# Patient Record
Sex: MALE | Race: WHITE | Employment: OTHER | URBAN - METROPOLITAN AREA
[De-identification: names, ages, dates, MRNs, and addresses within clinical notes are randomized per-mention and may not be internally consistent; named-entity substitution may affect disease eponyms.]

---

## 2017-05-03 PROBLEM — I82.409 DVT (DEEP VENOUS THROMBOSIS) (HCC): Status: ACTIVE | Noted: 2017-05-03

## 2017-05-03 PROBLEM — I63.9 CVA (CEREBRAL VASCULAR ACCIDENT) (HCC): Status: ACTIVE | Noted: 2017-05-03

## 2017-05-03 PROBLEM — I26.99 PULMONARY EMBOLISM (HCC): Status: ACTIVE | Noted: 2017-05-03

## 2017-05-03 PROBLEM — R13.10 DYSPHAGIA: Status: ACTIVE | Noted: 2017-05-03

## 2017-05-23 ENCOUNTER — HOSPITAL ENCOUNTER (OUTPATIENT)
Dept: OCCUPATIONAL THERAPY | Age: 62
Discharge: HOME | End: 2017-05-31
Admitting: PHYSICAL MEDICINE & REHABILITATION

## 2017-05-23 ASSESSMENT — 9 HOLE PEG TEST
TEST_RESULT: IMPAIRED
TESTTIME_SECONDS: 52
TESTTIME_SECONDS: 66
TEST_RESULT: IMPAIRED

## 2017-05-25 ENCOUNTER — HOSPITAL ENCOUNTER (OUTPATIENT)
Dept: SPEECH THERAPY | Age: 62
Discharge: HOME | End: 2017-05-31
Admitting: PHYSICAL MEDICINE & REHABILITATION

## 2017-05-25 ENCOUNTER — HOSPITAL ENCOUNTER (OUTPATIENT)
Dept: PHYSICAL THERAPY | Age: 62
Discharge: HOME | End: 2017-05-31
Admitting: PHYSICAL MEDICINE & REHABILITATION

## 2017-06-06 ENCOUNTER — TELEPHONE (OUTPATIENT)
Dept: FAMILY MEDICINE CLINIC | Age: 62
End: 2017-06-06

## 2017-06-06 ENCOUNTER — OFFICE VISIT (OUTPATIENT)
Dept: FAMILY MEDICINE CLINIC | Age: 62
End: 2017-06-06

## 2017-06-06 VITALS
DIASTOLIC BLOOD PRESSURE: 94 MMHG | HEART RATE: 78 BPM | BODY MASS INDEX: 39.37 KG/M2 | OXYGEN SATURATION: 96 % | SYSTOLIC BLOOD PRESSURE: 130 MMHG | WEIGHT: 315 LBS

## 2017-06-06 DIAGNOSIS — I63.9 CEREBROVASCULAR ACCIDENT (CVA), UNSPECIFIED MECHANISM (HCC): Primary | ICD-10-CM

## 2017-06-06 DIAGNOSIS — I26.99 OTHER ACUTE PULMONARY EMBOLISM WITHOUT ACUTE COR PULMONALE (HCC): ICD-10-CM

## 2017-06-06 DIAGNOSIS — I73.9 PERIPHERAL VASCULAR DISEASE (HCC): ICD-10-CM

## 2017-06-06 DIAGNOSIS — Z79.01 LONG TERM CURRENT USE OF ANTICOAGULANT: ICD-10-CM

## 2017-06-06 DIAGNOSIS — E11.9 TYPE 2 DIABETES MELLITUS WITHOUT COMPLICATION, WITHOUT LONG-TERM CURRENT USE OF INSULIN (HCC): ICD-10-CM

## 2017-06-06 DIAGNOSIS — R13.0 APHAGIA: ICD-10-CM

## 2017-06-06 DIAGNOSIS — I82.409 ACUTE DEEP VEIN THROMBOSIS (DVT) OF LOWER EXTREMITY, UNSPECIFIED LATERALITY, UNSPECIFIED VEIN (HCC): ICD-10-CM

## 2017-06-06 LAB
INTERNATIONAL NORMALIZATION RATIO, POC: 1.2
PROTHROMBIN TIME, POC: NORMAL

## 2017-06-06 PROCEDURE — 99204 OFFICE O/P NEW MOD 45 MIN: CPT | Performed by: PHYSICIAN ASSISTANT

## 2017-06-06 PROCEDURE — 85610 PROTHROMBIN TIME: CPT | Performed by: PHYSICIAN ASSISTANT

## 2017-06-06 ASSESSMENT — ENCOUNTER SYMPTOMS
CONSTIPATION: 0
SORE THROAT: 0
BACK PAIN: 0
TROUBLE SWALLOWING: 0
CHEST TIGHTNESS: 0
COUGH: 0
DIARRHEA: 0
VOICE CHANGE: 0
EYE PAIN: 0
SHORTNESS OF BREATH: 1
ABDOMINAL PAIN: 0

## 2017-06-07 ENCOUNTER — HOSPITAL ENCOUNTER (OUTPATIENT)
Dept: PHYSICAL THERAPY | Age: 62
Discharge: HOME | End: 2017-06-07
Admitting: PHYSICAL MEDICINE & REHABILITATION

## 2017-06-07 ENCOUNTER — TELEPHONE (OUTPATIENT)
Dept: FAMILY MEDICINE CLINIC | Age: 62
End: 2017-06-07

## 2017-06-13 ENCOUNTER — TELEPHONE (OUTPATIENT)
Dept: FAMILY MEDICINE CLINIC | Age: 62
End: 2017-06-13

## 2017-06-13 RX ORDER — WARFARIN SODIUM 4 MG/1
TABLET ORAL
Qty: 60 TABLET | Refills: 5 | Status: SHIPPED | OUTPATIENT
Start: 2017-06-13 | End: 2017-09-27 | Stop reason: SDUPTHER

## 2017-06-14 ENCOUNTER — TELEPHONE (OUTPATIENT)
Dept: FAMILY MEDICINE CLINIC | Age: 62
End: 2017-06-14

## 2017-06-14 ENCOUNTER — HOSPITAL ENCOUNTER (OUTPATIENT)
Dept: PHYSICAL THERAPY | Age: 62
Discharge: HOME | End: 2017-06-14
Admitting: PHYSICAL MEDICINE & REHABILITATION

## 2017-06-15 ENCOUNTER — ANTI-COAG VISIT (OUTPATIENT)
Dept: FAMILY MEDICINE CLINIC | Age: 62
End: 2017-06-15

## 2017-06-15 RX ORDER — BLOOD-GLUCOSE METER
1 KIT MISCELLANEOUS DAILY PRN
Qty: 1 KIT | Refills: 0 | Status: SHIPPED | OUTPATIENT
Start: 2017-06-15 | End: 2017-12-21

## 2017-06-15 RX ORDER — LANCETS 28 GAUGE
1 EACH MISCELLANEOUS DAILY
Qty: 100 EACH | Refills: 3 | Status: SHIPPED | OUTPATIENT
Start: 2017-06-15 | End: 2017-12-21 | Stop reason: CLARIF

## 2017-06-21 ENCOUNTER — OFFICE VISIT (OUTPATIENT)
Dept: FAMILY MEDICINE CLINIC | Age: 62
End: 2017-06-21

## 2017-06-21 VITALS
BODY MASS INDEX: 38.75 KG/M2 | DIASTOLIC BLOOD PRESSURE: 80 MMHG | WEIGHT: 310 LBS | OXYGEN SATURATION: 94 % | SYSTOLIC BLOOD PRESSURE: 110 MMHG | HEART RATE: 77 BPM

## 2017-06-21 DIAGNOSIS — I63.9 CEREBROVASCULAR ACCIDENT (CVA), UNSPECIFIED MECHANISM (HCC): ICD-10-CM

## 2017-06-21 DIAGNOSIS — Z79.01 LONG TERM (CURRENT) USE OF ANTICOAGULANTS: ICD-10-CM

## 2017-06-21 DIAGNOSIS — I26.99 OTHER ACUTE PULMONARY EMBOLISM WITHOUT ACUTE COR PULMONALE (HCC): ICD-10-CM

## 2017-06-21 DIAGNOSIS — E78.2 MIXED HYPERLIPIDEMIA: ICD-10-CM

## 2017-06-21 DIAGNOSIS — I10 ESSENTIAL HYPERTENSION: ICD-10-CM

## 2017-06-21 LAB
INTERNATIONAL NORMALIZATION RATIO, POC: 1.3
PROTHROMBIN TIME, POC: NORMAL

## 2017-06-21 PROCEDURE — 85610 PROTHROMBIN TIME: CPT | Performed by: PHYSICIAN ASSISTANT

## 2017-06-21 PROCEDURE — 99214 OFFICE O/P EST MOD 30 MIN: CPT | Performed by: PHYSICIAN ASSISTANT

## 2017-06-21 ASSESSMENT — ENCOUNTER SYMPTOMS
ABDOMINAL PAIN: 0
COUGH: 0
BACK PAIN: 0
SHORTNESS OF BREATH: 0

## 2017-07-01 ENCOUNTER — HOSPITAL ENCOUNTER (OUTPATIENT)
Dept: OTHER | Age: 62
Discharge: HOME | End: 2017-07-31
Attending: PHYSICAL MEDICINE & REHABILITATION | Admitting: PHYSICAL MEDICINE & REHABILITATION

## 2017-07-01 ENCOUNTER — HOSPITAL ENCOUNTER (OUTPATIENT)
Dept: OTHER | Age: 62
Discharge: HOME | End: 2017-07-21
Attending: PHYSICAL MEDICINE & REHABILITATION | Admitting: PHYSICAL MEDICINE & REHABILITATION

## 2017-07-05 ENCOUNTER — TELEPHONE (OUTPATIENT)
Dept: FAMILY MEDICINE CLINIC | Age: 62
End: 2017-07-05

## 2017-07-06 RX ORDER — ATORVASTATIN CALCIUM 40 MG/1
40 TABLET, FILM COATED ORAL NIGHTLY
Qty: 30 TABLET | Refills: 3 | Status: SHIPPED | OUTPATIENT
Start: 2017-07-06 | End: 2017-12-19 | Stop reason: SDUPTHER

## 2017-07-07 ENCOUNTER — TELEPHONE (OUTPATIENT)
Dept: FAMILY MEDICINE CLINIC | Age: 62
End: 2017-07-07

## 2017-07-11 ENCOUNTER — ANTI-COAG VISIT (OUTPATIENT)
Dept: FAMILY MEDICINE CLINIC | Age: 62
End: 2017-07-11

## 2017-07-11 RX ORDER — WARFARIN SODIUM 5 MG/1
TABLET ORAL
Qty: 100 TABLET | Refills: 3 | Status: SHIPPED | OUTPATIENT
Start: 2017-07-11 | End: 2017-08-01 | Stop reason: SDUPTHER

## 2017-07-17 ENCOUNTER — HOSPITAL ENCOUNTER (OUTPATIENT)
Dept: DIABETES SERVICES | Age: 62
Discharge: HOME | End: 2017-07-31
Admitting: PHYSICIAN ASSISTANT

## 2017-07-17 DIAGNOSIS — E11.9 TYPE 2 DIABETES MELLITUS WITHOUT COMPLICATIONS (HCC): ICD-10-CM

## 2017-07-17 ASSESSMENT — PATIENT HEALTH QUESTIONNAIRE - PHQ9
1. LITTLE INTEREST OR PLEASURE IN DOING THINGS: 0
SUM OF ALL RESPONSES TO PHQ9 QUESTIONS 1 & 2: 0
2. FEELING DOWN, DEPRESSED OR HOPELESS: 0
SUM OF ALL RESPONSES TO PHQ QUESTIONS 1-9: 0

## 2017-07-20 ENCOUNTER — ANTI-COAG VISIT (OUTPATIENT)
Dept: FAMILY MEDICINE CLINIC | Age: 62
End: 2017-07-20

## 2017-07-21 ENCOUNTER — OFFICE VISIT (OUTPATIENT)
Dept: FAMILY MEDICINE CLINIC | Age: 62
End: 2017-07-21

## 2017-07-21 VITALS
SYSTOLIC BLOOD PRESSURE: 120 MMHG | BODY MASS INDEX: 37.77 KG/M2 | WEIGHT: 302.2 LBS | DIASTOLIC BLOOD PRESSURE: 78 MMHG | HEART RATE: 60 BPM | OXYGEN SATURATION: 96 %

## 2017-07-21 DIAGNOSIS — Z79.01 LONG TERM (CURRENT) USE OF ANTICOAGULANTS: ICD-10-CM

## 2017-07-21 DIAGNOSIS — I26.99 OTHER ACUTE PULMONARY EMBOLISM WITHOUT ACUTE COR PULMONALE (HCC): ICD-10-CM

## 2017-07-21 DIAGNOSIS — I63.9 CEREBROVASCULAR ACCIDENT (CVA), UNSPECIFIED MECHANISM (HCC): ICD-10-CM

## 2017-07-21 PROBLEM — R13.10 DYSPHAGIA: Status: RESOLVED | Noted: 2017-05-03 | Resolved: 2017-07-21

## 2017-07-21 PROCEDURE — 99214 OFFICE O/P EST MOD 30 MIN: CPT | Performed by: PHYSICIAN ASSISTANT

## 2017-07-21 ASSESSMENT — ENCOUNTER SYMPTOMS
BACK PAIN: 0
COUGH: 0
SHORTNESS OF BREATH: 0
ABDOMINAL PAIN: 0

## 2017-07-25 ENCOUNTER — OFFICE VISIT (OUTPATIENT)
Dept: NEUROLOGY | Age: 62
End: 2017-07-25

## 2017-07-25 VITALS
BODY MASS INDEX: 38.5 KG/M2 | WEIGHT: 300 LBS | HEIGHT: 74 IN | SYSTOLIC BLOOD PRESSURE: 144 MMHG | HEART RATE: 70 BPM | DIASTOLIC BLOOD PRESSURE: 86 MMHG

## 2017-07-25 DIAGNOSIS — H53.461 RIGHT HOMONYMOUS HEMIANOPSIA: Primary | ICD-10-CM

## 2017-07-25 DIAGNOSIS — I69.351 HEMIPARESIS AFFECTING RIGHT SIDE AS LATE EFFECT OF CEREBROVASCULAR ACCIDENT (HCC): ICD-10-CM

## 2017-07-25 DIAGNOSIS — I65.22 INTERNAL CAROTID ARTERY OCCLUSION, LEFT: ICD-10-CM

## 2017-07-25 PROCEDURE — 99245 OFF/OP CONSLTJ NEW/EST HI 55: CPT | Performed by: PSYCHIATRY & NEUROLOGY

## 2017-08-01 ENCOUNTER — TELEPHONE (OUTPATIENT)
Dept: FAMILY MEDICINE CLINIC | Age: 62
End: 2017-08-01

## 2017-08-01 RX ORDER — WARFARIN SODIUM 5 MG/1
TABLET ORAL
Qty: 100 TABLET | Refills: 3 | Status: SHIPPED | OUTPATIENT
Start: 2017-08-01 | End: 2017-11-21 | Stop reason: SDUPTHER

## 2017-08-07 ENCOUNTER — HOSPITAL ENCOUNTER (OUTPATIENT)
Dept: OTHER | Age: 62
Discharge: HOME | End: 2017-08-07
Attending: PHYSICIAN ASSISTANT | Admitting: PHYSICIAN ASSISTANT

## 2017-08-08 ENCOUNTER — ANTI-COAG VISIT (OUTPATIENT)
Dept: FAMILY MEDICINE CLINIC | Age: 62
End: 2017-08-08

## 2017-08-08 LAB
ESTIMATED AVERAGE GLUCOSE: 125.5 MG/DL
HBA1C MFR BLD: 6 %

## 2017-08-11 ENCOUNTER — OFFICE VISIT (OUTPATIENT)
Dept: VASCULAR SURGERY | Age: 62
End: 2017-08-11

## 2017-08-11 VITALS
SYSTOLIC BLOOD PRESSURE: 136 MMHG | DIASTOLIC BLOOD PRESSURE: 64 MMHG | HEIGHT: 74 IN | BODY MASS INDEX: 38.12 KG/M2 | WEIGHT: 297 LBS

## 2017-08-11 DIAGNOSIS — I69.351 HEMIPARESIS AFFECTING RIGHT SIDE AS LATE EFFECT OF CEREBROVASCULAR ACCIDENT (HCC): Primary | ICD-10-CM

## 2017-08-11 PROCEDURE — 99203 OFFICE O/P NEW LOW 30 MIN: CPT | Performed by: SURGERY

## 2017-08-30 ENCOUNTER — ANTI-COAG VISIT (OUTPATIENT)
Dept: FAMILY MEDICINE CLINIC | Age: 62
End: 2017-08-30

## 2017-09-01 ENCOUNTER — HOSPITAL ENCOUNTER (OUTPATIENT)
Dept: OTHER | Age: 62
Discharge: HOME | End: 2017-09-30
Attending: PHYSICAL MEDICINE & REHABILITATION | Admitting: PHYSICAL MEDICINE & REHABILITATION

## 2017-09-25 ENCOUNTER — TELEPHONE (OUTPATIENT)
Dept: FAMILY MEDICINE CLINIC | Age: 62
End: 2017-09-25

## 2017-09-27 ENCOUNTER — TELEPHONE (OUTPATIENT)
Dept: FAMILY MEDICINE CLINIC | Age: 62
End: 2017-09-27

## 2017-09-27 RX ORDER — LISINOPRIL 40 MG/1
40 TABLET ORAL DAILY
Qty: 30 TABLET | Refills: 3 | Status: SHIPPED | OUTPATIENT
Start: 2017-09-27 | End: 2018-01-22 | Stop reason: SDUPTHER

## 2017-09-27 RX ORDER — AMLODIPINE BESYLATE 5 MG/1
5 TABLET ORAL DAILY
Qty: 30 TABLET | Refills: 3 | Status: SHIPPED | OUTPATIENT
Start: 2017-09-27 | End: 2018-01-22 | Stop reason: SDUPTHER

## 2017-09-27 NOTE — TELEPHONE ENCOUNTER
amLODIPine (NORVASC) 5 MG tablet 30 tablet 3 5/17/2017       Sig - Route: Take 1 tablet by mouth daily - Oral           lisinopril (PRINIVIL;ZESTRIL) 40 MG tablet 30 tablet 3 5/17/2017       Sig - Route:  Take 1 tablet by mouth daily - Oral         [facility] in chart

## 2017-10-20 ENCOUNTER — OFFICE VISIT (OUTPATIENT)
Dept: FAMILY MEDICINE CLINIC | Age: 62
End: 2017-10-20

## 2017-10-20 VITALS
WEIGHT: 281 LBS | SYSTOLIC BLOOD PRESSURE: 130 MMHG | OXYGEN SATURATION: 96 % | HEIGHT: 74 IN | BODY MASS INDEX: 36.06 KG/M2 | DIASTOLIC BLOOD PRESSURE: 70 MMHG | HEART RATE: 68 BPM

## 2017-10-20 DIAGNOSIS — I10 ESSENTIAL HYPERTENSION: ICD-10-CM

## 2017-10-20 DIAGNOSIS — I63.9 CEREBROVASCULAR ACCIDENT (CVA), UNSPECIFIED MECHANISM (HCC): ICD-10-CM

## 2017-10-20 DIAGNOSIS — E78.2 MIXED HYPERLIPIDEMIA: Primary | ICD-10-CM

## 2017-10-20 DIAGNOSIS — E11.42 DIABETIC POLYNEUROPATHY ASSOCIATED WITH TYPE 2 DIABETES MELLITUS (HCC): ICD-10-CM

## 2017-10-20 PROCEDURE — G8427 DOCREV CUR MEDS BY ELIG CLIN: HCPCS | Performed by: PHYSICIAN ASSISTANT

## 2017-10-20 PROCEDURE — 3046F HEMOGLOBIN A1C LEVEL >9.0%: CPT | Performed by: PHYSICIAN ASSISTANT

## 2017-10-20 PROCEDURE — G8598 ASA/ANTIPLAT THER USED: HCPCS | Performed by: PHYSICIAN ASSISTANT

## 2017-10-20 PROCEDURE — 3017F COLORECTAL CA SCREEN DOC REV: CPT | Performed by: PHYSICIAN ASSISTANT

## 2017-10-20 PROCEDURE — 1036F TOBACCO NON-USER: CPT | Performed by: PHYSICIAN ASSISTANT

## 2017-10-20 PROCEDURE — 99214 OFFICE O/P EST MOD 30 MIN: CPT | Performed by: PHYSICIAN ASSISTANT

## 2017-10-20 PROCEDURE — G8417 CALC BMI ABV UP PARAM F/U: HCPCS | Performed by: PHYSICIAN ASSISTANT

## 2017-10-20 PROCEDURE — G8484 FLU IMMUNIZE NO ADMIN: HCPCS | Performed by: PHYSICIAN ASSISTANT

## 2017-10-20 RX ORDER — GABAPENTIN 300 MG/1
300 CAPSULE ORAL 3 TIMES DAILY
Qty: 90 CAPSULE | Refills: 3 | Status: SHIPPED | OUTPATIENT
Start: 2017-10-20 | End: 2018-01-09 | Stop reason: CLARIF

## 2017-10-20 ASSESSMENT — ENCOUNTER SYMPTOMS
COUGH: 0
TROUBLE SWALLOWING: 0
SHORTNESS OF BREATH: 0
CONSTIPATION: 0
ABDOMINAL PAIN: 0
BACK PAIN: 0
VOICE CHANGE: 0
EYE PAIN: 0
DIARRHEA: 0
CHEST TIGHTNESS: 0
SORE THROAT: 0

## 2017-10-20 NOTE — PROGRESS NOTES
Subjective:      Patient ID: Kelton Shabazz is a 63 y.o. male. HPI  Patient is here today for his 3 month follow up for his diabetes and HTN. He had a stroke back in the spring. He has been progressing well as far as physical therapy and OT. He was discharged due to meeting his goals. He refused to continue speech therapy. He has trouble understanding the therapist. His wife works with him at home. He is doing pretty well otherwise. His wife keeps a really close eye on his diet. He rarely uses his cane anymore unless he will be on uneven ground. Blood sugars are running good. FBS . BP Readings from Last 2 Encounters:   10/20/17 130/70   08/11/17 136/64     Hemoglobin A1C (%)   Date Value   08/07/2017 6.0              Tobacco use:  Patient  reports that he quit smoking about 5 months ago. His smoking use included Cigarettes. He smoked 1.00 pack per day. He quit smokeless tobacco use about 6 months ago. If Smoker - Cessation materials given? NA, not since April of 2017    Is Daily aspirin on medication list?:  No, coumadin    Diabetic retinal exam done? No   If yes, document in Health Maintenance. Monofilament placed on counter? yes    Shoes and socks removed? Yes    BP taken with correct size cuff? Yes   Repeated if > 140/90 NA     Is patient taking any medications for diabetes    Yes   If yes, see medication list    Is the patient reporting any side effects of diabetic medications? No    Microalbumin performed if applicable?   Yes      Is patient taking any over the counter medications    No   If yes, see medication list        Sodium (mmol/L)   Date Value   05/18/2017 145    BUN (mg/dL)   Date Value   05/18/2017 10    Glucose (mg/dL)   Date Value   05/18/2017 121 (H)      Potassium (mmol/L)   Date Value   05/18/2017 3.9    CREATININE (mg/dL)   Date Value   05/18/2017 0.7 (L)           BP Readings from Last 2 Encounters:   10/20/17 130/70   08/11/17 136/64       Is patient murmur heard. Pulses:       Dorsalis pedis pulses are 2+ on the right side, and 2+ on the left side. Pulmonary/Chest: Effort normal and breath sounds normal. No respiratory distress. He has no decreased breath sounds. Abdominal: Soft. Normal appearance and bowel sounds are normal. He exhibits no distension and no mass. There is no hepatosplenomegaly. There is no tenderness. There is no rigidity, no rebound, no guarding and no CVA tenderness. No hernia. Musculoskeletal: Normal range of motion. He exhibits no edema. Lymphadenopathy:     He has no cervical adenopathy. Neurological: He is alert and oriented to person, place, and time. He has normal reflexes. 12 point monofillament: can feel most but not some toes and some metatarsal heads but they are random   Skin:   Lower legs with venous insufficiency       Assessment:      Shaquille [place] was seen today for diabetes. Diagnoses and all orders for this visit:    Mixed hyperlipidemia    Uncontrolled type 2 diabetes mellitus without complication, without long-term current use of insulin ([address]  -      DIABETES FOOT EXAM    Essential hypertension    Cerebrovascular accident (CVA), unspecified mechanism ([address]    Diabetic polyneuropathy associated with type 2 diabetes mellitus ([address]  -     gabapentin (NEURONTIN) 300 MG capsule; Take 1 capsule by mouth 3 times daily    Other orders  -     Cancel: INFLUENZA, QUADV, 18-64 YRS, ID, PF, MICRO INJ, 0.1ML (FLUZONE QUADV, PF)             Plan:      Get flu shot at a pharmacy, get labs in 3 weeks, start neurontin, he did not want to but will try it, get eye exam, return in 3 months, all stable currently.

## 2017-10-27 ENCOUNTER — OFFICE VISIT (OUTPATIENT)
Dept: VASCULAR SURGERY | Age: 62
End: 2017-10-27

## 2017-10-27 VITALS
DIASTOLIC BLOOD PRESSURE: 70 MMHG | HEIGHT: 74 IN | WEIGHT: 281 LBS | SYSTOLIC BLOOD PRESSURE: 130 MMHG | BODY MASS INDEX: 36.06 KG/M2

## 2017-10-27 DIAGNOSIS — I82.409 ACUTE DEEP VEIN THROMBOSIS (DVT) OF LOWER EXTREMITY, UNSPECIFIED LATERALITY, UNSPECIFIED VEIN (HCC): Primary | ICD-10-CM

## 2017-10-27 PROCEDURE — G8427 DOCREV CUR MEDS BY ELIG CLIN: HCPCS | Performed by: SURGERY

## 2017-10-27 PROCEDURE — G8598 ASA/ANTIPLAT THER USED: HCPCS | Performed by: SURGERY

## 2017-10-27 PROCEDURE — 1036F TOBACCO NON-USER: CPT | Performed by: SURGERY

## 2017-10-27 PROCEDURE — G8417 CALC BMI ABV UP PARAM F/U: HCPCS | Performed by: SURGERY

## 2017-10-27 PROCEDURE — G8484 FLU IMMUNIZE NO ADMIN: HCPCS | Performed by: SURGERY

## 2017-10-27 PROCEDURE — 3017F COLORECTAL CA SCREEN DOC REV: CPT | Performed by: SURGERY

## 2017-10-27 PROCEDURE — 99212 OFFICE O/P EST SF 10 MIN: CPT | Performed by: SURGERY

## 2017-11-01 ENCOUNTER — HOSPITAL ENCOUNTER (OUTPATIENT)
Dept: OTHER | Age: 62
Discharge: HOME | End: 2017-11-30
Attending: PHYSICIAN ASSISTANT | Admitting: PHYSICIAN ASSISTANT

## 2017-11-01 ENCOUNTER — HOSPITAL ENCOUNTER (OUTPATIENT)
Dept: OTHER | Age: 62
Discharge: HOME | End: 2017-11-30
Attending: PHYSICAL MEDICINE & REHABILITATION | Admitting: PHYSICAL MEDICINE & REHABILITATION

## 2017-11-17 ENCOUNTER — HOSPITAL ENCOUNTER (OUTPATIENT)
Dept: OTHER | Age: 62
Discharge: HOME | End: 2017-11-17
Attending: PHYSICIAN ASSISTANT | Admitting: PHYSICIAN ASSISTANT

## 2017-11-17 LAB
A/G RATIO: 1.6 (ref 1.1–2.2)
ALBUMIN SERPL-MCNC: 4.3 G/DL (ref 3.4–5)
ALP BLD-CCNC: 71 U/L (ref 40–129)
ALT SERPL-CCNC: 17 U/L (ref 10–40)
ANION GAP SERPL CALCULATED.3IONS-SCNC: 16 MMOL/L (ref 3–16)
AST SERPL-CCNC: 16 U/L (ref 15–37)
BILIRUB SERPL-MCNC: 0.8 MG/DL (ref 0–1)
BUN BLDV-MCNC: 16 MG/DL (ref 7–20)
CALCIUM SERPL-MCNC: 9.4 MG/DL (ref 8.3–10.6)
CHLORIDE BLD-SCNC: 103 MMOL/L (ref 99–110)
CHOLESTEROL, TOTAL: 111 MG/DL (ref 0–199)
CO2: 28 MMOL/L (ref 21–32)
CREAT SERPL-MCNC: 0.7 MG/DL (ref 0.8–1.3)
GFR AFRICAN AMERICAN: >60
GFR NON-AFRICAN AMERICAN: >60
GLOBULIN: 2.7 G/DL
GLUCOSE BLD-MCNC: 115 MG/DL (ref 70–99)
HDLC SERPL-MCNC: 44 MG/DL (ref 40–60)
HEPATITIS C ANTIBODY INTERPRETATION: NORMAL
INR BLD: 2.31 (ref 0.85–1.15)
LDL CHOLESTEROL CALCULATED: 41 MG/DL
POTASSIUM SERPL-SCNC: 3.7 MMOL/L (ref 3.5–5.1)
PROSTATE SPECIFIC ANTIGEN: 1.12 NG/ML (ref 0–4)
PROTHROMBIN TIME: 26.1 SEC (ref 9.6–13)
SODIUM BLD-SCNC: 147 MMOL/L (ref 136–145)
TOTAL PROTEIN: 7 G/DL (ref 6.4–8.2)
TRIGL SERPL-MCNC: 128 MG/DL (ref 0–150)
VLDLC SERPL CALC-MCNC: 26 MG/DL

## 2017-11-18 ENCOUNTER — HOSPITAL ENCOUNTER (OUTPATIENT)
Dept: OTHER | Age: 62
Discharge: HOME | End: 2017-11-18
Attending: PHYSICIAN ASSISTANT | Admitting: PHYSICIAN ASSISTANT

## 2017-11-18 LAB
CREATININE URINE: 265.1 MG/DL (ref 39–259)
ESTIMATED AVERAGE GLUCOSE: 119.8 MG/DL
HBA1C MFR BLD: 5.8 %
MICROALBUMIN UR-MCNC: 4.2 MG/DL
MICROALBUMIN/CREAT UR-RTO: 15.8 MG/G (ref 0–30)

## 2017-11-20 LAB — HIV-1 AND HIV-2 ANTIBODIES: NORMAL

## 2017-11-21 ENCOUNTER — TELEPHONE (OUTPATIENT)
Dept: FAMILY MEDICINE CLINIC | Age: 62
End: 2017-11-21

## 2017-11-21 RX ORDER — WARFARIN SODIUM 5 MG/1
TABLET ORAL
Qty: 100 TABLET | Refills: 3 | Status: SHIPPED | OUTPATIENT
Start: 2017-11-21 | End: 2018-06-28 | Stop reason: SDUPTHER

## 2017-11-21 NOTE — TELEPHONE ENCOUNTER
Patient's wife requesting refills      metFORMIN (GLUCOPHAGE) 500 MG tablet 60 tablet 3 7/6/2017     Sig - Route:  Take 1 tablet by mouth 2 times daily (with meals) - Oral          warfarin (COUMADIN) 5 MG tablet      [address] in chart    Patient has stopped taking gabapentin (NEURONTIN) 300 MG capsule  Because it hurt his feet

## 2017-12-01 ENCOUNTER — HOSPITAL ENCOUNTER (OUTPATIENT)
Dept: OTHER | Age: 62
Discharge: HOME | End: 2017-12-31
Attending: PHYSICAL MEDICINE & REHABILITATION | Admitting: PHYSICAL MEDICINE & REHABILITATION

## 2017-12-19 ENCOUNTER — TELEPHONE (OUTPATIENT)
Dept: FAMILY MEDICINE CLINIC | Age: 62
End: 2017-12-19

## 2017-12-19 DIAGNOSIS — E78.2 MIXED HYPERLIPIDEMIA: Primary | ICD-10-CM

## 2017-12-19 RX ORDER — ATORVASTATIN CALCIUM 40 MG/1
40 TABLET, FILM COATED ORAL NIGHTLY
Qty: 30 TABLET | Refills: 3 | Status: SHIPPED | OUTPATIENT
Start: 2017-12-19 | End: 2018-05-01 | Stop reason: SDUPTHER

## 2017-12-21 ENCOUNTER — TELEPHONE (OUTPATIENT)
Dept: FAMILY MEDICINE CLINIC | Age: 62
End: 2017-12-21

## 2017-12-21 LAB
INR BLD: 2.99 (ref 0.85–1.15)
PROTHROMBIN TIME: 33.8 SEC (ref 9.6–13)

## 2017-12-21 RX ORDER — GLUCOSAMINE HCL/CHONDROITIN SU 500-400 MG
1 CAPSULE ORAL DAILY
Qty: 100 STRIP | Refills: 3 | Status: SHIPPED | OUTPATIENT
Start: 2017-12-21 | End: 2019-01-15 | Stop reason: SDUPTHER

## 2017-12-21 RX ORDER — LANCETS 30 GAUGE
EACH MISCELLANEOUS
Qty: 100 EACH | Refills: 3 | Status: SHIPPED | OUTPATIENT
Start: 2017-12-21 | End: 2019-01-15 | Stop reason: SDUPTHER

## 2017-12-21 NOTE — TELEPHONE ENCOUNTER
Pt's insurance changed formulary and he now needs a new glucometer    Please send a new script for a [employer] meter & supplies to     [facility]

## 2018-01-01 ENCOUNTER — HOSPITAL ENCOUNTER (OUTPATIENT)
Dept: OTHER | Age: 63
Discharge: HOME | End: 2018-01-31
Attending: PHYSICIAN ASSISTANT | Admitting: PHYSICIAN ASSISTANT

## 2018-01-09 ENCOUNTER — OFFICE VISIT (OUTPATIENT)
Dept: NEUROLOGY | Age: 63
End: 2018-01-09

## 2018-01-09 VITALS
HEIGHT: 74 IN | HEART RATE: 58 BPM | BODY MASS INDEX: 35.94 KG/M2 | WEIGHT: 280 LBS | SYSTOLIC BLOOD PRESSURE: 119 MMHG | DIASTOLIC BLOOD PRESSURE: 75 MMHG

## 2018-01-09 DIAGNOSIS — I69.351 HEMIPARESIS AFFECTING RIGHT SIDE AS LATE EFFECT OF CEREBROVASCULAR ACCIDENT (HCC): Primary | ICD-10-CM

## 2018-01-09 DIAGNOSIS — H53.461 RIGHT HOMONYMOUS HEMIANOPSIA: ICD-10-CM

## 2018-01-09 PROCEDURE — 3017F COLORECTAL CA SCREEN DOC REV: CPT | Performed by: PSYCHIATRY & NEUROLOGY

## 2018-01-09 PROCEDURE — G8484 FLU IMMUNIZE NO ADMIN: HCPCS | Performed by: PSYCHIATRY & NEUROLOGY

## 2018-01-09 PROCEDURE — 99213 OFFICE O/P EST LOW 20 MIN: CPT | Performed by: PSYCHIATRY & NEUROLOGY

## 2018-01-09 PROCEDURE — G8598 ASA/ANTIPLAT THER USED: HCPCS | Performed by: PSYCHIATRY & NEUROLOGY

## 2018-01-09 PROCEDURE — G8417 CALC BMI ABV UP PARAM F/U: HCPCS | Performed by: PSYCHIATRY & NEUROLOGY

## 2018-01-09 PROCEDURE — 1036F TOBACCO NON-USER: CPT | Performed by: PSYCHIATRY & NEUROLOGY

## 2018-01-09 PROCEDURE — G8427 DOCREV CUR MEDS BY ELIG CLIN: HCPCS | Performed by: PSYCHIATRY & NEUROLOGY

## 2018-01-09 NOTE — PATIENT INSTRUCTIONS
Please call with any questions or concerns:   [facility]  @ [phone]. LAB RESULTS:  Please obtain any labs or diagnostic tests as discussed today. You may call the office to check the results. Please allow  3 to 7 days for us to get these results. MEDICATION LIST:  Please bring an accurate list of your medications to every visit. APPOINTMENT CONFIRMATION:  We will call you the day before your scheduled appointment to confirm. If we are unable to reach you, you MUST call back by the end of the day to confirm the appointment or we may be forced to cancel.

## 2018-01-18 LAB
INR BLD: 3.77 (ref 0.85–1.15)
PROTHROMBIN TIME: 42.6 SEC (ref 9.6–13)

## 2018-01-19 ENCOUNTER — OFFICE VISIT (OUTPATIENT)
Dept: FAMILY MEDICINE CLINIC | Age: 63
End: 2018-01-19

## 2018-01-19 VITALS
BODY MASS INDEX: 36.06 KG/M2 | WEIGHT: 281 LBS | SYSTOLIC BLOOD PRESSURE: 124 MMHG | HEART RATE: 64 BPM | HEIGHT: 74 IN | OXYGEN SATURATION: 96 % | DIASTOLIC BLOOD PRESSURE: 74 MMHG

## 2018-01-19 DIAGNOSIS — E78.2 MIXED HYPERLIPIDEMIA: ICD-10-CM

## 2018-01-19 DIAGNOSIS — I10 ESSENTIAL HYPERTENSION: Primary | ICD-10-CM

## 2018-01-19 DIAGNOSIS — I69.351 HEMIPARESIS AFFECTING RIGHT SIDE AS LATE EFFECT OF CEREBROVASCULAR ACCIDENT (HCC): ICD-10-CM

## 2018-01-19 DIAGNOSIS — E11.42 CONTROLLED TYPE 2 DIABETES MELLITUS WITH DIABETIC POLYNEUROPATHY, WITHOUT LONG-TERM CURRENT USE OF INSULIN (HCC): ICD-10-CM

## 2018-01-19 PROCEDURE — G8484 FLU IMMUNIZE NO ADMIN: HCPCS | Performed by: PHYSICIAN ASSISTANT

## 2018-01-19 PROCEDURE — 99214 OFFICE O/P EST MOD 30 MIN: CPT | Performed by: PHYSICIAN ASSISTANT

## 2018-01-19 PROCEDURE — G8598 ASA/ANTIPLAT THER USED: HCPCS | Performed by: PHYSICIAN ASSISTANT

## 2018-01-19 PROCEDURE — 3017F COLORECTAL CA SCREEN DOC REV: CPT | Performed by: PHYSICIAN ASSISTANT

## 2018-01-19 PROCEDURE — 1036F TOBACCO NON-USER: CPT | Performed by: PHYSICIAN ASSISTANT

## 2018-01-19 PROCEDURE — 3046F HEMOGLOBIN A1C LEVEL >9.0%: CPT | Performed by: PHYSICIAN ASSISTANT

## 2018-01-19 PROCEDURE — G8427 DOCREV CUR MEDS BY ELIG CLIN: HCPCS | Performed by: PHYSICIAN ASSISTANT

## 2018-01-19 PROCEDURE — G8417 CALC BMI ABV UP PARAM F/U: HCPCS | Performed by: PHYSICIAN ASSISTANT

## 2018-01-19 ASSESSMENT — ENCOUNTER SYMPTOMS
CONSTIPATION: 0
BACK PAIN: 0
SHORTNESS OF BREATH: 0
CHEST TIGHTNESS: 0
COUGH: 0
ABDOMINAL PAIN: 0
EYE PAIN: 0
TROUBLE SWALLOWING: 0
SORE THROAT: 0
DIARRHEA: 0
VOICE CHANGE: 0

## 2018-01-19 NOTE — PROGRESS NOTES
and visual disturbance. Respiratory: Negative for cough, chest tightness and shortness of breath. Cardiovascular: Negative for chest pain and palpitations. Gastrointestinal: Negative for abdominal pain, constipation and diarrhea. Genitourinary: Negative for difficulty urinating. Musculoskeletal: Negative for arthralgias, back pain, myalgias and neck pain. Skin: Negative for rash. Neurological: Positive for numbness (and pain in his feet). Negative for dizziness, speech difficulty, weakness and headaches. Hematological: Negative for adenopathy. Psychiatric/Behavioral: Negative for confusion and sleep disturbance. The patient is not nervous/anxious. Objective:   Physical Exam   Constitutional: He is oriented to person, place, and time. Vital signs are normal. He appears well-developed and well-nourished. He is cooperative. HENT:   Head: Normocephalic. Right Ear: Tympanic membrane and ear canal normal.   Left Ear: Tympanic membrane and ear canal normal.   Nose: Nose normal.   Mouth/Throat: Uvula is midline, oropharynx is clear and moist and mucous membranes are normal.   Eyes: Conjunctivae are normal. Pupils are equal, round, and reactive to light. Neck: Neck supple. No thyromegaly present. Cardiovascular: Normal rate, regular rhythm and normal heart sounds. No murmur heard. Pulses:       Dorsalis pedis pulses are 2+ on the right side, and 2+ on the left side. Pulmonary/Chest: Effort normal and breath sounds normal. No respiratory distress. He has no decreased breath sounds. Abdominal: Soft. Normal appearance and bowel sounds are normal. He exhibits no distension and no mass. There is no hepatosplenomegaly. There is no tenderness. There is no rigidity, no rebound, no guarding and no CVA tenderness. No hernia. Musculoskeletal: Normal range of motion. He exhibits no edema.    Good strength in upper and lower extremities, gait steady   Lymphadenopathy:     He has no cervical adenopathy. Neurological: He is alert and oriented to person, place, and time. He has normal reflexes. Skin: Skin is warm, dry and intact. Psychiatric: He has a normal mood and affect. Speech still not normal all the time, sometimes he comprehends and answers appropriately, sometimes not       Assessment:      Carlos Lyon was seen today for diabetes. Diagnoses and all orders for this visit:    Essential hypertension    Hemiparesis affecting right side as late effect of cerebrovascular accident [facility])    Mixed hyperlipidemia    Controlled type 2 diabetes mellitus with diabetic polyneuropathy, without long-term current use of insulin ([facility]             Plan:      All stable, get labs in a month, repeat protime in a week. Return in 6 months as long as labs are stable.

## 2018-01-22 ENCOUNTER — TELEPHONE (OUTPATIENT)
Dept: FAMILY MEDICINE CLINIC | Age: 63
End: 2018-01-22

## 2018-01-22 DIAGNOSIS — I10 ESSENTIAL HYPERTENSION: Primary | ICD-10-CM

## 2018-01-22 DIAGNOSIS — E78.2 MIXED HYPERLIPIDEMIA: ICD-10-CM

## 2018-01-22 RX ORDER — AMLODIPINE BESYLATE 5 MG/1
5 TABLET ORAL DAILY
Qty: 30 TABLET | Refills: 3 | Status: SHIPPED | OUTPATIENT
Start: 2018-01-22 | End: 2018-05-21 | Stop reason: SDUPTHER

## 2018-01-22 RX ORDER — LISINOPRIL 40 MG/1
40 TABLET ORAL DAILY
Qty: 30 TABLET | Refills: 3 | Status: SHIPPED | OUTPATIENT
Start: 2018-01-22 | End: 2018-05-21 | Stop reason: SDUPTHER

## 2018-01-22 NOTE — TELEPHONE ENCOUNTER
amLODIPine (NORVASC) 5 MG tablet 30 tablet 3 9/27/2017     Sig - Route: Take 1 tablet by mouth daily - Oral      lisinopril (PRINIVIL;ZESTRIL) 40 MG tablet 30 tablet 3 9/27/2017     Sig - Route:  Take 1 tablet by mouth daily - Oral      Pharmacy:  [facility], [address] [phone]   (in chart)

## 2018-01-25 ENCOUNTER — ANTI-COAG VISIT (OUTPATIENT)
Dept: FAMILY MEDICINE CLINIC | Age: 63
End: 2018-01-25

## 2018-01-25 LAB
INR BLD: 1.47 (ref 0.85–1.15)
PROTHROMBIN TIME: 16.6 SEC (ref 9.6–13)

## 2018-02-01 ENCOUNTER — HOSPITAL ENCOUNTER (OUTPATIENT)
Dept: OTHER | Age: 63
Discharge: HOME | End: 2018-02-28
Attending: PHYSICIAN ASSISTANT | Admitting: PHYSICIAN ASSISTANT

## 2018-02-01 LAB
INR BLD: 2.79 (ref 0.85–1.15)
PROTHROMBIN TIME: 31.5 SEC (ref 9.6–13)

## 2018-02-08 LAB
INR BLD: 2.63 (ref 0.85–1.15)
PROTHROMBIN TIME: 29.7 SEC (ref 9.6–13)

## 2018-02-16 ENCOUNTER — HOSPITAL ENCOUNTER (OUTPATIENT)
Dept: OTHER | Age: 63
Discharge: HOME | End: 2018-02-16
Attending: PHYSICIAN ASSISTANT | Admitting: PHYSICIAN ASSISTANT

## 2018-02-16 LAB
A/G RATIO: 1.6 (ref 1.1–2.2)
ALBUMIN SERPL-MCNC: 4.1 G/DL (ref 3.4–5)
ALP BLD-CCNC: 59 U/L (ref 40–129)
ALT SERPL-CCNC: 17 U/L (ref 10–40)
ANION GAP SERPL CALCULATED.3IONS-SCNC: 11 MMOL/L (ref 3–16)
AST SERPL-CCNC: 16 U/L (ref 15–37)
BILIRUB SERPL-MCNC: 0.9 MG/DL (ref 0–1)
BUN BLDV-MCNC: 18 MG/DL (ref 7–20)
CALCIUM SERPL-MCNC: 9 MG/DL (ref 8.3–10.6)
CHLORIDE BLD-SCNC: 101 MMOL/L (ref 99–110)
CHOLESTEROL, TOTAL: 106 MG/DL (ref 0–199)
CO2: 30 MMOL/L (ref 21–32)
CREAT SERPL-MCNC: 0.8 MG/DL (ref 0.8–1.3)
CREATININE URINE: 248.2 MG/DL (ref 39–259)
ESTIMATED AVERAGE GLUCOSE: 116.9 MG/DL
GFR AFRICAN AMERICAN: >60
GFR NON-AFRICAN AMERICAN: >60
GLOBULIN: 2.6 G/DL
GLUCOSE BLD-MCNC: 99 MG/DL (ref 70–99)
HBA1C MFR BLD: 5.7 %
HDLC SERPL-MCNC: 45 MG/DL (ref 40–60)
LDL CHOLESTEROL CALCULATED: 47 MG/DL
MICROALBUMIN UR-MCNC: 1.9 MG/DL
MICROALBUMIN/CREAT UR-RTO: 7.7 MG/G (ref 0–30)
POTASSIUM SERPL-SCNC: 3.8 MMOL/L (ref 3.5–5.1)
SODIUM BLD-SCNC: 142 MMOL/L (ref 136–145)
TOTAL PROTEIN: 6.7 G/DL (ref 6.4–8.2)
TRIGL SERPL-MCNC: 68 MG/DL (ref 0–150)
VLDLC SERPL CALC-MCNC: 14 MG/DL

## 2018-02-22 ENCOUNTER — ANTI-COAG VISIT (OUTPATIENT)
Dept: FAMILY MEDICINE CLINIC | Age: 63
End: 2018-02-22

## 2018-02-22 LAB
INR BLD: 2.51 (ref 0.85–1.15)
PROTHROMBIN TIME: 28.4 SEC (ref 9.6–13)

## 2018-03-01 ENCOUNTER — HOSPITAL ENCOUNTER (OUTPATIENT)
Dept: OTHER | Age: 63
Discharge: HOME | End: 2018-03-31
Attending: PHYSICIAN ASSISTANT | Admitting: PHYSICIAN ASSISTANT

## 2018-03-15 LAB
INR BLD: 2.57 (ref 0.85–1.15)
PROTHROMBIN TIME: 29 SEC (ref 9.6–13)

## 2018-03-21 ENCOUNTER — TELEPHONE (OUTPATIENT)
Dept: FAMILY MEDICINE CLINIC | Age: 63
End: 2018-03-21

## 2018-03-21 NOTE — TELEPHONE ENCOUNTER
metFORMIN (GLUCOPHAGE) 500 MG tablet 60 tablet 3 11/21/2017     Sig - Route: TAKE 1 TABLET BY MOUTH 2 TIMES DAILY (WITH MEALS) - [address] [facility] Elly Ellis [address] [place] [address] [phone]

## 2018-04-01 ENCOUNTER — HOSPITAL ENCOUNTER (OUTPATIENT)
Dept: OTHER | Age: 63
Discharge: HOME | End: 2018-04-30
Attending: PHYSICIAN ASSISTANT | Admitting: PHYSICIAN ASSISTANT

## 2018-04-10 LAB
INR BLD: 2.61 (ref 0.85–1.15)
PROTHROMBIN TIME: 29.5 SEC (ref 9.6–13)

## 2018-05-01 ENCOUNTER — HOSPITAL ENCOUNTER (OUTPATIENT)
Dept: OTHER | Age: 63
Discharge: HOME | End: 2018-05-31
Attending: PHYSICIAN ASSISTANT | Admitting: PHYSICIAN ASSISTANT

## 2018-05-01 DIAGNOSIS — E78.2 MIXED HYPERLIPIDEMIA: ICD-10-CM

## 2018-05-01 RX ORDER — ATORVASTATIN CALCIUM 40 MG/1
40 TABLET, FILM COATED ORAL NIGHTLY
Qty: 30 TABLET | Refills: 2 | Status: SHIPPED | OUTPATIENT
Start: 2018-05-01 | End: 2018-07-23 | Stop reason: SDUPTHER

## 2018-05-08 ENCOUNTER — ANTI-COAG VISIT (OUTPATIENT)
Dept: FAMILY MEDICINE CLINIC | Age: 63
End: 2018-05-08

## 2018-05-08 LAB
INR BLD: 2.84 (ref 0.85–1.15)
PROTHROMBIN TIME: 32.1 SEC (ref 9.6–13)

## 2018-05-21 ENCOUNTER — TELEPHONE (OUTPATIENT)
Dept: FAMILY MEDICINE CLINIC | Age: 63
End: 2018-05-21

## 2018-05-21 DIAGNOSIS — E78.2 MIXED HYPERLIPIDEMIA: ICD-10-CM

## 2018-05-21 DIAGNOSIS — I10 ESSENTIAL HYPERTENSION: ICD-10-CM

## 2018-05-21 RX ORDER — LISINOPRIL 40 MG/1
40 TABLET ORAL DAILY
Qty: 30 TABLET | Refills: 3 | Status: SHIPPED | OUTPATIENT
Start: 2018-05-21 | End: 2018-09-18 | Stop reason: SDUPTHER

## 2018-05-21 RX ORDER — AMLODIPINE BESYLATE 5 MG/1
5 TABLET ORAL DAILY
Qty: 30 TABLET | Refills: 3 | Status: SHIPPED | OUTPATIENT
Start: 2018-05-21 | End: 2018-09-18 | Stop reason: SDUPTHER

## 2018-06-01 ENCOUNTER — HOSPITAL ENCOUNTER (OUTPATIENT)
Dept: OTHER | Age: 63
Discharge: HOME | End: 2018-06-30
Attending: PHYSICIAN ASSISTANT | Admitting: PHYSICIAN ASSISTANT

## 2018-06-04 LAB
INR BLD: 2.02 (ref 0.86–1.14)
PROTHROMBIN TIME: 23 SEC (ref 9.8–13)

## 2018-06-12 LAB
INR BLD: 1.9 (ref 0.86–1.14)
PROTHROMBIN TIME: 21.7 SEC (ref 9.8–13)

## 2018-06-28 LAB
INR BLD: 2.12 (ref 0.86–1.14)
PROTHROMBIN TIME: 24.2 SEC (ref 9.8–13)

## 2018-06-28 RX ORDER — WARFARIN SODIUM 5 MG/1
TABLET ORAL
Qty: 100 TABLET | Refills: 3 | Status: SHIPPED | OUTPATIENT
Start: 2018-06-28 | End: 2019-02-15 | Stop reason: SDUPTHER

## 2018-07-01 ENCOUNTER — HOSPITAL ENCOUNTER (OUTPATIENT)
Dept: OTHER | Age: 63
Discharge: HOME | End: 2018-07-31
Attending: PHYSICIAN ASSISTANT | Admitting: PHYSICIAN ASSISTANT

## 2018-07-12 LAB
INR BLD: 2.31 (ref 0.86–1.14)
PROTHROMBIN TIME: 26.3 SEC (ref 9.8–13)

## 2018-07-13 ENCOUNTER — OFFICE VISIT (OUTPATIENT)
Dept: FAMILY MEDICINE CLINIC | Age: 63
End: 2018-07-13

## 2018-07-13 VITALS
SYSTOLIC BLOOD PRESSURE: 108 MMHG | OXYGEN SATURATION: 97 % | WEIGHT: 272 LBS | HEART RATE: 70 BPM | HEIGHT: 72 IN | BODY MASS INDEX: 36.84 KG/M2 | DIASTOLIC BLOOD PRESSURE: 64 MMHG

## 2018-07-13 DIAGNOSIS — R13.0 APHAGIA: ICD-10-CM

## 2018-07-13 DIAGNOSIS — E78.2 MIXED HYPERLIPIDEMIA: ICD-10-CM

## 2018-07-13 DIAGNOSIS — E11.42 CONTROLLED TYPE 2 DIABETES MELLITUS WITH DIABETIC POLYNEUROPATHY, WITHOUT LONG-TERM CURRENT USE OF INSULIN (HCC): Primary | ICD-10-CM

## 2018-07-13 DIAGNOSIS — I69.351 HEMIPARESIS AFFECTING RIGHT SIDE AS LATE EFFECT OF CEREBROVASCULAR ACCIDENT (HCC): ICD-10-CM

## 2018-07-13 DIAGNOSIS — I10 ESSENTIAL HYPERTENSION: ICD-10-CM

## 2018-07-13 PROCEDURE — 1036F TOBACCO NON-USER: CPT | Performed by: PHYSICIAN ASSISTANT

## 2018-07-13 PROCEDURE — G8417 CALC BMI ABV UP PARAM F/U: HCPCS | Performed by: PHYSICIAN ASSISTANT

## 2018-07-13 PROCEDURE — 2022F DILAT RTA XM EVC RTNOPTHY: CPT | Performed by: PHYSICIAN ASSISTANT

## 2018-07-13 PROCEDURE — G8598 ASA/ANTIPLAT THER USED: HCPCS | Performed by: PHYSICIAN ASSISTANT

## 2018-07-13 PROCEDURE — 99214 OFFICE O/P EST MOD 30 MIN: CPT | Performed by: PHYSICIAN ASSISTANT

## 2018-07-13 PROCEDURE — G8427 DOCREV CUR MEDS BY ELIG CLIN: HCPCS | Performed by: PHYSICIAN ASSISTANT

## 2018-07-13 PROCEDURE — 3017F COLORECTAL CA SCREEN DOC REV: CPT | Performed by: PHYSICIAN ASSISTANT

## 2018-07-13 PROCEDURE — 3044F HG A1C LEVEL LT 7.0%: CPT | Performed by: PHYSICIAN ASSISTANT

## 2018-07-13 ASSESSMENT — ENCOUNTER SYMPTOMS
ABDOMINAL PAIN: 0
EYE PAIN: 0
CONSTIPATION: 0
SORE THROAT: 0
TROUBLE SWALLOWING: 0
CHEST TIGHTNESS: 0
SHORTNESS OF BREATH: 0
DIARRHEA: 0
COUGH: 0
VOICE CHANGE: 0
BACK PAIN: 0

## 2018-07-13 NOTE — PROGRESS NOTES
Subjective:      Patient ID: Ruslan Mcnamara is a 63 y.o. male. HPI  Patient is here today for his 6 month follow up on his chronic conditions. He has no complaints today. He is still having trouble with his speech since his stroke. He was not making progress at therapy and just stopped going. Seems like he understands what is said to him but can't process it and say what he wants or what makes sense. BS are running great, . He is sleeping well. He has a good appetite and watching what he eats. He has no bowel/bladder issues. He is due for an eye exam.     He only took neurontin for a few days, feet hurt worse so he stopped it. Sodium (mmol/L)   Date Value   02/16/2018 142    BUN (mg/dL)   Date Value   02/16/2018 18    Glucose (mg/dL)   Date Value   02/16/2018 99      Potassium (mmol/L)   Date Value   02/16/2018 3.8    CREATININE (mg/dL)   Date Value   02/16/2018 0.8           BP Readings from Last 2 Encounters:   07/13/18 108/64   01/19/18 124/74       Is patient currently taking any antihypertensive medications? Yes   If yes, see med list as above    Is the patient reporting any side effects of antihypertensive medications? No    Is the patient taking any over the counter medications? Yes   If yes, see med list as above    Is the patient taking a daily aspirin? No, coumadin      BP Readings from Last 2 Encounters:   07/13/18 108/64   01/19/18 124/74     Hemoglobin A1C (%)   Date Value   02/16/2018 5.7     Microalbumin, Random Urine (mg/dL)   Date Value   02/16/2018 1.90     LDL Calculated (mg/dL)   Date Value   02/16/2018 47              Tobacco use:  Patient  reports that he quit smoking about 13 months ago. His smoking use included Cigarettes. He smoked 1.00 pack per day. He quit smokeless tobacco use about 14 months ago. If Smoker - Cessation materials given? NA    Is Daily aspirin on medication list?:  No, coumadin    Diabetic retinal exam done?  Yes   If yes, document

## 2018-07-23 ENCOUNTER — TELEPHONE (OUTPATIENT)
Dept: FAMILY MEDICINE CLINIC | Age: 63
End: 2018-07-23

## 2018-07-23 DIAGNOSIS — E78.2 MIXED HYPERLIPIDEMIA: ICD-10-CM

## 2018-07-23 RX ORDER — ATORVASTATIN CALCIUM 40 MG/1
40 TABLET, FILM COATED ORAL NIGHTLY
Qty: 30 TABLET | Refills: 2 | Status: SHIPPED | OUTPATIENT
Start: 2018-07-23 | End: 2018-11-15 | Stop reason: SDUPTHER

## 2018-07-23 NOTE — TELEPHONE ENCOUNTER
metFORMIN (GLUCOPHAGE) 500 MG tablet 60 tablet 3 3/21/2018     Sig - Route: Take 1 tablet by mouth 2 times daily (with meals) - Oral      atorvastatin (LIPITOR) 40 MG tablet 30 tablet 2 5/1/2018     Sig - Route: TAKE 1 TABLET BY MOUTH NIGHTLY - Oral          [facility] in [address] is out of Office today. Pt has one tab of Metformin for today.

## 2018-07-31 LAB
A/G RATIO: 1.6 (ref 1.1–2.2)
ALBUMIN SERPL-MCNC: 4.4 G/DL (ref 3.4–5)
ALP BLD-CCNC: 63 U/L (ref 40–129)
ALT SERPL-CCNC: 22 U/L (ref 10–40)
ANION GAP SERPL CALCULATED.3IONS-SCNC: 17 MMOL/L (ref 3–16)
AST SERPL-CCNC: 16 U/L (ref 15–37)
BILIRUB SERPL-MCNC: 1 MG/DL (ref 0–1)
BUN BLDV-MCNC: 17 MG/DL (ref 7–20)
CALCIUM SERPL-MCNC: 9.8 MG/DL (ref 8.3–10.6)
CHLORIDE BLD-SCNC: 106 MMOL/L (ref 99–110)
CHOLESTEROL, TOTAL: 137 MG/DL (ref 0–199)
CO2: 26 MMOL/L (ref 21–32)
CREAT SERPL-MCNC: 1 MG/DL (ref 0.8–1.3)
CREATININE URINE: 281 MG/DL (ref 39–259)
GFR AFRICAN AMERICAN: >60
GFR NON-AFRICAN AMERICAN: >60
GLOBULIN: 2.7 G/DL
GLUCOSE BLD-MCNC: 107 MG/DL (ref 70–99)
HDLC SERPL-MCNC: 54 MG/DL (ref 40–60)
INR BLD: 3.11 (ref 0.86–1.14)
LDL CHOLESTEROL CALCULATED: 67 MG/DL
MICROALBUMIN UR-MCNC: 12.3 MG/DL
MICROALBUMIN/CREAT UR-RTO: 43.8 MG/G (ref 0–30)
POTASSIUM SERPL-SCNC: 4.1 MMOL/L (ref 3.5–5.1)
PROTHROMBIN TIME: 35.5 SEC (ref 9.8–13)
SODIUM BLD-SCNC: 149 MMOL/L (ref 136–145)
TOTAL PROTEIN: 7.1 G/DL (ref 6.4–8.2)
TRIGL SERPL-MCNC: 81 MG/DL (ref 0–150)
VLDLC SERPL CALC-MCNC: 16 MG/DL

## 2018-08-01 ENCOUNTER — HOSPITAL ENCOUNTER (OUTPATIENT)
Dept: OTHER | Age: 63
Discharge: HOME | End: 2018-08-31
Attending: PHYSICIAN ASSISTANT | Admitting: PHYSICIAN ASSISTANT

## 2018-08-01 LAB
ESTIMATED AVERAGE GLUCOSE: 111.2 MG/DL
HBA1C MFR BLD: 5.5 %

## 2018-08-14 ENCOUNTER — ANTI-COAG VISIT (OUTPATIENT)
Dept: FAMILY MEDICINE CLINIC | Age: 63
End: 2018-08-14

## 2018-08-14 LAB
INR BLD: 2.69 (ref 0.86–1.14)
PROTHROMBIN TIME: 30.7 SEC (ref 9.8–13)

## 2018-09-01 ENCOUNTER — HOSPITAL ENCOUNTER (OUTPATIENT)
Dept: OTHER | Age: 63
Discharge: HOME | End: 2018-09-01
Attending: PHYSICIAN ASSISTANT | Admitting: PHYSICIAN ASSISTANT

## 2018-09-11 ENCOUNTER — ANTI-COAG VISIT (OUTPATIENT)
Dept: FAMILY MEDICINE CLINIC | Age: 63
End: 2018-09-11

## 2018-09-11 LAB
INR BLD: 2.46 (ref 0.86–1.14)
PROTHROMBIN TIME: 28.1 SEC (ref 9.8–13)

## 2018-09-18 ENCOUNTER — TELEPHONE (OUTPATIENT)
Dept: FAMILY MEDICINE CLINIC | Age: 63
End: 2018-09-18

## 2018-09-18 DIAGNOSIS — E78.2 MIXED HYPERLIPIDEMIA: ICD-10-CM

## 2018-09-18 DIAGNOSIS — I10 ESSENTIAL HYPERTENSION: ICD-10-CM

## 2018-09-18 RX ORDER — LISINOPRIL 40 MG/1
40 TABLET ORAL DAILY
Qty: 30 TABLET | Refills: 3 | Status: SHIPPED | OUTPATIENT
Start: 2018-09-18 | End: 2019-01-15 | Stop reason: SDUPTHER

## 2018-09-18 RX ORDER — AMLODIPINE BESYLATE 5 MG/1
5 TABLET ORAL DAILY
Qty: 30 TABLET | Refills: 3 | Status: SHIPPED | OUTPATIENT
Start: 2018-09-18 | End: 2019-01-15 | Stop reason: SDUPTHER

## 2018-10-05 ENCOUNTER — HOSPITAL ENCOUNTER (OUTPATIENT)
Age: 63
Discharge: HOME | End: 2018-10-05
Payer: COMMERCIAL

## 2018-10-05 ENCOUNTER — ANTI-COAG VISIT (OUTPATIENT)
Dept: FAMILY MEDICINE CLINIC | Age: 63
End: 2018-10-05

## 2018-10-05 DIAGNOSIS — I82.409 ACUTE DEEP VEIN THROMBOSIS (DVT) OF LOWER EXTREMITY, UNSPECIFIED LATERALITY, UNSPECIFIED VEIN (HCC): Primary | ICD-10-CM

## 2018-10-05 LAB
INR BLD: 4.24 (ref 0.86–1.14)
PROTHROMBIN TIME: 48.3 SEC (ref 9.8–13)

## 2018-10-05 PROCEDURE — 85610 PROTHROMBIN TIME: CPT

## 2018-10-05 PROCEDURE — 36415 COLL VENOUS BLD VENIPUNCTURE: CPT

## 2018-10-08 ENCOUNTER — HOSPITAL ENCOUNTER (OUTPATIENT)
Age: 63
Discharge: HOME | End: 2018-10-08
Payer: COMMERCIAL

## 2018-10-08 DIAGNOSIS — I82.409 ACUTE DEEP VEIN THROMBOSIS (DVT) OF LOWER EXTREMITY, UNSPECIFIED LATERALITY, UNSPECIFIED VEIN (HCC): Primary | ICD-10-CM

## 2018-10-08 LAB
INR BLD: 1.6 (ref 0.86–1.14)
PROTHROMBIN TIME: 18.2 SEC (ref 9.8–13)

## 2018-10-08 PROCEDURE — 36415 COLL VENOUS BLD VENIPUNCTURE: CPT

## 2018-10-08 PROCEDURE — 85610 PROTHROMBIN TIME: CPT

## 2018-10-12 ENCOUNTER — ANTI-COAG VISIT (OUTPATIENT)
Dept: FAMILY MEDICINE CLINIC | Age: 63
End: 2018-10-12

## 2018-10-12 ENCOUNTER — HOSPITAL ENCOUNTER (OUTPATIENT)
Age: 63
Discharge: HOME | End: 2018-10-12
Payer: COMMERCIAL

## 2018-10-12 LAB
INR BLD: 2.19 (ref 0.86–1.14)
PROTHROMBIN TIME: 25 SEC (ref 9.8–13)

## 2018-10-12 PROCEDURE — 85610 PROTHROMBIN TIME: CPT

## 2018-10-12 PROCEDURE — 36415 COLL VENOUS BLD VENIPUNCTURE: CPT

## 2018-10-19 ENCOUNTER — HOSPITAL ENCOUNTER (OUTPATIENT)
Age: 63
Discharge: HOME | End: 2018-10-19
Payer: COMMERCIAL

## 2018-10-19 DIAGNOSIS — I82.409 ACUTE DEEP VEIN THROMBOSIS (DVT) OF LOWER EXTREMITY, UNSPECIFIED LATERALITY, UNSPECIFIED VEIN (HCC): Primary | ICD-10-CM

## 2018-10-19 LAB
INR BLD: 2.42 (ref 0.86–1.14)
PROTHROMBIN TIME: 27.6 SEC (ref 9.8–13)

## 2018-10-19 PROCEDURE — 36415 COLL VENOUS BLD VENIPUNCTURE: CPT

## 2018-10-19 PROCEDURE — 85610 PROTHROMBIN TIME: CPT

## 2018-11-02 ENCOUNTER — HOSPITAL ENCOUNTER (OUTPATIENT)
Age: 63
Discharge: HOME | End: 2018-11-02
Payer: COMMERCIAL

## 2018-11-02 ENCOUNTER — ANTI-COAG VISIT (OUTPATIENT)
Dept: FAMILY MEDICINE CLINIC | Age: 63
End: 2018-11-02

## 2018-11-02 LAB
INR BLD: 3.01 (ref 0.86–1.14)
PROTHROMBIN TIME: 34.3 SEC (ref 9.8–13)

## 2018-11-02 PROCEDURE — 85610 PROTHROMBIN TIME: CPT

## 2018-11-15 ENCOUNTER — TELEPHONE (OUTPATIENT)
Dept: FAMILY MEDICINE CLINIC | Age: 63
End: 2018-11-15

## 2018-11-15 DIAGNOSIS — E78.2 MIXED HYPERLIPIDEMIA: ICD-10-CM

## 2018-11-15 RX ORDER — ATORVASTATIN CALCIUM 40 MG/1
40 TABLET, FILM COATED ORAL NIGHTLY
Qty: 30 TABLET | Refills: 2 | Status: SHIPPED | OUTPATIENT
Start: 2018-11-15 | End: 2019-02-15 | Stop reason: SDUPTHER

## 2018-11-16 ENCOUNTER — ANTI-COAG VISIT (OUTPATIENT)
Dept: FAMILY MEDICINE CLINIC | Age: 63
End: 2018-11-16

## 2018-11-16 ENCOUNTER — HOSPITAL ENCOUNTER (OUTPATIENT)
Age: 63
Discharge: HOME | End: 2018-11-16
Payer: COMMERCIAL

## 2018-11-16 DIAGNOSIS — I82.409 ACUTE DEEP VEIN THROMBOSIS (DVT) OF LOWER EXTREMITY, UNSPECIFIED LATERALITY, UNSPECIFIED VEIN (HCC): Primary | ICD-10-CM

## 2018-11-16 LAB
INR BLD: 3.26 (ref 0.86–1.14)
PROTHROMBIN TIME: 37.2 SEC (ref 9.8–13)

## 2018-11-16 PROCEDURE — 85610 PROTHROMBIN TIME: CPT

## 2018-11-16 PROCEDURE — 36415 COLL VENOUS BLD VENIPUNCTURE: CPT

## 2018-11-30 ENCOUNTER — HOSPITAL ENCOUNTER (OUTPATIENT)
Age: 63
Discharge: HOME | End: 2018-11-30
Payer: COMMERCIAL

## 2018-11-30 ENCOUNTER — ANTI-COAG VISIT (OUTPATIENT)
Dept: FAMILY MEDICINE CLINIC | Age: 63
End: 2018-11-30

## 2018-11-30 LAB
INR BLD: 2.75 (ref 0.86–1.14)
PROTHROMBIN TIME: 31.3 SEC (ref 9.8–13)

## 2018-11-30 PROCEDURE — 85610 PROTHROMBIN TIME: CPT

## 2018-11-30 PROCEDURE — 36415 COLL VENOUS BLD VENIPUNCTURE: CPT

## 2018-12-03 DIAGNOSIS — I63.9 CEREBROVASCULAR ACCIDENT (CVA), UNSPECIFIED MECHANISM (HCC): Primary | ICD-10-CM

## 2018-12-07 ENCOUNTER — HOSPITAL ENCOUNTER (OUTPATIENT)
Age: 63
Discharge: HOME | End: 2018-12-07
Payer: COMMERCIAL

## 2018-12-07 ENCOUNTER — ANTI-COAG VISIT (OUTPATIENT)
Dept: FAMILY MEDICINE CLINIC | Age: 63
End: 2018-12-07

## 2018-12-07 DIAGNOSIS — I63.9 CEREBROVASCULAR ACCIDENT (CVA), UNSPECIFIED MECHANISM (HCC): Primary | ICD-10-CM

## 2018-12-07 LAB
INR BLD: 3.04 (ref 0.86–1.14)
PROTHROMBIN TIME: 34.7 SEC (ref 9.8–13)

## 2018-12-07 PROCEDURE — 36415 COLL VENOUS BLD VENIPUNCTURE: CPT

## 2018-12-07 PROCEDURE — 85610 PROTHROMBIN TIME: CPT

## 2018-12-14 ENCOUNTER — HOSPITAL ENCOUNTER (OUTPATIENT)
Age: 63
Discharge: HOME | End: 2018-12-14
Payer: COMMERCIAL

## 2018-12-14 ENCOUNTER — ANTI-COAG VISIT (OUTPATIENT)
Dept: FAMILY MEDICINE CLINIC | Age: 63
End: 2018-12-14

## 2018-12-14 LAB
INR BLD: 2.85 (ref 0.86–1.14)
PROTHROMBIN TIME: 32.5 SEC (ref 9.8–13)

## 2018-12-14 PROCEDURE — 36415 COLL VENOUS BLD VENIPUNCTURE: CPT

## 2018-12-14 PROCEDURE — 85610 PROTHROMBIN TIME: CPT

## 2018-12-21 ENCOUNTER — TELEPHONE (OUTPATIENT)
Dept: FAMILY MEDICINE CLINIC | Age: 63
End: 2018-12-21

## 2018-12-21 NOTE — TELEPHONE ENCOUNTER
FYI    Patient wife wants Estefani Soto to know that patient    Was to have his lab done today but she can not    Take him because she has  The stomach flu    Says she cant even get out the house. The wife    Says she will probably take him to get his    Labs done Monday if she is up to it.     Please advise

## 2018-12-26 ENCOUNTER — HOSPITAL ENCOUNTER (OUTPATIENT)
Age: 63
Discharge: HOME | End: 2018-12-26
Payer: COMMERCIAL

## 2018-12-26 LAB
INR BLD: 3.95 (ref 0.86–1.14)
PROTHROMBIN TIME: 45 SEC (ref 9.8–13)

## 2018-12-26 PROCEDURE — 85610 PROTHROMBIN TIME: CPT

## 2018-12-26 PROCEDURE — 36415 COLL VENOUS BLD VENIPUNCTURE: CPT

## 2018-12-27 ENCOUNTER — ANTI-COAG VISIT (OUTPATIENT)
Dept: FAMILY MEDICINE CLINIC | Age: 63
End: 2018-12-27

## 2019-01-10 ENCOUNTER — HOSPITAL ENCOUNTER (OUTPATIENT)
Age: 64
Discharge: HOME | End: 2019-01-10
Payer: COMMERCIAL

## 2019-01-10 LAB
INR BLD: 2.68 (ref 0.86–1.14)
PROTHROMBIN TIME: 30.5 SEC (ref 9.8–13)

## 2019-01-10 PROCEDURE — 85610 PROTHROMBIN TIME: CPT

## 2019-01-10 PROCEDURE — 36415 COLL VENOUS BLD VENIPUNCTURE: CPT

## 2019-01-11 ENCOUNTER — OFFICE VISIT (OUTPATIENT)
Dept: FAMILY MEDICINE CLINIC | Age: 64
End: 2019-01-11
Payer: COMMERCIAL

## 2019-01-11 VITALS
HEART RATE: 85 BPM | BODY MASS INDEX: 36.62 KG/M2 | SYSTOLIC BLOOD PRESSURE: 106 MMHG | WEIGHT: 270 LBS | DIASTOLIC BLOOD PRESSURE: 58 MMHG | OXYGEN SATURATION: 98 %

## 2019-01-11 DIAGNOSIS — Z86.73 HISTORY OF CVA (CEREBROVASCULAR ACCIDENT): ICD-10-CM

## 2019-01-11 DIAGNOSIS — Z79.01 LONG TERM CURRENT USE OF ANTICOAGULANT THERAPY: ICD-10-CM

## 2019-01-11 DIAGNOSIS — Z12.5 SCREENING PSA (PROSTATE SPECIFIC ANTIGEN): ICD-10-CM

## 2019-01-11 DIAGNOSIS — Z12.11 SCREEN FOR COLON CANCER: ICD-10-CM

## 2019-01-11 DIAGNOSIS — I10 ESSENTIAL HYPERTENSION: ICD-10-CM

## 2019-01-11 DIAGNOSIS — E11.42 CONTROLLED TYPE 2 DIABETES MELLITUS WITH DIABETIC POLYNEUROPATHY, WITHOUT LONG-TERM CURRENT USE OF INSULIN (HCC): Primary | ICD-10-CM

## 2019-01-11 DIAGNOSIS — E78.2 MIXED HYPERLIPIDEMIA: ICD-10-CM

## 2019-01-11 PROCEDURE — 99214 OFFICE O/P EST MOD 30 MIN: CPT | Performed by: PHYSICIAN ASSISTANT

## 2019-01-11 PROCEDURE — G8484 FLU IMMUNIZE NO ADMIN: HCPCS | Performed by: PHYSICIAN ASSISTANT

## 2019-01-11 PROCEDURE — 3017F COLORECTAL CA SCREEN DOC REV: CPT | Performed by: PHYSICIAN ASSISTANT

## 2019-01-11 PROCEDURE — G8427 DOCREV CUR MEDS BY ELIG CLIN: HCPCS | Performed by: PHYSICIAN ASSISTANT

## 2019-01-11 PROCEDURE — 2022F DILAT RTA XM EVC RTNOPTHY: CPT | Performed by: PHYSICIAN ASSISTANT

## 2019-01-11 PROCEDURE — G8598 ASA/ANTIPLAT THER USED: HCPCS | Performed by: PHYSICIAN ASSISTANT

## 2019-01-11 PROCEDURE — 1036F TOBACCO NON-USER: CPT | Performed by: PHYSICIAN ASSISTANT

## 2019-01-11 PROCEDURE — G8417 CALC BMI ABV UP PARAM F/U: HCPCS | Performed by: PHYSICIAN ASSISTANT

## 2019-01-11 PROCEDURE — 3046F HEMOGLOBIN A1C LEVEL >9.0%: CPT | Performed by: PHYSICIAN ASSISTANT

## 2019-01-11 RX ORDER — AMOXICILLIN 500 MG
CAPSULE ORAL
COMMUNITY
End: 2020-01-10 | Stop reason: ALTCHOICE

## 2019-01-11 ASSESSMENT — PATIENT HEALTH QUESTIONNAIRE - PHQ9
SUM OF ALL RESPONSES TO PHQ QUESTIONS 1-9: 0
2. FEELING DOWN, DEPRESSED OR HOPELESS: 0
SUM OF ALL RESPONSES TO PHQ QUESTIONS 1-9: 0
1. LITTLE INTEREST OR PLEASURE IN DOING THINGS: 0
SUM OF ALL RESPONSES TO PHQ9 QUESTIONS 1 & 2: 0

## 2019-01-11 ASSESSMENT — ENCOUNTER SYMPTOMS
CHEST TIGHTNESS: 0
CONSTIPATION: 0
BACK PAIN: 0
SHORTNESS OF BREATH: 0
ABDOMINAL PAIN: 0
TROUBLE SWALLOWING: 0
SORE THROAT: 0
COUGH: 0
VOICE CHANGE: 0
EYE PAIN: 0
DIARRHEA: 0

## 2019-01-14 ENCOUNTER — HOSPITAL ENCOUNTER (OUTPATIENT)
Age: 64
Discharge: HOME | End: 2019-01-14
Payer: COMMERCIAL

## 2019-01-14 ENCOUNTER — TELEPHONE (OUTPATIENT)
Dept: FAMILY MEDICINE CLINIC | Age: 64
End: 2019-01-14

## 2019-01-14 LAB
A/G RATIO: 1.6 (ref 1.1–2.2)
ALBUMIN SERPL-MCNC: 4.2 G/DL (ref 3.4–5)
ALP BLD-CCNC: 58 U/L (ref 40–129)
ALT SERPL-CCNC: 28 U/L (ref 10–40)
ANION GAP SERPL CALCULATED.3IONS-SCNC: 11 MMOL/L (ref 3–16)
AST SERPL-CCNC: 18 U/L (ref 15–37)
BILIRUB SERPL-MCNC: 1.3 MG/DL (ref 0–1)
BUN BLDV-MCNC: 12 MG/DL (ref 7–20)
CALCIUM SERPL-MCNC: 9.4 MG/DL (ref 8.3–10.6)
CHLORIDE BLD-SCNC: 105 MMOL/L (ref 99–110)
CHOLESTEROL, TOTAL: 104 MG/DL (ref 0–199)
CO2: 28 MMOL/L (ref 21–32)
CREAT SERPL-MCNC: 0.7 MG/DL (ref 0.8–1.3)
CREATININE URINE: 86.2 MG/DL (ref 39–259)
GFR AFRICAN AMERICAN: >60
GFR NON-AFRICAN AMERICAN: >60
GLOBULIN: 2.7 G/DL
GLUCOSE BLD-MCNC: 96 MG/DL (ref 70–99)
HDLC SERPL-MCNC: 47 MG/DL (ref 40–60)
LDL CHOLESTEROL CALCULATED: 46 MG/DL
MICROALBUMIN UR-MCNC: 2.4 MG/DL
MICROALBUMIN/CREAT UR-RTO: 27.8 MG/G (ref 0–30)
POTASSIUM SERPL-SCNC: 4.1 MMOL/L (ref 3.5–5.1)
PROSTATE SPECIFIC ANTIGEN: 1.34 NG/ML (ref 0–4)
SODIUM BLD-SCNC: 144 MMOL/L (ref 136–145)
TOTAL PROTEIN: 6.9 G/DL (ref 6.4–8.2)
TRIGL SERPL-MCNC: 53 MG/DL (ref 0–150)
VLDLC SERPL CALC-MCNC: 11 MG/DL

## 2019-01-14 PROCEDURE — 84153 ASSAY OF PSA TOTAL: CPT

## 2019-01-14 PROCEDURE — 82043 UR ALBUMIN QUANTITATIVE: CPT

## 2019-01-14 PROCEDURE — 80053 COMPREHEN METABOLIC PANEL: CPT

## 2019-01-14 PROCEDURE — 36415 COLL VENOUS BLD VENIPUNCTURE: CPT

## 2019-01-14 PROCEDURE — 82570 ASSAY OF URINE CREATININE: CPT

## 2019-01-14 PROCEDURE — 83036 HEMOGLOBIN GLYCOSYLATED A1C: CPT

## 2019-01-14 PROCEDURE — 80061 LIPID PANEL: CPT

## 2019-01-15 DIAGNOSIS — E78.2 MIXED HYPERLIPIDEMIA: ICD-10-CM

## 2019-01-15 DIAGNOSIS — I10 ESSENTIAL HYPERTENSION: ICD-10-CM

## 2019-01-15 LAB
ESTIMATED AVERAGE GLUCOSE: 116.9 MG/DL
HBA1C MFR BLD: 5.7 %

## 2019-01-15 RX ORDER — GLUCOSAMINE HCL/CHONDROITIN SU 500-400 MG
1 CAPSULE ORAL DAILY
Qty: 100 STRIP | Refills: 3 | Status: SHIPPED | OUTPATIENT
Start: 2019-01-15 | End: 2020-03-03 | Stop reason: SDUPTHER

## 2019-01-15 RX ORDER — AMLODIPINE BESYLATE 5 MG/1
5 TABLET ORAL DAILY
Qty: 30 TABLET | Refills: 3 | Status: SHIPPED | OUTPATIENT
Start: 2019-01-15 | End: 2019-05-13 | Stop reason: SDUPTHER

## 2019-01-15 RX ORDER — LANCETS 30 GAUGE
EACH MISCELLANEOUS
Qty: 100 EACH | Refills: 3 | Status: SHIPPED | OUTPATIENT
Start: 2019-01-15 | End: 2020-03-03 | Stop reason: SDUPTHER

## 2019-01-15 RX ORDER — LISINOPRIL 40 MG/1
40 TABLET ORAL DAILY
Qty: 30 TABLET | Refills: 3 | Status: SHIPPED | OUTPATIENT
Start: 2019-01-15 | End: 2019-05-13 | Stop reason: SDUPTHER

## 2019-01-22 ENCOUNTER — HOSPITAL ENCOUNTER (OUTPATIENT)
Age: 64
Discharge: HOME | End: 2019-01-22
Payer: COMMERCIAL

## 2019-01-22 ENCOUNTER — OFFICE VISIT (OUTPATIENT)
Dept: NEUROLOGY | Age: 64
End: 2019-01-22
Payer: COMMERCIAL

## 2019-01-22 VITALS
WEIGHT: 268 LBS | HEART RATE: 50 BPM | HEIGHT: 74 IN | BODY MASS INDEX: 34.39 KG/M2 | DIASTOLIC BLOOD PRESSURE: 78 MMHG | SYSTOLIC BLOOD PRESSURE: 129 MMHG

## 2019-01-22 DIAGNOSIS — I69.351 HEMIPARESIS AFFECTING RIGHT SIDE AS LATE EFFECT OF CEREBROVASCULAR ACCIDENT (HCC): ICD-10-CM

## 2019-01-22 LAB
INR BLD: 2.25 (ref 0.86–1.14)
PROTHROMBIN TIME: 25.6 SEC (ref 9.8–13)

## 2019-01-22 PROCEDURE — G8598 ASA/ANTIPLAT THER USED: HCPCS | Performed by: PSYCHIATRY & NEUROLOGY

## 2019-01-22 PROCEDURE — 99213 OFFICE O/P EST LOW 20 MIN: CPT | Performed by: PSYCHIATRY & NEUROLOGY

## 2019-01-22 PROCEDURE — 1036F TOBACCO NON-USER: CPT | Performed by: PSYCHIATRY & NEUROLOGY

## 2019-01-22 PROCEDURE — G8484 FLU IMMUNIZE NO ADMIN: HCPCS | Performed by: PSYCHIATRY & NEUROLOGY

## 2019-01-22 PROCEDURE — 36415 COLL VENOUS BLD VENIPUNCTURE: CPT

## 2019-01-22 PROCEDURE — 81327 SEPT9 GEN PRMTR MTHYLTN ALYS: CPT

## 2019-01-22 PROCEDURE — G8427 DOCREV CUR MEDS BY ELIG CLIN: HCPCS | Performed by: PSYCHIATRY & NEUROLOGY

## 2019-01-22 PROCEDURE — G8417 CALC BMI ABV UP PARAM F/U: HCPCS | Performed by: PSYCHIATRY & NEUROLOGY

## 2019-01-22 PROCEDURE — 85610 PROTHROMBIN TIME: CPT

## 2019-01-22 PROCEDURE — 3017F COLORECTAL CA SCREEN DOC REV: CPT | Performed by: PSYCHIATRY & NEUROLOGY

## 2019-02-01 LAB — MISCELLANEOUS LAB TEST ORDER: NORMAL

## 2019-02-15 ENCOUNTER — ANTI-COAG VISIT (OUTPATIENT)
Dept: FAMILY MEDICINE CLINIC | Age: 64
End: 2019-02-15

## 2019-02-15 ENCOUNTER — HOSPITAL ENCOUNTER (OUTPATIENT)
Age: 64
Discharge: HOME | End: 2019-02-15
Payer: COMMERCIAL

## 2019-02-15 DIAGNOSIS — I82.409 ACUTE DEEP VEIN THROMBOSIS (DVT) OF LOWER EXTREMITY, UNSPECIFIED LATERALITY, UNSPECIFIED VEIN (HCC): Primary | ICD-10-CM

## 2019-02-15 LAB
INR BLD: 2.5 (ref 0.86–1.14)
PROTHROMBIN TIME: 28.5 SEC (ref 9.8–13)

## 2019-02-15 PROCEDURE — 85610 PROTHROMBIN TIME: CPT

## 2019-02-15 PROCEDURE — 36415 COLL VENOUS BLD VENIPUNCTURE: CPT

## 2019-02-15 RX ORDER — WARFARIN SODIUM 5 MG/1
TABLET ORAL
Qty: 100 TABLET | Refills: 3 | Status: SHIPPED | OUTPATIENT
Start: 2019-02-15 | End: 2019-10-17 | Stop reason: SDUPTHER

## 2019-03-07 ENCOUNTER — ANTI-COAG VISIT (OUTPATIENT)
Dept: FAMILY MEDICINE CLINIC | Age: 64
End: 2019-03-07

## 2019-03-07 ENCOUNTER — HOSPITAL ENCOUNTER (OUTPATIENT)
Age: 64
Discharge: HOME | End: 2019-03-07
Payer: COMMERCIAL

## 2019-03-07 DIAGNOSIS — I82.409 ACUTE DEEP VEIN THROMBOSIS (DVT) OF LOWER EXTREMITY, UNSPECIFIED LATERALITY, UNSPECIFIED VEIN (HCC): Primary | ICD-10-CM

## 2019-03-07 LAB
INR BLD: 2.55 (ref 0.86–1.14)
PROTHROMBIN TIME: 29.1 SEC (ref 9.8–13)

## 2019-03-07 PROCEDURE — 36415 COLL VENOUS BLD VENIPUNCTURE: CPT

## 2019-03-07 PROCEDURE — 85610 PROTHROMBIN TIME: CPT

## 2019-03-11 ENCOUNTER — TELEPHONE (OUTPATIENT)
Dept: RHEUMATOLOGY | Age: 64
End: 2019-03-11

## 2019-03-20 ENCOUNTER — TELEPHONE (OUTPATIENT)
Dept: FAMILY MEDICINE CLINIC | Age: 64
End: 2019-03-20

## 2019-04-08 ENCOUNTER — ANTI-COAG VISIT (OUTPATIENT)
Dept: FAMILY MEDICINE CLINIC | Age: 64
End: 2019-04-08

## 2019-04-08 ENCOUNTER — HOSPITAL ENCOUNTER (OUTPATIENT)
Age: 64
Discharge: HOME | End: 2019-04-08
Payer: COMMERCIAL

## 2019-04-08 DIAGNOSIS — Z86.73 HISTORY OF CVA (CEREBROVASCULAR ACCIDENT): Primary | ICD-10-CM

## 2019-04-08 LAB
INR BLD: 2.79 (ref 0.86–1.14)
PROTHROMBIN TIME: 31.8 SEC (ref 9.8–13)

## 2019-04-08 PROCEDURE — 36415 COLL VENOUS BLD VENIPUNCTURE: CPT

## 2019-04-08 PROCEDURE — 85610 PROTHROMBIN TIME: CPT

## 2019-04-11 DIAGNOSIS — Z01.84 IMMUNITY STATUS TESTING: Primary | ICD-10-CM

## 2019-04-12 ENCOUNTER — HOSPITAL ENCOUNTER (OUTPATIENT)
Age: 64
Discharge: HOME | End: 2019-04-12
Payer: COMMERCIAL

## 2019-04-12 DIAGNOSIS — Z01.84 IMMUNITY STATUS TESTING: ICD-10-CM

## 2019-04-12 PROCEDURE — 86735 MUMPS ANTIBODY: CPT

## 2019-04-12 PROCEDURE — 86765 RUBEOLA ANTIBODY: CPT

## 2019-04-13 LAB
MUV IGG SER QL: 188 AU/ML
RUBEOLA (MEASLES) AB IGG: >300 AU/ML

## 2019-05-13 DIAGNOSIS — E78.2 MIXED HYPERLIPIDEMIA: ICD-10-CM

## 2019-05-13 DIAGNOSIS — I10 ESSENTIAL HYPERTENSION: ICD-10-CM

## 2019-05-13 RX ORDER — LISINOPRIL 40 MG/1
40 TABLET ORAL DAILY
Qty: 30 TABLET | Refills: 3 | Status: SHIPPED | OUTPATIENT
Start: 2019-05-13 | End: 2019-09-16 | Stop reason: SDUPTHER

## 2019-05-13 RX ORDER — AMLODIPINE BESYLATE 5 MG/1
5 TABLET ORAL DAILY
Qty: 30 TABLET | Refills: 3 | Status: SHIPPED | OUTPATIENT
Start: 2019-05-13 | End: 2019-09-16 | Stop reason: SDUPTHER

## 2019-05-14 ENCOUNTER — HOSPITAL ENCOUNTER (OUTPATIENT)
Age: 64
Discharge: HOME | End: 2019-05-14
Payer: COMMERCIAL

## 2019-05-14 ENCOUNTER — ANTI-COAG VISIT (OUTPATIENT)
Dept: FAMILY MEDICINE CLINIC | Age: 64
End: 2019-05-14

## 2019-05-14 LAB
INR BLD: 3.25 (ref 0.86–1.14)
PROTHROMBIN TIME: 37 SEC (ref 9.8–13)

## 2019-05-14 PROCEDURE — 36415 COLL VENOUS BLD VENIPUNCTURE: CPT

## 2019-05-14 PROCEDURE — 85610 PROTHROMBIN TIME: CPT

## 2019-05-28 ENCOUNTER — ANTI-COAG VISIT (OUTPATIENT)
Dept: FAMILY MEDICINE CLINIC | Age: 64
End: 2019-05-28

## 2019-05-28 ENCOUNTER — HOSPITAL ENCOUNTER (OUTPATIENT)
Age: 64
Discharge: HOME | End: 2019-05-28
Payer: COMMERCIAL

## 2019-05-28 LAB
INR BLD: 2.89 (ref 0.86–1.14)
PROTHROMBIN TIME: 32.9 SEC (ref 9.8–13)

## 2019-05-28 PROCEDURE — 36415 COLL VENOUS BLD VENIPUNCTURE: CPT

## 2019-05-28 PROCEDURE — 85610 PROTHROMBIN TIME: CPT

## 2019-06-11 ENCOUNTER — HOSPITAL ENCOUNTER (OUTPATIENT)
Age: 64
Discharge: HOME | End: 2019-06-11
Payer: COMMERCIAL

## 2019-06-11 LAB
INR BLD: 2.76 (ref 0.86–1.14)
PROTHROMBIN TIME: 31.5 SEC (ref 9.8–13)

## 2019-06-11 PROCEDURE — 36415 COLL VENOUS BLD VENIPUNCTURE: CPT

## 2019-06-11 PROCEDURE — 85610 PROTHROMBIN TIME: CPT

## 2019-06-12 ENCOUNTER — TELEPHONE (OUTPATIENT)
Dept: FAMILY MEDICINE CLINIC | Age: 64
End: 2019-06-12

## 2019-06-12 ENCOUNTER — ANTI-COAG VISIT (OUTPATIENT)
Dept: FAMILY MEDICINE CLINIC | Age: 64
End: 2019-06-12

## 2019-06-25 ENCOUNTER — ANTI-COAG VISIT (OUTPATIENT)
Dept: FAMILY MEDICINE CLINIC | Age: 64
End: 2019-06-25

## 2019-06-25 ENCOUNTER — HOSPITAL ENCOUNTER (OUTPATIENT)
Age: 64
Discharge: HOME | End: 2019-06-25
Payer: COMMERCIAL

## 2019-06-25 DIAGNOSIS — Z86.73 HISTORY OF CVA (CEREBROVASCULAR ACCIDENT): Primary | ICD-10-CM

## 2019-06-25 LAB
INR BLD: 2.94 (ref 0.86–1.14)
PROTHROMBIN TIME: 33.5 SEC (ref 9.8–13)

## 2019-06-25 PROCEDURE — 36415 COLL VENOUS BLD VENIPUNCTURE: CPT

## 2019-06-25 PROCEDURE — 85610 PROTHROMBIN TIME: CPT

## 2019-07-09 ENCOUNTER — HOSPITAL ENCOUNTER (OUTPATIENT)
Age: 64
Discharge: HOME | End: 2019-07-09
Payer: COMMERCIAL

## 2019-07-09 LAB
INR BLD: 3 (ref 0.86–1.14)
PROTHROMBIN TIME: 34.2 SEC (ref 9.8–13)

## 2019-07-09 PROCEDURE — 85610 PROTHROMBIN TIME: CPT

## 2019-07-09 PROCEDURE — 36415 COLL VENOUS BLD VENIPUNCTURE: CPT

## 2019-07-10 ENCOUNTER — ANTI-COAG VISIT (OUTPATIENT)
Dept: FAMILY MEDICINE CLINIC | Age: 64
End: 2019-07-10

## 2019-07-10 DIAGNOSIS — Z86.73 HISTORY OF CVA (CEREBROVASCULAR ACCIDENT): Primary | ICD-10-CM

## 2019-07-11 ENCOUNTER — OFFICE VISIT (OUTPATIENT)
Dept: FAMILY MEDICINE CLINIC | Age: 64
End: 2019-07-11
Payer: COMMERCIAL

## 2019-07-11 VITALS
HEART RATE: 94 BPM | OXYGEN SATURATION: 97 % | DIASTOLIC BLOOD PRESSURE: 70 MMHG | WEIGHT: 264 LBS | BODY MASS INDEX: 33.9 KG/M2 | SYSTOLIC BLOOD PRESSURE: 100 MMHG

## 2019-07-11 DIAGNOSIS — I69.351 HEMIPARESIS AFFECTING RIGHT SIDE AS LATE EFFECT OF CEREBROVASCULAR ACCIDENT (HCC): ICD-10-CM

## 2019-07-11 DIAGNOSIS — Z79.01 LONG TERM CURRENT USE OF ANTICOAGULANT THERAPY: ICD-10-CM

## 2019-07-11 DIAGNOSIS — I10 ESSENTIAL HYPERTENSION: ICD-10-CM

## 2019-07-11 DIAGNOSIS — E11.42 CONTROLLED TYPE 2 DIABETES MELLITUS WITH DIABETIC POLYNEUROPATHY, WITHOUT LONG-TERM CURRENT USE OF INSULIN (HCC): ICD-10-CM

## 2019-07-11 DIAGNOSIS — E78.2 MIXED HYPERLIPIDEMIA: ICD-10-CM

## 2019-07-11 DIAGNOSIS — I73.9 PERIPHERAL VASCULAR DISEASE (HCC): Primary | ICD-10-CM

## 2019-07-11 PROCEDURE — 99214 OFFICE O/P EST MOD 30 MIN: CPT | Performed by: PHYSICIAN ASSISTANT

## 2019-07-11 PROCEDURE — 3044F HG A1C LEVEL LT 7.0%: CPT | Performed by: PHYSICIAN ASSISTANT

## 2019-07-11 PROCEDURE — G8417 CALC BMI ABV UP PARAM F/U: HCPCS | Performed by: PHYSICIAN ASSISTANT

## 2019-07-11 PROCEDURE — 1036F TOBACCO NON-USER: CPT | Performed by: PHYSICIAN ASSISTANT

## 2019-07-11 PROCEDURE — G8427 DOCREV CUR MEDS BY ELIG CLIN: HCPCS | Performed by: PHYSICIAN ASSISTANT

## 2019-07-11 PROCEDURE — G8598 ASA/ANTIPLAT THER USED: HCPCS | Performed by: PHYSICIAN ASSISTANT

## 2019-07-11 PROCEDURE — 3017F COLORECTAL CA SCREEN DOC REV: CPT | Performed by: PHYSICIAN ASSISTANT

## 2019-07-11 PROCEDURE — 2022F DILAT RTA XM EVC RTNOPTHY: CPT | Performed by: PHYSICIAN ASSISTANT

## 2019-07-11 ASSESSMENT — ENCOUNTER SYMPTOMS
COUGH: 0
SHORTNESS OF BREATH: 0
BACK PAIN: 0
ABDOMINAL PAIN: 0

## 2019-07-12 ENCOUNTER — HOSPITAL ENCOUNTER (OUTPATIENT)
Age: 64
Discharge: HOME | End: 2019-07-12
Payer: COMMERCIAL

## 2019-07-12 DIAGNOSIS — E78.2 MIXED HYPERLIPIDEMIA: ICD-10-CM

## 2019-07-12 LAB
A/G RATIO: 1.6 (ref 1.1–2.2)
ALBUMIN SERPL-MCNC: 4.2 G/DL (ref 3.4–5)
ALP BLD-CCNC: 54 U/L (ref 40–129)
ALT SERPL-CCNC: 22 U/L (ref 10–40)
ANION GAP SERPL CALCULATED.3IONS-SCNC: 15 MMOL/L (ref 3–16)
AST SERPL-CCNC: 18 U/L (ref 15–37)
BILIRUB SERPL-MCNC: 1.6 MG/DL (ref 0–1)
BUN BLDV-MCNC: 18 MG/DL (ref 7–20)
CALCIUM SERPL-MCNC: 9.5 MG/DL (ref 8.3–10.6)
CHLORIDE BLD-SCNC: 103 MMOL/L (ref 99–110)
CHOLESTEROL, TOTAL: 99 MG/DL (ref 0–199)
CO2: 26 MMOL/L (ref 21–32)
CREAT SERPL-MCNC: 1.1 MG/DL (ref 0.8–1.3)
ESTIMATED AVERAGE GLUCOSE: 119.8 MG/DL
GFR AFRICAN AMERICAN: >60
GFR NON-AFRICAN AMERICAN: >60
GLOBULIN: 2.6 G/DL
GLUCOSE BLD-MCNC: 118 MG/DL (ref 70–99)
HBA1C MFR BLD: 5.8 %
HDLC SERPL-MCNC: 51 MG/DL (ref 40–60)
LDL CHOLESTEROL CALCULATED: 33 MG/DL
POTASSIUM SERPL-SCNC: 3.8 MMOL/L (ref 3.5–5.1)
SODIUM BLD-SCNC: 144 MMOL/L (ref 136–145)
TOTAL PROTEIN: 6.8 G/DL (ref 6.4–8.2)
TRIGL SERPL-MCNC: 74 MG/DL (ref 0–150)
VLDLC SERPL CALC-MCNC: 15 MG/DL

## 2019-07-12 PROCEDURE — 80053 COMPREHEN METABOLIC PANEL: CPT

## 2019-07-12 PROCEDURE — 83036 HEMOGLOBIN GLYCOSYLATED A1C: CPT

## 2019-07-12 PROCEDURE — 36415 COLL VENOUS BLD VENIPUNCTURE: CPT

## 2019-07-12 PROCEDURE — 80061 LIPID PANEL: CPT

## 2019-08-01 ENCOUNTER — ANTI-COAG VISIT (OUTPATIENT)
Dept: FAMILY MEDICINE CLINIC | Age: 64
End: 2019-08-01

## 2019-08-01 ENCOUNTER — HOSPITAL ENCOUNTER (OUTPATIENT)
Age: 64
Discharge: HOME | End: 2019-08-01
Payer: COMMERCIAL

## 2019-08-01 LAB
INR BLD: 3.22 (ref 0.86–1.14)
PROTHROMBIN TIME: 36.7 SEC (ref 9.8–13)

## 2019-08-01 PROCEDURE — 36415 COLL VENOUS BLD VENIPUNCTURE: CPT

## 2019-08-01 PROCEDURE — 85610 PROTHROMBIN TIME: CPT

## 2019-08-15 ENCOUNTER — HOSPITAL ENCOUNTER (OUTPATIENT)
Age: 64
Discharge: HOME | End: 2019-08-15
Payer: COMMERCIAL

## 2019-08-15 LAB
INR BLD: 2.51 (ref 0.86–1.14)
PROTHROMBIN TIME: 28.6 SEC (ref 9.8–13)

## 2019-08-15 PROCEDURE — 85610 PROTHROMBIN TIME: CPT

## 2019-08-15 PROCEDURE — 36415 COLL VENOUS BLD VENIPUNCTURE: CPT

## 2019-08-16 ENCOUNTER — ANTI-COAG VISIT (OUTPATIENT)
Dept: FAMILY MEDICINE CLINIC | Age: 64
End: 2019-08-16

## 2019-08-29 ENCOUNTER — ANTI-COAG VISIT (OUTPATIENT)
Dept: FAMILY MEDICINE CLINIC | Age: 64
End: 2019-08-29

## 2019-08-29 ENCOUNTER — HOSPITAL ENCOUNTER (OUTPATIENT)
Age: 64
Discharge: HOME | End: 2019-08-29
Payer: COMMERCIAL

## 2019-08-29 LAB
INR BLD: 2.16 (ref 0.86–1.14)
PROTHROMBIN TIME: 24.6 SEC (ref 9.8–13)

## 2019-08-29 PROCEDURE — 85610 PROTHROMBIN TIME: CPT

## 2019-08-29 PROCEDURE — 36415 COLL VENOUS BLD VENIPUNCTURE: CPT

## 2019-09-05 DIAGNOSIS — E78.2 MIXED HYPERLIPIDEMIA: ICD-10-CM

## 2019-09-05 RX ORDER — ATORVASTATIN CALCIUM 40 MG/1
40 TABLET, FILM COATED ORAL NIGHTLY
Qty: 30 TABLET | Refills: 5 | Status: SHIPPED | OUTPATIENT
Start: 2019-09-05 | End: 2020-03-13 | Stop reason: SDUPTHER

## 2019-09-12 ENCOUNTER — ANTI-COAG VISIT (OUTPATIENT)
Dept: FAMILY MEDICINE CLINIC | Age: 64
End: 2019-09-12

## 2019-09-12 ENCOUNTER — HOSPITAL ENCOUNTER (OUTPATIENT)
Age: 64
Discharge: HOME | End: 2019-09-12
Payer: COMMERCIAL

## 2019-09-12 DIAGNOSIS — Z86.73 HISTORY OF CVA (CEREBROVASCULAR ACCIDENT): ICD-10-CM

## 2019-09-12 LAB
INR BLD: 2.53 (ref 0.86–1.14)
PROTHROMBIN TIME: 28.8 SEC (ref 9.8–13)

## 2019-09-12 PROCEDURE — 36415 COLL VENOUS BLD VENIPUNCTURE: CPT

## 2019-09-12 PROCEDURE — 85610 PROTHROMBIN TIME: CPT

## 2019-09-16 DIAGNOSIS — I10 ESSENTIAL HYPERTENSION: ICD-10-CM

## 2019-09-16 DIAGNOSIS — E78.2 MIXED HYPERLIPIDEMIA: ICD-10-CM

## 2019-09-16 RX ORDER — LISINOPRIL 40 MG/1
40 TABLET ORAL DAILY
Qty: 30 TABLET | Refills: 5 | Status: SHIPPED | OUTPATIENT
Start: 2019-09-16 | End: 2020-03-13 | Stop reason: SDUPTHER

## 2019-09-16 RX ORDER — AMLODIPINE BESYLATE 5 MG/1
5 TABLET ORAL DAILY
Qty: 30 TABLET | Refills: 5 | Status: SHIPPED | OUTPATIENT
Start: 2019-09-16 | End: 2020-03-13 | Stop reason: SDUPTHER

## 2019-09-16 NOTE — TELEPHONE ENCOUNTER
Pt. Requesting:        lisinopril (PRINIVIL;ZESTRIL) 40 MG tablet 30 tablet 3 5/13/2019     Sig - Route: Take 1 tablet by mouth daily - Oral      amLODIPine (NORVASC) 5 MG tablet 30 tablet 3 5/13/2019     Sig - Route:  Take 1 tablet by mouth daily - Oral      Please send to :    Pharmacy:  [facility], [address]  [phone]

## 2019-10-10 ENCOUNTER — HOSPITAL ENCOUNTER (OUTPATIENT)
Age: 64
Discharge: HOME | End: 2019-10-10
Payer: MEDICARE

## 2019-10-10 ENCOUNTER — TELEPHONE (OUTPATIENT)
Dept: FAMILY MEDICINE CLINIC | Age: 64
End: 2019-10-10

## 2019-10-10 DIAGNOSIS — I82.409 ACUTE DEEP VEIN THROMBOSIS (DVT) OF LOWER EXTREMITY, UNSPECIFIED LATERALITY, UNSPECIFIED VEIN (HCC): Primary | ICD-10-CM

## 2019-10-10 LAB
INR BLD: 2.14 (ref 0.86–1.14)
PROTHROMBIN TIME: 24.4 SEC (ref 9.8–13)

## 2019-10-10 PROCEDURE — 36415 COLL VENOUS BLD VENIPUNCTURE: CPT

## 2019-10-10 PROCEDURE — 85610 PROTHROMBIN TIME: CPT

## 2019-10-18 RX ORDER — WARFARIN SODIUM 5 MG/1
TABLET ORAL
Qty: 100 TABLET | Refills: 1 | Status: SHIPPED | OUTPATIENT
Start: 2019-10-18 | End: 2020-03-03 | Stop reason: SDUPTHER

## 2019-11-07 ENCOUNTER — ANTI-COAG VISIT (OUTPATIENT)
Dept: FAMILY MEDICINE CLINIC | Age: 64
End: 2019-11-07

## 2019-11-22 LAB — INR BLD: 2.2

## 2019-11-25 ENCOUNTER — ANTI-COAG VISIT (OUTPATIENT)
Dept: FAMILY MEDICINE CLINIC | Age: 64
End: 2019-11-25

## 2019-12-20 ENCOUNTER — ANTI-COAG VISIT (OUTPATIENT)
Dept: FAMILY MEDICINE CLINIC | Age: 64
End: 2019-12-20

## 2019-12-30 ENCOUNTER — ANTI-COAG VISIT (OUTPATIENT)
Dept: FAMILY MEDICINE CLINIC | Age: 64
End: 2019-12-30

## 2020-01-10 ENCOUNTER — OFFICE VISIT (OUTPATIENT)
Dept: FAMILY MEDICINE CLINIC | Age: 65
End: 2020-01-10
Payer: MEDICARE

## 2020-01-10 VITALS
HEART RATE: 96 BPM | WEIGHT: 264.6 LBS | SYSTOLIC BLOOD PRESSURE: 110 MMHG | DIASTOLIC BLOOD PRESSURE: 82 MMHG | OXYGEN SATURATION: 98 % | BODY MASS INDEX: 33.97 KG/M2

## 2020-01-10 PROBLEM — Z86.73 HISTORY OF CVA (CEREBROVASCULAR ACCIDENT): Status: ACTIVE | Noted: 2020-01-10

## 2020-01-10 PROCEDURE — G8427 DOCREV CUR MEDS BY ELIG CLIN: HCPCS | Performed by: PHYSICIAN ASSISTANT

## 2020-01-10 PROCEDURE — G8417 CALC BMI ABV UP PARAM F/U: HCPCS | Performed by: PHYSICIAN ASSISTANT

## 2020-01-10 PROCEDURE — 90686 IIV4 VACC NO PRSV 0.5 ML IM: CPT | Performed by: PHYSICIAN ASSISTANT

## 2020-01-10 PROCEDURE — 3046F HEMOGLOBIN A1C LEVEL >9.0%: CPT | Performed by: PHYSICIAN ASSISTANT

## 2020-01-10 PROCEDURE — 1036F TOBACCO NON-USER: CPT | Performed by: PHYSICIAN ASSISTANT

## 2020-01-10 PROCEDURE — 99214 OFFICE O/P EST MOD 30 MIN: CPT | Performed by: PHYSICIAN ASSISTANT

## 2020-01-10 PROCEDURE — G0008 ADMIN INFLUENZA VIRUS VAC: HCPCS | Performed by: PHYSICIAN ASSISTANT

## 2020-01-10 PROCEDURE — 3017F COLORECTAL CA SCREEN DOC REV: CPT | Performed by: PHYSICIAN ASSISTANT

## 2020-01-10 PROCEDURE — G8482 FLU IMMUNIZE ORDER/ADMIN: HCPCS | Performed by: PHYSICIAN ASSISTANT

## 2020-01-10 PROCEDURE — 2022F DILAT RTA XM EVC RTNOPTHY: CPT | Performed by: PHYSICIAN ASSISTANT

## 2020-01-10 ASSESSMENT — ENCOUNTER SYMPTOMS
SHORTNESS OF BREATH: 0
DIARRHEA: 0
VOICE CHANGE: 0
CHEST TIGHTNESS: 0
SORE THROAT: 0
CONSTIPATION: 0
ABDOMINAL PAIN: 0
TROUBLE SWALLOWING: 0
COUGH: 0
BACK PAIN: 0
EYE PAIN: 0

## 2020-01-10 ASSESSMENT — PATIENT HEALTH QUESTIONNAIRE - PHQ9
SUM OF ALL RESPONSES TO PHQ QUESTIONS 1-9: 0
SUM OF ALL RESPONSES TO PHQ QUESTIONS 1-9: 0
1. LITTLE INTEREST OR PLEASURE IN DOING THINGS: 0
SUM OF ALL RESPONSES TO PHQ9 QUESTIONS 1 & 2: 0
2. FEELING DOWN, DEPRESSED OR HOPELESS: 0

## 2020-01-10 NOTE — PROGRESS NOTES
Subjective:      Patient ID: Mikael Calix is a 60 y.o. male. HPI  Patient is here today for his 6 month follow up for his chronic conditions. They do not have any concerns today. He needs a repeat protime from abnormal one about a week ago. FBS run 90's. Postprandial run 120-140. He is sleeping well. He has a good appetite and follows a diabetic diet very closely. He has no bowel/bladder issues. His speech is not much better but he is ok with it. He gets around fine, strength is good. He understands everything, it is the processing and talking after he hears something that is the problem. He takes all of his medicines as directed, no SE's. He is overdue for eye exam.      Sodium (mmol/L)   Date Value   07/12/2019 144    BUN (mg/dL)   Date Value   07/12/2019 18    Glucose (mg/dL)   Date Value   07/12/2019 118 (H)      Potassium (mmol/L)   Date Value   07/12/2019 3.8    CREATININE (mg/dL)   Date Value   07/12/2019 1.1           BP Readings from Last 2 Encounters:   01/10/20 110/82   07/11/19 100/70       Is patient currently taking any antihypertensive medications? Yes   If yes, see med list as above    Is the patient reporting any side effects of antihypertensive medications? No    Is the patient taking any over the counter medications? Yes   If yes, see med list as above    Is the patient taking a daily aspirin? No coumadin      BP Readings from Last 2 Encounters:   01/10/20 110/82   07/11/19 100/70     Hemoglobin A1C (%)   Date Value   07/12/2019 5.8     Microalbumin, Random Urine (mg/dL)   Date Value   01/14/2019 2.40 (H)     LDL Calculated (mg/dL)   Date Value   07/12/2019 33              Tobacco use:  Patient  reports that he quit smoking about 2 years ago. His smoking use included cigarettes. He has a 80.00 pack-year smoking history. He quit smokeless tobacco use about 2 years ago. If Smoker - Cessation materials given?  NA    Is Daily aspirin on medication list?:  No coumadin    Diabetic retinal exam done? No   If yes, document in Health Maintenance. Monofilament placed on counter? Yes    Shoes and socks removed? Yes    BP taken with correct size cuff? Yes   Repeated if > 140/90 NA     Is patient taking any medications for diabetes    Yes   If yes, see medication list    Is the patient reporting any side effects of diabetic medications? No    Microalbumin performed if applicable? Yes      Is patient taking any over the counter medications    Yes   If yes, see medication list        Review of Systems   Constitutional: Negative for appetite change, fatigue and unexpected weight change. HENT: Negative for congestion, dental problem, ear pain, hearing loss, sore throat, trouble swallowing and voice change. Eyes: Negative for pain and visual disturbance. Respiratory: Negative for cough, chest tightness and shortness of breath. Cardiovascular: Negative for chest pain and palpitations. Gastrointestinal: Negative for abdominal pain, constipation and diarrhea. Genitourinary: Negative for difficulty urinating. Musculoskeletal: Negative for arthralgias, back pain, myalgias and neck pain. Skin: Negative for rash. Neurological: Positive for speech difficulty and weakness (right leg). Negative for dizziness, numbness and headaches. Hematological: Negative for adenopathy. Psychiatric/Behavioral: Negative for confusion and sleep disturbance. The patient is not nervous/anxious. Objective:   Physical Exam  Vitals signs reviewed. Constitutional:       General: He is not in acute distress. Appearance: Normal appearance. He is well-developed and well-groomed. HENT:      Head: Normocephalic. Right Ear: Tympanic membrane and ear canal normal.      Left Ear: Tympanic membrane and ear canal normal.      Nose: Nose normal.      Mouth/Throat:      Pharynx: Oropharynx is clear. Uvula midline.    Eyes:      Conjunctiva/sclera: Conjunctivae normal. Pupils: Pupils are equal, round, and reactive to light. Neck:      Vascular: No carotid bruit. Cardiovascular:      Rate and Rhythm: Normal rate and regular rhythm. Pulses:           Dorsalis pedis pulses are 1+ on the right side and 1+ on the left side. Heart sounds: Normal heart sounds. No murmur. No friction rub. No gallop. Comments: Mild trace edema BLE's  Pulmonary:      Effort: Pulmonary effort is normal.      Breath sounds: Normal breath sounds. Abdominal:      General: Bowel sounds are normal. There is no distension. Palpations: Abdomen is soft. Abdomen is not rigid. There is no hepatomegaly, splenomegaly or mass. Tenderness: There is no tenderness. There is no right CVA tenderness, left CVA tenderness, guarding or rebound. Hernia: A hernia is present. Hernia is present in the umbilical area (reducible, nontender). Musculoskeletal: Normal range of motion. Right foot: Normal.      Left foot: Normal.   Lymphadenopathy:      Cervical: No cervical adenopathy. Skin:     General: Skin is warm. Findings: No rash. Comments: BLE's with varicose veins, venous insufficiency, pedal pulses difficult to palpate   Neurological:      Mental Status: He is alert. Mental status is at baseline. Sensory: Sensation is intact. Motor: Motor function is intact. Deep Tendon Reflexes: Reflexes are normal and symmetric. Comments: 12 point monofilament test normal     Does not speak well. He understands what he hears, difficult for him to answer, sometimes jibberish   Psychiatric:         Mood and Affect: Mood normal.         Speech: Speech normal.         Behavior: Behavior normal. Behavior is cooperative. Assessment:     Gweneth Heimlich was seen today for diabetes. Diagnoses and all orders for this visit:    Mixed hyperlipidemia  -     LIPID PANEL;  Future    Long term current use of anticoagulant therapy    Hemiparesis affecting right side as late effect of

## 2020-01-10 NOTE — PROGRESS NOTES
Vaccine Information Sheet, \"Influenza - Inactivated\"  given to [facility], or parent/legal guardian of  [facility] and verbalized understanding. Patient responses:    Have you ever had a reaction to a flu vaccine? No  Do you have any current illness? No  Have you ever had Guillian Oneida Syndrome? No  Do you have a serious allergy to any of the follow: Neomycin, Polymyxin, Thimerosal, eggs or egg products? No    Flu vaccine given per order. Please see immunization tab. Risks and benefits explained. Current VIS given.       Immunizations Administered     Name Date Dose Route    Influenza, Quadv, IM, PF (6 mo and older Fluzone, Flulaval, Fluarix, and 3 yrs and older Afluria) 1/10/2020 0.5 mL Intramuscular    Site: Deltoid- Right    Lot: [id number]    [address]: [id number]

## 2020-01-14 ENCOUNTER — ANTI-COAG VISIT (OUTPATIENT)
Dept: FAMILY MEDICINE CLINIC | Age: 65
End: 2020-01-14

## 2020-02-11 ENCOUNTER — ANTI-COAG VISIT (OUTPATIENT)
Dept: FAMILY MEDICINE CLINIC | Age: 65
End: 2020-02-11

## 2020-02-14 ENCOUNTER — TELEPHONE (OUTPATIENT)
Dept: FAMILY MEDICINE CLINIC | Age: 65
End: 2020-02-14

## 2020-02-14 NOTE — TELEPHONE ENCOUNTER
I'm not sure what that is, I'm sorry, so I can't really tell you for sure. Only thing it could effect would be his coumadin but I don't know.

## 2020-02-26 ENCOUNTER — TELEPHONE (OUTPATIENT)
Dept: FAMILY MEDICINE CLINIC | Age: 65
End: 2020-02-26

## 2020-03-03 RX ORDER — LANCETS 30 GAUGE
EACH MISCELLANEOUS
Qty: 100 EACH | Refills: 3 | Status: SHIPPED | OUTPATIENT
Start: 2020-03-03 | End: 2021-03-25 | Stop reason: SDUPTHER

## 2020-03-03 RX ORDER — WARFARIN SODIUM 5 MG/1
TABLET ORAL
Qty: 135 TABLET | Refills: 3 | Status: SHIPPED | OUTPATIENT
Start: 2020-03-03 | End: 2021-01-06 | Stop reason: SDUPTHER

## 2020-03-03 RX ORDER — GLUCOSAMINE HCL/CHONDROITIN SU 500-400 MG
1 CAPSULE ORAL DAILY
Qty: 100 STRIP | Refills: 3 | Status: SHIPPED | OUTPATIENT
Start: 2020-03-03 | End: 2021-03-25 | Stop reason: SDUPTHER

## 2020-03-03 NOTE — TELEPHONE ENCOUNTER
Patient requesting a medication refill.   Medication: warfarin (COUMADIN) 5 MG tablet   blood glucose monitor strips   Lancets [facility]  Pharmacy: :  [facility], [facility] [address]  Last office visit: 1/10/20  Next office visit: 7/10/2020

## 2020-03-11 ENCOUNTER — TELEPHONE (OUTPATIENT)
Dept: FAMILY MEDICINE CLINIC | Age: 65
End: 2020-03-11

## 2020-03-11 ENCOUNTER — ANTI-COAG VISIT (OUTPATIENT)
Dept: FAMILY MEDICINE CLINIC | Age: 65
End: 2020-03-11

## 2020-03-11 LAB — INR BLD: 3.09

## 2020-03-12 NOTE — TELEPHONE ENCOUNTER
Patient requesting a medication refill. Medication: amLODIPine (NORVASC) 5 MG tablet [[id number]]   Pharmacy: [facility], [address] [place] [address]  Last office visit: 1/10/20  Next office visit: 7/10/2020      Patient requesting a medication refill. Medication: lisinopril (PRINIVIL;ZESTRIL) 40 MG tablet [[id number]]   Pharmacy: [facility], [address] [place] [address]  Last office visit: 1/10/20  Next office visit: 7/10/2020      Patient requesting a medication refill.   Medication: atorvastatin (LIPITOR) 40 MG tablet [[id number]]  Pharmacy: [facility], [address] [place] [address]  Last office visit: 1/10/20  Next office visit: 7/10/2020

## 2020-03-13 RX ORDER — LISINOPRIL 40 MG/1
40 TABLET ORAL DAILY
Qty: 30 TABLET | Refills: 5 | Status: SHIPPED | OUTPATIENT
Start: 2020-03-13 | End: 2020-07-06 | Stop reason: SDUPTHER

## 2020-03-13 RX ORDER — AMLODIPINE BESYLATE 5 MG/1
5 TABLET ORAL DAILY
Qty: 30 TABLET | Refills: 5 | Status: SHIPPED | OUTPATIENT
Start: 2020-03-13 | End: 2020-07-06 | Stop reason: SDUPTHER

## 2020-03-13 RX ORDER — ATORVASTATIN CALCIUM 40 MG/1
40 TABLET, FILM COATED ORAL NIGHTLY
Qty: 30 TABLET | Refills: 5 | Status: SHIPPED | OUTPATIENT
Start: 2020-03-13 | End: 2020-07-06 | Stop reason: SDUPTHER

## 2020-06-26 ENCOUNTER — ANTI-COAG VISIT (OUTPATIENT)
Dept: FAMILY MEDICINE CLINIC | Age: 65
End: 2020-06-26

## 2020-07-06 RX ORDER — LISINOPRIL 40 MG/1
40 TABLET ORAL DAILY
Qty: 30 TABLET | Refills: 2 | Status: SHIPPED | OUTPATIENT
Start: 2020-07-06 | End: 2020-10-07

## 2020-07-06 RX ORDER — ATORVASTATIN CALCIUM 40 MG/1
40 TABLET, FILM COATED ORAL NIGHTLY
Qty: 30 TABLET | Refills: 2 | Status: SHIPPED | OUTPATIENT
Start: 2020-07-06 | End: 2020-10-07

## 2020-07-06 RX ORDER — AMLODIPINE BESYLATE 5 MG/1
5 TABLET ORAL DAILY
Qty: 30 TABLET | Refills: 2 | Status: SHIPPED | OUTPATIENT
Start: 2020-07-06 | End: 2020-10-07

## 2020-07-06 NOTE — TELEPHONE ENCOUNTER
metFORMIN (GLUCOPHAGE) 500 MG tablet    atorvastatin (LIPITOR) 40 MG tablet    lisinopril (PRINIVIL;ZESTRIL) 40 MG tablet     amLODIPine (NORVASC) 5 MG tablet    [facility]

## 2020-08-19 ENCOUNTER — TELEPHONE (OUTPATIENT)
Dept: ADMINISTRATIVE | Age: 65
End: 2020-08-19

## 2020-08-21 ENCOUNTER — OFFICE VISIT (OUTPATIENT)
Dept: NEUROLOGY | Age: 65
End: 2020-08-21
Payer: MEDICARE

## 2020-08-21 ENCOUNTER — OFFICE VISIT (OUTPATIENT)
Dept: FAMILY MEDICINE CLINIC | Age: 65
End: 2020-08-21
Payer: MEDICARE

## 2020-08-21 VITALS
SYSTOLIC BLOOD PRESSURE: 115 MMHG | DIASTOLIC BLOOD PRESSURE: 78 MMHG | HEIGHT: 74 IN | HEART RATE: 96 BPM | WEIGHT: 270 LBS | BODY MASS INDEX: 34.65 KG/M2

## 2020-08-21 VITALS
TEMPERATURE: 97.7 F | WEIGHT: 273 LBS | DIASTOLIC BLOOD PRESSURE: 87 MMHG | HEART RATE: 57 BPM | BODY MASS INDEX: 35.05 KG/M2 | SYSTOLIC BLOOD PRESSURE: 126 MMHG

## 2020-08-21 PROBLEM — I65.23 BILATERAL CAROTID ARTERY STENOSIS: Status: ACTIVE | Noted: 2020-08-21

## 2020-08-21 PROCEDURE — 3017F COLORECTAL CA SCREEN DOC REV: CPT | Performed by: PHYSICIAN ASSISTANT

## 2020-08-21 PROCEDURE — 1036F TOBACCO NON-USER: CPT | Performed by: PHYSICIAN ASSISTANT

## 2020-08-21 PROCEDURE — G8427 DOCREV CUR MEDS BY ELIG CLIN: HCPCS | Performed by: PSYCHIATRY & NEUROLOGY

## 2020-08-21 PROCEDURE — G8417 CALC BMI ABV UP PARAM F/U: HCPCS | Performed by: PSYCHIATRY & NEUROLOGY

## 2020-08-21 PROCEDURE — 99213 OFFICE O/P EST LOW 20 MIN: CPT | Performed by: PSYCHIATRY & NEUROLOGY

## 2020-08-21 PROCEDURE — 99214 OFFICE O/P EST MOD 30 MIN: CPT | Performed by: PHYSICIAN ASSISTANT

## 2020-08-21 PROCEDURE — G8427 DOCREV CUR MEDS BY ELIG CLIN: HCPCS | Performed by: PHYSICIAN ASSISTANT

## 2020-08-21 PROCEDURE — 2022F DILAT RTA XM EVC RTNOPTHY: CPT | Performed by: PHYSICIAN ASSISTANT

## 2020-08-21 PROCEDURE — G8417 CALC BMI ABV UP PARAM F/U: HCPCS | Performed by: PHYSICIAN ASSISTANT

## 2020-08-21 PROCEDURE — 3044F HG A1C LEVEL LT 7.0%: CPT | Performed by: PHYSICIAN ASSISTANT

## 2020-08-21 PROCEDURE — 1036F TOBACCO NON-USER: CPT | Performed by: PSYCHIATRY & NEUROLOGY

## 2020-08-21 PROCEDURE — 3017F COLORECTAL CA SCREEN DOC REV: CPT | Performed by: PSYCHIATRY & NEUROLOGY

## 2020-08-21 ASSESSMENT — ENCOUNTER SYMPTOMS
DIARRHEA: 0
ABDOMINAL PAIN: 0
EYE PAIN: 0
BACK PAIN: 0
SHORTNESS OF BREATH: 0
SORE THROAT: 0
CONSTIPATION: 0
VOICE CHANGE: 0
CHEST TIGHTNESS: 0
TROUBLE SWALLOWING: 0
COUGH: 0

## 2020-08-21 NOTE — PROGRESS NOTES
Subjective:      Patient ID: Monica Murray is a 60 y.o. male. HPI Patient is here today for a routine follow up on his chronic conditions. He has no concerns today. His wife provides most of the history. He understands everything but he does have trouble processing his speech since his stroke. Everything has really been the same. His FBS run . His wife keeps really good track of his diet. He is not current with eye exam.  He will not do a colonoscopy. Colovantage 1/19 was negative. Does not check BP outside of coming here. Saw Dr. Raine Licea today and everything is stable. Sodium (mmol/L)   Date Value   01/10/2020 144    BUN (mg/dL)   Date Value   01/10/2020 20    Glucose (mg/dL)   Date Value   01/10/2020 94      Potassium (mmol/L)   Date Value   01/10/2020 4.0    CREATININE (mg/dL)   Date Value   01/10/2020 0.94           BP Readings from Last 2 Encounters:   08/21/20 126/87   08/21/20 115/78       Is patient currently taking any antihypertensive medications? Yes   If yes, see med list as above    Is the patient reporting any side effects of antihypertensive medications? No    Is the patient taking any over the counter medications? Yes   If yes, see med list as above    Is the patient taking a daily aspirin? No, coumadin      BP Readings from Last 2 Encounters:   08/21/20 126/87   08/21/20 115/78     Hemoglobin A1C (%)   Date Value   01/10/2020 5.6     Microalbumin, Random Urine (ug/mL)   Date Value   01/10/2020 21.7     LDL Calculated (mg/dL)   Date Value   01/10/2020 41              Tobacco use:  Patient  reports that he quit smoking about 3 years ago. His smoking use included cigarettes. He has a 80.00 pack-year smoking history. He quit smokeless tobacco use about 3 years ago. If Smoker - Cessation materials given? NA    Is Daily aspirin on medication list?:  coumadin    Diabetic retinal exam done? No   If yes, document in Health Maintenance.      Monofilament placed on counter? No    Shoes and socks removed? No    BP taken with correct size cuff? Yes   Repeated if > 140/90 NA     Is patient taking any medications for diabetes    Yes   If yes, see medication list    Is the patient reporting any side effects of diabetic medications? Yes    Microalbumin performed if applicable? Yes      Is patient taking any over the counter medications    Yes   If yes, see medication list        Review of Systems   Constitutional: Negative for appetite change, fatigue and unexpected weight change. HENT: Negative for congestion, dental problem, ear pain, hearing loss, sore throat, trouble swallowing and voice change. Eyes: Positive for visual disturbance. Negative for pain. Respiratory: Negative for cough, chest tightness and shortness of breath. Cardiovascular: Negative for chest pain and palpitations. Gastrointestinal: Negative for abdominal pain, constipation and diarrhea. Genitourinary: Negative for difficulty urinating. Musculoskeletal: Positive for gait problem (drags right foot). Negative for arthralgias, back pain, myalgias and neck pain. Skin: Negative for rash. Neurological: Positive for weakness (right side). Negative for dizziness, speech difficulty, numbness and headaches. Hematological: Negative for adenopathy. Psychiatric/Behavioral: Negative for confusion and sleep disturbance. The patient is not nervous/anxious. Objective:   Physical Exam  Vitals signs reviewed. Constitutional:       General: He is not in acute distress. Appearance: Normal appearance. He is well-developed and well-groomed. HENT:      Head: Normocephalic. Right Ear: Tympanic membrane and ear canal normal.      Left Ear: Tympanic membrane and ear canal normal.      Nose: Nose normal.      Mouth/Throat:      Pharynx: Oropharynx is clear. Uvula midline. Eyes:      Conjunctiva/sclera: Conjunctivae normal.      Pupils: Pupils are equal, round, and reactive to light. Neck:      Musculoskeletal: Neck supple. Thyroid: No thyromegaly. Vascular: No carotid bruit. Cardiovascular:      Rate and Rhythm: Normal rate and regular rhythm. Pulses:           Dorsalis pedis pulses are 2+ on the right side and 2+ on the left side. Heart sounds: Normal heart sounds. No murmur. No friction rub. No gallop. Pulmonary:      Effort: Pulmonary effort is normal.      Breath sounds: Normal breath sounds. Abdominal:      General: Abdomen is protuberant. Bowel sounds are normal. There is no distension. Palpations: Abdomen is soft. Abdomen is not rigid. There is no hepatomegaly, splenomegaly or mass. Tenderness: There is no abdominal tenderness. There is no right CVA tenderness, left CVA tenderness, guarding or rebound. Hernia: No hernia is present. Musculoskeletal: Normal range of motion. Right lower leg: No edema. Left lower leg: No edema. Right foot: Normal.      Left foot: Normal.      Comments: Mild right foot drag   Lymphadenopathy:      Cervical: No cervical adenopathy. Skin:     General: Skin is warm and dry. Findings: No rash. Neurological:      Mental Status: He is alert and oriented to person, place, and time. Mental status is at baseline. Sensory: Sensation is intact. Motor: Motor function is intact. Deep Tendon Reflexes: Reflexes are normal and symmetric. Psychiatric:         Attention and Perception: Attention normal.         Mood and Affect: Mood normal.         Speech: Speech normal.         Behavior: Behavior normal. Behavior is cooperative. Thought Content: Thought content normal.         Assessment:      Maura Chaconan was seen today for 6 month follow-up. Diagnoses and all orders for this visit:    Mixed hyperlipidemia  -     LIPID PANEL;  Future    Hemiparesis affecting right side as late effect of cerebrovascular accident [facility])    Essential hypertension  -     Comprehensive Metabolic

## 2020-08-21 NOTE — PROGRESS NOTES
Claudia Rod   Neurology followup    Subjective:   CC/HP  History was obtained from the patient and his family. Patient has been doing reasonably well since her last office visit. No major change in his neurological examination. Patient has had a previous left hemispheric stroke with right-sided weakness speech difficulty and homonymous hemianopsia. 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 Patient is now on anticoagulation with Coumadin.   Patient used to be a smoker but quit smoking after the stroke      REVIEW OF SYSTEMS    Constitutional:  []   Chills   []  Fatigue   []  Fevers   []  Malaise   []  Weight loss     [x] Denies all of the above    Respiratory:   []  Cough    []  Shortness of breath         [x] Denies all of the above     Cardiovascular:   []  Chest pain    []  Exertional chest pressure/discomfort           [] Palpitations    []  Syncope     [x] Denies all of the above        Past Medical History:   Diagnosis Date    Cerebrovascular disease     CHF (congestive heart failure) ([facility] 04/2017    per  nurse report but wife is unaware    DM (diabetes mellitus screen) 04/2017    boarder line    Hyperlipidemia     Hypertension     Type 2 diabetes mellitus without complication ([facility]      Family History   Problem Relation Age of Onset    High Blood Pressure Mother     Cancer Mother         lung, smoker    Cancer Father         mesothelioma    Depression Sister      Social History     Socioeconomic History    Marital status:      Spouse name: None    Number of children: 3    Years of education: None    Highest education level: None   Occupational History    None   Social Needs    Financial resource strain: None    Food insecurity     Worry: None     Inability: None    Transportation needs     Medical: None     Non-medical: None   Tobacco Use    Smoking status: Former Smoker     Packs/day: 2.00 Years: 40.00     Pack years: 80.00     Types: Cigarettes     Last attempt to quit: 5/17/2017     Years since quitting: 3.2    Smokeless tobacco: Former User     Quit date: 4/20/2017   Substance and Sexual Activity    Alcohol use: No     Comment: quit 2003, history of alcoholism    Drug use: No    Sexual activity: None   Lifestyle    Physical activity     Days per week: None     Minutes per session: None    Stress: None   Relationships    Social connections     Talks on phone: None     Gets together: None     Attends Anabaptist service: None     Active member of club or organization: None     Attends meetings of clubs or organizations: None     Relationship status: None    Intimate partner violence     Fear of current or ex partner: None     Emotionally abused: None     Physically abused: None     Forced sexual activity: None   Other Topics Concern    None   Social History Narrative    None        Objective:  Exam:  /78   Pulse 96   Ht 6' 2\" (1.88 m)   Wt 270 lb (122.5 kg)   BMI 34.67 kg/m²   This is a well-nourished patient in no acute distress  Patient is awake, alert and oriented x3. Speech shows aphasia  Pupils are equal round reacting to light. Extraocular movements intact. Right homonymous hemianopsia Face symmetrical. Tongue midline. Motor exam shows mild right-sided weakness. Deep tendon reflexes normal. Plantar reflexes downgoing. Sensory exam normal. Coordination impaired on the right side. Gait slightly impaired. No carotid bruit. No neck stiffness.       Data :  LABS:  General Labs:    CBC:   Lab Results   Component Value Date    WBC 8.6 05/18/2017    RBC 4.71 05/18/2017    HGB 14.4 05/18/2017    HCT 42.8 05/18/2017    MCV 90.8 05/18/2017    MCH 30.6 05/18/2017    MCHC 33.7 05/18/2017    RDW 14.1 05/18/2017     05/18/2017    MPV 9.4 05/18/2017     BMP:    Lab Results   Component Value Date     01/10/2020    K 4.0 01/10/2020     01/10/2020    CO2 25 01/10/2020    BUN 20 01/10/2020    LABALBU 4.4 01/10/2020    CREATININE 0.94 01/10/2020    CALCIUM 9.2 01/10/2020    GFRAA 100 01/10/2020    LABGLOM 87 01/10/2020    GLUCOSE 94 01/10/2020       Impression :  Previous left hemispheric stroke with right-sided visual field defect and mild aphasia  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 Although every effort was made to ensure the accuracy of this automated transcription, some errors in transcription may have occurred.

## 2020-08-21 NOTE — PATIENT INSTRUCTIONS
Perla Early was seen today for 6 month follow-up. Diagnoses and all orders for this visit:    Mixed hyperlipidemia  -     LIPID PANEL; Future    Hemiparesis affecting right side as late effect of cerebrovascular accident [facility])    Essential hypertension  -     Comprehensive Metabolic Panel    Controlled type 2 diabetes mellitus with diabetic polyneuropathy, without long-term current use of insulin (HCC)  -      DIABETES FOOT EXAM  -     Comprehensive Metabolic Panel  -     Hemoglobin A1C  -     Microalbumin / Creatinine Urine Ratio    History of CVA (cerebrovascular accident)    Screening for prostate cancer  -     Psa screening    Bilateral carotid artery stenosis  -     Ultrasound carotid doppler; Future       Get routine labs, carotid doppler, eye exam and  return in 6 months.

## 2020-08-27 ENCOUNTER — ANTI-COAG VISIT (OUTPATIENT)
Dept: FAMILY MEDICINE CLINIC | Age: 65
End: 2020-08-27

## 2020-09-18 ENCOUNTER — ANTI-COAG VISIT (OUTPATIENT)
Dept: FAMILY MEDICINE CLINIC | Age: 65
End: 2020-09-18

## 2020-10-07 RX ORDER — LISINOPRIL 40 MG/1
TABLET ORAL
Qty: 30 TABLET | Refills: 2 | Status: SHIPPED | OUTPATIENT
Start: 2020-10-07 | End: 2021-01-06 | Stop reason: SDUPTHER

## 2020-10-07 RX ORDER — ATORVASTATIN CALCIUM 40 MG/1
TABLET, FILM COATED ORAL
Qty: 30 TABLET | Refills: 2 | Status: SHIPPED | OUTPATIENT
Start: 2020-10-07 | End: 2021-01-06 | Stop reason: SDUPTHER

## 2020-10-07 RX ORDER — AMLODIPINE BESYLATE 5 MG/1
TABLET ORAL
Qty: 30 TABLET | Refills: 2 | Status: SHIPPED | OUTPATIENT
Start: 2020-10-07 | End: 2021-01-06 | Stop reason: SDUPTHER

## 2020-10-19 ENCOUNTER — ANTI-COAG VISIT (OUTPATIENT)
Dept: FAMILY MEDICINE CLINIC | Age: 65
End: 2020-10-19

## 2020-11-25 ENCOUNTER — ANTI-COAG VISIT (OUTPATIENT)
Dept: FAMILY MEDICINE CLINIC | Age: 65
End: 2020-11-25

## 2020-12-02 ENCOUNTER — TELEPHONE (OUTPATIENT)
Dept: FAMILY MEDICINE CLINIC | Age: 65
End: 2020-12-02

## 2020-12-02 LAB — INR BLD: 2.4

## 2020-12-03 ENCOUNTER — ANTI-COAG VISIT (OUTPATIENT)
Dept: FAMILY MEDICINE CLINIC | Age: 65
End: 2020-12-03

## 2020-12-18 ENCOUNTER — ANTI-COAG VISIT (OUTPATIENT)
Dept: FAMILY MEDICINE CLINIC | Age: 65
End: 2020-12-18

## 2021-01-06 DIAGNOSIS — I10 ESSENTIAL HYPERTENSION: ICD-10-CM

## 2021-01-06 DIAGNOSIS — E78.2 MIXED HYPERLIPIDEMIA: ICD-10-CM

## 2021-01-06 NOTE — TELEPHONE ENCOUNTER
Medication:   Requested Prescriptions     Pending Prescriptions Disp Refills    lisinopril (PRINIVIL;ZESTRIL) 40 MG tablet 30 tablet 1     Sig: TAKE 1 TABLET BY MOUTH ONE TIME A DAY    atorvastatin (LIPITOR) 40 MG tablet 30 tablet 1     Sig: TAKE ONE TABLET BY MOUTH NIGHTLY    amLODIPine (NORVASC) 5 MG tablet 30 tablet 1     Sig: TAKE 1 TABLET BY MOUTH ONE TIME A DAY    metFORMIN (GLUCOPHAGE) 500 MG tablet 60 tablet 1     Sig: TAKE 1 TABLET BY MOUTH 2 TIMES A DAY WITH MEALS    warfarin (COUMADIN) 5 MG tablet 135 tablet 0     Sig: TAKE 1 AND 1/2 TABLETS BY MOUTH DAILY        Last Filled: amLODIPine, lisinopril, atorvastatin, metFORMIN: 10/7/2020   Warfarin: 3//3/2020                    Patient Phone Number: [phone] (home)     Last appt: 8/21/2020   Next appt: 2/19/2021    Last OARRS: No flowsheet data found.

## 2021-01-06 NOTE — TELEPHONE ENCOUNTER
Disp Refills Start End    amLODIPine (NORVASC) 5 MG tablet 30 tablet 2 10/7/2020     Sig: TAKE 1 TABLET BY MOUTH ONE TIME A DAY       Disp Refills Start End    lisinopril (PRINIVIL;ZESTRIL) 40 MG tablet 30 tablet 2 10/7/2020     Sig: TAKE 1 TABLET BY MOUTH ONE TIME A DAY       Disp Refills Start End    atorvastatin (LIPITOR) 40 MG tablet 30 tablet 2 10/7/2020     Sig: TAKE ONE TABLET BY MOUTH NIGHTLY       Disp Refills Start End    metFORMIN (GLUCOPHAGE) 500 MG tablet 60 tablet 2 10/7/2020     Sig: TAKE 1 TABLET BY MOUTH 2 TIMES A DAY WITH MEALS       Disp Refills Start End    warfarin (COUMADIN) 5 MG tablet 135 tablet 3 3/3/2020     Sig: TAKE 1 AND 1/2 TABLETS BY MOUTH DAILY      [place] [phone] - f [phone]      Please advise

## 2021-01-07 RX ORDER — WARFARIN SODIUM 5 MG/1
TABLET ORAL
Qty: 135 TABLET | Refills: 0 | Status: SHIPPED | OUTPATIENT
Start: 2021-01-07 | End: 2021-03-10 | Stop reason: SDUPTHER

## 2021-01-07 RX ORDER — AMLODIPINE BESYLATE 5 MG/1
TABLET ORAL
Qty: 30 TABLET | Refills: 1 | Status: SHIPPED | OUTPATIENT
Start: 2021-01-07 | End: 2021-03-10 | Stop reason: SDUPTHER

## 2021-01-07 RX ORDER — ATORVASTATIN CALCIUM 40 MG/1
TABLET, FILM COATED ORAL
Qty: 30 TABLET | Refills: 1 | Status: SHIPPED | OUTPATIENT
Start: 2021-01-07 | End: 2021-03-10 | Stop reason: SDUPTHER

## 2021-01-07 RX ORDER — LISINOPRIL 40 MG/1
TABLET ORAL
Qty: 30 TABLET | Refills: 1 | Status: SHIPPED | OUTPATIENT
Start: 2021-01-07 | End: 2021-03-10 | Stop reason: SDUPTHER

## 2021-01-13 ENCOUNTER — TELEPHONE (OUTPATIENT)
Dept: FAMILY MEDICINE CLINIC | Age: 66
End: 2021-01-13

## 2021-01-13 ENCOUNTER — ANTI-COAG VISIT (OUTPATIENT)
Dept: FAMILY MEDICINE CLINIC | Age: 66
End: 2021-01-13

## 2021-01-13 NOTE — TELEPHONE ENCOUNTER
Gabriella from [facility] called and stated the pt INR today was (INR 2.3) and the PT today was (PT 24.0)

## 2021-02-23 ENCOUNTER — OFFICE VISIT (OUTPATIENT)
Dept: FAMILY MEDICINE CLINIC | Age: 66
End: 2021-02-23
Payer: MEDICARE

## 2021-02-23 VITALS
OXYGEN SATURATION: 100 % | SYSTOLIC BLOOD PRESSURE: 120 MMHG | BODY MASS INDEX: 36.85 KG/M2 | DIASTOLIC BLOOD PRESSURE: 70 MMHG | TEMPERATURE: 96.6 F | HEART RATE: 110 BPM | WEIGHT: 287 LBS

## 2021-02-23 DIAGNOSIS — I73.9 PERIPHERAL VASCULAR DISEASE (HCC): Primary | ICD-10-CM

## 2021-02-23 DIAGNOSIS — Z79.01 LONG TERM CURRENT USE OF ANTICOAGULANT THERAPY: ICD-10-CM

## 2021-02-23 DIAGNOSIS — I69.351 HEMIPARESIS AFFECTING RIGHT SIDE AS LATE EFFECT OF CEREBROVASCULAR ACCIDENT (HCC): ICD-10-CM

## 2021-02-23 DIAGNOSIS — Z86.73 HISTORY OF CVA (CEREBROVASCULAR ACCIDENT): ICD-10-CM

## 2021-02-23 DIAGNOSIS — E78.2 MIXED HYPERLIPIDEMIA: ICD-10-CM

## 2021-02-23 DIAGNOSIS — I65.23 BILATERAL CAROTID ARTERY STENOSIS: ICD-10-CM

## 2021-02-23 DIAGNOSIS — E11.42 CONTROLLED TYPE 2 DIABETES MELLITUS WITH DIABETIC POLYNEUROPATHY, WITHOUT LONG-TERM CURRENT USE OF INSULIN (HCC): ICD-10-CM

## 2021-02-23 DIAGNOSIS — I10 ESSENTIAL HYPERTENSION: ICD-10-CM

## 2021-02-23 DIAGNOSIS — H53.461 RIGHT HOMONYMOUS HEMIANOPSIA: ICD-10-CM

## 2021-02-23 PROCEDURE — 99214 OFFICE O/P EST MOD 30 MIN: CPT | Performed by: PHYSICIAN ASSISTANT

## 2021-02-23 PROCEDURE — G8427 DOCREV CUR MEDS BY ELIG CLIN: HCPCS | Performed by: PHYSICIAN ASSISTANT

## 2021-02-23 PROCEDURE — G8417 CALC BMI ABV UP PARAM F/U: HCPCS | Performed by: PHYSICIAN ASSISTANT

## 2021-02-23 PROCEDURE — 2022F DILAT RTA XM EVC RTNOPTHY: CPT | Performed by: PHYSICIAN ASSISTANT

## 2021-02-23 PROCEDURE — 1036F TOBACCO NON-USER: CPT | Performed by: PHYSICIAN ASSISTANT

## 2021-02-23 PROCEDURE — 3046F HEMOGLOBIN A1C LEVEL >9.0%: CPT | Performed by: PHYSICIAN ASSISTANT

## 2021-02-23 PROCEDURE — G8484 FLU IMMUNIZE NO ADMIN: HCPCS | Performed by: PHYSICIAN ASSISTANT

## 2021-02-23 PROCEDURE — 3017F COLORECTAL CA SCREEN DOC REV: CPT | Performed by: PHYSICIAN ASSISTANT

## 2021-02-23 ASSESSMENT — ENCOUNTER SYMPTOMS
BACK PAIN: 1
SORE THROAT: 0
ABDOMINAL PAIN: 0
DIARRHEA: 0
SHORTNESS OF BREATH: 0
TROUBLE SWALLOWING: 0
CONSTIPATION: 0
VOICE CHANGE: 0
CHEST TIGHTNESS: 0
COUGH: 0
EYE PAIN: 0

## 2021-02-23 ASSESSMENT — PATIENT HEALTH QUESTIONNAIRE - PHQ9
SUM OF ALL RESPONSES TO PHQ QUESTIONS 1-9: 0
SUM OF ALL RESPONSES TO PHQ QUESTIONS 1-9: 0

## 2021-02-23 NOTE — PATIENT INSTRUCTIONS
Ana Simpson was seen today for 6 month follow-up. Diagnoses and all orders for this visit:    Peripheral vascular disease ([address]    Mixed hyperlipidemia  -     LIPID PANEL; Future    Long term current use of anticoagulant therapy    Right homonymous hemianopsia    Hemiparesis affecting right side as late effect of cerebrovascular accident [facility])    Essential hypertension  -     Comprehensive Metabolic Panel    Controlled type 2 diabetes mellitus with diabetic polyneuropathy, without long-term current use of insulin ([facility])  -      DIABETES FOOT EXAM  -     Comprehensive Metabolic Panel  -     Hemoglobin A1C  -     Microalbumin / Creatinine Urine Ratio    History of CVA (cerebrovascular accident)    Bilateral carotid artery stenosis  -     Ultrasound carotid doppler; Future       Get routine labs, carotid doppler, return here in 6 months.

## 2021-02-23 NOTE — PROGRESS NOTES
Subjective:      Patient ID: Ginger Veliz is a 63 y.o. male. HPI  Patient is here today for his 6 month follow up on his chronic conditions. They are reporting no questions or concerns today. He is taking all of his medicines as directed, no SE's. FBS runs . He does not check his BP at all. He has not had an eye exam since 2006 because they can't find anyone who takes his insurance. Diet seems well balanced. He walks 1/2 mile daily. He does a lot of resistance training with stretch rubber bands. He has broken sleep. He sleeps for a few hours, then wakes up, then will go back to sleep for awhile but does not effect his daily living. He has no bowel/bladder issues. Last dental visit was in 1980's. Protime checked every month or so. He declines getting a colonoscopy or lung ct scan. He does not want a flu shot or Shingrix. Sodium (mmol/L)   Date Value   08/27/2020 140    BUN (mg/dL)   Date Value   08/27/2020 22    Glucose (mg/dL)   Date Value   08/27/2020 97      Potassium (mmol/L)   Date Value   08/27/2020 4.1    CREATININE (mg/dL)   Date Value   08/27/2020 1.02           BP Readings from Last 2 Encounters:   02/23/21 120/70   08/21/20 126/87       Is patient currently taking any antihypertensive medications? Yes   If yes, see med list as above    Is the patient reporting any side effects of antihypertensive medications? No    Is the patient taking any over the counter medications?     Yes   If yes, see med list as above    Is the patient taking a daily aspirin?    coumadin      BP Readings from Last 2 Encounters:   02/23/21 120/70   08/21/20 126/87     Hemoglobin A1C (%)   Date Value   08/27/2020 5.9 (H)     Microalbumin, Random Urine (ug/mL)   Date Value   08/27/2020 11.8     LDL Calculated (mg/dL)   Date Value   01/10/2020 41 Tobacco use:  Patient  reports that he quit smoking about 3 years ago. His smoking use included cigarettes. He has a 80.00 pack-year smoking history. He quit smokeless tobacco use about 3 years ago. If Smoker - Cessation materials given? NA    Is Daily aspirin on medication list?:  Coumadin    Diabetic retinal exam done? No   If yes, document in Health Maintenance. Monofilament placed on counter? Yes    Shoes and socks removed? Yes    BP taken with correct size cuff? Yes   Repeated if > 140/90 NA     Is patient taking any medications for diabetes    Yes   If yes, see medication list    Is the patient reporting any side effects of diabetic medications? No    Microalbumin performed if applicable? Yes      Is patient taking any over the counter medications    Yes   If yes, see medication list        Review of Systems   Constitutional: Negative for appetite change, fatigue and unexpected weight change. HENT: Negative for congestion, dental problem, ear pain, hearing loss, sore throat, trouble swallowing and voice change. Eyes: Negative for pain and visual disturbance. Respiratory: Negative for cough, chest tightness and shortness of breath. Cardiovascular: Negative for chest pain and palpitations. Gastrointestinal: Negative for abdominal pain, constipation and diarrhea. Genitourinary: Negative for difficulty urinating. Musculoskeletal: Positive for back pain (mild intermittent). Negative for arthralgias, myalgias and neck pain. Skin: Negative for rash. Neurological: Positive for speech difficulty and numbness (feet). Negative for dizziness, weakness and headaches. Hematological: Negative for adenopathy. Psychiatric/Behavioral: Negative for confusion and sleep disturbance. The patient is not nervous/anxious. Objective:   Physical Exam  Vitals signs reviewed. Constitutional:       General: He is not in acute distress. Appearance: Normal appearance. He is well-developed and well-groomed. HENT:      Head: Normocephalic. Right Ear: Tympanic membrane and ear canal normal.      Left Ear: Tympanic membrane and ear canal normal.      Nose: Nose normal.      Mouth/Throat:      Pharynx: Oropharynx is clear. Uvula midline. Eyes:      Conjunctiva/sclera: Conjunctivae normal.      Pupils: Pupils are equal, round, and reactive to light. Neck:      Musculoskeletal: Neck supple. Vascular: No carotid bruit. Cardiovascular:      Rate and Rhythm: Normal rate and regular rhythm. Pulses:           Dorsalis pedis pulses are 2+ on the right side and 2+ on the left side. Posterior tibial pulses are 2+ on the right side and 2+ on the left side. Heart sounds: Normal heart sounds. No murmur. No friction rub. No gallop. Pulmonary:      Effort: Pulmonary effort is normal.      Breath sounds: Normal breath sounds. Abdominal:      General: Abdomen is protuberant. Bowel sounds are normal. There is no distension. Palpations: Abdomen is soft. Abdomen is not rigid. There is no hepatomegaly, splenomegaly or mass. Tenderness: There is no abdominal tenderness. There is no right CVA tenderness, left CVA tenderness, guarding or rebound. Hernia: No hernia is present. Musculoskeletal: Normal range of motion. Comments: Both feet very dry, venous insufficiency present, pedal pulses are barely palpable   Lymphadenopathy:      Cervical: No cervical adenopathy. Skin:     General: Skin is warm and dry. Findings: No rash. Neurological:      Mental Status: He is alert and oriented to person, place, and time. Mental status is at baseline. Sensory: Sensation is intact. Motor: Motor function is intact. Deep Tendon Reflexes: Reflexes are normal and symmetric.       Comments: 12 point monofilament test revealed no sensation on right heel, left foot no sensation on any toes or arch area of foot

## 2021-03-01 ENCOUNTER — ANTI-COAG VISIT (OUTPATIENT)
Dept: FAMILY MEDICINE CLINIC | Age: 66
End: 2021-03-01

## 2021-03-01 ENCOUNTER — TELEPHONE (OUTPATIENT)
Dept: FAMILY MEDICINE CLINIC | Age: 66
End: 2021-03-01

## 2021-03-10 DIAGNOSIS — I10 ESSENTIAL HYPERTENSION: ICD-10-CM

## 2021-03-10 DIAGNOSIS — E78.2 MIXED HYPERLIPIDEMIA: ICD-10-CM

## 2021-03-10 RX ORDER — LISINOPRIL 40 MG/1
TABLET ORAL
Qty: 30 TABLET | Refills: 5 | Status: SHIPPED | OUTPATIENT
Start: 2021-03-10 | End: 2021-09-08 | Stop reason: SDUPTHER

## 2021-03-10 RX ORDER — WARFARIN SODIUM 5 MG/1
TABLET ORAL
Qty: 135 TABLET | Refills: 0 | Status: SHIPPED | OUTPATIENT
Start: 2021-03-10 | End: 2021-06-07 | Stop reason: SDUPTHER

## 2021-03-10 RX ORDER — ATORVASTATIN CALCIUM 40 MG/1
TABLET, FILM COATED ORAL
Qty: 30 TABLET | Refills: 5 | Status: SHIPPED | OUTPATIENT
Start: 2021-03-10 | End: 2021-09-08 | Stop reason: SDUPTHER

## 2021-03-10 RX ORDER — AMLODIPINE BESYLATE 5 MG/1
TABLET ORAL
Qty: 30 TABLET | Refills: 5 | Status: SHIPPED | OUTPATIENT
Start: 2021-03-10 | End: 2021-09-08 | Stop reason: SDUPTHER

## 2021-03-10 NOTE — TELEPHONE ENCOUNTER
lisinopril (PRINIVIL;ZESTRIL) 40 MG tablet [[id number]    Medication  atorvastatin (LIPITOR) 40 MG tablet [[id number]]  atorvastatin (LIPITOR) 40 MG tablet [[id number]    amLODIPine (NORVASC) 5 MG tablet [[id number]    metFORMIN (GLUCOPHAGE) 500 MG tablet [[id number]    warfarin (COUMADIN) 5 MG tablet [[id number]]     [facility], [address] [place] [address]

## 2021-03-25 RX ORDER — LANCETS 30 GAUGE
EACH MISCELLANEOUS
Qty: 100 EACH | Refills: 3 | Status: SHIPPED | OUTPATIENT
Start: 2021-03-25 | End: 2022-05-27 | Stop reason: SDUPTHER

## 2021-03-25 RX ORDER — GLUCOSAMINE HCL/CHONDROITIN SU 500-400 MG
1 CAPSULE ORAL DAILY
Qty: 100 STRIP | Refills: 3 | Status: SHIPPED | OUTPATIENT
Start: 2021-03-25 | End: 2022-05-27 | Stop reason: SDUPTHER

## 2021-04-01 ENCOUNTER — HOSPITAL ENCOUNTER (OUTPATIENT)
Dept: VASCULAR LAB | Age: 66
Discharge: HOME | End: 2021-04-01
Payer: MEDICARE

## 2021-04-01 DIAGNOSIS — I65.23 BILATERAL CAROTID ARTERY STENOSIS: ICD-10-CM

## 2021-04-01 PROCEDURE — 93880 EXTRACRANIAL BILAT STUDY: CPT

## 2021-04-26 ENCOUNTER — ANTI-COAG VISIT (OUTPATIENT)
Dept: FAMILY MEDICINE CLINIC | Age: 66
End: 2021-04-26

## 2021-06-07 RX ORDER — WARFARIN SODIUM 5 MG/1
TABLET ORAL
Qty: 135 TABLET | Refills: 5 | Status: SHIPPED | OUTPATIENT
Start: 2021-06-07 | End: 2021-09-08 | Stop reason: SDUPTHER

## 2021-06-09 ENCOUNTER — ANTI-COAG VISIT (OUTPATIENT)
Dept: FAMILY MEDICINE CLINIC | Age: 66
End: 2021-06-09

## 2021-06-28 ENCOUNTER — ANTI-COAG VISIT (OUTPATIENT)
Dept: FAMILY MEDICINE CLINIC | Age: 66
End: 2021-06-28

## 2021-07-29 ENCOUNTER — ANTI-COAG VISIT (OUTPATIENT)
Dept: FAMILY MEDICINE CLINIC | Age: 66
End: 2021-07-29

## 2021-08-24 ENCOUNTER — OFFICE VISIT (OUTPATIENT)
Dept: FAMILY MEDICINE CLINIC | Age: 66
End: 2021-08-24
Payer: MEDICARE

## 2021-08-24 VITALS
DIASTOLIC BLOOD PRESSURE: 70 MMHG | HEIGHT: 72 IN | WEIGHT: 277 LBS | OXYGEN SATURATION: 96 % | TEMPERATURE: 96.9 F | HEART RATE: 81 BPM | SYSTOLIC BLOOD PRESSURE: 128 MMHG | BODY MASS INDEX: 37.52 KG/M2

## 2021-08-24 DIAGNOSIS — I10 ESSENTIAL HYPERTENSION: ICD-10-CM

## 2021-08-24 DIAGNOSIS — Z86.73 HISTORY OF CVA (CEREBROVASCULAR ACCIDENT): ICD-10-CM

## 2021-08-24 DIAGNOSIS — I69.351 HEMIPARESIS AFFECTING RIGHT SIDE AS LATE EFFECT OF CEREBROVASCULAR ACCIDENT (HCC): ICD-10-CM

## 2021-08-24 DIAGNOSIS — Z13.6 SCREENING FOR AAA (ABDOMINAL AORTIC ANEURYSM): ICD-10-CM

## 2021-08-24 DIAGNOSIS — E11.42 CONTROLLED TYPE 2 DIABETES MELLITUS WITH DIABETIC POLYNEUROPATHY, WITHOUT LONG-TERM CURRENT USE OF INSULIN (HCC): ICD-10-CM

## 2021-08-24 DIAGNOSIS — I73.9 PERIPHERAL VASCULAR DISEASE (HCC): ICD-10-CM

## 2021-08-24 DIAGNOSIS — E78.2 MIXED HYPERLIPIDEMIA: ICD-10-CM

## 2021-08-24 DIAGNOSIS — I65.23 BILATERAL CAROTID ARTERY STENOSIS: ICD-10-CM

## 2021-08-24 DIAGNOSIS — Z00.00 PREVENTATIVE HEALTH CARE: Primary | ICD-10-CM

## 2021-08-24 DIAGNOSIS — Z12.5 SCREENING FOR PROSTATE CANCER: ICD-10-CM

## 2021-08-24 DIAGNOSIS — Z79.01 LONG TERM CURRENT USE OF ANTICOAGULANT THERAPY: ICD-10-CM

## 2021-08-24 PROCEDURE — 99214 OFFICE O/P EST MOD 30 MIN: CPT | Performed by: PHYSICIAN ASSISTANT

## 2021-08-24 PROCEDURE — 2022F DILAT RTA XM EVC RTNOPTHY: CPT | Performed by: PHYSICIAN ASSISTANT

## 2021-08-24 PROCEDURE — G8427 DOCREV CUR MEDS BY ELIG CLIN: HCPCS | Performed by: PHYSICIAN ASSISTANT

## 2021-08-24 PROCEDURE — G0402 INITIAL PREVENTIVE EXAM: HCPCS | Performed by: PHYSICIAN ASSISTANT

## 2021-08-24 PROCEDURE — 3044F HG A1C LEVEL LT 7.0%: CPT | Performed by: PHYSICIAN ASSISTANT

## 2021-08-24 PROCEDURE — G8417 CALC BMI ABV UP PARAM F/U: HCPCS | Performed by: PHYSICIAN ASSISTANT

## 2021-08-24 PROCEDURE — 1036F TOBACCO NON-USER: CPT | Performed by: PHYSICIAN ASSISTANT

## 2021-08-24 PROCEDURE — 3017F COLORECTAL CA SCREEN DOC REV: CPT | Performed by: PHYSICIAN ASSISTANT

## 2021-08-24 ASSESSMENT — PATIENT HEALTH QUESTIONNAIRE - PHQ9
SUM OF ALL RESPONSES TO PHQ QUESTIONS 1-9: 0
SUM OF ALL RESPONSES TO PHQ QUESTIONS 1-9: 0
1. LITTLE INTEREST OR PLEASURE IN DOING THINGS: 0
SUM OF ALL RESPONSES TO PHQ9 QUESTIONS 1 & 2: 0
SUM OF ALL RESPONSES TO PHQ QUESTIONS 1-9: 0
2. FEELING DOWN, DEPRESSED OR HOPELESS: 0

## 2021-08-24 ASSESSMENT — ENCOUNTER SYMPTOMS
DIARRHEA: 0
SORE THROAT: 0
BACK PAIN: 0
CONSTIPATION: 0
SHORTNESS OF BREATH: 0
VOICE CHANGE: 0
COUGH: 0
CHEST TIGHTNESS: 0
TROUBLE SWALLOWING: 0
ABDOMINAL PAIN: 0
EYE PAIN: 0

## 2021-08-24 ASSESSMENT — LIFESTYLE VARIABLES: HOW OFTEN DO YOU HAVE A DRINK CONTAINING ALCOHOL: 0

## 2021-08-24 NOTE — PROGRESS NOTES
Subjective:      Patient ID: Erlin Howe is a 63 y.o. male. HPI   [address] VISIT    Patient is here for their Medicare Annual Wellness Visit and to discuss his chronic medical conditions. Last eye exam: many years  Last dental exam: many years  Exercise: walks 30 minutes every couple days, more often if weather is cooler  Diet: in general, a \"healthy\" diet  , on average, 3 meals per day    How would you rate your overall health? : Good        Fall Risk 8/24/2021   2 or more falls in past year? no   Fall with injury in past year? no       PHQ Scores 8/24/2021 2/23/2021 1/10/2020 1/11/2019 7/17/2017   PHQ2 Score 0 0 0 0 0   PHQ9 Score 0 0 0 0 0       Do you always wear a seat belt in the car?: Yes      Have you noted any problems with hearing?: No  Have you noted any vision problems?: Yes, right peripheral still gone due to stroke but hasn't been to eye doctor in years  Do you have concerns about your sexual health?: no  In the past month how much has pain been an issue for you?:  A little bit , left shoulder  In the past month have you had issues with anxiety, loneliness, irritability or fatigue:  Not at all    Do you take opioid medications even sometimes? No   Living Will: No,   Encouraged to get one    Who lives at home with you: spouse  Do you have any services coming to your home (meals on wheels, home health, etc) ? : no      Do you need help with:  Using the phone:  N/A doesn't use the phone due to aphagia  Bathing: No  Dressing:  No  Toileting: No  Transportation:  N/A, doesn't drive  Shopping: No  Preparing meals: Yes: but cooks limited  Housework/Laundry: No  Medications: No  Money management: Yes: wife    Does your home have:  Unsecured throw rugs: No  Grab bars in bathroom: No  Walk in shower: No  Seat in shower: No  Lit pathways for night (nightlights): Yes    Memory:  Have you or anyone close to you expressed concerns about your memory: Yes: that was questionable before stroke but has gotten a little worse since then. But it depends on what is asked. He does remember a lot of things though. Cannot draw clock. Knows:  Month: No  Day: No  Year: No  Day of Week: Yes  Able to Recall (orange, boat, pencil) : No    Patient history:   Patient's medications, allergies, past medical, surgical, social and family histories were reviewed and updated in the EHR under History. Reviewed    Care Team:  Patient's list of care team members was updated in EHR under the Snap Shot. Neurology: Dr. Martina Hastings    Immunizations: Reviewed with patient. Did not get Covid vaccines. Does not want Shingrix or Pneumonia vaccines. Health Maintenance Due   Topic Date Due    Pneumococcal 0-64 years Vaccine (1 of 2 - PPSV23) Never done    Diabetic retinal exam  Never done    COVID-19 Vaccine (1) Never done    DTaP/Tdap/Td vaccine (1 - Tdap) Never done    Colon cancer screen colonoscopy  Never done    Shingles Vaccine (1 of 2) Never done    Low dose CT lung screening  Never done    Annual Wellness Visit (AWV)  Never done       CHRONIC CONDITIONS    Current coumadin dose is 5mg is MWSaturday, rest is 7.5mg, getting checked again next week. His left shoulder has been bothering him for months. Can barely raise his arm. Has been doing stretches and exercises and it is helping some. His BP is looking good. They don't check it at home. He takes his medicine as directed, no SE's. He is taking his lipitor as directed, no SE's. His FBS run . He eats an appropriate diet and it has been stable for awhile now. He is sleeping well. He has no bowel/bladder issues. Physical Exam:    Body mass index is 38.1 kg/m².   Vitals:    08/24/21 1033   BP: 128/70   Site: Right Upper Arm   Position: Sitting   Cuff Size: Large Adult   Pulse: 81   Temp: 96.9 °F (36.1 °C)   SpO2: 96%   Weight: 277 lb (125.6 kg)   Height: 5' 11.5\" (1.816 m)     Wt Readings from Last 3 Encounters:   08/24/21 277 lb (125.6 kg)   02/23/21 287 lb (130.2 kg)   08/21/20 273 lb (123.8 kg)       GENERAL:Alert and oriented x 4 NAD, no acute distress, affect appropriate and obese, well hydrated, well developed. LUNG:clear to auscultation bilaterally with normal respiratory effort  CV: Normal heart sounds, regular rate and rhythm without murmurs  EXTREMETY: no loss of hair, no edema, normal pedal pulses bilaterally    Left shoulder with limited ROM due to pain    Was the timed get up and go unsteady or longer than 20 seconds: No    Vision Screen for Initial Exam: no, has not had an eye exam in years    EKG for Initial Exam at 74 (): not applicable    AAA U/S screen for men 65-75 who smoked (): ordered it today    Assessment/Plan:    Karin Azar was seen today for medicare awv and 6 month follow-up. Diagnoses and all orders for this visit:    Preventative health care    Bilateral carotid artery stenosis    Controlled type 2 diabetes mellitus with diabetic polyneuropathy, without long-term current use of insulin (HCC)  -     Comprehensive Metabolic Panel  -     Hemoglobin A1C  -     Microalbumin / Creatinine Urine Ratio    History of CVA (cerebrovascular accident)    Hemiparesis affecting right side as late effect of cerebrovascular accident [facility])    Essential hypertension  -     Comprehensive Metabolic Panel    Long term current use of anticoagulant therapy    Mixed hyperlipidemia  -     LIPID PANEL; Future    Peripheral vascular disease (HCC)    Screening for prostate cancer  -     PSA screening    Screening for AAA (abdominal aortic aneurysm)  -     US ABDOMINAL AORTA LIMITED; Future               Review of Systems   Constitutional: Negative for appetite change, fatigue and unexpected weight change. HENT: Negative for congestion, dental problem, ear pain, hearing loss, sore throat, trouble swallowing and voice change. Eyes: Negative for pain and visual disturbance.    Respiratory: Negative for cough, chest tightness and shortness of breath. Cardiovascular: Negative for chest pain and palpitations. Gastrointestinal: Negative for abdominal pain, constipation and diarrhea. Genitourinary: Negative for difficulty urinating. Musculoskeletal: Positive for arthralgias (left shoulder pain). Negative for back pain, myalgias and neck pain. Skin: Negative for rash. Neurological: Negative for dizziness, speech difficulty, weakness, numbness and headaches. Hematological: Negative for adenopathy. Psychiatric/Behavioral: Negative for confusion and sleep disturbance. The patient is not nervous/anxious. Objective:   Physical Exam    Assessment:          Karin Azar was seen today for medicare aw and 6 month follow-up. Diagnoses and all orders for this visit:    Preventative health care    Bilateral carotid artery stenosis    Controlled type 2 diabetes mellitus with diabetic polyneuropathy, without long-term current use of insulin (HCC)  -     Comprehensive Metabolic Panel  -     Hemoglobin A1C  -     Microalbumin / Creatinine Urine Ratio    History of CVA (cerebrovascular accident)    Hemiparesis affecting right side as late effect of cerebrovascular accident [facility])    Essential hypertension  -     Comprehensive Metabolic Panel    Long term current use of anticoagulant therapy    Mixed hyperlipidemia  -     LIPID PANEL; Future    Peripheral vascular disease (HCC)    Screening for prostate cancer  -     PSA screening    Screening for AAA (abdominal aortic aneurysm)  -     US ABDOMINAL AORTA LIMITED; Future         Plan:          Get routine labs, US of abdomen, stable conditions, return here in 6 months. Declined colonoscopy, ct scan of lungs, and vaccines. Tylenol for shoulder but see ortho if not improving.      Arie Miranda

## 2021-08-24 NOTE — PATIENT INSTRUCTIONS
Orthopedists:      [address] [facility]  [address] [facility]. MD Leonarda Sommer MD Oneita Ivans, MD Gifford Salk, MD Elvera Nottingham, MD    [address]  [address] MD Cynthia Cruz MD    [phone]    Twila Elin [address]  Various locations  MD Natali Pandey MD Theodosia Panning, MD Marko Curie, MD Magalene Filbert, MD    [phone]      [facility]  MD Courtney Rose MD Alice Pancoast, MD     [phone]      Janalee Lesches Dr. Raymundo Huron Dr. Marco Dyer Dr. Seymour Hayward Dr. Carman Roman Dr. Arnulfo Pottier Dr. Lawayne Rede Dr. Worth Prier Dr. Ortencia Leiter    [phone]) 217 [facility]  Various locations  MD Mehnaz Garay MD Kathyrn Ben, MD Jamel Rash, MD Gwen Stabler, MD    [phone]          Mckayla Arroyo was seen today for medicare awv and 6 month follow-up. Diagnoses and all orders for this visit:    Preventative health care    Bilateral carotid artery stenosis    Controlled type 2 diabetes mellitus with diabetic polyneuropathy, without long-term current use of insulin (HCC)  -     Comprehensive Metabolic Panel  -     Hemoglobin A1C  -     Microalbumin / Creatinine Urine Ratio    History of CVA (cerebrovascular accident)    Hemiparesis affecting right side as late effect of cerebrovascular accident [facility])    Essential hypertension  -     Comprehensive Metabolic Panel    Long term current use of anticoagulant therapy    Mixed hyperlipidemia  -     LIPID PANEL; Future    Peripheral vascular disease (HCC)    Screening for prostate cancer  -     PSA screening    Screening for AAA (abdominal aortic aneurysm)  -     US ABDOMINAL AORTA LIMITED; Future       Get routine labs, ultrasound, return here in 6 months.

## 2021-09-08 DIAGNOSIS — I10 ESSENTIAL HYPERTENSION: ICD-10-CM

## 2021-09-08 DIAGNOSIS — E78.2 MIXED HYPERLIPIDEMIA: ICD-10-CM

## 2021-09-08 RX ORDER — WARFARIN SODIUM 5 MG/1
TABLET ORAL
Qty: 135 TABLET | Refills: 5 | Status: SHIPPED | OUTPATIENT
Start: 2021-09-08

## 2021-09-08 RX ORDER — AMLODIPINE BESYLATE 5 MG/1
TABLET ORAL
Qty: 30 TABLET | Refills: 5 | Status: SHIPPED | OUTPATIENT
Start: 2021-09-08 | End: 2022-03-03 | Stop reason: SDUPTHER

## 2021-09-08 RX ORDER — ATORVASTATIN CALCIUM 40 MG/1
TABLET, FILM COATED ORAL
Qty: 30 TABLET | Refills: 5 | Status: SHIPPED | OUTPATIENT
Start: 2021-09-08 | End: 2022-03-03 | Stop reason: SDUPTHER

## 2021-09-08 RX ORDER — LISINOPRIL 40 MG/1
TABLET ORAL
Qty: 30 TABLET | Refills: 5 | Status: SHIPPED | OUTPATIENT
Start: 2021-09-08 | End: 2022-03-03 | Stop reason: SDUPTHER

## 2021-09-08 NOTE — TELEPHONE ENCOUNTER
----- Message from Gagan Rodriguez sent at 9/7/2021 11:22 AM EDT -----  Subject: Refill Request    QUESTIONS  Name of Medication? warfarin (COUMADIN) 5 MG tablet  Patient-reported dosage and instructions? once day  How many days do you have left? 5  Preferred Pharmacy? [facility] phone number (if available)? [phone]  ---------------------------------------------------------------------------  --------------,  Name of Medication? metFORMIN (GLUCOPHAGE) 500 MG tablet  Patient-reported dosage and instructions? Twice a day   How many days do you have left? 0  Preferred Pharmacy? [facility] phone number (if available)? [phone]  ---------------------------------------------------------------------------  --------------,  Name of Medication? atorvastatin (LIPITOR) 40 MG tablet  Patient-reported dosage and instructions? once a day  How many days do you have left? 0  Preferred Pharmacy? [facility] phone number (if available)? [phone]  ---------------------------------------------------------------------------  --------------,  Name of Medication? lisinopril (PRINIVIL;ZESTRIL) 40 MG tablet  Patient-reported dosage and instructions? once a day   How many days do you have left? 0  Preferred Pharmacy? [facility] phone number (if available)? [phone]  ---------------------------------------------------------------------------  --------------,  Name of Medication? amLODIPine (NORVASC) 5 MG tablet  Patient-reported dosage and instructions? once a day  How many days do you have left? 0  Preferred Pharmacy? [facility] phone number (if available)? [phone]  ---------------------------------------------------------------------------  --------------  Deannie Sandhoff INFO  What is the best way for the office to contact you?  OK to leave message on   voicemail  Preferred Call Back Phone Number?  [phone]

## 2021-09-08 NOTE — TELEPHONE ENCOUNTER
Medication:   Requested Prescriptions     Pending Prescriptions Disp Refills    warfarin (COUMADIN) 5 MG tablet 135 tablet 5     Sig: TAKE 1 AND 1/2 TABLETS BY MOUTH DAILY    metFORMIN (GLUCOPHAGE) 500 MG tablet 60 tablet 5     Sig: TAKE 1 TABLET BY MOUTH 2 TIMES A DAY WITH MEALS    atorvastatin (LIPITOR) 40 MG tablet 30 tablet 5     Sig: TAKE ONE TABLET BY MOUTH NIGHTLY    lisinopril (PRINIVIL;ZESTRIL) 40 MG tablet 30 tablet 5     Sig: TAKE 1 TABLET BY MOUTH ONE TIME A DAY    amLODIPine (NORVASC) 5 MG tablet 30 tablet 5     Sig: TAKE 1 TABLET BY MOUTH ONE TIME A DAY      Last Filled:      Patient Phone Number: [phone] (home)     Last appt: 8/24/2021   Next appt: 2/25/2022    Last OARRS: No flowsheet data found.   PDMP Monitoring:    Last PDMP Jenna Tee as Reviewed [facility]):  Review User Review Instant Review Result          Preferred Pharmacy:   [address]  Phone: [phone] Fax: [phone]

## 2021-09-09 ENCOUNTER — ANTI-COAG VISIT (OUTPATIENT)
Dept: FAMILY MEDICINE CLINIC | Age: 66
End: 2021-09-09

## 2021-09-23 ENCOUNTER — TELEPHONE (OUTPATIENT)
Dept: FAMILY MEDICINE CLINIC | Age: 66
End: 2021-09-23

## 2021-09-23 NOTE — TELEPHONE ENCOUNTER
Looks like taking 5mg daily now    Have him increase to 7.5mg M/Thur and 5mg rest    Recheck 2 weeks

## 2021-09-24 ENCOUNTER — ANTI-COAG VISIT (OUTPATIENT)
Dept: FAMILY MEDICINE CLINIC | Age: 66
End: 2021-09-24

## 2021-10-08 ENCOUNTER — ANTI-COAG VISIT (OUTPATIENT)
Dept: FAMILY MEDICINE CLINIC | Age: 66
End: 2021-10-08

## 2021-10-08 LAB — INR BLD: 1.97

## 2021-10-08 NOTE — PROGRESS NOTES
Number with fast busy signal/ stay on the same dose and recheck in 1 wk    Already charted on flowsheet

## 2021-10-15 ENCOUNTER — ANTI-COAG VISIT (OUTPATIENT)
Dept: FAMILY MEDICINE CLINIC | Age: 66
End: 2021-10-15

## 2021-10-15 LAB — INR BLD: 2.36

## 2021-10-25 ENCOUNTER — OFFICE VISIT (OUTPATIENT)
Dept: NEUROLOGY | Age: 66
End: 2021-10-25
Payer: MEDICARE

## 2021-10-25 VITALS
HEART RATE: 72 BPM | DIASTOLIC BLOOD PRESSURE: 72 MMHG | WEIGHT: 277 LBS | SYSTOLIC BLOOD PRESSURE: 116 MMHG | HEIGHT: 72 IN | BODY MASS INDEX: 37.52 KG/M2

## 2021-10-25 DIAGNOSIS — H53.461 RIGHT HOMONYMOUS HEMIANOPSIA: Primary | ICD-10-CM

## 2021-10-25 DIAGNOSIS — Z79.01 LONG TERM CURRENT USE OF ANTICOAGULANT THERAPY: ICD-10-CM

## 2021-10-25 DIAGNOSIS — Z86.73 HISTORY OF CVA (CEREBROVASCULAR ACCIDENT): ICD-10-CM

## 2021-10-25 PROCEDURE — 1036F TOBACCO NON-USER: CPT | Performed by: PSYCHIATRY & NEUROLOGY

## 2021-10-25 PROCEDURE — 99213 OFFICE O/P EST LOW 20 MIN: CPT | Performed by: PSYCHIATRY & NEUROLOGY

## 2021-10-25 PROCEDURE — G8417 CALC BMI ABV UP PARAM F/U: HCPCS | Performed by: PSYCHIATRY & NEUROLOGY

## 2021-10-25 PROCEDURE — G8427 DOCREV CUR MEDS BY ELIG CLIN: HCPCS | Performed by: PSYCHIATRY & NEUROLOGY

## 2021-10-25 PROCEDURE — 3017F COLORECTAL CA SCREEN DOC REV: CPT | Performed by: PSYCHIATRY & NEUROLOGY

## 2021-10-25 PROCEDURE — G8484 FLU IMMUNIZE NO ADMIN: HCPCS | Performed by: PSYCHIATRY & NEUROLOGY

## 2021-10-25 NOTE — PROGRESS NOTES
Dann Singleton   Neurology followup    Subjective:   CC/HP  History was obtained from the patient and his family. Patient has been doing reasonably well since his last office visit. No major change in his neurological examination. Patient has had a previous left hemispheric stroke with right-sided weakness speech difficulty and homonymous hemianopsia. 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 Patient is now on anticoagulation with Coumadin.   Patient used to be a smoker but quit smoking after the stroke      REVIEW OF SYSTEMS    Constitutional:  []   Chills   []  Fatigue   []  Fevers   []  Malaise   []  Weight loss     [x] Denies all of the above    Respiratory:   []  Cough    []  Shortness of breath         [x] Denies all of the above     Cardiovascular:   []  Chest pain    []  Exertional chest pressure/discomfort           [] Palpitations    []  Syncope     [x] Denies all of the above        Past Medical History:   Diagnosis Date    Cerebrovascular disease     CHF (congestive heart failure) ([facility] 2017    per  nurse report but wife is unaware    DM (diabetes mellitus screen) 2017    boarder line    Hyperlipidemia     Hypertension     Type 2 diabetes mellitus without complication ([facility]      Family History   Problem Relation Age of Onset    High Blood Pressure Mother     Cancer Mother         lung, smoker    Cancer Father         mesothelioma    Depression Sister      Social History     Socioeconomic History    Marital status:      Spouse name: Not on file    Number of children: 3    Years of education: Not on file    Highest education level: Not on file   Occupational History    Not on file   Tobacco Use    Smoking status: Former Smoker     Packs/day: 2.00     Years: 40.00     Pack years: 80.00     Types: Cigarettes     Quit date: 2017     Years since quittin.4    Smokeless tobacco: Former User     Quit date: 4/20/2017   Vaping Use    Vaping Use: Never used   Substance and Sexual Activity    Alcohol use: No     Comment: quit 2003, history of alcoholism    Drug use: No    Sexual activity: Not on file   Other Topics Concern    Not on file   Social History Narrative    Not on file     Social Determinants of Health     Financial Resource Strain:     Difficulty of Paying Living Expenses:    Food Insecurity:     Worried About Running Out of Food in the Last Year:     [address] in the Last Year:    Transportation Needs:     Lack of Transportation (Medical):  Lack of Transportation (Non-Medical):    Physical Activity:     Days of Exercise per Week:     Minutes of Exercise per Session:    Stress:     Feeling of Stress :    Social Connections:     Frequency of Communication with Friends and Family:     Frequency of Social Gatherings with Friends and Family:     Attends [employer] Services:     Active Member of Clubs or Organizations:     Attends Club or Organization Meetings:     Marital Status:    Intimate Partner Violence:     Fear of Current or Ex-Partner:     Emotionally Abused:     Physically Abused:     Sexually Abused:         Objective:  Exam:  /72   Pulse 72   Ht 5' 11.5\" (1.816 m)   Wt 277 lb (125.6 kg)   BMI 38.10 kg/m²   This is a well-nourished patient in no acute distress  Patient is awake, alert and oriented x3. Speech shows aphasia  Pupils are equal round reacting to light. Extraocular movements intact. Right homonymous hemianopsia Face symmetrical. Tongue midline. Motor exam shows mild right-sided weakness. Deep tendon reflexes normal. Plantar reflexes downgoing. Sensory exam normal. Coordination impaired on the right side. Gait slightly impaired. No carotid bruit. No neck stiffness.       Data :  LABS:  General Labs:    CBC:   Lab Results   Component Value Date    WBC 8.6 05/18/2017    RBC 4.71 05/18/2017    HGB 14.4 05/18/2017    HCT 42.8 05/18/2017 MCV 90.8 05/18/2017    MCH 30.6 05/18/2017    MCHC 33.7 05/18/2017    RDW 14.1 05/18/2017     05/18/2017    MPV 9.4 05/18/2017     BMP:    Lab Results   Component Value Date     09/09/2021    K 4.0 09/09/2021     09/09/2021    CO2 22 09/09/2021    BUN 29 09/09/2021    LABALBU 4.0 09/09/2021    CREATININE 1.15 09/09/2021    CALCIUM 9.3 09/09/2021    GFRAA 78 09/09/2021    LABGLOM 67 09/09/2021    GLUCOSE 95 09/09/2021       Impression :  Previous left hemispheric stroke with right-sided visual field defect and mild aphasia  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 Although every effort was made to ensure the accuracy of this automated transcription, some errors in transcription may have occurred.

## 2021-10-29 ENCOUNTER — ANTI-COAG VISIT (OUTPATIENT)
Dept: FAMILY MEDICINE CLINIC | Age: 66
End: 2021-10-29

## 2021-11-22 ENCOUNTER — TELEPHONE (OUTPATIENT)
Dept: FAMILY MEDICINE CLINIC | Age: 66
End: 2021-11-22

## 2021-11-23 ENCOUNTER — ANTI-COAG VISIT (OUTPATIENT)
Dept: FAMILY MEDICINE CLINIC | Age: 66
End: 2021-11-23

## 2022-02-08 ENCOUNTER — ANTI-COAG VISIT (OUTPATIENT)
Dept: FAMILY MEDICINE CLINIC | Age: 67
End: 2022-02-08

## 2022-03-02 ENCOUNTER — OFFICE VISIT (OUTPATIENT)
Dept: FAMILY MEDICINE CLINIC | Age: 67
End: 2022-03-02
Payer: MEDICARE

## 2022-03-02 VITALS
SYSTOLIC BLOOD PRESSURE: 118 MMHG | DIASTOLIC BLOOD PRESSURE: 70 MMHG | BODY MASS INDEX: 36.3 KG/M2 | HEART RATE: 80 BPM | TEMPERATURE: 97.3 F | HEIGHT: 72 IN | WEIGHT: 268 LBS | OXYGEN SATURATION: 96 %

## 2022-03-02 DIAGNOSIS — E11.42 CONTROLLED TYPE 2 DIABETES MELLITUS WITH DIABETIC POLYNEUROPATHY, WITHOUT LONG-TERM CURRENT USE OF INSULIN (HCC): Primary | ICD-10-CM

## 2022-03-02 DIAGNOSIS — E78.2 MIXED HYPERLIPIDEMIA: ICD-10-CM

## 2022-03-02 DIAGNOSIS — I10 ESSENTIAL HYPERTENSION: ICD-10-CM

## 2022-03-02 DIAGNOSIS — Z86.73 HISTORY OF CVA (CEREBROVASCULAR ACCIDENT): ICD-10-CM

## 2022-03-02 DIAGNOSIS — Z79.01 LONG TERM CURRENT USE OF ANTICOAGULANT THERAPY: ICD-10-CM

## 2022-03-02 PROCEDURE — 99214 OFFICE O/P EST MOD 30 MIN: CPT | Performed by: PHYSICIAN ASSISTANT

## 2022-03-02 PROCEDURE — 1036F TOBACCO NON-USER: CPT | Performed by: PHYSICIAN ASSISTANT

## 2022-03-02 PROCEDURE — 3046F HEMOGLOBIN A1C LEVEL >9.0%: CPT | Performed by: PHYSICIAN ASSISTANT

## 2022-03-02 PROCEDURE — G8417 CALC BMI ABV UP PARAM F/U: HCPCS | Performed by: PHYSICIAN ASSISTANT

## 2022-03-02 PROCEDURE — G8484 FLU IMMUNIZE NO ADMIN: HCPCS | Performed by: PHYSICIAN ASSISTANT

## 2022-03-02 PROCEDURE — 3017F COLORECTAL CA SCREEN DOC REV: CPT | Performed by: PHYSICIAN ASSISTANT

## 2022-03-02 PROCEDURE — 2022F DILAT RTA XM EVC RTNOPTHY: CPT | Performed by: PHYSICIAN ASSISTANT

## 2022-03-02 PROCEDURE — G8427 DOCREV CUR MEDS BY ELIG CLIN: HCPCS | Performed by: PHYSICIAN ASSISTANT

## 2022-03-02 SDOH — ECONOMIC STABILITY: FOOD INSECURITY: WITHIN THE PAST 12 MONTHS, YOU WORRIED THAT YOUR FOOD WOULD RUN OUT BEFORE YOU GOT MONEY TO BUY MORE.: NEVER TRUE

## 2022-03-02 SDOH — ECONOMIC STABILITY: FOOD INSECURITY: WITHIN THE PAST 12 MONTHS, THE FOOD YOU BOUGHT JUST DIDN'T LAST AND YOU DIDN'T HAVE MONEY TO GET MORE.: NEVER TRUE

## 2022-03-02 ASSESSMENT — ENCOUNTER SYMPTOMS
TROUBLE SWALLOWING: 0
CONSTIPATION: 0
BACK PAIN: 0
ABDOMINAL PAIN: 0
CHEST TIGHTNESS: 0
SHORTNESS OF BREATH: 0
EYE PAIN: 0
SORE THROAT: 0
DIARRHEA: 0
COUGH: 0
VOICE CHANGE: 0

## 2022-03-02 ASSESSMENT — SOCIAL DETERMINANTS OF HEALTH (SDOH): HOW HARD IS IT FOR YOU TO PAY FOR THE VERY BASICS LIKE FOOD, HOUSING, MEDICAL CARE, AND HEATING?: NOT HARD AT ALL

## 2022-03-02 NOTE — PATIENT INSTRUCTIONS
Lalo Rahman was seen today for follow-up, diabetes and hypertension. Diagnoses and all orders for this visit:    Controlled type 2 diabetes mellitus with diabetic polyneuropathy, without long-term current use of insulin (HCC)  -     Hemoglobin A1C; Future  -     Comprehensive Metabolic Panel    Essential hypertension  -     Comprehensive Metabolic Panel    Mixed hyperlipidemia  -     LIPID PANEL; Future    Long term current use of anticoagulant therapy    History of CVA (cerebrovascular accident)       Get labs next week, return here in 6 months.

## 2022-03-02 NOTE — PROGRESS NOTES
Subjective:      Patient ID: Zachariah Mijares is a 61 y.o. male. HPI   Patient presenting today for diabetes follow up. Patient currently taking Metformin 500 mg bid. Checks bs in the morning- reports it stays in the range (). Reports it was low one day but he ate and it came up. Good at controlling sugar intake. Patient with controlled hypertension- still taking atorvastatin, lisinopril, and amlodipine. Never forgets does. Does not take blood pressures at home- never had. Patient declines colonoscopy, vaccines, CT scan of lungs or US of Abdominal Aorta for screening for AAA. Reports she cannot afford the copay right now. Sodium (mmol/L)   Date Value   09/09/2021 140    BUN (mg/dL)   Date Value   09/09/2021 29 (H)    Glucose (mg/dL)   Date Value   09/09/2021 95      Potassium (mmol/L)   Date Value   09/09/2021 4.0    CREATININE (mg/dL)   Date Value   09/09/2021 1.15           BP Readings from Last 2 Encounters:   03/02/22 118/70   10/25/21 116/72       Is patient currently taking any antihypertensive medications? Yes   If yes, see med list as above    Is the patient reporting any side effects of antihypertensive medications? No    Is the patient taking any over the counter medications? Yes   If yes, see med list as above    Is the patient taking a daily aspirin? No, coumadin      BP Readings from Last 2 Encounters:   03/02/22 118/70   10/25/21 116/72     Hemoglobin A1C (%)   Date Value   09/09/2021 6.5 (H)     Microalbumin, Random Urine (ug/mL)   Date Value   09/09/2021 21.4     LDL Calculated (mg/dL)   Date Value   09/09/2021 43              Tobacco use:  Patient  reports that he quit smoking about 4 years ago. His smoking use included cigarettes. He has a 80.00 pack-year smoking history. He quit smokeless tobacco use about 4 years ago. If Smoker - Cessation materials given? NA    Is Daily aspirin on medication list?:  No, coumadin    Diabetic retinal exam done?  No   If yes, document in Health Maintenance. Monofilament placed on counter? No    Shoes and socks removed? No    BP taken with correct size cuff? Yes   Repeated if > 140/90 NA     Is patient taking any medications for diabetes    Yes   If yes, see medication list    Is the patient reporting any side effects of diabetic medications? No    Microalbumin performed if applicable? No      Is patient taking any over the counter medications    Yes   If yes, see medication list        Review of Systems   Constitutional: Negative for appetite change, fatigue and unexpected weight change. HENT: Negative for congestion, dental problem, ear pain, hearing loss, sore throat, trouble swallowing and voice change. Eyes: Negative for pain and visual disturbance. Respiratory: Negative for cough, chest tightness and shortness of breath. Cardiovascular: Negative for chest pain and palpitations. Gastrointestinal: Negative for abdominal pain, constipation and diarrhea. Genitourinary: Negative for difficulty urinating. Musculoskeletal: Negative for arthralgias, back pain, myalgias and neck pain. Skin: Negative for rash. Neurological: Negative for dizziness, speech difficulty, weakness, numbness and headaches. Hematological: Negative for adenopathy. Psychiatric/Behavioral: Negative for confusion and sleep disturbance. The patient is not nervous/anxious. Objective:   Physical Exam  Vitals reviewed. Constitutional:       Appearance: Normal appearance. He is well-developed and well-groomed. HENT:      Head: Normocephalic. Right Ear: Tympanic membrane and ear canal normal.      Left Ear: Tympanic membrane and ear canal normal.      Nose: Nose normal.      Mouth/Throat:      Pharynx: Oropharynx is clear. Uvula midline. Eyes:      Conjunctiva/sclera: Conjunctivae normal.      Pupils: Pupils are equal, round, and reactive to light. Neck:      Thyroid: No thyroid mass or thyromegaly.       Vascular: No carotid bruit. Trachea: Trachea normal.   Cardiovascular:      Rate and Rhythm: Normal rate and regular rhythm. Chest Wall: PMI is not displaced. Pulses: Normal pulses. Heart sounds: Normal heart sounds, S1 normal and S2 normal.   Pulmonary:      Effort: Pulmonary effort is normal.      Breath sounds: Normal breath sounds. Abdominal:      General: Abdomen is flat. Bowel sounds are normal.      Palpations: Abdomen is soft. Tenderness: There is no abdominal tenderness. There is no guarding or rebound. Hernia: No hernia is present. Musculoskeletal:      Cervical back: Normal range of motion and neck supple. Lymphadenopathy:      Cervical: No cervical adenopathy. Skin:     General: Skin is warm and dry. Findings: No rash. Neurological:      Mental Status: He is alert and oriented to person, place, and time. Cranial Nerves: No cranial nerve deficit. Sensory: No sensory deficit. Gait: Gait normal.   Psychiatric:         Attention and Perception: Attention normal.         Mood and Affect: Mood normal.         Speech: Speech normal.         Behavior: Behavior normal. Behavior is cooperative. Assessment:      Hector Liu was seen today for follow-up, diabetes and hypertension. Diagnoses and all orders for this visit:    Controlled type 2 diabetes mellitus with diabetic polyneuropathy, without long-term current use of insulin (HCC)  -     Hemoglobin A1C; Future  -     Comprehensive Metabolic Panel    Essential hypertension  -     Comprehensive Metabolic Panel    Mixed hyperlipidemia  -     LIPID PANEL; Future    Long term current use of anticoagulant therapy    History of CVA (cerebrovascular accident)             Plan:      Get labs in a week, return here in 6 months.          Arie Cotton

## 2022-03-03 DIAGNOSIS — I10 ESSENTIAL HYPERTENSION: ICD-10-CM

## 2022-03-03 DIAGNOSIS — E78.2 MIXED HYPERLIPIDEMIA: ICD-10-CM

## 2022-03-03 RX ORDER — ATORVASTATIN CALCIUM 40 MG/1
TABLET, FILM COATED ORAL
Qty: 30 TABLET | Refills: 5 | Status: SHIPPED | OUTPATIENT
Start: 2022-03-03 | End: 2022-03-22 | Stop reason: SDUPTHER

## 2022-03-03 RX ORDER — AMLODIPINE BESYLATE 5 MG/1
TABLET ORAL
Qty: 30 TABLET | Refills: 5 | Status: SHIPPED | OUTPATIENT
Start: 2022-03-03 | End: 2022-08-30 | Stop reason: SDUPTHER

## 2022-03-03 RX ORDER — LISINOPRIL 40 MG/1
TABLET ORAL
Qty: 30 TABLET | Refills: 5 | Status: SHIPPED | OUTPATIENT
Start: 2022-03-03 | End: 2022-08-30 | Stop reason: SDUPTHER

## 2022-03-03 NOTE — TELEPHONE ENCOUNTER
metFORMIN (GLUCOPHAGE) 500 MG tablet 60 tablet 5 9/8/2021     Sig: TAKE 1 TABLET BY MOUTH 2 TIMES A DAY WITH MEALS      lisinopril (PRINIVIL;ZESTRIL) 40 MG tablet 30 tablet 5 9/8/2021     Sig: TAKE 1 TABLET BY MOUTH ONE TIME A DAY      amLODIPine (NORVASC) 5 MG tablet 30 tablet 5 9/8/2021     Sig: TAKE 1 TABLET BY MOUTH ONE TIME A DAY      [facility] chart     Provider out of the office

## 2022-03-03 NOTE — TELEPHONE ENCOUNTER
Medication:   Requested Prescriptions     Pending Prescriptions Disp Refills    metFORMIN (GLUCOPHAGE) 500 MG tablet 60 tablet 5     Sig: TAKE 1 TABLET BY MOUTH 2 TIMES A DAY WITH MEALS    lisinopril (PRINIVIL;ZESTRIL) 40 MG tablet 30 tablet 5     Sig: TAKE 1 TABLET BY MOUTH ONE TIME A DAY    amLODIPine (NORVASC) 5 MG tablet 30 tablet 5     Sig: TAKE 1 TABLET BY MOUTH ONE TIME A DAY    atorvastatin (LIPITOR) 40 MG tablet 30 tablet 5     Sig: TAKE ONE TABLET BY MOUTH NIGHTLY      Provider out of office    Patient Phone Number: [phone] (home)     Last appt: 3/2/2022   Next appt: Visit date not found    Last OARRS: No flowsheet data found.   PDMP Monitoring:    Last PDMP Boyd pappas Reviewed [facility]):  Review User Review Instant Review Result          Preferred Pharmacy:   [address]  Phone: [phone] Fax: [phone]

## 2022-03-10 ENCOUNTER — ANTI-COAG VISIT (OUTPATIENT)
Dept: FAMILY MEDICINE CLINIC | Age: 67
End: 2022-03-10

## 2022-03-21 DIAGNOSIS — E78.2 MIXED HYPERLIPIDEMIA: ICD-10-CM

## 2022-03-21 DIAGNOSIS — Z79.01 LONG TERM CURRENT USE OF ANTICOAGULANT THERAPY: Primary | ICD-10-CM

## 2022-03-21 RX ORDER — ATORVASTATIN CALCIUM 40 MG/1
TABLET, FILM COATED ORAL
Qty: 30 TABLET | Refills: 5 | OUTPATIENT
Start: 2022-03-21

## 2022-03-21 NOTE — TELEPHONE ENCOUNTER
PT is requesting a refill on atorvastatin (LIPITOR) 40 MG tablet to please be sent to [facility], [address] [phone]    PT is also requesting to know if the order for the [address] Standing order is still good for this week?

## 2022-03-22 RX ORDER — ATORVASTATIN CALCIUM 40 MG/1
TABLET, FILM COATED ORAL
Qty: 30 TABLET | Refills: 5 | Status: SHIPPED | OUTPATIENT
Start: 2022-03-22 | End: 2022-10-03

## 2022-03-24 LAB
INR BLD: 2.37 (ref 0.88–1.12)
PROTHROMBIN TIME: 27.8 SEC (ref 9.9–12.7)

## 2022-03-25 ENCOUNTER — ANTI-COAG VISIT (OUTPATIENT)
Dept: FAMILY MEDICINE CLINIC | Age: 67
End: 2022-03-25

## 2022-04-26 LAB
INR BLD: 1.9 (ref 0.9–1.2)
PROTHROMBIN TIME: 19.2 SEC (ref 9.1–12)

## 2022-04-27 ENCOUNTER — ANTI-COAG VISIT (OUTPATIENT)
Dept: FAMILY MEDICINE CLINIC | Age: 67
End: 2022-04-27

## 2022-05-12 LAB
INR BLD: 2.5 (ref 0.9–1.2)
INTERNATIONAL NORMALIZATION RATIO, POC: 2.5
PROTHROMBIN TIME: 25.4 SEC (ref 9.1–12)

## 2022-05-13 ENCOUNTER — TELEPHONE (OUTPATIENT)
Dept: FAMILY MEDICINE CLINIC | Age: 67
End: 2022-05-13

## 2022-05-13 ENCOUNTER — ANTI-COAG VISIT (OUTPATIENT)
Dept: FAMILY MEDICINE CLINIC | Age: 67
End: 2022-05-13
Payer: MEDICARE

## 2022-05-13 LAB — INR BLD: 2.5

## 2022-05-13 PROCEDURE — 85610 PROTHROMBIN TIME: CPT | Performed by: PHYSICIAN ASSISTANT

## 2022-05-13 NOTE — TELEPHONE ENCOUNTER
PT INR came in, left message to inform patient that it was ok and to stay on the same dose. Recheck in 1 month.

## 2022-05-16 ENCOUNTER — ANTI-COAG VISIT (OUTPATIENT)
Dept: FAMILY MEDICINE CLINIC | Age: 67
End: 2022-05-16

## 2022-05-27 RX ORDER — GLUCOSAMINE HCL/CHONDROITIN SU 500-400 MG
1 CAPSULE ORAL DAILY
Qty: 100 STRIP | Refills: 3 | Status: SHIPPED | OUTPATIENT
Start: 2022-05-27

## 2022-05-27 RX ORDER — LANCETS 30 GAUGE
EACH MISCELLANEOUS
Qty: 100 EACH | Refills: 3 | Status: SHIPPED | OUTPATIENT
Start: 2022-05-27

## 2022-05-27 NOTE — TELEPHONE ENCOUNTER
blood glucose monitor strips 100 strip 3 3/25/2021     Sig - Route: 1 strip by Other route daily True Metric Test strips - Other    Sent to pharmacy as: Blood Glucose Test In Vitro Strip      Lancets MISC [[id number]]     Order Details  Dose, Route, Frequency: As Directed   Dispense Quantity: 100 each Refills: 3            Lancets MISC 100 each 3 3/25/2021     Sig: True Metric Lancets    Sent to pharmacy as: Lancets    E-Prescribing Status: Receipt confirmed by pharmacy (3/25/2021  6:22 PM EDT)          Pharmacy    [address] [place] [address] [address] [place] [address]   Phone:  [phone]  Fax:  [phone]

## 2022-05-27 NOTE — TELEPHONE ENCOUNTER
Medication:   Requested Prescriptions     Pending Prescriptions Disp Refills    metFORMIN (GLUCOPHAGE) 500 MG tablet 60 tablet 5     Sig: TAKE 1 TABLET BY MOUTH 2 TIMES A DAY WITH MEALS      Last Filled:      Patient Phone Number: [phone] (home)     Last appt: 3/2/2022   Next appt: 9/2/2022    Last OARRS: No flowsheet data found.   PDMP Monitoring:    Last PDMP Ale Darden as Reviewed [facility]):  Review User Review Instant Review Result          [facility] [address]  Phone: [phone] Fax: [phone]

## 2022-06-20 LAB — INR BLD: 2.73

## 2022-06-21 ENCOUNTER — ANTI-COAG VISIT (OUTPATIENT)
Dept: FAMILY MEDICINE CLINIC | Age: 67
End: 2022-06-21

## 2022-07-26 LAB
INR BLD: 2.7 (ref 0.9–1.2)
PROTHROMBIN TIME: 28 SEC (ref 9.1–12)

## 2022-07-27 ENCOUNTER — ANTI-COAG VISIT (OUTPATIENT)
Dept: FAMILY MEDICINE CLINIC | Age: 67
End: 2022-07-27

## 2022-08-24 LAB
INR BLD: 2.6 (ref 0.9–1.2)
PROTHROMBIN TIME: 25.5 SEC (ref 9.1–12)

## 2022-08-30 DIAGNOSIS — I10 ESSENTIAL HYPERTENSION: ICD-10-CM

## 2022-08-30 DIAGNOSIS — E78.2 MIXED HYPERLIPIDEMIA: ICD-10-CM

## 2022-08-30 RX ORDER — AMLODIPINE BESYLATE 5 MG/1
TABLET ORAL
Qty: 30 TABLET | Refills: 5 | OUTPATIENT
Start: 2022-08-30

## 2022-08-30 RX ORDER — AMLODIPINE BESYLATE 5 MG/1
TABLET ORAL
Qty: 90 TABLET | Refills: 1 | Status: SHIPPED | OUTPATIENT
Start: 2022-08-30

## 2022-08-30 RX ORDER — LISINOPRIL 40 MG/1
TABLET ORAL
Qty: 30 TABLET | Refills: 5 | OUTPATIENT
Start: 2022-08-30

## 2022-08-30 RX ORDER — LISINOPRIL 40 MG/1
TABLET ORAL
Qty: 90 TABLET | Refills: 1 | Status: SHIPPED | OUTPATIENT
Start: 2022-08-30

## 2022-09-02 ENCOUNTER — OFFICE VISIT (OUTPATIENT)
Dept: FAMILY MEDICINE CLINIC | Age: 67
End: 2022-09-02
Payer: MEDICARE

## 2022-09-02 VITALS
OXYGEN SATURATION: 97 % | SYSTOLIC BLOOD PRESSURE: 110 MMHG | BODY MASS INDEX: 36.22 KG/M2 | DIASTOLIC BLOOD PRESSURE: 78 MMHG | HEART RATE: 62 BPM | WEIGHT: 263.4 LBS

## 2022-09-02 DIAGNOSIS — Z86.711 HX OF PULMONARY EMBOLUS: ICD-10-CM

## 2022-09-02 DIAGNOSIS — Z86.73 HISTORY OF CVA (CEREBROVASCULAR ACCIDENT): ICD-10-CM

## 2022-09-02 DIAGNOSIS — E11.42 CONTROLLED TYPE 2 DIABETES MELLITUS WITH DIABETIC POLYNEUROPATHY, WITHOUT LONG-TERM CURRENT USE OF INSULIN (HCC): ICD-10-CM

## 2022-09-02 DIAGNOSIS — Z79.01 LONG TERM CURRENT USE OF ANTICOAGULANT THERAPY: ICD-10-CM

## 2022-09-02 DIAGNOSIS — I10 ESSENTIAL HYPERTENSION: Primary | ICD-10-CM

## 2022-09-02 DIAGNOSIS — E78.2 MIXED HYPERLIPIDEMIA: ICD-10-CM

## 2022-09-02 DIAGNOSIS — Z86.718 HISTORY OF DVT (DEEP VEIN THROMBOSIS): ICD-10-CM

## 2022-09-02 PROCEDURE — G8417 CALC BMI ABV UP PARAM F/U: HCPCS | Performed by: NURSE PRACTITIONER

## 2022-09-02 PROCEDURE — 3044F HG A1C LEVEL LT 7.0%: CPT | Performed by: NURSE PRACTITIONER

## 2022-09-02 PROCEDURE — 2022F DILAT RTA XM EVC RTNOPTHY: CPT | Performed by: NURSE PRACTITIONER

## 2022-09-02 PROCEDURE — G8427 DOCREV CUR MEDS BY ELIG CLIN: HCPCS | Performed by: NURSE PRACTITIONER

## 2022-09-02 PROCEDURE — 3017F COLORECTAL CA SCREEN DOC REV: CPT | Performed by: NURSE PRACTITIONER

## 2022-09-02 PROCEDURE — 1036F TOBACCO NON-USER: CPT | Performed by: NURSE PRACTITIONER

## 2022-09-02 PROCEDURE — 99214 OFFICE O/P EST MOD 30 MIN: CPT | Performed by: NURSE PRACTITIONER

## 2022-09-02 RX ORDER — BLOOD PRESSURE TEST KIT
1 KIT MISCELLANEOUS DAILY
Qty: 1 KIT | Refills: 0 | Status: SHIPPED | OUTPATIENT
Start: 2022-09-02

## 2022-09-02 ASSESSMENT — PATIENT HEALTH QUESTIONNAIRE - PHQ9
SUM OF ALL RESPONSES TO PHQ QUESTIONS 1-9: 0
SUM OF ALL RESPONSES TO PHQ9 QUESTIONS 1 & 2: 0
SUM OF ALL RESPONSES TO PHQ QUESTIONS 1-9: 0
SUM OF ALL RESPONSES TO PHQ QUESTIONS 1-9: 0
1. LITTLE INTEREST OR PLEASURE IN DOING THINGS: 0
SUM OF ALL RESPONSES TO PHQ QUESTIONS 1-9: 0
2. FEELING DOWN, DEPRESSED OR HOPELESS: 0

## 2022-09-02 NOTE — PROGRESS NOTES
Nina Javier  : 1955  Encounter date: 2022    This is a 61 y.o. male who presents with  Chief Complaint   Patient presents with    New Patient     Former Chelsea Conklinte patient. No concerns. History of present illness:    HPI   Presents to clinic today to establish care with me - former Joyce Romero patient. He has a few chronic health conditions. HTN  Has been working on losing weight - has been successful in losing 20lbs in the past 1-2 years. T2DM  Does check blood sugars in the morning - typically runs between 90s-110s. Otherwise has been limiting sweets. Does do exercise when its not so hot outside. HLD  Stable. Compliant with medications. Denies any side effects. Hx CVA/PE/DVT - Coumadin therapy  Does well with coumadin therapy. Does sometimes need to adjust when he increases his exercise. No Known Allergies  Current Outpatient Medications   Medication Sig Dispense Refill    Blood Pressure KIT 1 kit by Does not apply route daily 1 kit 0    metFORMIN (GLUCOPHAGE) 500 MG tablet TAKE 1 TABLET BY MOUTH 2 TIMES A DAY WITH MEALS 180 tablet 1    lisinopril (PRINIVIL;ZESTRIL) 40 MG tablet TAKE 1 TABLET BY MOUTH ONE TIME A DAY 90 tablet 1    amLODIPine (NORVASC) 5 MG tablet TAKE 1 TABLET BY MOUTH ONE TIME A DAY 90 tablet 1    blood glucose monitor strips 1 strip by Other route daily True Metric Test strips 100 strip 3    Lancets MISC True Metric Lancets 100 each 3    atorvastatin (LIPITOR) 40 MG tablet TAKE ONE TABLET BY MOUTH NIGHTLY 30 tablet 5    warfarin (COUMADIN) 5 MG tablet TAKE 1 AND 1/2 TABLETS BY MOUTH DAILY 135 tablet 5    Blood Glucose Monitoring Suppl AGUSTO 1 kit by Does not apply route daily True Metric testing device 1 Device 0    Methylsulfonylmethane (MSM PO) Take by mouth      Prenatal MV-Min-Fe Fum-FA-DHA (PRENATAL 1 PO) Take by mouth       No current facility-administered medications for this visit.       Review of Systems   Constitutional:  Negative for activity change, appetite change, chills, fatigue and fever. Respiratory:  Negative for cough and shortness of breath. Cardiovascular:  Negative for chest pain and palpitations. Gastrointestinal:  Negative for diarrhea, nausea and vomiting. Past medical, surgical, family and social history were reviewed and updated with the patient. Objective:    /78 (Site: Left Upper Arm, Position: Sitting, Cuff Size: Large Adult)   Pulse 62   Wt 263 lb 6.4 oz (119.5 kg)   SpO2 97%   BMI 36.22 kg/m²   Weight: 263 lb 6.4 oz (119.5 kg)     BP Readings from Last 3 Encounters:   09/02/22 110/78   03/02/22 118/70   10/25/21 116/72     Wt Readings from Last 3 Encounters:   09/02/22 263 lb 6.4 oz (119.5 kg)   03/02/22 268 lb (121.6 kg)   10/25/21 277 lb (125.6 kg)       Physical Exam  Constitutional:       General: He is not in acute distress. Appearance: He is well-developed. HENT:      Head: Normocephalic and atraumatic. Cardiovascular:      Rate and Rhythm: Normal rate and regular rhythm. Heart sounds: Normal heart sounds, S1 normal and S2 normal.   Pulmonary:      Effort: Pulmonary effort is normal. No respiratory distress. Breath sounds: Normal breath sounds. Skin:     General: Skin is warm and dry. Neurological:      Mental Status: He is alert and oriented to person, place, and time. Psychiatric:         Thought Content: Thought content normal.         Judgment: Judgment normal.     Assessment/Plan    1. Essential hypertension  Stable. Continue on current medication regimen. May consider monitoring blood pressure readings at home. Watch for low readings as he continues to lose weight. - Blood Pressure KIT; 1 kit by Does not apply route daily  Dispense: 1 kit; Refill: 0    2. Mixed hyperlipidemia  Stable. Continue on current medication regimen. 3. Controlled type 2 diabetes mellitus with diabetic polyneuropathy, without long-term current use of insulin (HCC)  Stable.   Continue on current medication regimen. 4. Long term current use of anticoagulant therapy  Stable. Continue on current medication regimen. 5. History of CVA (cerebrovascular accident)  Continue follow up with Neurology. 6. History of DVT (deep vein thrombosis)    7. Hx of pulmonary embolus     Nika Shin was counseled regarding symptoms of current diagnosis, course and complications of disease if inadequately treated. Discussed side effects of medications, diagnosis, treatment options, and prognosis along with risks, benefits, complications, and alternatives of treatment including labs, imaging and other studies/treatment targets and goals. He verbalized understanding of instructions and counseling. Return in about 6 months (around 3/2/2023) for Medicare annual wellness, chronic health conditions. Medical decision making of moderate complexity.

## 2022-09-07 ENCOUNTER — TELEPHONE (OUTPATIENT)
Dept: FAMILY MEDICINE CLINIC | Age: 67
End: 2022-09-07

## 2022-09-07 NOTE — TELEPHONE ENCOUNTER
Can check sometimes in the morning, sometimes in the afternoon, sometimes in the evening to get a good variety.

## 2022-09-18 ASSESSMENT — ENCOUNTER SYMPTOMS
COUGH: 0
VOMITING: 0
SHORTNESS OF BREATH: 0
DIARRHEA: 0
NAUSEA: 0

## 2022-09-26 ENCOUNTER — TELEPHONE (OUTPATIENT)
Dept: FAMILY MEDICINE CLINIC | Age: 67
End: 2022-09-26

## 2022-09-26 NOTE — TELEPHONE ENCOUNTER
Blood pressure readings okay with one low reading. If he gets more readings consistently in the 100s/60s will decrease his lisinopril.

## 2022-09-27 ENCOUNTER — ANTI-COAG VISIT (OUTPATIENT)
Dept: FAMILY MEDICINE CLINIC | Age: 67
End: 2022-09-27

## 2022-09-27 LAB — INR BLD: 3.43

## 2022-09-29 LAB
INR BLD: 3.43 (ref 0.87–1.14)
PROTHROMBIN TIME: 34.8 SEC (ref 11.7–14.5)

## 2022-10-03 DIAGNOSIS — E78.2 MIXED HYPERLIPIDEMIA: ICD-10-CM

## 2022-10-03 RX ORDER — ATORVASTATIN CALCIUM 40 MG/1
TABLET, FILM COATED ORAL
Qty: 90 TABLET | Refills: 1 | Status: SHIPPED | OUTPATIENT
Start: 2022-10-03 | End: 2022-10-04 | Stop reason: SDUPTHER

## 2022-10-03 NOTE — TELEPHONE ENCOUNTER
Medication:   Requested Prescriptions     Pending Prescriptions Disp Refills    atorvastatin (LIPITOR) 40 MG tablet [Pharmacy Med Name: ATORVASTATIN CALCIUM 40MG TABS] 30 tablet 5     Sig: TAKE ONE TABLET BY MOUTH EVERY NIGHT          Patient Phone Number: [phone] (home)     Last appt: 9/2/2022   Next appt: 3/2/2023    Last OARRS: No flowsheet data found.   PDMP Monitoring:    Last PDMP Singh Lopez as Reviewed [facility]):  Review User Review Instant Review Result          Preferred Pharmacy:   [address]  Phone: [phone] Fax: [phone]

## 2022-10-04 RX ORDER — ATORVASTATIN CALCIUM 40 MG/1
TABLET, FILM COATED ORAL
Qty: 90 TABLET | Refills: 1 | Status: SHIPPED | OUTPATIENT
Start: 2022-10-04

## 2022-10-04 NOTE — TELEPHONE ENCOUNTER
Pharmacy called back stating they never received atorvastatin refill. Pharmacy states wife has been calling for the script. Please resend atorvastatin 40mg to [facility] on [address].

## 2022-10-28 LAB
INR BLD: 4 (ref 0.9–1.2)
PROTHROMBIN TIME: 38.2 SEC (ref 9.1–12)

## 2022-11-16 ENCOUNTER — ANTI-COAG VISIT (OUTPATIENT)
Dept: FAMILY MEDICINE CLINIC | Age: 67
End: 2022-11-16

## 2022-11-16 LAB
INR BLD: 4.1 (ref 0.9–1.2)
PROTHROMBIN TIME: 39 SEC (ref 9.1–12)

## 2022-12-06 ENCOUNTER — TELEPHONE (OUTPATIENT)
Dept: FAMILY MEDICINE CLINIC | Age: 67
End: 2022-12-06

## 2022-12-06 ENCOUNTER — ANTI-COAG VISIT (OUTPATIENT)
Dept: FAMILY MEDICINE CLINIC | Age: 67
End: 2022-12-06

## 2022-12-06 LAB — INR BLD: 3.05

## 2022-12-13 ENCOUNTER — TELEPHONE (OUTPATIENT)
Dept: FAMILY MEDICINE CLINIC | Age: 67
End: 2022-12-13

## 2022-12-13 NOTE — TELEPHONE ENCOUNTER
Spouse called stating that her  is declining and she is worried. She stated that after he was seen by Jim Kimball in September he has starting wetting himself, legs sometimes swell up and now SOB after going from the bed to the living room. She stated that she believe it could be his warfarin because every time they lower it, he seems to get worse. Patient would like to speak with someone today.

## 2022-12-13 NOTE — TELEPHONE ENCOUNTER
Called spouse, Mario Deng. States he has been having SOB during exertion only (moving from bed to bathroom) for 7 days. Nocturia x2mos. BLE edema x6wks, but is worse on Rt side. Scheduled appt for Friday 12/15.

## 2022-12-16 ENCOUNTER — HOSPITAL ENCOUNTER (INPATIENT)
Age: 67
LOS: 10 days | Discharge: HOME | DRG: 291 | End: 2022-12-26
Attending: EMERGENCY MEDICINE | Admitting: FAMILY MEDICINE
Payer: MEDICARE

## 2022-12-16 ENCOUNTER — APPOINTMENT (OUTPATIENT)
Dept: GENERAL RADIOLOGY | Age: 67
DRG: 291 | End: 2022-12-16
Payer: MEDICARE

## 2022-12-16 ENCOUNTER — OFFICE VISIT (OUTPATIENT)
Dept: FAMILY MEDICINE CLINIC | Age: 67
End: 2022-12-16
Payer: MEDICARE

## 2022-12-16 VITALS
WEIGHT: 288 LBS | BODY MASS INDEX: 39.61 KG/M2 | SYSTOLIC BLOOD PRESSURE: 90 MMHG | OXYGEN SATURATION: 94 % | TEMPERATURE: 97.7 F | HEART RATE: 52 BPM | DIASTOLIC BLOOD PRESSURE: 62 MMHG

## 2022-12-16 DIAGNOSIS — R06.02 SOB (SHORTNESS OF BREATH): Primary | ICD-10-CM

## 2022-12-16 DIAGNOSIS — I48.91 ATRIAL FIBRILLATION WITH RVR (HCC): ICD-10-CM

## 2022-12-16 DIAGNOSIS — M79.89 LEG SWELLING: ICD-10-CM

## 2022-12-16 DIAGNOSIS — N17.9 ACUTE RENAL FAILURE, UNSPECIFIED ACUTE RENAL FAILURE TYPE (HCC): ICD-10-CM

## 2022-12-16 DIAGNOSIS — R09.02 HYPOXIA: ICD-10-CM

## 2022-12-16 DIAGNOSIS — I48.91 ATRIAL FIBRILLATION, UNSPECIFIED TYPE (HCC): ICD-10-CM

## 2022-12-16 DIAGNOSIS — I50.9 ACUTE CONGESTIVE HEART FAILURE, UNSPECIFIED HEART FAILURE TYPE (HCC): Primary | ICD-10-CM

## 2022-12-16 LAB
A/G RATIO: 1.3 (ref 1.1–2.2)
ALBUMIN SERPL-MCNC: 3.7 G/DL (ref 3.4–5)
ALP BLD-CCNC: 80 U/L (ref 40–129)
ALT SERPL-CCNC: 26 U/L (ref 10–40)
ANION GAP SERPL CALCULATED.3IONS-SCNC: 13 MMOL/L (ref 3–16)
AST SERPL-CCNC: 31 U/L (ref 15–37)
BASOPHILS ABSOLUTE: 0.1 K/UL (ref 0–0.2)
BASOPHILS RELATIVE PERCENT: 1.2 %
BILIRUB SERPL-MCNC: 1.2 MG/DL (ref 0–1)
BUN BLDV-MCNC: 39 MG/DL (ref 7–20)
CALCIUM SERPL-MCNC: 9.7 MG/DL (ref 8.3–10.6)
CHLORIDE BLD-SCNC: 111 MMOL/L (ref 99–110)
CO2: 20 MMOL/L (ref 21–32)
CREAT SERPL-MCNC: 2.4 MG/DL (ref 0.8–1.3)
EOSINOPHILS ABSOLUTE: 0.1 K/UL (ref 0–0.6)
EOSINOPHILS RELATIVE PERCENT: 0.9 %
GFR SERPL CREATININE-BSD FRML MDRD: 29 ML/MIN/{1.73_M2}
GLUCOSE BLD-MCNC: 112 MG/DL (ref 70–99)
HCT VFR BLD CALC: 34 % (ref 40.5–52.5)
HEMOGLOBIN: 11.4 G/DL (ref 13.5–17.5)
INR BLD: 4.36 (ref 0.87–1.14)
LYMPHOCYTES ABSOLUTE: 1.1 K/UL (ref 1–5.1)
LYMPHOCYTES RELATIVE PERCENT: 15.7 %
MCH RBC QN AUTO: 32.9 PG (ref 26–34)
MCHC RBC AUTO-ENTMCNC: 33.5 G/DL (ref 31–36)
MCV RBC AUTO: 98.2 FL (ref 80–100)
MONOCYTES ABSOLUTE: 0.5 K/UL (ref 0–1.3)
MONOCYTES RELATIVE PERCENT: 8 %
NEUTROPHILS ABSOLUTE: 5 K/UL (ref 1.7–7.7)
NEUTROPHILS RELATIVE PERCENT: 74.2 %
PDW BLD-RTO: 15.1 % (ref 12.4–15.4)
PLATELET # BLD: 178 K/UL (ref 135–450)
PMV BLD AUTO: 9.8 FL (ref 5–10.5)
POTASSIUM REFLEX MAGNESIUM: 4.6 MMOL/L (ref 3.5–5.1)
PRO-BNP: ABNORMAL PG/ML (ref 0–124)
PROTHROMBIN TIME: 42.1 SEC (ref 11.7–14.5)
RBC # BLD: 3.46 M/UL (ref 4.2–5.9)
SODIUM BLD-SCNC: 144 MMOL/L (ref 136–145)
TOTAL PROTEIN: 6.6 G/DL (ref 6.4–8.2)
TROPONIN: 0.04 NG/ML
TROPONIN: 0.05 NG/ML
WBC # BLD: 6.8 K/UL (ref 4–11)

## 2022-12-16 PROCEDURE — 1200000000 HC SEMI PRIVATE

## 2022-12-16 PROCEDURE — 1123F ACP DISCUSS/DSCN MKR DOCD: CPT | Performed by: NURSE PRACTITIONER

## 2022-12-16 PROCEDURE — 3078F DIAST BP <80 MM HG: CPT | Performed by: NURSE PRACTITIONER

## 2022-12-16 PROCEDURE — 3017F COLORECTAL CA SCREEN DOC REV: CPT | Performed by: NURSE PRACTITIONER

## 2022-12-16 PROCEDURE — G8484 FLU IMMUNIZE NO ADMIN: HCPCS | Performed by: NURSE PRACTITIONER

## 2022-12-16 PROCEDURE — 83880 ASSAY OF NATRIURETIC PEPTIDE: CPT

## 2022-12-16 PROCEDURE — 99285 EMERGENCY DEPT VISIT HI MDM: CPT

## 2022-12-16 PROCEDURE — 96374 THER/PROPH/DIAG INJ IV PUSH: CPT

## 2022-12-16 PROCEDURE — G8417 CALC BMI ABV UP PARAM F/U: HCPCS | Performed by: NURSE PRACTITIONER

## 2022-12-16 PROCEDURE — 1036F TOBACCO NON-USER: CPT | Performed by: NURSE PRACTITIONER

## 2022-12-16 PROCEDURE — 6360000002 HC RX W HCPCS: Performed by: EMERGENCY MEDICINE

## 2022-12-16 PROCEDURE — 2580000003 HC RX 258: Performed by: FAMILY MEDICINE

## 2022-12-16 PROCEDURE — 6370000000 HC RX 637 (ALT 250 FOR IP): Performed by: FAMILY MEDICINE

## 2022-12-16 PROCEDURE — 80053 COMPREHEN METABOLIC PANEL: CPT

## 2022-12-16 PROCEDURE — 3074F SYST BP LT 130 MM HG: CPT | Performed by: NURSE PRACTITIONER

## 2022-12-16 PROCEDURE — 85025 COMPLETE CBC W/AUTO DIFF WBC: CPT

## 2022-12-16 PROCEDURE — 99214 OFFICE O/P EST MOD 30 MIN: CPT | Performed by: NURSE PRACTITIONER

## 2022-12-16 PROCEDURE — 93005 ELECTROCARDIOGRAM TRACING: CPT | Performed by: EMERGENCY MEDICINE

## 2022-12-16 PROCEDURE — 36415 COLL VENOUS BLD VENIPUNCTURE: CPT

## 2022-12-16 PROCEDURE — 85610 PROTHROMBIN TIME: CPT

## 2022-12-16 PROCEDURE — 93000 ELECTROCARDIOGRAM COMPLETE: CPT | Performed by: NURSE PRACTITIONER

## 2022-12-16 PROCEDURE — 2500000003 HC RX 250 WO HCPCS: Performed by: EMERGENCY MEDICINE

## 2022-12-16 PROCEDURE — 84484 ASSAY OF TROPONIN QUANT: CPT

## 2022-12-16 PROCEDURE — 71046 X-RAY EXAM CHEST 2 VIEWS: CPT

## 2022-12-16 PROCEDURE — G8427 DOCREV CUR MEDS BY ELIG CLIN: HCPCS | Performed by: NURSE PRACTITIONER

## 2022-12-16 RX ORDER — ONDANSETRON 2 MG/ML
4 INJECTION INTRAMUSCULAR; INTRAVENOUS EVERY 6 HOURS PRN
Status: DISCONTINUED | OUTPATIENT
Start: 2022-12-16 | End: 2022-12-26 | Stop reason: HOSPADM

## 2022-12-16 RX ORDER — SODIUM CHLORIDE 0.9 % (FLUSH) 0.9 %
5-40 SYRINGE (ML) INJECTION EVERY 12 HOURS SCHEDULED
Status: DISCONTINUED | OUTPATIENT
Start: 2022-12-16 | End: 2022-12-26 | Stop reason: HOSPADM

## 2022-12-16 RX ORDER — SODIUM CHLORIDE 0.9 % (FLUSH) 0.9 %
5-40 SYRINGE (ML) INJECTION PRN
Status: DISCONTINUED | OUTPATIENT
Start: 2022-12-16 | End: 2022-12-26 | Stop reason: HOSPADM

## 2022-12-16 RX ORDER — WARFARIN SODIUM 5 MG/1
5 TABLET ORAL
Status: CANCELLED | OUTPATIENT
Start: 2022-12-17

## 2022-12-16 RX ORDER — FUROSEMIDE 10 MG/ML
40 INJECTION INTRAMUSCULAR; INTRAVENOUS DAILY
Status: DISCONTINUED | OUTPATIENT
Start: 2022-12-17 | End: 2022-12-19

## 2022-12-16 RX ORDER — ACETAMINOPHEN 325 MG/1
650 TABLET ORAL EVERY 6 HOURS PRN
Status: DISCONTINUED | OUTPATIENT
Start: 2022-12-16 | End: 2022-12-26 | Stop reason: HOSPADM

## 2022-12-16 RX ORDER — DILTIAZEM HYDROCHLORIDE 5 MG/ML
10 INJECTION INTRAVENOUS ONCE
Status: COMPLETED | OUTPATIENT
Start: 2022-12-16 | End: 2022-12-16

## 2022-12-16 RX ORDER — SODIUM CHLORIDE 9 MG/ML
INJECTION, SOLUTION INTRAVENOUS PRN
Status: DISCONTINUED | OUTPATIENT
Start: 2022-12-16 | End: 2022-12-26 | Stop reason: HOSPADM

## 2022-12-16 RX ORDER — WARFARIN SODIUM 5 MG/1
5 TABLET ORAL
Status: CANCELLED | OUTPATIENT
Start: 2022-12-16

## 2022-12-16 RX ORDER — POLYETHYLENE GLYCOL 3350 17 G/17G
17 POWDER, FOR SOLUTION ORAL DAILY PRN
Status: DISCONTINUED | OUTPATIENT
Start: 2022-12-16 | End: 2022-12-26 | Stop reason: HOSPADM

## 2022-12-16 RX ORDER — ATORVASTATIN CALCIUM 40 MG/1
40 TABLET, FILM COATED ORAL DAILY
Status: DISCONTINUED | OUTPATIENT
Start: 2022-12-16 | End: 2022-12-26 | Stop reason: HOSPADM

## 2022-12-16 RX ORDER — FUROSEMIDE 10 MG/ML
80 INJECTION INTRAMUSCULAR; INTRAVENOUS ONCE
Status: COMPLETED | OUTPATIENT
Start: 2022-12-16 | End: 2022-12-16

## 2022-12-16 RX ORDER — ONDANSETRON 4 MG/1
4 TABLET, ORALLY DISINTEGRATING ORAL EVERY 8 HOURS PRN
Status: DISCONTINUED | OUTPATIENT
Start: 2022-12-16 | End: 2022-12-26 | Stop reason: HOSPADM

## 2022-12-16 RX ORDER — DILTIAZEM HCL/D5W 125 MG/125
2.5-15 PLASTIC BAG, INJECTION (ML) INTRAVENOUS CONTINUOUS
Status: DISCONTINUED | OUTPATIENT
Start: 2022-12-16 | End: 2022-12-21 | Stop reason: ALTCHOICE

## 2022-12-16 RX ORDER — ACETAMINOPHEN 650 MG/1
650 SUPPOSITORY RECTAL EVERY 6 HOURS PRN
Status: DISCONTINUED | OUTPATIENT
Start: 2022-12-16 | End: 2022-12-26 | Stop reason: HOSPADM

## 2022-12-16 RX ADMIN — ATORVASTATIN CALCIUM 40 MG: 40 TABLET, FILM COATED ORAL at 21:22

## 2022-12-16 RX ADMIN — DILTIAZEM HYDROCHLORIDE 10 MG: 5 INJECTION INTRAVENOUS at 17:07

## 2022-12-16 RX ADMIN — Medication 5 MG/HR: at 18:21

## 2022-12-16 RX ADMIN — FUROSEMIDE 80 MG: 10 INJECTION, SOLUTION INTRAMUSCULAR; INTRAVENOUS at 17:44

## 2022-12-16 RX ADMIN — Medication 10 ML: at 21:22

## 2022-12-16 ASSESSMENT — ENCOUNTER SYMPTOMS
NAUSEA: 0
COUGH: 0
DIARRHEA: 0
SHORTNESS OF BREATH: 0
VOMITING: 0

## 2022-12-16 ASSESSMENT — PAIN SCALES - GENERAL: PAINLEVEL_OUTOF10: 0

## 2022-12-16 NOTE — PROGRESS NOTES
Brandon Memory  : 1955  Encounter date: 2022    This is a 74 y.o. male who presents with  Chief Complaint   Patient presents with    Shortness of Breath     After walking O2 dropped to 90 after walking down the roy to exam room. Increase in swelling to BLE. History of present illness:    HPI   Presents to clinic today with concerns for worsening SOB and leg swelling, abdominal swelling over the past week. Reports some heart palpitations. Increasing fatigue. Per wife he has been sleeping frequently. They did first notice the leg swelling in November and has progressed. Patient states he associates all of his symptoms with his warfarin dosing adjustments. Since we have been decreasing his medicine he has started to notice the changes. No Known Allergies  Current Outpatient Medications   Medication Sig Dispense Refill    atorvastatin (LIPITOR) 40 MG tablet TAKE ONE TABLET BY MOUTH EVERY NIGHT 90 tablet 1    Blood Pressure KIT 1 kit by Does not apply route daily 1 kit 0    metFORMIN (GLUCOPHAGE) 500 MG tablet TAKE 1 TABLET BY MOUTH 2 TIMES A DAY WITH MEALS 180 tablet 1    lisinopril (PRINIVIL;ZESTRIL) 40 MG tablet TAKE 1 TABLET BY MOUTH ONE TIME A DAY 90 tablet 1    amLODIPine (NORVASC) 5 MG tablet TAKE 1 TABLET BY MOUTH ONE TIME A DAY 90 tablet 1    blood glucose monitor strips 1 strip by Other route daily True Metric Test strips 100 strip 3    Lancets MISC True Metric Lancets 100 each 3    warfarin (COUMADIN) 5 MG tablet TAKE 1 AND 1/2 TABLETS BY MOUTH DAILY (Patient taking differently: TAKE 1/2 TABLETS BY MOUTH DAILY) 135 tablet 5    Blood Glucose Monitoring Suppl AGUSTO 1 kit by Does not apply route daily True Metric testing device 1 Device 0    Methylsulfonylmethane (MSM PO) Take by mouth      Prenatal MV-Min-Fe Fum-FA-DHA (PRENATAL 1 PO) Take by mouth       No current facility-administered medications for this visit.       Review of Systems   Constitutional:  Negative for activity change, appetite change, chills, fatigue and fever. Respiratory:  Negative for cough and shortness of breath. Cardiovascular:  Negative for chest pain and palpitations. Gastrointestinal:  Negative for diarrhea, nausea and vomiting. Past medical, surgical, family and social history were reviewed and updated with the patient. Objective:    BP 90/62 (Site: Left Upper Arm, Position: Sitting, Cuff Size: Large Adult)   Pulse 52   Temp 97.7 °F (36.5 °C)   Wt 288 lb (130.6 kg)   SpO2 94%   BMI 39.61 kg/m²   Weight: 288 lb (130.6 kg)     BP Readings from Last 3 Encounters:   12/16/22 120/72   12/16/22 90/62   09/02/22 110/78     Wt Readings from Last 3 Encounters:   12/16/22 288 lb (130.6 kg)   09/02/22 263 lb 6.4 oz (119.5 kg)   03/02/22 268 lb (121.6 kg)     Physical Exam  Constitutional:       General: He is not in acute distress. Appearance: Normal appearance. Neurological:      Mental Status: He is alert and oriented to person, place, and time. Psychiatric:         Mood and Affect: Mood normal.         Speech: Speech normal.         Behavior: Behavior normal.     Assessment/Plan    1. SOB (shortness of breath)  EKG shows Afib - new onset. Hx of stroke. O2 sat at 90 with ambulation - recovered to 94% on room air with a few minutes of rest. Advised on EMS transport - patient refused. Wife will transport to [facility]. Report called to [facility]. - EKG 12 Lead    2. Atrial fibrillation, unspecified type (HCC)    3. Leg swelling    4. Hypoxia     Robbie Rea was counseled regarding symptoms of current diagnosis, course and complications of disease if inadequately treated. Discussed side effects of medications, diagnosis, treatment options, and prognosis along with risks, benefits, complications, and alternatives of treatment including labs, imaging and other studies/treatment targets and goals. He verbalized understanding of instructions and counseling.     Medical decision making of moderate complexity.

## 2022-12-16 NOTE — ED PROVIDER NOTES
[address] [facility] PROVIDER NOTE    Patient Identification  Pt Name: Imtiaz Escobar  MRN: [id number]  Buckgfaudrey 1955  Date of evaluation: 12/16/2022  Provider: Tonya Love MD  PCP: KRISTYN [place] CNP    Chief Complaint  Shortness of Breath (Pt states that he is having shortness of breath, sent by cardiology. Pt states Sob with walking. )      HPI  (History provided by {Persons; family members:[id number]})  This is a 74 y.o. male who was brought in by {Z:[id number]} for ***. Shortness of breath, worse with exertion. Patient was sent from cardiology clinic today with worsening shortness of breath with exertion and concerns for CHF. Patient had previously been told he may have CHF many years ago, but does not have a recent echocardiogram.  He said increasing short of breath with exertion, but no orthopnea or paroxysmal nocturnal dyspnea. He has had no associated chest pain, fever, or cough. He has some lower extremity edema bilaterally. This has worsened over time per his wife. She also reports he has had weight gain    ROS  {NUMBERS 1-12:10} systems reviewed, pertinent positives/negatives per HPI otherwise noted to be negative. I have reviewed the following nursing documentation:  Allergies: Patient has no known allergies.     Past medical history:   Past Medical History:   Diagnosis Date    Cerebrovascular disease     CHF (congestive heart failure) ([facility] [address] 04/2017    per  nurse report but wife is unaware    DM (diabetes mellitus screen) 04/2017    boarder line    Hyperlipidemia     Hypertension     Type 2 diabetes mellitus without complication ([facility] [address]      Past surgical history:   Past Surgical History:   Procedure Laterality Date    VASECTOMY         Home medications:   Previous Medications    AMLODIPINE (NORVASC) 5 MG TABLET    TAKE 1 TABLET BY MOUTH ONE TIME A DAY    ATORVASTATIN (LIPITOR) 40 MG TABLET    TAKE ONE TABLET BY MOUTH EVERY NIGHT    BLOOD GLUCOSE MONITOR STRIPS    1 strip by Other route daily True Metric Test strips    BLOOD GLUCOSE MONITORING SUPPL AGUSTO    1 kit by Does not apply route daily True Metric testing device    BLOOD PRESSURE KIT    1 kit by Does not apply route daily    LANCETS MISC    True Metric Lancets    LISINOPRIL (PRINIVIL;ZESTRIL) 40 MG TABLET    TAKE 1 TABLET BY MOUTH ONE TIME A DAY    METFORMIN (GLUCOPHAGE) 500 MG TABLET    TAKE 1 TABLET BY MOUTH 2 TIMES A DAY WITH MEALS    METHYLSULFONYLMETHANE (MSM PO)    Take by mouth    PRENATAL MV-MIN-FE FUM-FA-DHA (PRENATAL 1 PO)    Take by mouth    WARFARIN (COUMADIN) 5 MG TABLET    TAKE 1 AND 1/2 TABLETS BY MOUTH DAILY       Social history:  reports that he quit smoking about 5 years ago. His smoking use included cigarettes. He has a 80.00 pack-year smoking history. He quit smokeless tobacco use about 5 years ago. He reports that he does not drink alcohol and does not use drugs. Family history:    Family History   Problem Relation Age of Onset    High Blood Pressure Mother     Cancer Mother         lung, smoker    Cancer Father         mesothelioma    Depression Sister        ***  Exam  ED Triage Vitals [12/16/22 1526]   BP Temp Temp Source Heart Rate Resp SpO2 Height Weight   120/72 97.8 °F (36.6 °C) Oral 91 20 92 % -- --     Nursing note and vitals reviewed. Constitutional: Well developed, well nourished. Non-toxic in appearance. HENT:      Head: Normocephalic and atraumatic. Ears: External ears normal.      Nose: Nose normal.     Mouth: Membrane mucosa moist and pink. Eyes: Anicteric sclera. No discharge. Neck: Supple. Trachea midline. Cardiovascular: RRR; no murmurs, rubs, or gallops. Pulmonary/Chest: Effort normal. No respiratory distress. CTAB. No stridor. No wheezes. No rales. Abdominal: Soft. No distension. ***  : ***  Rectal: ***   Musculoskeletal: Moves all extremities. No gross deformity. Neurological: Alert and oriented. Face symmetric. Speech is clear.   Skin: Warm and dry. No rash. Psychiatric: Normal mood and affect. Behavior is normal.    Procedures      EKG  The Ekg interpreted by me in the absence of a cardiologist shows. atrial fibrillation with a rate of 138; PVCs present  Axis is   Normal  QTc is  normal  Intervals and Durations are unremarkable. No specific ST-T wave changes appreciated. No evidence of acute ischemia. Atrial fibrillation is new in comparison to previous EKG from April 2020 recently. Otherwise, there are no significant      Radiology  XR CHEST (2 VW)   Final Result   1. Small to moderate right pleural effusion and right basilar airspace   consolidation that could reflect atelectasis or pneumonia. 2. Cardiomegaly without evidence of CHF.              Labs  Results for orders placed or performed during the hospital encounter of 12/16/22   CBC with Auto Differential   Result Value Ref Range    WBC 6.8 4.0 - 11.0 K/uL    RBC 3.46 (L) 4.20 - 5.90 M/uL    Hemoglobin 11.4 (L) 13.5 - 17.5 g/dL    Hematocrit 34.0 (L) 40.5 - 52.5 %    MCV 98.2 80.0 - 100.0 fL    MCH 32.9 26.0 - 34.0 pg    MCHC 33.5 31.0 - 36.0 g/dL    RDW 15.1 12.4 - 15.4 %    Platelets 885 987 - 585 K/uL    MPV 9.8 5.0 - 10.5 fL    Neutrophils % 74.2 %    Lymphocytes % 15.7 %    Monocytes % 8.0 %    Eosinophils % 0.9 %    Basophils % 1.2 %    Neutrophils Absolute 5.0 1.7 - 7.7 K/uL    Lymphocytes Absolute 1.1 1.0 - 5.1 K/uL    Monocytes Absolute 0.5 0.0 - 1.3 K/uL    Eosinophils Absolute 0.1 0.0 - 0.6 K/uL    Basophils Absolute 0.1 0.0 - 0.2 K/uL   Comprehensive Metabolic Panel w/ Reflex to MG   Result Value Ref Range    Sodium 144 136 - 145 mmol/L    Potassium reflex Magnesium 4.6 3.5 - 5.1 mmol/L    Chloride 111 (H) 99 - 110 mmol/L    CO2 20 (L) 21 - 32 mmol/L    Anion Gap 13 3 - 16    Glucose 112 (H) 70 - 99 mg/dL    BUN 39 (H) 7 - 20 mg/dL    Creatinine 2.4 (H) 0.8 - 1.3 mg/dL    Est, Glom Filt Rate 29 (A) >60    Calcium 9.7 8.3 - 10.6 mg/dL    Total Protein 6.6 6.4 - 8.2 g/dL Albumin 3.7 3.4 - 5.0 g/dL    Albumin/Globulin Ratio 1.3 1.1 - 2.2    Total Bilirubin 1.2 (H) 0.0 - 1.0 mg/dL    Alkaline Phosphatase 80 40 - 129 U/L    ALT 26 10 - 40 U/L    AST 31 15 - 37 U/L   Brain Natriuretic Peptide   Result Value Ref Range    Pro-BNP 20,155 (H) 0 - 124 pg/mL   Troponin   Result Value Ref Range    Troponin 0.05 (H) <0.01 ng/mL       SEP-1  Is this patient to be included in the SEP-1 Core Measure due to severe sepsis or septic shock? No   Exclusion criteria - the patient is NOT to be included for SEP-1 Core Measure due to: Infection is not suspected     MDM and ED Course  Patient was sent from cardiology clinic today with worsening shortness of breath with exertion and concerns for CHF. Patient had previously been told he may have CHF many years ago, but does not have a recent echocardiogram.  He has increasing short of breath with exertion, but no orthopnea or paroxysmal nocturnal dyspnea. His BNP is significantly elevated to 20,155. His troponin is mildly elevated at 0.05, but he appears to have a chronic troponin leak when compared to his previous labs. His EKG shows no evidence of acute ischemia or infarction. In addition to the above, he also has acute renal failure with a creatinine of 2.4 which may be contributing to his fluid overload. He was also experiencing atrial fibrillation with RVR, which may have increased his troponin or BNP. I initiated treatment of CHF with Lasix 80mg IV. I have also given Cardizem 10mg IV, but his heart rate is still elevated, so I am going to initiate a Cardizem drip. I consulted *** through [facility] for admission. *** reviewed the patient's history, physical exam, labs, imaging studies, and emergency department course and has decided to admit Prateek Rojo for further evaluation and treatment.   As I have deemed necessary from their history, physical, and studies, I have considered and evaluated Prateek Rojo for the following diagnoses:  ***    The total Critical Care time is 30 minutes which excludes separately billable procedures. Final Impression  1. Acute congestive heart failure, unspecified heart failure type ([facility] [address]    2. Atrial fibrillation with RVR ([facility] [address]    3. Acute renal failure, unspecified acute renal failure type (HCC)        Blood pressure 119/79, pulse (!) 125, temperature 97.8 °F (36.6 °C), temperature source Oral, resp. rate 24, SpO2 99 %. Disposition:  DISPOSITION Decision To Admit 12/16/2022 05:00:43 PM        This chart was generated using the [address] [place] [address] dictation system. I created this record but it may contain dictation errors given the limitations of this technology.

## 2022-12-17 PROBLEM — I48.0 PAROXYSMAL ATRIAL FIBRILLATION (HCC): Status: ACTIVE | Noted: 2022-12-17

## 2022-12-17 PROBLEM — I50.9 ACUTE CONGESTIVE HEART FAILURE (HCC): Status: ACTIVE | Noted: 2022-12-17

## 2022-12-17 PROBLEM — N17.9 ACUTE RENAL FAILURE (HCC): Status: ACTIVE | Noted: 2022-12-17

## 2022-12-17 LAB
ANION GAP SERPL CALCULATED.3IONS-SCNC: 15 MMOL/L (ref 3–16)
BUN BLDV-MCNC: 38 MG/DL (ref 7–20)
CALCIUM SERPL-MCNC: 9 MG/DL (ref 8.3–10.6)
CHLORIDE BLD-SCNC: 112 MMOL/L (ref 99–110)
CO2: 20 MMOL/L (ref 21–32)
CREAT SERPL-MCNC: 2.2 MG/DL (ref 0.8–1.3)
EKG ATRIAL RATE: 159 BPM
EKG DIAGNOSIS: NORMAL
EKG Q-T INTERVAL: 268 MS
EKG QRS DURATION: 82 MS
EKG QTC CALCULATION (BAZETT): 402 MS
EKG R AXIS: 100 DEGREES
EKG T AXIS: -69 DEGREES
EKG VENTRICULAR RATE: 135 BPM
GFR SERPL CREATININE-BSD FRML MDRD: 32 ML/MIN/{1.73_M2}
GLUCOSE BLD-MCNC: 113 MG/DL (ref 70–99)
HCT VFR BLD CALC: 33.4 % (ref 40.5–52.5)
HEMOGLOBIN: 11 G/DL (ref 13.5–17.5)
INR BLD: 4.25 (ref 0.87–1.14)
MCH RBC QN AUTO: 32.1 PG (ref 26–34)
MCHC RBC AUTO-ENTMCNC: 32.9 G/DL (ref 31–36)
MCV RBC AUTO: 97.8 FL (ref 80–100)
PDW BLD-RTO: 15.1 % (ref 12.4–15.4)
PLATELET # BLD: 161 K/UL (ref 135–450)
PMV BLD AUTO: 9.4 FL (ref 5–10.5)
POTASSIUM SERPL-SCNC: 3.9 MMOL/L (ref 3.5–5.1)
PROTHROMBIN TIME: 41.3 SEC (ref 11.7–14.5)
RBC # BLD: 3.42 M/UL (ref 4.2–5.9)
SODIUM BLD-SCNC: 147 MMOL/L (ref 136–145)
WBC # BLD: 6.9 K/UL (ref 4–11)

## 2022-12-17 PROCEDURE — 36415 COLL VENOUS BLD VENIPUNCTURE: CPT

## 2022-12-17 PROCEDURE — 6370000000 HC RX 637 (ALT 250 FOR IP): Performed by: FAMILY MEDICINE

## 2022-12-17 PROCEDURE — 2500000003 HC RX 250 WO HCPCS: Performed by: FAMILY MEDICINE

## 2022-12-17 PROCEDURE — 99223 1ST HOSP IP/OBS HIGH 75: CPT | Performed by: INTERNAL MEDICINE

## 2022-12-17 PROCEDURE — 6370000000 HC RX 637 (ALT 250 FOR IP): Performed by: INTERNAL MEDICINE

## 2022-12-17 PROCEDURE — 2580000003 HC RX 258: Performed by: FAMILY MEDICINE

## 2022-12-17 PROCEDURE — 2700000000 HC OXYGEN THERAPY PER DAY

## 2022-12-17 PROCEDURE — 6360000002 HC RX W HCPCS: Performed by: FAMILY MEDICINE

## 2022-12-17 PROCEDURE — 85610 PROTHROMBIN TIME: CPT

## 2022-12-17 PROCEDURE — 85027 COMPLETE CBC AUTOMATED: CPT

## 2022-12-17 PROCEDURE — 94760 N-INVAS EAR/PLS OXIMETRY 1: CPT

## 2022-12-17 PROCEDURE — 93010 ELECTROCARDIOGRAM REPORT: CPT | Performed by: INTERNAL MEDICINE

## 2022-12-17 PROCEDURE — 80048 BASIC METABOLIC PNL TOTAL CA: CPT

## 2022-12-17 PROCEDURE — 1200000000 HC SEMI PRIVATE

## 2022-12-17 RX ADMIN — Medication 10 MG/HR: at 15:03

## 2022-12-17 RX ADMIN — Medication 10 ML: at 20:13

## 2022-12-17 RX ADMIN — METOPROLOL TARTRATE 25 MG: 25 TABLET, FILM COATED ORAL at 19:38

## 2022-12-17 RX ADMIN — Medication 12.5 MG/HR: at 02:31

## 2022-12-17 RX ADMIN — FUROSEMIDE 40 MG: 10 INJECTION, SOLUTION INTRAMUSCULAR; INTRAVENOUS at 10:32

## 2022-12-17 RX ADMIN — Medication 10 ML: at 10:33

## 2022-12-17 RX ADMIN — METOPROLOL TARTRATE 25 MG: 25 TABLET, FILM COATED ORAL at 13:50

## 2022-12-17 RX ADMIN — METOPROLOL TARTRATE 25 MG: 25 TABLET, FILM COATED ORAL at 23:51

## 2022-12-17 RX ADMIN — ATORVASTATIN CALCIUM 40 MG: 40 TABLET, FILM COATED ORAL at 20:13

## 2022-12-17 ASSESSMENT — PAIN SCALES - GENERAL
PAINLEVEL_OUTOF10: 0

## 2022-12-17 NOTE — CONSULTS
LDLCALC, LABVLDL in the last 72 hours. ABGs: No results for input(s): PHART, PO2ART, GEE4SLL in the last 72 hours. INR:   Recent Labs     12/16/22  1602 12/17/22  0554   INR 4.36* 4.25*     UA:No results for input(s): Christella Plants, GLUCOSEU, BILIRUBINUR, Genette Forge, BLOODU, PHUR, PROTEINU, UROBILINOGEN, NITRU, LEUKOCYTESUR, Champ Perez in the last 72 hours. Urine Microscopic: No results for input(s): LABCAST, BACTERIA, COMU, HYALCAST, WBCUA, RBCUA, EPIU in the last 72 hours. Urine Culture: No results for input(s): LABURIN in the last 72 hours. Urine Chemistry: No results for input(s): Kahn David, PROTEINUR, NAUR in the last 72 hours. IMAGING:  XR CHEST (2 VW)   Final Result   1. Small to moderate right pleural effusion and right basilar airspace   consolidation that could reflect atelectasis or pneumonia. 2. Cardiomegaly without evidence of CHF. Prior to Admission medications    Medication Sig Start Date End Date Taking?  Authorizing Provider   atorvastatin (LIPITOR) 40 MG tablet TAKE ONE TABLET BY MOUTH EVERY NIGHT 10/4/22   Elonda Head KRISTYN Goodman NP   Blood Pressure KIT 1 kit by Does not apply route daily 9/2/22   KRISTYN Cai CNP   metFORMIN (GLUCOPHAGE) 500 MG tablet TAKE 1 TABLET BY MOUTH 2 TIMES A DAY WITH MEALS 8/30/22   Rohith Lilly MD   lisinopril (PRINIVIL;ZESTRIL) 40 MG tablet TAKE 1 TABLET BY MOUTH ONE TIME A DAY 8/30/22   Rohith Lilly MD   amLODIPine (NORVASC) 5 MG tablet TAKE 1 TABLET BY MOUTH ONE TIME A DAY 8/30/22   Rohith Lilly MD   blood glucose monitor strips 1 strip by Other route daily True Metric Test strips 5/27/22   KRISTYN Cai CNP   Lancets MISC True Metric Lancets 5/27/22   KRISTYN Cai CNP   warfarin (COUMADIN) 5 MG tablet TAKE 1 AND 1/2 TABLETS BY MOUTH DAILY  Patient taking differently: TAKE 1/2 TABLETS BY MOUTH DAILY 9/8/21   NALINI Sheridan   Blood Glucose Monitoring Suppl AGUSTO 1 kit by Does not apply route daily True Metric testing device 12/21/17   Corinne Herman MD   Methylsulfonylmethane (MSM PO) Take by mouth    Historical Provider, MD   Prenatal MV-Min-Fe Fum-FA-DHA (PRENATAL 1 PO) Take by mouth    Historical Provider, MD                       Assessment/Plan :      1. SANDY 2/2 hemodynamic chnages form atrial fib with RVR   On cardizem drip   Hold metformin   Hold lisinopril       Has cardiorenal process going on   Continue lasix iv     Recommend to dose adjust all medications  based on renal functions  Maintain SBP> 90 mmHg   Daily weights   AVOID NSAIDs  Avoid Nephrotoxins  Monitor Intake/Output  Call if significant decrease in urine output        2. A. Fib with RVR   On cardizem. Consult EP  Get ECHO     3. Anemia. Hb11.0   Monitor     4. Acid- base disorder. Metabolic acidosis   Monitor     5.  Electrolytes  Mild high sodium levels   Monitor             D/w primary team      Thank you for allowing us to participate in care of Frantz Dietrich         Electronically signed by: Hillary Mcintyre MD, 12/17/2022, 8:47 AM      [facility] [address]  Office : [phone]  Fax :[phone]

## 2022-12-17 NOTE — PROGRESS NOTES
[facility] PROGRESS NOTE    12/17/2022 9:18 AM        Name: Clair Salcedo . Admitted: 12/16/2022  Primary Care Provider: KRISTYN Obando CNP (Tel: [phone])                        Subjective:  . No acute events overnight. Resting well. Pain control. Diet ok. Labs reviewed  Denies any chest pain sob. Reviewed interval ancillary notes    Current Medications  dilTIAZem (CARDIZEM) 125 mg in dextrose 5% 125 mL infusion (premix), Continuous  furosemide (LASIX) injection 40 mg, Daily  atorvastatin (LIPITOR) tablet 40 mg, Daily  sodium chloride flush 0.9 % injection 5-40 mL, 2 times per day  sodium chloride flush 0.9 % injection 5-40 mL, PRN  0.9 % sodium chloride infusion, PRN  ondansetron (ZOFRAN-ODT) disintegrating tablet 4 mg, Q8H PRN   Or  ondansetron (ZOFRAN) injection 4 mg, Q6H PRN  polyethylene glycol (GLYCOLAX) packet 17 g, Daily PRN  acetaminophen (TYLENOL) tablet 650 mg, Q6H PRN   Or  acetaminophen (TYLENOL) suppository 650 mg, Q6H PRN  warfarin placeholder: dosing by pharmacy, RX Placeholder        Objective:  /78   Pulse 84   Temp 97.3 °F (36.3 °C) (Oral)   Resp 18   Ht 6' (1.829 m)   Wt 275 lb 3.2 oz (124.8 kg)   SpO2 95%   BMI 37.32 kg/m²     Intake/Output Summary (Last 24 hours) at 12/17/2022 0918  Last data filed at 12/17/2022 0827  Gross per 24 hour   Intake 960 ml   Output 4125 ml   Net -3165 ml      Wt Readings from Last 3 Encounters:   12/17/22 275 lb 3.2 oz (124.8 kg)   12/16/22 288 lb (130.6 kg)   09/02/22 263 lb 6.4 oz (119.5 kg)       General appearance:  Appears comfortable  Eyes: Sclera clear. Pupils equal.  ENT: Moist oral mucosa. Trachea midline, no adenopathy. Cardiovascular: Regular rhythm, normal S1, S2. No murmur. No edema in lower extremities  Respiratory: Not using accessory muscles. Good inspiratory effort. Clear to auscultation bilaterally, no wheeze or crackles. GI: Abdomen soft, no tenderness, not distended, normal bowel sounds  Musculoskeletal: No cyanosis in digits, neck supple  Neurology: CN 2-12 grossly intact. No speech or motor deficits  Psych: Normal affect. Alert and oriented in time, place and person  Skin: Warm, dry, normal turgor    Labs and Tests:  CBC:   Recent Labs     12/16/22  1602 12/17/22  0554   WBC 6.8 6.9   HGB 11.4* 11.0*    161     BMP:    Recent Labs     12/16/22  1602 12/17/22  0554    147*   K 4.6 3.9   * 112*   CO2 20* 20*   BUN 39* 38*   CREATININE 2.4* 2.2*   GLUCOSE 112* 113*     Hepatic:   Recent Labs     12/16/22  1602   AST 31   ALT 26   BILITOT 1.2*   ALKPHOS 80       Discussed care with patient             Problem List  Principal Problem:    Atrial fibrillation with rapid ventricular response (HCC)  Resolved Problems:    * No resolved hospital problems. *       Assessment & Plan:   A. fib with RVR  -Likely multifactorial with underlying CHF and also with renal failure  -Remains on Cardizem drip which we will wean. -Coagulation on Coumadin. -INR is therapeutic    Acute on chronic CHF  -Patient receiving IV Lasix monitor closely    Acute renal failure  -Creatinine is at 2.2 with hypernatremia  -Nephrology consult  Hold lisinopril      Diet: ADULT DIET; Regular;  Low Sodium (2 gm)  Code:Full Code  DVT PPX lovenox       Dario Alfred MD   12/17/2022 9:18 AM

## 2022-12-17 NOTE — CONSULTS
[facility]  Cardiac Consult     Referring Provider:  KRISTYN Apple CNP         History of Present Illness:  73 y/o male admitted with shortness of breath, edema and new afib. He has a h/o prior CVA and has an aphasia, so history is very difficult. It also appears that he has a h/o DVT and PE and has been on chronic anticoagulation with warfarin for at least 5 years. He says he has had LE edema for some time but seem to be worsening. Shortness of breath worsening over days to weeks as well. He cannot give me much more history due to aphasia. He was seen by PCP yesterday and found to be in rapid afib and sent to the ER. Past Medical History:   has a past medical history of Cerebrovascular disease, CHF (congestive heart failure) ([address] DM (diabetes mellitus screen), Hyperlipidemia, Hypertension, and Type 2 diabetes mellitus without complication ([facility] [address] Surgical History:   has a past surgical history that includes Vasectomy. Social History:   reports that he quit smoking about 5 years ago. His smoking use included cigarettes. He has a 80.00 pack-year smoking history. He quit smokeless tobacco use about 5 years ago. He reports that he does not drink alcohol and does not use drugs. Family History:  family history includes Cancer in his father and mother; Depression in his sister; High Blood Pressure in his mother. Medications:   furosemide  40 mg IntraVENous Daily    atorvastatin  40 mg Oral Daily    sodium chloride flush  5-40 mL IntraVENous 2 times per day    warfarin placeholder: dosing by pharmacy   Other RX Placeholder         Allergies:  Patient has no known allergies. [x] Medications and dosages reviewed.     ROS:  [x]Full ROS obtained and negative except as mentioned in HPI      Physical Examination:    Vitals:    12/17/22 0823   BP: 116/78   Pulse: 84   Resp: 18   Temp: 97.3 °F (36.3 °C)   SpO2: 95%        GENERAL: middle age male with expressive aphasia in NAD  NEUROLOGICAL: Alert and oriented  PSYCH: Calm affect  SKIN: Warm and dry, No visible rash,   EYES: Pupils equal and round, Sclera non-icteric,   HENT:  External ears and nose unremarkable, mucus membranes moist  MUSCULOSKELETAL: Normal cephalic, neck supple  CAROTID: Normal upstroke, no bruits  CARDIAC: JVP normal, Normal PMI, regular rate and irregular rhythm, normal S1S2, no murmur, rub, or gallop  RESPIRATORY: Normal respiratory effort, clear to auscultation bilaterally  EXTREMITIES: 2+ edema  GASTROINTESTINAL: normal bowel sounds, soft, non-tender, No hepatomegaly     All testing and labs listed below were personally reviewed. CXR  There is a small to moderate right pleural effusion and there is right basal   consolidation. There is cardiomegaly. There is no vascular congestion or   septal thickening. Impression:  1. Small to moderate right pleural effusion and right basilar airspace   consolidation that could reflect atelectasis or pneumonia. 2. Cardiomegaly without evidence of CHF. LABS     Latest Reference Range & Units 12/16/22 16:02 12/16/22 20:14   Troponin <0.01 ng/mL 0.05 (H) 0.04 (H)   (H): Data is abnormally high    WBC6.8K/uL RBC3.46 Low M/uL Kzcxubqaff32.4 Low g/dL Gwyajcjdja44.0 Low % MCV98. 2fL MCH32.9pg MCHC33.5g/dL RDW15.1% Bmjtqufgj317B/uL     Pro-BNP20,155 High pg/mL     Calcium9.7mg/dL Total Protein6.6g/dL Albumin3.7g/dL Albumin/Globulin Ratio1. 3 Total Bilirubin1.2 High mg/dL Alkaline Rqgciavqwqc79R/L ALT26U/L AST31U/L     WBC6.9K/uL RBC3.42 Low M/uL Nidswlqmhe64.0 Low g/dL Ucbfnhxndu99.4 Low % MCV97. 8fL MCH32.1pg MCHC32.9g/dL RDW15.1% Lbcdaditk279G/uL     INR4.25 High      Doeqqr326 High mmol/L Potassium3.9mmol/L Lagmkmhn102 High mmol/L  Low mmol/L Anion Gap15 Tfnqnol597 High mg/dL BUN38 High mg/dL Creatinine2.2 High mg/dL     TELE:-personally reviewed  Afib    EKG  Rapid afib, NSST changes    ASSESSMENT:    Elevated troponin  Mild.  C/w CHF and renal insufficiency, not ACS  No ischemic evaluation needed. CHF:  Acute on chronic   EF unknown   Agree with diuresis  Will need ECHO    Afib:  New diagnosis  Duration unclear  On coumadin at home  Continue coumadin  Start lopressor for rate control and wean cardizem. Renal insufficiency:  Acute  Likely cardiorenal  Nephrology following  Follow with diuresis    Prior CVA:  Stable  Expressive aphasia    H/o DVT/CVA  On chronic anticoagulation with coumadin      Plan:  Agree with plan  Continue coumadin  Rate control for afib for now.   Diuresis  ECHO    Thank you for allowing me to participate in the care of this individual.      Dioni Mann M.D., [facility]

## 2022-12-17 NOTE — CONSULTS
Clinical Pharmacy Note: Pharmacy to Dose Warfarin    Pharmacy consulted by Dr. Alysha De La O to dose warfarin. Verena Pickett is a 74 y.o. male  is receiving warfarin for indication: afib. INR Goal Range: 2.0-3.0  Prior to admission warfarin dosing regimen: 5 mg every Mon, Wed, Fri; 2.5 mg all other days    INR today:   Lab Results   Component Value Date/Time    INR 4.36 12/16/2022 04:02 PM       Assessment/Plan:  INR is supratherapeutic on prior to admission dosing regimen. Based on today's assessment, hold warfarin today  Daily INR is ordered. Pharmacy will continue to monitor and make adjustments to regimen as necessary. Thank you for the consult,     Doug EllisD.   PGY-1 Resident  [id number]  12/16/22 8:56 PM

## 2022-12-17 NOTE — H&P
HOSPITALISTS HISTORY AND PHYSICAL    12/16/2022 7:27 PM    Patient Information:  Neel Morrison is a 74 y.o. male [id number]  PCP:  KRISTYN Humphrey CNP (Tel: [phone] )    Chief complaint:    Chief Complaint   Patient presents with    Shortness of Breath     Pt states that he is having shortness of breath, sent by cardiology. Pt states Sob with walking. History of Present Illness:  Veronika Morgan is a 74 y.o. male with h/o CVA, CHF, Afib , chronic anticoagulation with Coumadin presented with dyspnea, leg edema, palpitations, and weight gain. Per Wife at bedside pt has been very fatigued and wanting to sleep most of the time. Reports weight gain of over 20 pounds. Reports compliance with medications. Pt is independent with ADLs at baselineHe was seen at the cardiology office today for the same and was  sent to the ED for further management. He is found to be in AFib with Rapid rate. HR ranging . Chest xray showed   Small to moderate right pleural effusion and right basilar airspace   consolidation that could reflect atelectasis or pneumonia. 2. Cardiomegaly without evidence of CHF     Labs showed elevated pro bnp 20K   Troponin is elevated 0.05  Cr is high 2.4    REVIEW OF SYSTEMS:   Constitutional: Negative for fever,chills or night sweats  ENT: Negative for rhinorrhea, epistaxis, hoarseness, sore throat. Respiratory: Negative for shortness of breath,wheezing  Cardiovascular: Negative for chest pain, palpitations   Gastrointestinal: Negative for nausea, vomiting, diarrhea  Genitourinary: Negative for polyuria, dysuria   Hematologic/Lymphatic: Negative for bleeding tendency, easy bruising  Musculoskeletal: Negative for myalgias and arthralgias  Neurologic: Negative for confusion,dysarthria.   Skin: Negative for itching,rash  Psychiatric: Negative for depression,anxiety, agitation. Endocrine: Negative for polydipsia,polyuria,heat /cold intolerance. Past Medical History:   has a past medical history of Cerebrovascular disease, CHF (congestive heart failure) ([facility] [address] DM (diabetes mellitus screen), Hyperlipidemia, Hypertension, and Type 2 diabetes mellitus without complication ([facility] [address] Past Surgical History:   has a past surgical history that includes Vasectomy. Medications:  No current facility-administered medications on file prior to encounter.      Current Outpatient Medications on File Prior to Encounter   Medication Sig Dispense Refill    atorvastatin (LIPITOR) 40 MG tablet TAKE ONE TABLET BY MOUTH EVERY NIGHT 90 tablet 1    Blood Pressure KIT 1 kit by Does not apply route daily 1 kit 0    metFORMIN (GLUCOPHAGE) 500 MG tablet TAKE 1 TABLET BY MOUTH 2 TIMES A DAY WITH MEALS 180 tablet 1    lisinopril (PRINIVIL;ZESTRIL) 40 MG tablet TAKE 1 TABLET BY MOUTH ONE TIME A DAY 90 tablet 1    amLODIPine (NORVASC) 5 MG tablet TAKE 1 TABLET BY MOUTH ONE TIME A DAY 90 tablet 1    blood glucose monitor strips 1 strip by Other route daily True Metric Test strips 100 strip 3    Lancets MISC True Metric Lancets 100 each 3    warfarin (COUMADIN) 5 MG tablet TAKE 1 AND 1/2 TABLETS BY MOUTH DAILY (Patient taking differently: TAKE 1/2 TABLETS BY MOUTH DAILY) 135 tablet 5    Blood Glucose Monitoring Suppl AGUSTO 1 kit by Does not apply route daily True Metric testing device 1 Device 0    Methylsulfonylmethane (MSM PO) Take by mouth      Prenatal MV-Min-Fe Fum-FA-DHA (PRENATAL 1 PO) Take by mouth       Current Facility-Administered Medications   Medication Dose Route Frequency Provider Last Rate Last Admin    dilTIAZem (CARDIZEM) 125 mg in dextrose 5% 125 mL infusion (premix)  2.5-15 mg/hr IntraVENous Continuous Palak Becker MD 7.5 mL/hr at 12/16/22 1856 7.5 mg/hr at 12/16/22 1856     Current Outpatient Medications   Medication Sig Dispense Refill    atorvastatin (LIPITOR) 40 MG tablet TAKE ONE TABLET BY MOUTH EVERY NIGHT 90 tablet 1    Blood Pressure KIT 1 kit by Does not apply route daily 1 kit 0    metFORMIN (GLUCOPHAGE) 500 MG tablet TAKE 1 TABLET BY MOUTH 2 TIMES A DAY WITH MEALS 180 tablet 1    lisinopril (PRINIVIL;ZESTRIL) 40 MG tablet TAKE 1 TABLET BY MOUTH ONE TIME A DAY 90 tablet 1    amLODIPine (NORVASC) 5 MG tablet TAKE 1 TABLET BY MOUTH ONE TIME A DAY 90 tablet 1    blood glucose monitor strips 1 strip by Other route daily True Metric Test strips 100 strip 3    Lancets MISC True Metric Lancets 100 each 3    warfarin (COUMADIN) 5 MG tablet TAKE 1 AND 1/2 TABLETS BY MOUTH DAILY (Patient taking differently: TAKE 1/2 TABLETS BY MOUTH DAILY) 135 tablet 5    Blood Glucose Monitoring Suppl AGUSTO 1 kit by Does not apply route daily True Metric testing device 1 Device 0    Methylsulfonylmethane (MSM PO) Take by mouth      Prenatal MV-Min-Fe Fum-FA-DHA (PRENATAL 1 PO) Take by mouth        Allergies:  No Known Allergies     Social History:  Patient Lives    reports that he quit smoking about 5 years ago. His smoking use included cigarettes. He has a 80.00 pack-year smoking history. He quit smokeless tobacco use about 5 years ago. He reports that he does not drink alcohol and does not use drugs. Family History:  family history includes Cancer in his father and mother; Depression in his sister; High Blood Pressure in his mother. ,     Physical Exam:  /76   Pulse (!) 133   Temp 97.8 °F (36.6 °C) (Oral)   Resp 26   SpO2 96%     General appearance:  Appears comfortable. Well nourished  Eyes: Sclera clear, pupils equal  ENT: Moist mucus membranes, no thrush. Trachea midline. Cardiovascular: Regular rhythm, normal S1, S2.  No murmur, gallop, rub. +Ve edema in lower extremities  Respiratory: Clear to auscultation bilaterally, no wheeze, good inspiratory effort  Gastrointestinal: Abdomen soft, non-tender, not distended, normal bowel sounds  Musculoskeletal: No cyanosis in two midnights for investigation and treatment of the above medically necessary diagnoses. Please note that some part of this chart was generated using Dragon dictation software. Although every effort was made to ensure the accuracy of this automated transcription, some errors in transcription may have occurred inadvertently. If you may need any clarification, please do not hesitate to contact me through [facility].        Livia Treviño MD    12/16/2022 7:27 PM

## 2022-12-18 PROBLEM — R77.8 ELEVATED TROPONIN: Status: ACTIVE | Noted: 2022-12-18

## 2022-12-18 PROBLEM — Z86.718 HISTORY OF DVT IN ADULTHOOD: Status: ACTIVE | Noted: 2022-12-18

## 2022-12-18 PROBLEM — R79.89 ELEVATED TROPONIN: Status: ACTIVE | Noted: 2022-12-18

## 2022-12-18 LAB
ANION GAP SERPL CALCULATED.3IONS-SCNC: 10 MMOL/L (ref 3–16)
BUN BLDV-MCNC: 36 MG/DL (ref 7–20)
CALCIUM SERPL-MCNC: 9.6 MG/DL (ref 8.3–10.6)
CHLORIDE BLD-SCNC: 107 MMOL/L (ref 99–110)
CO2: 27 MMOL/L (ref 21–32)
CREAT SERPL-MCNC: 2.1 MG/DL (ref 0.8–1.3)
GFR SERPL CREATININE-BSD FRML MDRD: 34 ML/MIN/{1.73_M2}
GLUCOSE BLD-MCNC: 109 MG/DL (ref 70–99)
HCT VFR BLD CALC: 35.5 % (ref 40.5–52.5)
HEMOGLOBIN: 11.6 G/DL (ref 13.5–17.5)
INR BLD: 3.67 (ref 0.87–1.14)
MCH RBC QN AUTO: 31.8 PG (ref 26–34)
MCHC RBC AUTO-ENTMCNC: 32.6 G/DL (ref 31–36)
MCV RBC AUTO: 97.5 FL (ref 80–100)
PDW BLD-RTO: 14.9 % (ref 12.4–15.4)
PLATELET # BLD: 180 K/UL (ref 135–450)
PMV BLD AUTO: 9 FL (ref 5–10.5)
POTASSIUM SERPL-SCNC: 3.8 MMOL/L (ref 3.5–5.1)
PROTHROMBIN TIME: 36.7 SEC (ref 11.7–14.5)
RBC # BLD: 3.64 M/UL (ref 4.2–5.9)
SODIUM BLD-SCNC: 144 MMOL/L (ref 136–145)
TSH REFLEX: 1.2 UIU/ML (ref 0.27–4.2)
WBC # BLD: 6.9 K/UL (ref 4–11)

## 2022-12-18 PROCEDURE — 2580000003 HC RX 258: Performed by: FAMILY MEDICINE

## 2022-12-18 PROCEDURE — 1200000000 HC SEMI PRIVATE

## 2022-12-18 PROCEDURE — 36415 COLL VENOUS BLD VENIPUNCTURE: CPT

## 2022-12-18 PROCEDURE — 6370000000 HC RX 637 (ALT 250 FOR IP): Performed by: FAMILY MEDICINE

## 2022-12-18 PROCEDURE — 84443 ASSAY THYROID STIM HORMONE: CPT

## 2022-12-18 PROCEDURE — 99233 SBSQ HOSP IP/OBS HIGH 50: CPT | Performed by: NURSE PRACTITIONER

## 2022-12-18 PROCEDURE — 80048 BASIC METABOLIC PNL TOTAL CA: CPT

## 2022-12-18 PROCEDURE — 85027 COMPLETE CBC AUTOMATED: CPT

## 2022-12-18 PROCEDURE — 85610 PROTHROMBIN TIME: CPT

## 2022-12-18 PROCEDURE — 6370000000 HC RX 637 (ALT 250 FOR IP): Performed by: INTERNAL MEDICINE

## 2022-12-18 PROCEDURE — 6360000002 HC RX W HCPCS: Performed by: FAMILY MEDICINE

## 2022-12-18 RX ADMIN — METOPROLOL TARTRATE 25 MG: 25 TABLET, FILM COATED ORAL at 23:54

## 2022-12-18 RX ADMIN — ATORVASTATIN CALCIUM 40 MG: 40 TABLET, FILM COATED ORAL at 20:24

## 2022-12-18 RX ADMIN — Medication 10 ML: at 20:25

## 2022-12-18 RX ADMIN — METOPROLOL TARTRATE 25 MG: 25 TABLET, FILM COATED ORAL at 14:22

## 2022-12-18 RX ADMIN — FUROSEMIDE 40 MG: 10 INJECTION, SOLUTION INTRAMUSCULAR; INTRAVENOUS at 09:31

## 2022-12-18 RX ADMIN — METOPROLOL TARTRATE 25 MG: 25 TABLET, FILM COATED ORAL at 06:21

## 2022-12-18 RX ADMIN — Medication 10 ML: at 09:30

## 2022-12-18 ASSESSMENT — PAIN SCALES - GENERAL
PAINLEVEL_OUTOF10: 0

## 2022-12-18 NOTE — PROGRESS NOTES
Pt assisted to bathroom with stand by assist and then up to chair. Chair alarm in place. Call bell in reach.

## 2022-12-18 NOTE — PROGRESS NOTES
[address]   Cardiology Progress Note     Date: 12/18/2022  Admit Date: 12/16/2022     Reason for consultation: CHF, AF    Chief Complaint:   Chief Complaint   Patient presents with    Shortness of Breath     Pt states that he is having shortness of breath, sent by cardiology. Pt states Sob with walking. History of Present Illness: History obtained from patient and medical record. Lisa Morris is a 74 y.o. 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 Hx of IVC filter. Pt presented to hospital with worsening leg swelling and SOB. He was found to have new onset AF. Interval Hx: Today, he is being seen for follow up. He is being seen for follow up. He remains in atrial fibrillation with stable BP. We discussed doing cardioversion tomorrow. Patient seen and examined. Clinical notes reviewed. Telemetry reviewed. No new complaints today. No major events overnight. Denies having chest pain, palpitations, shortness of breath, orthopnea/PND, cough, or dizziness at the time of this visit. Allergies:  No Known Allergies    Home Meds:  Prior to Visit Medications    Medication Sig Taking?  Authorizing Provider   atorvastatin (LIPITOR) 40 MG tablet TAKE ONE TABLET BY MOUTH EVERY NIGHT  Ashely Awad, APRN - NP   Blood Pressure KIT 1 kit by Does not apply route daily  KRISTYN Liang CNP   metFORMIN (GLUCOPHAGE) 500 MG tablet TAKE 1 TABLET BY MOUTH 2 TIMES A DAY WITH MEALS  Martin Quinn MD   lisinopril (PRINIVIL;ZESTRIL) 40 MG tablet TAKE 1 TABLET BY MOUTH ONE TIME A DAY  Martin Quinn MD   amLODIPine (NORVASC) 5 MG tablet TAKE 1 TABLET BY MOUTH ONE TIME A DAY  Martin Quinn MD   blood glucose monitor strips 1 strip by Other route daily True Metric Test strips  KRISTYN Liang CNP   Lancets MISC True Metric Lancets  KRISTYN Liang - CNP   warfarin (COUMADIN) 5 MG tablet TAKE 1 AND 1/2 TABLETS BY MOUTH DAILY  Patient taking differently: TAKE 1/2 TABLETS BY MOUTH DAILY  NALINI Yadav   Blood Glucose Monitoring Suppl AGUSTO 1 kit by Does not apply route daily True Metric testing device  Clarita Garzon MD   Methylsulfonylmethane (MSM PO) Take by mouth  Historical Provider, MD   Prenatal MV-Min-Fe Fum-FA-DHA (PRENATAL 1 PO) Take by mouth  Historical Provider, MD      Scheduled Meds:   metoprolol tartrate  25 mg Oral 4 times per day    furosemide  40 mg IntraVENous Daily    atorvastatin  40 mg Oral Daily    sodium chloride flush  5-40 mL IntraVENous 2 times per day    warfarin placeholder: dosing by pharmacy   Other RX Placeholder     Continuous Infusions:   dilTIAZem Stopped (12/17/22 1729)    sodium chloride       PRN Meds:sodium chloride flush, sodium chloride, ondansetron **OR** ondansetron, polyethylene glycol, acetaminophen **OR** acetaminophen     Past Medical History:  Past Medical History:   Diagnosis Date    Cerebrovascular disease     CHF (congestive heart failure) ([facility] 04/2017    per  nurse report but wife is unaware    DM (diabetes mellitus screen) 04/2017    boarder line    Hyperlipidemia     Hypertension     Type 2 diabetes mellitus without complication ([facility] [address]       Past Surgical History:    has a past surgical history that includes Vasectomy. Social History:  Reviewed. reports that he quit smoking about 5 years ago. His smoking use included cigarettes. He has a 80.00 pack-year smoking history. He quit smokeless tobacco use about 5 years ago. He reports that he does not drink alcohol and does not use drugs. Family History:  Reviewed. family history includes Cancer in his father and mother; Depression in his sister; High Blood Pressure in his mother.      Review of Systems:  Limited by aphasia    Physical Examination:  Vitals:    12/18/22 0927   BP: 101/60   Pulse: 97   Resp: 18   Temp: 97.6 °F (36.4 °C)   SpO2: 95%      In: 1480 [P.O.:1480]  Out: 5825    Wt Readings from Last 3 Encounters:   12/17/22 267 lb 4.8 oz (121.2 kg)   12/16/22 288 lb (130.6 kg)   09/02/22 263 lb 6.4 oz (119.5 kg)       Intake/Output Summary (Last 24 hours) at 12/18/2022 1320  Last data filed at 12/18/2022 0479  Gross per 24 hour   Intake 1000 ml   Output 4950 ml   Net -3950 ml       Telemetry: Personally Reviewed  - Atrial fibrillation  Constitutional: Cooperative and in no apparent distress, and appears well nourished  Skin: Warm and pink; no pallor, cyanosis, bruising, or clubbing  HEENT: Symmetric and normocephalic. PERRL, EOM intact. Conjunctiva pink with clear sclera. Mucus membranes pink and moist. Teeth intact. Thyroid smooth without nodules or goiter. Cardiovascular: Regular rate and irregular rhythm. S1/S2 present without murmurs, rubs, or gallops. Peripheral pulses 2+, capillary refill < 3 seconds. 1+ BLE edema  Respiratory: Respirations symmetric and unlabored. Lungs clear to auscultation bilaterally, no wheezing, crackles, or rhonchi  Gastrointestinal: Abdomen soft and round. Bowel sounds normoactive in all quadrants without tenderness or masses. Musculoskeletal: Bilateral upper and lower extremity strength 5/5 with full ROM  Neurologic/Psych: Awake and orientated to person, place and time. Calm affect, appropriate mood. + Aphasia    Pertinent labs, diagnostic, device, and imaging results reviewed as a part of this visit    Labs:    BMP:   Recent Labs     12/16/22  1602 12/17/22  0554 12/18/22  0701    147* 144   K 4.6 3.9 3.8   * 112* 107   CO2 20* 20* 27   BUN 39* 38* 36*   CREATININE 2.4* 2.2* 2.1*     Estimated Creatinine Clearance: 47 mL/min (A) (based on SCr of 2.1 mg/dL (H)).    CBC:   Recent Labs     12/16/22  1602 12/17/22  0554 12/18/22  0701   WBC 6.8 6.9 6.9   HGB 11.4* 11.0* 11.6*   HCT 34.0* 33.4* 35.5*   MCV 98.2 97.8 97.5    161 180     Thyroid: No results found for: TSH, G1PEGGW, U7XAFDZ, THYROIDAB  Lipids:   Lab Results   Component Value Date/Time    CHOL 103 03/10/2022 08:55 AM    HDL 48 03/10/2022 08:55 AM    TRIG 61 03/10/2022 08:55 AM     LFTS:   Lab Results   Component Value Date/Time    ALT 26 2022 04:02 PM    AST 31 2022 04:02 PM    ALKPHOS 80 2022 04:02 PM    PROT 6.6 2022 04:02 PM    AGRATIO 1.3 2022 04:02 PM    BILITOT 1.2 2022 04:02 PM     Cardiac Enzymes:   Lab Results   Component Value Date/Time    TROPONINI 0.04 2022 08:14 PM    TROPONINI 0.05 2022 04:02 PM    TROPONINI 0.06 2017 07:22 AM     Coags:   Lab Results   Component Value Date/Time    PROTIME 36.7 2022 07:01 AM    INR 3.67 2022 07:01 AM       EC22 (Personally Interpreted)   - Atrial fibrillation with RVR    ECHO: 2017  - Left ventricle: Systolic function was probably normal. Images were inadequate for LV wall motion assessment.   - Right ventricle: The cavity size was dilated. Wall thickness was increased. Systolic function was moderately reduced by objective     interpretation. TAPSE: 1.3cm. Tricuspid annular systolic velocity: 6.8UH/W.   - Pulmonary arteries: Systolic pressure was moderately increased, = 50mm Hg assuming that the right atrial pressure was 15 mmHg. Stress Test: None    CXR: 22  1. Small to moderate right pleural effusion and right basilar airspace   consolidation that could reflect atelectasis or pneumonia. 2. Cardiomegaly without evidence of CHF.      Problem List:   Patient Active Problem List    Diagnosis Date Noted    Acute renal failure ([address] 2022    Acute congestive heart failure ([address] 2022    Paroxysmal atrial fibrillation ([address] 2022    Atrial fibrillation with RVR ([address] 2022    Bilateral carotid artery stenosis 2020    History of CVA (cerebrovascular accident) 01/10/2020    Controlled type 2 diabetes mellitus with diabetic polyneuropathy, without long-term current use of insulin ([address] 2018    Essential hypertension 10/20/2017 Right homonymous hemianopsia 07/25/2017    Hemiparesis affecting right side as late effect of cerebrovascular accident ([facility] 07/25/2017    Mixed hyperlipidemia 06/21/2017    Long term current use of anticoagulant therapy 06/21/2017    Peripheral vascular disease ([facility] 06/06/2017    CVA (cerebral vascular accident) ([facility] 05/03/2017    Pulmonary embolism ([facility] 05/03/2017    DVT (deep venous thrombosis) ([facility] 05/03/2017        Assessment and Plan:     1. Atrial Fibrillation  - New onset of unknown duration    - Currently in AF  - Continue metoprolol 25 mg QID  - [id number] score:high ; [id number] Vasc score and anticoagulation discussed. High risk for stroke and thromboembolism. Anticoagulation is recommended. Risk of bleeding was discussed.  ~ On Coumadin (Was on previously for hx of DVT/PE)    - Afib risk factors including age, HTN, obesity, inactivity and YAHAIRA were discussed with patient. Risk factor modification recommended   ~ TSH: will check       - Treatment options including cardioversion, rate control strategy, antiarrhythmics, anticoagulation and possible ablation were discussed with patient. Risks, benefits and alternative of each treatment options were explained. All questions answered    ~ Pt has been anti-coagulated on coumadin for DVT/PE. Will keep NPO p midnight for possible DCCV tomorrow, although, he could probably use a couple more days of diuresis prior to attempting cardioversion. Will need to call and discuss with pt's family as difficult to obtain consent given aphasia    2. Acute heart failure (NYHA Class III)  - Appears decompensated  - Continue with BB  - On IV lasix  ~ ACE-I/ARB contraindicated due to renal insufficiency and risk of hyperkalemia  - Monitor I&O's, Daily weights    - Echo ordered. To be completed tomorrow    3. Elevated Troponin   - Mild   - No need for ischemic eval at this time    4. SANDY on CKD   - Remains elevated   - Monitor closely with diuresis    5.  Hx of DVT   - On coumadin    6. Hx of CVA   - Aphasia at baseline  - On coumadin, statin    Multiple medical conditions with risk of decompensation. All pertinent information and plan of care discussed with the rounding physician. All questions and concerns were addressed to the patient. Alternatives to my treatment were discussed. I have discussed the above stated plan with patient and the nurse. The patient verbalized understanding and agreed with the plan. Thank you for allowing to us to participate in the care of Rufus Seen    The patient was seen for >35 minutes. I reviewed interval history, physical exam, review of data including labs, imaging, development and implementation of treatment plan and coordination of complex care.  More than 50% of the time was devoted to counseling the patient on their diagnoses/treatments, as well as coordination of care with the other care teams    KRISTYN Wyatt-CNP  [facility]   Office: [phone]

## 2022-12-18 NOTE — PROGRESS NOTES
Office : [phone]     Fax :[phone]       Nephrology  progress Note      Patient's Name: Merlyn Stewart  8:37 AM  12/18/2022    Reason for Consult: SANDY      Requesting Physician:  KIRSTYN Altman CNP      Chief Complaint:    Chief Complaint   Patient presents with    Shortness of Breath     Pt states that he is having shortness of breath, sent by cardiology. Pt states Sob with walking. History of Present iIlness:    Merlyn Stewart is a 74 y.o. male with prior history of DM 2, CHF, a. Fib , edema LE who presented at the PCP office yesterday with c/o SOB and leg swelling . Also, c/o palpitations . Was sent to ER . He was in A.fib with RVR. Also, initial lab work shows elevated creatinine of 2.4. baseline is 1.0. On 02 per NC     Interval hx     Off oxygen now   Edema decreasing   No chest pain       I/O last 3 completed shifts: In: 46 [P.O.:1680]  Out: 0 [Urine:9950]    Past Medical History:   Diagnosis Date    Cerebrovascular disease     CHF (congestive heart failure) ([facility] [address] 04/2017    per  nurse report but wife is unaware    DM (diabetes mellitus screen) 04/2017    boarder line    Hyperlipidemia     Hypertension     Type 2 diabetes mellitus without complication [facility])        Past Surgical History:   Procedure Laterality Date    VASECTOMY         Family History   Problem Relation Age of Onset    High Blood Pressure Mother     Cancer Mother         lung, smoker    Cancer Father         mesothelioma    Depression Sister         reports that he quit smoking about 5 years ago. His smoking use included cigarettes. He has a 80.00 pack-year smoking history. He quit smokeless tobacco use about 5 years ago.  He reports that he does not drink alcohol and does not use drugs. Allergies:  Patient has no known allergies.     Current Medications:    metoprolol tartrate (LOPRESSOR) tablet 25 mg, 4 times per day  dilTIAZem (CARDIZEM) 125 mg in dextrose 5% 125 mL infusion (premix), Continuous  furosemide (LASIX) injection 40 mg, Daily  atorvastatin (LIPITOR) tablet 40 mg, Daily  sodium chloride flush 0.9 % injection 5-40 mL, 2 times per day  sodium chloride flush 0.9 % injection 5-40 mL, PRN  0.9 % sodium chloride infusion, PRN  ondansetron (ZOFRAN-ODT) disintegrating tablet 4 mg, Q8H PRN   Or  ondansetron (ZOFRAN) injection 4 mg, Q6H PRN  polyethylene glycol (GLYCOLAX) packet 17 g, Daily PRN  acetaminophen (TYLENOL) tablet 650 mg, Q6H PRN   Or  acetaminophen (TYLENOL) suppository 650 mg, Q6H PRN  warfarin placeholder: dosing by pharmacy, RX Placeholder      Review of Systems:   14 point ROS obtained but were negative except mentioned in HPI      Physical exam:     Vitals:  /78   Pulse 96   Temp 97.6 °F (36.4 °C) (Oral)   Resp 16   Ht 6' (1.829 m)   Wt 267 lb 4.8 oz (121.2 kg)   SpO2 93%   BMI 36.25 kg/m²   Constitutional:  OAA X3 NAD  Skin: no rash, turgor wnl  Heent:  eomi, mmm  Neck: no bruits or jvd noted  Cardiovascular:  S1, S2 without m/r/g  Respiratory: CTA B without w/r/r  Abdomen:  +bs, soft, nt, nd  Ext: 1 +  lower extremity edema  Psychiatric: mood and affect appropriate  Musculoskeletal:  Rom, muscular strength intact    Labs:  CBC:   Recent Labs     12/16/22  1602 12/17/22  0554 12/18/22  0701   WBC 6.8 6.9 6.9   HGB 11.4* 11.0* 11.6*    161 180     BMP:    Recent Labs     12/16/22  1602 12/17/22  0554 12/18/22  0701    147* 144   K 4.6 3.9 3.8   * 112* 107   CO2 20* 20* 27   BUN 39* 38* 36*   CREATININE 2.4* 2.2* 2.1*   GLUCOSE 112* 113* 109*     Ca/Mg/Phos:   Recent Labs     12/16/22  1602 12/17/22  0554 12/18/22  0701   CALCIUM 9.7 9.0 9.6     Hepatic:   Recent Labs     12/16/22  1602   AST 31   ALT 26   BILITOT 1.2* ALKPHOS 80     Troponin:   Recent Labs     12/16/22  1602 12/16/22 2014   TROPONINI 0.05* 0.04*     BNP: No results for input(s): BNP in the last 72 hours. Lipids: No results for input(s): CHOL, TRIG, HDL, LDLCALC, LABVLDL in the last 72 hours. ABGs: No results for input(s): PHART, PO2ART, WPX7AWR in the last 72 hours. INR:   Recent Labs     12/16/22  1602 12/17/22  0554 12/18/22  0701   INR 4.36* 4.25* 3.67*     UA:No results for input(s): Vicenta Redbird Smith, GLUCOSEU, BILIRUBINUR, Isabel Maximino, SPECGRAV, BLOODU, PHUR, PROTEINU, UROBILINOGEN, NITRU, LEUKOCYTESUR, Rohan Hires in the last 72 hours. Urine Microscopic: No results for input(s): LABCAST, BACTERIA, COMU, HYALCAST, WBCUA, RBCUA, EPIU in the last 72 hours. Urine Culture: No results for input(s): LABURIN in the last 72 hours. Urine Chemistry: No results for input(s): Nicci Area, PROTEINUR, NAUR in the last 72 hours. IMAGING:  XR CHEST (2 VW)   Final Result   1. Small to moderate right pleural effusion and right basilar airspace   consolidation that could reflect atelectasis or pneumonia. 2. Cardiomegaly without evidence of CHF. Prior to Admission medications    Medication Sig Start Date End Date Taking?  Authorizing Provider   atorvastatin (LIPITOR) 40 MG tablet TAKE ONE TABLET BY MOUTH EVERY NIGHT 10/4/22   Annie Awad, APRN - NP   Blood Pressure KIT 1 kit by Does not apply route daily 9/2/22   [facility], APRN - CNP   metFORMIN (GLUCOPHAGE) 500 MG tablet TAKE 1 TABLET BY MOUTH 2 TIMES A DAY WITH MEALS 8/30/22   Martin Quinn MD   lisinopril (PRINIVIL;ZESTRIL) 40 MG tablet TAKE 1 TABLET BY MOUTH ONE TIME A DAY 8/30/22   Martin Quinn MD   amLODIPine (NORVASC) 5 MG tablet TAKE 1 TABLET BY MOUTH ONE TIME A DAY 8/30/22   Martin Quinn MD   blood glucose monitor strips 1 strip by Other route daily True Metric Test strips 5/27/22   Ana Police, APRN - CNP   Lancets MISC True Metric Lancets 5/27/22   KRISTYN Anderson - CNP   warfarin (COUMADIN) 5 MG tablet TAKE 1 AND 1/2 TABLETS BY MOUTH DAILY  Patient taking differently: TAKE 1/2 TABLETS BY MOUTH DAILY 9/8/21   NALINI Isabel   Blood Glucose Monitoring Suppl AGUSTO 1 kit by Does not apply route daily True Metric testing device 12/21/17   Dali Eckert MD   Methylsulfonylmethane (MSM PO) Take by mouth    Historical Provider, MD   Prenatal MV-Min-Fe Fum-FA-DHA (PRENATAL 1 PO) Take by mouth    Historical Provider, MD                       Assessment/Plan :      1. SANDY 2/2 hemodynamic chnages form atrial fib with RVR   On cardizem drip   Hold metformin   Hold lisinopril       Has cardiorenal process going on   Continue lasix iv - good response     Recommend to dose adjust all medications  based on renal functions  Maintain SBP> 90 mmHg   Daily weights   AVOID NSAIDs  Avoid Nephrotoxins  Monitor Intake/Output  Call if significant decrease in urine output        2. A. Fib with RVR   On cardizem. Consult EP  Get ECHO     3. Anemia. Hb11.6  Monitor     4. Acid- base disorder. Metabolic acidosis   Monitor     5.  Monitor electrolytes   Sodium level better             D/w primary team      Thank you for allowing us to participate in care of [facility]         Electronically signed by: Eleno Hodgkins, MD, 12/18/2022, 8:37 AM      [facility] [address]  Office : [phone]  Fax :[phone]

## 2022-12-18 NOTE — PROGRESS NOTES
[facility]    12/18/2022 7:43 AM        Name: Jevon Matthews . Admitted: 12/16/2022  Primary Care Provider: KRISTYN Zabala CNP (Tel: [phone])                        Subjective:  . No acute events overnight. Resting well. Pain control. Diet ok. Labs reviewed  Denies any chest pain sob. Reviewed interval ancillary notes    Current Medications  metoprolol tartrate (LOPRESSOR) tablet 25 mg, 4 times per day  dilTIAZem (CARDIZEM) 125 mg in dextrose 5% 125 mL infusion (premix), Continuous  furosemide (LASIX) injection 40 mg, Daily  atorvastatin (LIPITOR) tablet 40 mg, Daily  sodium chloride flush 0.9 % injection 5-40 mL, 2 times per day  sodium chloride flush 0.9 % injection 5-40 mL, PRN  0.9 % sodium chloride infusion, PRN  ondansetron (ZOFRAN-ODT) disintegrating tablet 4 mg, Q8H PRN   Or  ondansetron (ZOFRAN) injection 4 mg, Q6H PRN  polyethylene glycol (GLYCOLAX) packet 17 g, Daily PRN  acetaminophen (TYLENOL) tablet 650 mg, Q6H PRN   Or  acetaminophen (TYLENOL) suppository 650 mg, Q6H PRN  warfarin placeholder: dosing by pharmacy, RX Placeholder      Objective:  /78   Pulse 96   Temp 97.6 °F (36.4 °C) (Oral)   Resp 16   Ht 6' (1.829 m)   Wt 267 lb 4.8 oz (121.2 kg)   SpO2 93%   BMI 36.25 kg/m²     Intake/Output Summary (Last 24 hours) at 12/18/2022 0743  Last data filed at 12/18/2022 2271  Gross per 24 hour   Intake 1200 ml   Output 5825 ml   Net -4625 ml      Wt Readings from Last 3 Encounters:   12/17/22 267 lb 4.8 oz (121.2 kg)   12/16/22 288 lb (130.6 kg)   09/02/22 263 lb 6.4 oz (119.5 kg)       General appearance:  Appears comfortable  Eyes: Sclera clear. Pupils equal.  ENT: Moist oral mucosa. Trachea midline, no adenopathy. Cardiovascular: Regular rhythm, normal S1, S2. No murmur.  No edema in lower extremities  Respiratory: Not using accessory muscles. Good inspiratory effort. Clear to auscultation bilaterally, no wheeze or crackles. GI: Abdomen soft, no tenderness, not distended, normal bowel sounds  Musculoskeletal: No cyanosis in digits, neck supple  Neurology: CN 2-12 grossly intact. No speech or motor deficits  Psych: Normal affect. Alert and oriented in time, place and person  Skin: Warm, dry, normal turgor    Labs and Tests:  CBC:   Recent Labs     12/16/22  1602 12/17/22  0554 12/18/22  0701   WBC 6.8 6.9 6.9   HGB 11.4* 11.0* 11.6*    161 180     BMP:    Recent Labs     12/16/22  1602 12/17/22  0554    147*   K 4.6 3.9   * 112*   CO2 20* 20*   BUN 39* 38*   CREATININE 2.4* 2.2*   GLUCOSE 112* 113*     Hepatic:   Recent Labs     12/16/22  1602   AST 31   ALT 26   BILITOT 1.2*   ALKPHOS 80       Discussed care with patient             Problem List  Principal Problem:    Atrial fibrillation with RVR (HCC)  Active Problems:    Acute renal failure (HCC)    Acute congestive heart failure (HCC)    Paroxysmal atrial fibrillation (HCC)  Resolved Problems:    * No resolved hospital problems. *       Assessment & Plan:   A. fib with RVR  -Likely multifactorial with underlying CHF and also with renal failure  -Hr improviong  - continue current treatment  - apprecaite cardio revs     Acute on chronic CHF  -Patient receiving IV Lasix  - morning renal function panel peding  - weight is down       Acute renal failure  -Creatinine is at 2.2 with hypernatremia on presentation   -appreciate nephrology recs   Hold lisinopril      Diet: ADULT DIET; Regular;  Low Sodium (2 gm)  Code:Full Code  DVT PPX lovenox       Vanessa Nicholas MD   12/18/2022 7:43 AM

## 2022-12-19 LAB
ANION GAP SERPL CALCULATED.3IONS-SCNC: 8 MMOL/L (ref 3–16)
BUN BLDV-MCNC: 31 MG/DL (ref 7–20)
CALCIUM SERPL-MCNC: 9.6 MG/DL (ref 8.3–10.6)
CHLORIDE BLD-SCNC: 108 MMOL/L (ref 99–110)
CO2: 32 MMOL/L (ref 21–32)
CREAT SERPL-MCNC: 1.9 MG/DL (ref 0.8–1.3)
EKG ATRIAL RATE: 61 BPM
EKG DIAGNOSIS: NORMAL
EKG P AXIS: 69 DEGREES
EKG P-R INTERVAL: 170 MS
EKG Q-T INTERVAL: 442 MS
EKG QRS DURATION: 90 MS
EKG QTC CALCULATION (BAZETT): 444 MS
EKG R AXIS: 13 DEGREES
EKG T AXIS: 120 DEGREES
EKG VENTRICULAR RATE: 61 BPM
GFR SERPL CREATININE-BSD FRML MDRD: 39 ML/MIN/{1.73_M2}
GLUCOSE BLD-MCNC: 113 MG/DL (ref 70–99)
HCT VFR BLD CALC: 34.9 % (ref 40.5–52.5)
HEMOGLOBIN: 11.8 G/DL (ref 13.5–17.5)
INR BLD: 3.31 (ref 0.87–1.14)
MCH RBC QN AUTO: 32.8 PG (ref 26–34)
MCHC RBC AUTO-ENTMCNC: 33.7 G/DL (ref 31–36)
MCV RBC AUTO: 97.4 FL (ref 80–100)
PDW BLD-RTO: 15 % (ref 12.4–15.4)
PLATELET # BLD: 195 K/UL (ref 135–450)
PMV BLD AUTO: 9.3 FL (ref 5–10.5)
POTASSIUM SERPL-SCNC: 3.7 MMOL/L (ref 3.5–5.1)
PROTHROMBIN TIME: 33.9 SEC (ref 11.7–14.5)
RBC # BLD: 3.58 M/UL (ref 4.2–5.9)
SODIUM BLD-SCNC: 148 MMOL/L (ref 136–145)
WBC # BLD: 6.8 K/UL (ref 4–11)

## 2022-12-19 PROCEDURE — 6370000000 HC RX 637 (ALT 250 FOR IP): Performed by: FAMILY MEDICINE

## 2022-12-19 PROCEDURE — 2580000003 HC RX 258: Performed by: FAMILY MEDICINE

## 2022-12-19 PROCEDURE — 6370000000 HC RX 637 (ALT 250 FOR IP): Performed by: HOSPITALIST

## 2022-12-19 PROCEDURE — 93306 TTE W/DOPPLER COMPLETE: CPT

## 2022-12-19 PROCEDURE — 5A2204Z RESTORATION OF CARDIAC RHYTHM, SINGLE: ICD-10-PCS | Performed by: INTERNAL MEDICINE

## 2022-12-19 PROCEDURE — 92960 CARDIOVERSION ELECTRIC EXT: CPT | Performed by: INTERNAL MEDICINE

## 2022-12-19 PROCEDURE — 6370000000 HC RX 637 (ALT 250 FOR IP): Performed by: INTERNAL MEDICINE

## 2022-12-19 PROCEDURE — 92960 CARDIOVERSION ELECTRIC EXT: CPT

## 2022-12-19 PROCEDURE — 85610 PROTHROMBIN TIME: CPT

## 2022-12-19 PROCEDURE — 93005 ELECTROCARDIOGRAM TRACING: CPT | Performed by: HOSPITALIST

## 2022-12-19 PROCEDURE — 93005 ELECTROCARDIOGRAM TRACING: CPT | Performed by: INTERNAL MEDICINE

## 2022-12-19 PROCEDURE — 85027 COMPLETE CBC AUTOMATED: CPT

## 2022-12-19 PROCEDURE — 7100000010 HC PHASE II RECOVERY - FIRST 15 MIN

## 2022-12-19 PROCEDURE — 1200000000 HC SEMI PRIVATE

## 2022-12-19 PROCEDURE — 36415 COLL VENOUS BLD VENIPUNCTURE: CPT

## 2022-12-19 PROCEDURE — 80048 BASIC METABOLIC PNL TOTAL CA: CPT

## 2022-12-19 PROCEDURE — 93010 ELECTROCARDIOGRAM REPORT: CPT | Performed by: INTERNAL MEDICINE

## 2022-12-19 RX ORDER — FUROSEMIDE 40 MG/1
40 TABLET ORAL DAILY
Status: DISCONTINUED | OUTPATIENT
Start: 2022-12-19 | End: 2022-12-22 | Stop reason: ALTCHOICE

## 2022-12-19 RX ORDER — WARFARIN SODIUM 2.5 MG/1
2.5 TABLET ORAL
Status: COMPLETED | OUTPATIENT
Start: 2022-12-19 | End: 2022-12-19

## 2022-12-19 RX ADMIN — Medication 10 ML: at 20:26

## 2022-12-19 RX ADMIN — Medication 10 ML: at 09:00

## 2022-12-19 RX ADMIN — METOPROLOL TARTRATE 25 MG: 25 TABLET, FILM COATED ORAL at 13:35

## 2022-12-19 RX ADMIN — METOPROLOL TARTRATE 25 MG: 25 TABLET, FILM COATED ORAL at 06:24

## 2022-12-19 RX ADMIN — ATORVASTATIN CALCIUM 40 MG: 40 TABLET, FILM COATED ORAL at 20:26

## 2022-12-19 RX ADMIN — WARFARIN SODIUM 2.5 MG: 2.5 TABLET ORAL at 17:35

## 2022-12-19 RX ADMIN — METOPROLOL TARTRATE 25 MG: 25 TABLET, FILM COATED ORAL at 23:28

## 2022-12-19 ASSESSMENT — PAIN SCALES - GENERAL
PAINLEVEL_OUTOF10: 0

## 2022-12-19 NOTE — PROGRESS NOTES
Pharmacy to Dose Warfarin    Pharmacy consulted to dose warfarin for afib, hx DVT/PE. INR Goal: 2-3    INR today: 3.31    Assessment/Plan:  - Just above therapeutic goal. As it has been held the past 3 days, will resume warfarin today with 2.5mg anticipating INR will drop below goal.    Pharmacy will continue to follow.     Cintia Suárez, PharmD, BCCCP

## 2022-12-19 NOTE — PROGRESS NOTES
[address]   Cardiology Progress Note     Date: 12/19/2022  Admit Date: 12/16/2022     Reason for consultation: CHF, AF    Chief Complaint:   Chief Complaint   Patient presents with    Shortness of Breath     Pt states that he is having shortness of breath, sent by cardiology. Pt states Sob with walking. History of Present Illness: History obtained from patient and medical record. Armando Ventura is a 74 y.o. 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 Hx of IVC filter. Pt presented to hospital with worsening leg swelling and SOB. He was found to have new onset AF. Interval Hx: Today, he is being seen for follow up. Remains in AF. He can tel he is in AD with palpitations x several weeks. He has chronic expressive aphasia from prior CVA. Continues with some LE swelling, however he has been diuresed and now on PO lasix. No new complaints today. No major events overnight. Denies having chest pain, palpitations, shortness of breath,  or dizziness. Patient seen and examined. Clinical notes reviewed. Telemetry reviewed. Allergies:  No Known Allergies    Home Meds:  Prior to Visit Medications    Medication Sig Taking?  Authorizing Provider   atorvastatin (LIPITOR) 40 MG tablet TAKE ONE TABLET BY MOUTH EVERY NIGHT  KRISTYN Hernandez - NP   Blood Pressure KIT 1 kit by Does not apply route daily  KRISTYN Denson CNP   metFORMIN (GLUCOPHAGE) 500 MG tablet TAKE 1 TABLET BY MOUTH 2 TIMES A DAY WITH MEALS  Chayito Rose MD   lisinopril (PRINIVIL;ZESTRIL) 40 MG tablet TAKE 1 TABLET BY MOUTH ONE TIME A DAY  Chayito Rose MD   amLODIPine (NORVASC) 5 MG tablet TAKE 1 TABLET BY MOUTH ONE TIME A DAY  Chayito Rose MD   blood glucose monitor strips 1 strip by Other route daily True Metric Test strips  KRISTYN Denson - CNP   Lancets MISC True Metric Lancets  KRISTYN Denson - CNP   warfarin (COUMADIN) 5 MG tablet TAKE 1 AND 1/2 TABLETS BY MOUTH DAILY  Patient taking differently: TAKE 1/2 TABLETS BY MOUTH DAILY  NALINI Larry   Blood Glucose Monitoring Suppl AGUSTO 1 kit by Does not apply route daily True Metric testing device  Jethro Cummings MD   Methylsulfonylmethane (MSM PO) Take by mouth  Historical Provider, MD   Prenatal MV-Min-Fe Fum-FA-DHA (PRENATAL 1 PO) Take by mouth  Historical Provider, MD      Scheduled Meds:   furosemide  40 mg Oral Daily    warfarin  2.5 mg Oral Once    metoprolol tartrate  25 mg Oral 4 times per day    atorvastatin  40 mg Oral Daily    sodium chloride flush  5-40 mL IntraVENous 2 times per day    warfarin placeholder: dosing by pharmacy   Other RX Placeholder     Continuous Infusions:   dilTIAZem Stopped (12/17/22 1729)    sodium chloride       PRN Meds:sodium chloride flush, sodium chloride, ondansetron **OR** ondansetron, polyethylene glycol, acetaminophen **OR** acetaminophen     Past Medical History:  Past Medical History:   Diagnosis Date    Cerebrovascular disease     CHF (congestive heart failure) ([facility] [address] 04/2017    per  nurse report but wife is unaware    DM (diabetes mellitus screen) 04/2017    boarder line    Hyperlipidemia     Hypertension     Type 2 diabetes mellitus without complication ([facility] [address]       Past Surgical History:    has a past surgical history that includes Vasectomy. Social History:  Reviewed. reports that he quit smoking about 5 years ago. His smoking use included cigarettes. He has a 80.00 pack-year smoking history. He quit smokeless tobacco use about 5 years ago. He reports that he does not drink alcohol and does not use drugs. Family History:  Reviewed. family history includes Cancer in his father and mother; Depression in his sister; High Blood Pressure in his mother.      Review of Systems:  Limited by aphasia    Physical Examination:  Vitals:    12/19/22 0917   BP: (!) 98/55   Pulse: 70   Resp: 16   Temp: 97.5 °F (36.4 °C)   SpO2: 92%      In: 760 [P.O.:760]  Out: 5700    Wt Readings from Last 3 Encounters:   12/19/22 258 lb 1.6 oz (117.1 kg)   12/16/22 288 lb (130.6 kg)   09/02/22 263 lb 6.4 oz (119.5 kg)       Intake/Output Summary (Last 24 hours) at 12/19/2022 1041  Last data filed at 12/19/2022 8062  Gross per 24 hour   Intake 480 ml   Output 5700 ml   Net -5220 ml         Telemetry: Personally Reviewed  - Atrial fibrillation  Constitutional: Cooperative and in no apparent distress, and appears well nourished  Skin: Warm and pink; no pallor, cyanosis, bruising, or clubbing  HEENT: Symmetric and normocephalic. PERRL, EOM intact. Conjunctiva pink with clear sclera. Mucus membranes pink and moist. Teeth intact. Thyroid smooth without nodules or goiter. Cardiovascular: Irregular rate and rhythm. S1/S2 present without murmurs, rubs, or gallops. Peripheral pulses 2+, capillary refill < 3 seconds. 1+ BLE edema  Respiratory: Respirations symmetric and unlabored. Lungs clear to auscultation bilaterally, no wheezing, crackles, or rhonchi   Gastrointestinal: Abdomen soft and round. Bowel sounds normoactive in all quadrants without tenderness or masses. Musculoskeletal: Bilateral upper and lower extremity strength 5/5 with full ROM  Neurologic/Psych: Awake and orientated to person, place and time. Calm affect, appropriate mood. + Aphasia    Pertinent labs, diagnostic, device, and imaging results reviewed as a part of this visit    Labs:    BMP:   Recent Labs     12/17/22  0554 12/18/22  0701 12/19/22  0553   * 144 148*   K 3.9 3.8 3.7   * 107 108   CO2 20* 27 32   BUN 38* 36* 31*   CREATININE 2.2* 2.1* 1.9*       Estimated Creatinine Clearance: 51 mL/min (A) (based on SCr of 1.9 mg/dL (H)).    CBC:   Recent Labs     12/17/22  0554 12/18/22  0701 12/19/22  0553   WBC 6.9 6.9 6.8   HGB 11.0* 11.6* 11.8*   HCT 33.4* 35.5* 34.9*   MCV 97.8 97.5 97.4    180 195       Thyroid: No results found for: TSH, E7VOVBZ, V3IJRSD, THYROIDAB  Lipids:   Lab Results   Component Value Date/Time    CHOL 103 03/10/2022 08:55 AM    HDL 48 03/10/2022 08:55 AM    TRIG 61 03/10/2022 08:55 AM     LFTS:   Lab Results   Component Value Date/Time    ALT 26 2022 04:02 PM    AST 31 2022 04:02 PM    ALKPHOS 80 2022 04:02 PM    PROT 6.6 2022 04:02 PM    AGRATIO 1.3 2022 04:02 PM    BILITOT 1.2 2022 04:02 PM     Cardiac Enzymes:   Lab Results   Component Value Date/Time    TROPONINI 0.04 2022 08:14 PM    TROPONINI 0.05 2022 04:02 PM    TROPONINI 0.06 2017 07:22 AM     Coags:   Lab Results   Component Value Date/Time    PROTIME 33.9 2022 05:53 AM    INR 3.31 2022 05:53 AM       EC22 (Personally Interpreted)   - Atrial fibrillation with RVR    ECHO: 2017  - Left ventricle: Systolic function was probably normal. Images were inadequate for LV wall motion assessment.   - Right ventricle: The cavity size was dilated. Wall thickness was increased. Systolic function was moderately reduced by objective     interpretation. TAPSE: 1.3cm. Tricuspid annular systolic velocity: 9.0HN/V.   - Pulmonary arteries: Systolic pressure was moderately increased, = 50mm Hg assuming that the right atrial pressure was 15 mmHg. Stress Test: None    CXR: 22  1. Small to moderate right pleural effusion and right basilar airspace   consolidation that could reflect atelectasis or pneumonia. 2. Cardiomegaly without evidence of CHF.      Problem List:   Patient Active Problem List    Diagnosis Date Noted    Elevated troponin 2022    History of DVT in adulthood 2022    Acute renal failure ([address] 2022    Acute congestive heart failure ([address] 2022    Paroxysmal atrial fibrillation ([address] 2022    Atrial fibrillation ([address] 2022    Bilateral carotid artery stenosis 2020    History of stroke 01/10/2020    Controlled type 2 diabetes mellitus with diabetic polyneuropathy, without long-term current use of insulin ([facility] 01/19/2018    Essential hypertension 10/20/2017    Right homonymous hemianopsia 07/25/2017    Hemiparesis affecting right side as late effect of cerebrovascular accident ([facility] 07/25/2017    Mixed hyperlipidemia 06/21/2017    Long term current use of anticoagulant therapy 06/21/2017    Peripheral vascular disease ([facility] 06/06/2017    CVA (cerebral vascular accident) ([facility] 05/03/2017    Pulmonary embolism ([facility] 05/03/2017    DVT (deep venous thrombosis) ([facility] 05/03/2017        Assessment and Plan:   1. Atrial Fibrillation  - New onset of unknown duration, complains of palpitaitons x several weeks  - Currently in AF rate 100-110's  - Continue metoprolol 25 mg QID  - [id number] score:high ; [id number] Vasc score and anticoagulation discussed. High risk for stroke and thromboembolism. Anticoagulation is recommended. Risk of bleeding was discussed.  ~ On Coumadin (Was on previously for hx of DVT/PE)    - Afib risk factors including age, HTN, obesity, inactivity and YAHAIRA were discussed with patient. Risk factor modification recommended   ~ TSH: will check       - Treatment options including cardioversion, rate control strategy, antiarrhythmics, anticoagulation and possible ablation were discussed with patient. Risks, benefits and alternative of each treatment options were explained. All questions answered    ~ DCCV later today discussed with patient as below    2. Acute heart failure (NYHA Class III)  - Appears decompensated  - Continue with BB  - On IV lasix  ~ ACE-I/ARB contraindicated due to renal insufficiency and risk of hyperkalemia  - Monitor I&O's, Daily weights    - Echo ordered. To be completed tomorrow    3. Elevated Troponin   - Mild   - No need for ischemic eval at this time    4. SANDY on CKD   - Remains elevated   - Monitor closely with diuresis    5. Hx of DVT   - On coumadin    6.  Hx of CVA   - Aphasia at baseline  - On coumadin, statin    - Patient is feeling better, now on PO diuretics. Remains in AF with rates somewhat elevated. He can feel that he is in AF. He ahs been AC with warfarin. R/B/A discussed and he would like to proceed with Cardioversion. Consider monitor or follow clinically as OP for recurrence. - NPO for DCCV later today with Dr. Win Nuñez conditions with risk of decompensation. All pertinent information and plan of care discussed with the rounding physician. All questions and concerns were addressed to the patient. Alternatives to my treatment were discussed. I have discussed the above stated plan with patient and the nurse. The patient verbalized understanding and agreed with the plan. Thank you for allowing to us to participate in the care of Marvin Tobin    The patient was seen for >35 minutes. I reviewed interval history, physical exam, review of data including labs, imaging, development and implementation of treatment plan and coordination of complex care.  More than 50% of the time was devoted to counseling the patient on their diagnoses/treatments, as well as coordination of care with the other care teams    KRISTYN Burnette-VERNELL BENAVIDEZ[address] [address]   Office: [phone]

## 2022-12-19 NOTE — PROCEDURES
[facility]     Electrophysiology Procedure Note       Date of Procedure: 12/19/2022  Patient's Name: Xiomy Roberts  YOB: 1955   Medical Record Number: [id number]  Procedure Performed by: Jihan Frederick MD    Procedures performed:    External Electrical cardioversion   IV sedation. Start time: 11:15 AM  Stop time: 11:20 AM    Medication: Brevital Sodium   Sedation medication was given by physician     Indication of the procedure: Persistent atrial fibrillation     Details of procedure: The patient was brought to the cath lab area in a fasting and non-sedated state. The risks, benefits and alternatives of the procedure were discussed with the patient. The patient opted to proceed with the procedure. Written informed consent was signed and placed in the chart. A timeout protocol was completed to identify the patient and the procedure being performed. Then we used brevital for sedation. 60 mg Brevital was given by me as one dose, and electrical DC cardioversion was perfomred using 200J and then 360 J, synchronized shock. Patient was converted to sinus rhythm. The patient tolerated the procedure well and there were no complications.      Conclusion:   Successful external DC cardioversion of atrial fibrillation

## 2022-12-19 NOTE — PROGRESS NOTES
[facility] PROGRESS NOTE    12/19/2022 7:55 AM        Name: Anusha Santana . Admitted: 12/16/2022  Primary Care Provider: KRISTYN Rush CNP (Tel: [phone])                        Subjective:  . No acute events overnight. Resting well. Pain control. Diet ok. Labs reviewed  Denies any chest pain sob. Reviewed interval ancillary notes    Current Medications  metoprolol tartrate (LOPRESSOR) tablet 25 mg, 4 times per day  dilTIAZem (CARDIZEM) 125 mg in dextrose 5% 125 mL infusion (premix), Continuous  furosemide (LASIX) injection 40 mg, Daily  atorvastatin (LIPITOR) tablet 40 mg, Daily  sodium chloride flush 0.9 % injection 5-40 mL, 2 times per day  sodium chloride flush 0.9 % injection 5-40 mL, PRN  0.9 % sodium chloride infusion, PRN  ondansetron (ZOFRAN-ODT) disintegrating tablet 4 mg, Q8H PRN   Or  ondansetron (ZOFRAN) injection 4 mg, Q6H PRN  polyethylene glycol (GLYCOLAX) packet 17 g, Daily PRN  acetaminophen (TYLENOL) tablet 650 mg, Q6H PRN   Or  acetaminophen (TYLENOL) suppository 650 mg, Q6H PRN  warfarin placeholder: dosing by pharmacy, RX Placeholder      Objective:  /74   Pulse 71   Temp 97.4 °F (36.3 °C) (Oral)   Resp 17   Ht 6' (1.829 m)   Wt 258 lb 1.6 oz (117.1 kg)   SpO2 92%   BMI 35.00 kg/m²     Intake/Output Summary (Last 24 hours) at 12/19/2022 0755  Last data filed at 12/19/2022 0250  Gross per 24 hour   Intake 760 ml   Output 4750 ml   Net -3990 ml      Wt Readings from Last 3 Encounters:   12/19/22 258 lb 1.6 oz (117.1 kg)   12/16/22 288 lb (130.6 kg)   09/02/22 263 lb 6.4 oz (119.5 kg)       General appearance:  Appears comfortable  Eyes: Sclera clear. Pupils equal.  ENT: Moist oral mucosa. Trachea midline, no adenopathy. Cardiovascular: Regular rhythm, normal S1, S2. No murmur.  No edema in lower extremities  Respiratory: Not using accessory muscles. Good inspiratory effort. Clear to auscultation bilaterally, no wheeze or crackles. GI: Abdomen soft, no tenderness, not distended, normal bowel sounds  Musculoskeletal: No cyanosis in digits, neck supple  Neurology: CN 2-12 grossly intact. No speech or motor deficits  Psych: Normal affect. Alert and oriented in time, place and person  Skin: Warm, dry, normal turgor    Labs and Tests:  CBC:   Recent Labs     12/17/22  0554 12/18/22  0701 12/19/22  0553   WBC 6.9 6.9 6.8   HGB 11.0* 11.6* 11.8*    180 195     BMP:    Recent Labs     12/17/22  0554 12/18/22  0701 12/19/22  0553   * 144 148*   K 3.9 3.8 3.7   * 107 108   CO2 20* 27 32   BUN 38* 36* 31*   CREATININE 2.2* 2.1* 1.9*   GLUCOSE 113* 109* 113*     Hepatic:   Recent Labs     12/16/22  1602   AST 31   ALT 26   BILITOT 1.2*   ALKPHOS 80       Discussed care with patient             Problem List  Principal Problem:    Atrial fibrillation (HCC)  Active Problems:    Acute renal failure (HCC)    Acute congestive heart failure (HCC)    Paroxysmal atrial fibrillation (HCC)    Elevated troponin    History of DVT in adulthood    History of stroke  Resolved Problems:    * No resolved hospital problems.  *       Assessment & Plan:   A. fib with RVR  -Likely multifactorial with underlying CHF and also with renal failure  -Hr improviong   Continue diuresis  Possible cardioversion t luis alberto     Acute on chronic CHF  -Patient receiving IV Lasix  - morning renal function panel peding  - weight is down       Acute renal failure  -Creatinine is at 2.2 with hypernatremia on presentation   -appreciate nephrology recs         Diet: Diet NPO  Code:Full Code  DVT PPX lovenox   Dispo- 1-2 days pending improvement     Georgia Nassar MD   12/19/2022 7:55 AM

## 2022-12-19 NOTE — PROGRESS NOTES
Office : [phone]     Fax :[phone]       Nephrology  progress Note      Patient's Name: Irving Henriquez  8:17 AM  12/19/2022    Reason for Consult: SANDY      Requesting Physician:  KRISTYN Guevara CNP      Chief Complaint:    Chief Complaint   Patient presents with    Shortness of Breath     Pt states that he is having shortness of breath, sent by cardiology. Pt states Sob with walking. History of Present iIlness:    Irving Henriquez is a 74 y.o. male with prior history of DM 2, CHF, a. Fib , edema LE who presented at the PCP office yesterday with c/o SOB and leg swelling . Also, c/o palpitations . Was sent to ER . He was in A.fib with RVR. Also, initial lab work shows elevated creatinine of 2.4. baseline is 1.0. On 02 per NC     Interval hx :    Off oxygen now   Edema decreasing   No chest pain     GOOD UOP     Creatinine  trending down     I/O last 3 completed shifts:   In: 200 [P.O.:1240]  Out: 7950 [Urine:7950]    Past Medical History:   Diagnosis Date    Cerebrovascular disease     CHF (congestive heart failure) ([facility] [address] 04/2017    per  nurse report but wife is unaware    DM (diabetes mellitus screen) 04/2017    boarder line    Hyperlipidemia     Hypertension     Type 2 diabetes mellitus without complication ([facility] [address]        Past Surgical History:   Procedure Laterality Date    VASECTOMY         Family History   Problem Relation Age of Onset    High Blood Pressure Mother     Cancer Mother         lung, smoker    Cancer Father         mesothelioma    Depression Sister            Current Medications:    metoprolol tartrate (LOPRESSOR) tablet 25 mg, 4 times per day  dilTIAZem (CARDIZEM) 125 mg in dextrose 5% 125 mL infusion (premix), Continuous  furosemide (LASIX) injection 40 mg, Daily  atorvastatin (LIPITOR) tablet 40 mg, Daily  sodium chloride flush 0.9 % injection 5-40 mL, 2 times per day  sodium chloride flush 0.9 % injection 5-40 mL, PRN  0.9 % sodium chloride infusion, PRN  ondansetron (ZOFRAN-ODT) disintegrating tablet 4 mg, Q8H PRN   Or  ondansetron (ZOFRAN) injection 4 mg, Q6H PRN  polyethylene glycol (GLYCOLAX) packet 17 g, Daily PRN  acetaminophen (TYLENOL) tablet 650 mg, Q6H PRN   Or  acetaminophen (TYLENOL) suppository 650 mg, Q6H PRN  warfarin placeholder: dosing by pharmacy, RX Placeholder        Physical exam:     Vitals:  /74   Pulse 71   Temp 97.4 °F (36.3 °C) (Oral)   Resp 17   Ht 6' (1.829 m)   Wt 258 lb 1.6 oz (117.1 kg)   SpO2 92%   BMI 35.00 kg/m²   Constitutional:  OAA X3 NAD  Skin: no rash, turgor wnl  Heent:  eomi, mmm  Neck: no bruits or jvd noted  Cardiovascular:  S1, S2 without m/r/g  Respiratory: CTA B without w/r/r  Abdomen:  +bs, soft, nt, nd  Ext: 1 +  lower extremity edema      Labs:  CBC:   Recent Labs     12/17/22  0554 12/18/22  0701 12/19/22  0553   WBC 6.9 6.9 6.8   HGB 11.0* 11.6* 11.8*    180 195     BMP:    Recent Labs     12/17/22  0554 12/18/22  0701 12/19/22  0553   * 144 148*   K 3.9 3.8 3.7   * 107 108   CO2 20* 27 32   BUN 38* 36* 31*   CREATININE 2.2* 2.1* 1.9*   GLUCOSE 113* 109* 113*     Ca/Mg/Phos:   Recent Labs     12/17/22  0554 12/18/22  0701 12/19/22  0553   CALCIUM 9.0 9.6 9.6     Hepatic:   Recent Labs     12/16/22  1602   AST 31   ALT 26   BILITOT 1.2*   ALKPHOS 80     Troponin:   Recent Labs     12/16/22  1602 12/16/22 2014   TROPONINI 0.05* 0.04*     BNP: No results for input(s): BNP in the last 72 hours. Lipids: No results for input(s): CHOL, TRIG, HDL, LDLCALC, LABVLDL in the last 72 hours. ABGs: No results for input(s): PHART, PO2ART, DJH8QLT in the last 72 hours.   INR:   Recent Labs     12/17/22  0554 12/18/22  0701 12/19/22  0553   INR 4.25* 3.67* 3.31*     UA:No results for input(s): Selina Schiller, GLUCOSEU, BILIRUBINUR, Deshaun Eva, BLOODU, PHUR, PROTEINU, UROBILINOGEN, NITRU, LEUKOCYTESUR, Waqas Junior in the last 72 hours. Urine Microscopic: No results for input(s): LABCAST, BACTERIA, COMU, HYALCAST, WBCUA, RBCUA, EPIU in the last 72 hours. Urine Culture: No results for input(s): LABURIN in the last 72 hours. Urine Chemistry: No results for input(s): [place], PROTEINUR, NAUR in the last 72 hours. IMAGING:  XR CHEST (2 VW)   Final Result   1. Small to moderate right pleural effusion and right basilar airspace   consolidation that could reflect atelectasis or pneumonia. 2. Cardiomegaly without evidence of CHF. Prior to Admission medications    Medication Sig Start Date End Date Taking?  Authorizing Provider   atorvastatin (LIPITOR) 40 MG tablet TAKE ONE TABLET BY MOUTH EVERY NIGHT 10/4/22   KRISTYN Medrano - JORGE   Blood Pressure KIT 1 kit by Does not apply route daily 9/2/22   KRISTYN Carrillo CNP   metFORMIN (GLUCOPHAGE) 500 MG tablet TAKE 1 TABLET BY MOUTH 2 TIMES A DAY WITH MEALS 8/30/22   Marshal Level., MD   lisinopril (PRINIVIL;ZESTRIL) 40 MG tablet TAKE 1 TABLET BY MOUTH ONE TIME A DAY 8/30/22   Marshal Level., MD   amLODIPine (NORVASC) 5 MG tablet TAKE 1 TABLET BY MOUTH ONE TIME A DAY 8/30/22   Marshal Level., MD   blood glucose monitor strips 1 strip by Other route daily True Metric Test strips 5/27/22   KRISTYN Carrillo CNP   Lancets MISC True Metric Lancets 5/27/22   KRISTYN Carrillo CNP   warfarin (COUMADIN) 5 MG tablet TAKE 1 AND 1/2 TABLETS BY MOUTH DAILY  Patient taking differently: TAKE 1/2 TABLETS BY MOUTH DAILY 9/8/21   NALINI Viera   Blood Glucose Monitoring Suppl AGUSTO 1 kit by Does not apply route daily True Metric testing device 12/21/17   Pascale Lock MD   Methylsulfonylmethane (MSM PO) Take by mouth    Historical Provider, MD Prenatal MV-Min-Fe Fum-FA-DHA (PRENATAL 1 PO) Take by mouth    Historical Provider, MD                       Assessment/Plan :      1. SANDY 2/2 hemodynamic chnages form atrial fib with RVR     Hold metformin   Hold lisinopril   Has cardiorenal process going on   On lasix 40 mg po daily     Recommend to dose adjust all medications  based on renal functions  Maintain SBP> 90 mmHg   Daily weights   AVOID NSAIDs  Avoid Nephrotoxins  Monitor Intake/Output  Call if significant decrease in urine output        2. A. Fib with RVR   On cardizem. Consult EP  Get ECHO     3. Anemia. Hb11.6  Monitor     4. Acid- base disorder. Metabolic acidosis   Monitor     5.  Monitor electrolytes   Sodium level better             D/w primary team      Thank you for allowing us to participate in care of [facility]         Electronically signed by: Kathy Squires MD, 12/19/2022, 8:17 AM      [facility] of [address]  Office : [phone]  Fax :[phone]

## 2022-12-20 PROBLEM — I50.21 ACUTE SYSTOLIC HEART FAILURE (HCC): Status: ACTIVE | Noted: 2022-12-20

## 2022-12-20 PROBLEM — I42.9 CARDIOMYOPATHY (HCC): Status: ACTIVE | Noted: 2022-12-20

## 2022-12-20 LAB
ANION GAP SERPL CALCULATED.3IONS-SCNC: 11 MMOL/L (ref 3–16)
BASOPHILS ABSOLUTE: 0.1 K/UL (ref 0–0.2)
BASOPHILS RELATIVE PERCENT: 0.8 %
BUN BLDV-MCNC: 29 MG/DL (ref 7–20)
CALCIUM SERPL-MCNC: 9.4 MG/DL (ref 8.3–10.6)
CHLORIDE BLD-SCNC: 106 MMOL/L (ref 99–110)
CO2: 29 MMOL/L (ref 21–32)
CREAT SERPL-MCNC: 1.8 MG/DL (ref 0.8–1.3)
CREATININE URINE: 81.7 MG/DL (ref 39–259)
EKG ATRIAL RATE: 85 BPM
EKG DIAGNOSIS: NORMAL
EKG P AXIS: 58 DEGREES
EKG P-R INTERVAL: 154 MS
EKG Q-T INTERVAL: 384 MS
EKG QRS DURATION: 86 MS
EKG QTC CALCULATION (BAZETT): 456 MS
EKG R AXIS: 29 DEGREES
EKG T AXIS: 96 DEGREES
EKG VENTRICULAR RATE: 85 BPM
EOSINOPHILS ABSOLUTE: 0.2 K/UL (ref 0–0.6)
EOSINOPHILS RELATIVE PERCENT: 1.7 %
GFR SERPL CREATININE-BSD FRML MDRD: 41 ML/MIN/{1.73_M2}
GLUCOSE BLD-MCNC: 120 MG/DL (ref 70–99)
HCT VFR BLD CALC: 37.2 % (ref 40.5–52.5)
HEMOGLOBIN: 12.4 G/DL (ref 13.5–17.5)
INR BLD: 2.98 (ref 0.87–1.14)
INR BLD: 3.05 (ref 0.87–1.14)
LYMPHOCYTES ABSOLUTE: 0.8 K/UL (ref 1–5.1)
LYMPHOCYTES RELATIVE PERCENT: 8.2 %
MCH RBC QN AUTO: 32.7 PG (ref 26–34)
MCHC RBC AUTO-ENTMCNC: 33.2 G/DL (ref 31–36)
MCV RBC AUTO: 98.4 FL (ref 80–100)
MONOCYTES ABSOLUTE: 1.1 K/UL (ref 0–1.3)
MONOCYTES RELATIVE PERCENT: 11.4 %
NEUTROPHILS ABSOLUTE: 7.2 K/UL (ref 1.7–7.7)
NEUTROPHILS RELATIVE PERCENT: 77.9 %
PDW BLD-RTO: 14.9 % (ref 12.4–15.4)
PLATELET # BLD: 198 K/UL (ref 135–450)
PMV BLD AUTO: 9.6 FL (ref 5–10.5)
POTASSIUM SERPL-SCNC: 3.7 MMOL/L (ref 3.5–5.1)
PROTEIN PROTEIN: 23 MG/DL
PROTEIN/CREAT RATIO: 0.3 MG/DL
PROTHROMBIN TIME: 31.2 SEC (ref 11.7–14.5)
PROTHROMBIN TIME: 31.7 SEC (ref 11.7–14.5)
RBC # BLD: 3.78 M/UL (ref 4.2–5.9)
SODIUM BLD-SCNC: 146 MMOL/L (ref 136–145)
WBC # BLD: 9.3 K/UL (ref 4–11)

## 2022-12-20 PROCEDURE — 85025 COMPLETE CBC W/AUTO DIFF WBC: CPT

## 2022-12-20 PROCEDURE — 97165 OT EVAL LOW COMPLEX 30 MIN: CPT

## 2022-12-20 PROCEDURE — 6370000000 HC RX 637 (ALT 250 FOR IP): Performed by: INTERNAL MEDICINE

## 2022-12-20 PROCEDURE — 1200000000 HC SEMI PRIVATE

## 2022-12-20 PROCEDURE — 80048 BASIC METABOLIC PNL TOTAL CA: CPT

## 2022-12-20 PROCEDURE — 97530 THERAPEUTIC ACTIVITIES: CPT

## 2022-12-20 PROCEDURE — 97116 GAIT TRAINING THERAPY: CPT

## 2022-12-20 PROCEDURE — 85610 PROTHROMBIN TIME: CPT

## 2022-12-20 PROCEDURE — 97161 PT EVAL LOW COMPLEX 20 MIN: CPT

## 2022-12-20 PROCEDURE — 99233 SBSQ HOSP IP/OBS HIGH 50: CPT | Performed by: INTERNAL MEDICINE

## 2022-12-20 PROCEDURE — 84156 ASSAY OF PROTEIN URINE: CPT

## 2022-12-20 PROCEDURE — 2580000003 HC RX 258: Performed by: FAMILY MEDICINE

## 2022-12-20 PROCEDURE — 93010 ELECTROCARDIOGRAM REPORT: CPT | Performed by: INTERNAL MEDICINE

## 2022-12-20 PROCEDURE — 6370000000 HC RX 637 (ALT 250 FOR IP): Performed by: FAMILY MEDICINE

## 2022-12-20 PROCEDURE — 36415 COLL VENOUS BLD VENIPUNCTURE: CPT

## 2022-12-20 PROCEDURE — 6370000000 HC RX 637 (ALT 250 FOR IP): Performed by: HOSPITALIST

## 2022-12-20 PROCEDURE — 6370000000 HC RX 637 (ALT 250 FOR IP): Performed by: NURSE PRACTITIONER

## 2022-12-20 PROCEDURE — 99233 SBSQ HOSP IP/OBS HIGH 50: CPT | Performed by: NURSE PRACTITIONER

## 2022-12-20 PROCEDURE — 82570 ASSAY OF URINE CREATININE: CPT

## 2022-12-20 PROCEDURE — 97535 SELF CARE MNGMENT TRAINING: CPT

## 2022-12-20 RX ORDER — TAMSULOSIN HYDROCHLORIDE 0.4 MG/1
0.4 CAPSULE ORAL DAILY
Qty: 30 CAPSULE | Refills: 3 | Status: SHIPPED | OUTPATIENT
Start: 2022-12-20 | End: 2022-12-23 | Stop reason: SDUPTHER

## 2022-12-20 RX ORDER — TAMSULOSIN HYDROCHLORIDE 0.4 MG/1
0.4 CAPSULE ORAL DAILY
Status: DISCONTINUED | OUTPATIENT
Start: 2022-12-20 | End: 2022-12-26 | Stop reason: HOSPADM

## 2022-12-20 RX ORDER — WARFARIN SODIUM 5 MG/1
5 TABLET ORAL
Status: COMPLETED | OUTPATIENT
Start: 2022-12-20 | End: 2022-12-20

## 2022-12-20 RX ORDER — METOPROLOL TARTRATE 50 MG/1
50 TABLET, FILM COATED ORAL 2 TIMES DAILY
Qty: 60 TABLET | Refills: 1 | Status: SHIPPED | OUTPATIENT
Start: 2022-12-20 | End: 2022-12-21 | Stop reason: HOSPADM

## 2022-12-20 RX ORDER — FINASTERIDE 5 MG/1
5 TABLET, FILM COATED ORAL DAILY
Status: DISCONTINUED | OUTPATIENT
Start: 2022-12-20 | End: 2022-12-26 | Stop reason: HOSPADM

## 2022-12-20 RX ORDER — METOPROLOL SUCCINATE 50 MG/1
100 TABLET, EXTENDED RELEASE ORAL DAILY
Status: DISCONTINUED | OUTPATIENT
Start: 2022-12-20 | End: 2022-12-22

## 2022-12-20 RX ORDER — FUROSEMIDE 40 MG/1
40 TABLET ORAL DAILY
Qty: 60 TABLET | Refills: 3 | Status: SHIPPED | OUTPATIENT
Start: 2022-12-20 | End: 2022-12-23 | Stop reason: HOSPADM

## 2022-12-20 RX ADMIN — FINASTERIDE 5 MG: 5 TABLET, FILM COATED ORAL at 13:08

## 2022-12-20 RX ADMIN — SACUBITRIL AND VALSARTAN 1 TABLET: 24; 26 TABLET, FILM COATED ORAL at 21:21

## 2022-12-20 RX ADMIN — ATORVASTATIN CALCIUM 40 MG: 40 TABLET, FILM COATED ORAL at 21:21

## 2022-12-20 RX ADMIN — FUROSEMIDE 40 MG: 40 TABLET ORAL at 08:39

## 2022-12-20 RX ADMIN — SACUBITRIL AND VALSARTAN 1 TABLET: 24; 26 TABLET, FILM COATED ORAL at 13:07

## 2022-12-20 RX ADMIN — TAMSULOSIN HYDROCHLORIDE 0.4 MG: 0.4 CAPSULE ORAL at 09:47

## 2022-12-20 RX ADMIN — METOPROLOL TARTRATE 25 MG: 25 TABLET, FILM COATED ORAL at 13:08

## 2022-12-20 RX ADMIN — Medication 10 ML: at 21:21

## 2022-12-20 RX ADMIN — Medication 10 ML: at 08:39

## 2022-12-20 RX ADMIN — WARFARIN SODIUM 5 MG: 5 TABLET ORAL at 17:51

## 2022-12-20 RX ADMIN — METOPROLOL TARTRATE 25 MG: 25 TABLET, FILM COATED ORAL at 05:48

## 2022-12-20 ASSESSMENT — PAIN SCALES - GENERAL: PAINLEVEL_OUTOF10: 0

## 2022-12-20 NOTE — CONSULTS
Urology Consult Note  [facility]     Patient: Irving Henriquez MRN: [id number]  Room/Bed: [facility]8575/5351-42   YOB: 1955  Age/Sex: 74 y. o.male  Admission Date: 12/16/2022     Date of Service:  12/20/2022    Consulting Provider: KRISTYN Tripp CNP  Admitting/Requesting Physician: Hal Perez MD  Primary Care Physician: KRISTYN Guevara CNP    Reason for Consult: Urinary Retention    ASSESSMENT/PLAN     73 yo male with hx of DVT/PE on Coumadin, DM, HTN, HLD, CHF, CVA and obesity. Hx of IVC filter. Presents with worsening leg swelling and SOB. Found to have new onset A. Fib. Urology has been consulted for urinary retention. Post-cath residual is 870cc's today. PSA was 1.7 on 08/2021. No cross sectional imaging. Has not previously worked with a urologist.     Recommendations:  Str cath Tristen Rupa Flomax and finasteride  OOB as tolerated  Discussed indwelling cummins if unable to void by time of discharge  Will need to follow up as an outpatient for PSA testing and consideration of a cystoscopy with volumetrics  Will follow      All patient questions were answered. He understands the plan as listed above. HISTORY     Chief Complaint:   Chief Complaint   Patient presents with    Shortness of Breath     Pt states that he is having shortness of breath, sent by cardiology. Pt states Sob with walking. History of Present Illness: Irving Henriquez is a 74 y.o. male with urinary retention. Onset of symptoms was several days ago with improving course since that time. Symptoms are aggravated by lasix and fluid overload. Symptoms improved with str cath. Associated symptoms include pelvic distension. Patient also reports frequency of urination. He has tried the following treatments: Flomax, finasteride, cic.      Past Medical History:  He has a past medical history of Cerebrovascular disease, CHF (congestive heart failure) ([facility] [address] (04/2017), DM (diabetes mellitus screen) (04/2017), Hyperlipidemia, Hypertension, and Type 2 diabetes mellitus without complication ([facility] [address] Hospital Problem List:  Principal Problem:    Atrial fibrillation with RVR (HCC)  Active Problems:    Acute renal failure (HCC)    Acute congestive heart failure (HCC)    Paroxysmal atrial fibrillation (HCC)    Elevated troponin    History of DVT in adulthood    Cardiomyopathy ([facility] [address]    History of stroke  Resolved Problems:    * No resolved hospital problems. *      Past Surgical History:  He has a past surgical history that includes Vasectomy. Social History:  He reports that he quit smoking about 5 years ago. His smoking use included cigarettes. He has a 80.00 pack-year smoking history. He quit smokeless tobacco use about 5 years ago. He reports that he does not drink alcohol and does not use drugs. Family History:  family history includes Cancer in his father and mother; Depression in his sister; High Blood Pressure in his mother. Allergies:  No Known Allergies    Medications:  Scheduled Meds:   tamsulosin  0.4 mg Oral Daily    warfarin  5 mg Oral Once    sacubitril-valsartan  1 tablet Oral BID    furosemide  40 mg Oral Daily    metoprolol tartrate  25 mg Oral 4 times per day    atorvastatin  40 mg Oral Daily    sodium chloride flush  5-40 mL IntraVENous 2 times per day    warfarin placeholder: dosing by pharmacy   Other RX Placeholder     Continuous Infusions:   dilTIAZem Stopped (12/17/22 1729)    sodium chloride       PRN Meds:sodium chloride flush, sodium chloride, ondansetron **OR** ondansetron, polyethylene glycol, acetaminophen **OR** acetaminophen    Review of Systems:  Pertinent positives/negatives reviewed in HPI. All other systems reviewed and negative, unless noted below.     Constitutional: Negative  Genitourinary: see HPI  HEENT: Negative   Cardiovascular: Negative   Respiratory: Negative   Gastrointestinal: Negative   Musculoskeletal: Negative   Neurological: Negative Psychiatric: Negative   Integumentary: Negative     PHYSICAL EXAM     Vitals:    12/20/22 0830   BP: 104/70   Pulse: 71   Resp: 16   Temp: 97.8 °F (36.6 °C)   SpO2: 96%     CONSTITUTIONAL: The patient is well nourished/developed, with no distress noted. NEUROLOGICAL/PSYCHIATRIC: Oriented to place and time, normal affected noted. NECK: The neck is symmetrical and supple, with no masses noted. CARDIOVASCULAR: Regular rate and rhythm, no evidence of swelling noted. RESPIRATORY: Normal respiratory effort with no wheezing noted. ABDOMEN: Abdomen soft, non-tender, non-distended. No enlarged liver or spleen. No hernias noted. Stool occult blood not indicated. SKIN: Skin appears normal.  LYMPHATICS: No adenopathy noted. CVA: No CVA tenderness bilaterally. GENITOURINARY: The penis is without rash or lesions and meatus with expected size and location. The scrotum appears normal. Bilateral testicles appears to be of normal size and location. No masses or tenderness noted of testicles or epididymis.        Ins/Outs:    Intake/Output Summary (Last 24 hours) at 12/20/2022 1227  Last data filed at 12/20/2022 1223  Gross per 24 hour   Intake 1200 ml   Output 2775 ml   Net -1575 ml       LABS     CBC   Lab Results   Component Value Date/Time    WBC 9.3 12/20/2022 04:50 AM    RBC 3.78 12/20/2022 04:50 AM    HGB 12.4 12/20/2022 04:50 AM    HCT 37.2 12/20/2022 04:50 AM    MCV 98.4 12/20/2022 04:50 AM    MCH 32.7 12/20/2022 04:50 AM    MCHC 33.2 12/20/2022 04:50 AM    RDW 14.9 12/20/2022 04:50 AM     12/20/2022 04:50 AM    MPV 9.6 12/20/2022 04:50 AM     BMP   Lab Results   Component Value Date/Time     12/20/2022 04:50 AM    K 3.7 12/20/2022 04:50 AM    K 4.6 12/16/2022 04:02 PM     12/20/2022 04:50 AM    CO2 29 12/20/2022 04:50 AM    BUN 29 12/20/2022 04:50 AM    CREATININE 1.8 12/20/2022 04:50 AM    GLUCOSE 120 12/20/2022 04:50 AM    CALCIUM 9.4 12/20/2022 04:50 AM     Urinalysis: No results found for: COLORU, GLUCOSEU, BLOODU, NITRU, LEUKOCYTESUR  Urine culture: No results for input(s): LABURIN in the last 72 hours. PSA:   Lab Results   Component Value Date    PSA 1.7 09/09/2021    PSA 1.5 08/27/2020    PSA 1.34 01/14/2019         IMAGING     XR CHEST (2 VW)    Result Date: 12/16/2022  EXAMINATION: TWO XRAY VIEWS OF THE CHEST 12/16/2022 1:01 pm COMPARISON: None. HISTORY: ORDERING SYSTEM PROVIDED HISTORY: short of breath, suspected CHF TECHNOLOGIST PROVIDED HISTORY: Reason for exam:->short of breath, suspected CHF Reason for Exam: short of breath, suspected CHF FINDINGS: There is a small to moderate right pleural effusion and there is right basal consolidation. There is cardiomegaly. There is no vascular congestion or septal thickening. 1. Small to moderate right pleural effusion and right basilar airspace consolidation that could reflect atelectasis or pneumonia. 2. Cardiomegaly without evidence of CHF.             Electronically signed by: KRISTYN Bradley CNP  12/20/2022   [facility]  Office Contact: [phone]

## 2022-12-20 NOTE — PROGRESS NOTES
4963:   Patient having difficulty urinating, repeat bladder scan 787ml, straight cath output 760ml, post void residual 5ml    Patient unable to urinate after cummins out, bladder scan 363 ml, hosp notified. Order to straight cath when greater than 400.  Will continue to monitor

## 2022-12-20 NOTE — PROGRESS NOTES
[facility] [address]   Phone: [phone]    Occupational Therapy    [x] Initial Evaluation            [] Daily Treatment Note         [x] Discharge Summary      Patient: Shubham Rollins   : 1955   MRN: [id number]   Date of Service:  2022    Admitting Diagnosis:  Atrial fibrillation [facility])  Current Admission Summary: Shubham Rollins is a 74 y.o. male with h/o CVA, CHF, Afib , chronic anticoagulation with Coumadin presented with dyspnea, leg edema, palpitations, and weight gain. Per Wife at bedside pt has been very fatigued and wanting to sleep most of the time. Reports weight gain of over 20 pounds. Reports compliance with medications. Pt is independent with ADLs at baselineHe was seen at the cardiology office today for the same and was  sent to the ED for further management. He is found to be in AFib with Rapid rate. HR ranging . Past Medical History:  has a past medical history of Cerebrovascular disease, CHF (congestive heart failure) ([address] DM (diabetes mellitus screen), Hyperlipidemia, Hypertension, and Type 2 diabetes mellitus without complication ([address] Past Surgical History:  has a past surgical history that includes Vasectomy. Discharge Recommendations: Shubham Rollins scored a 21/24 on the AM-PAC ADL Inpatient form. At this time, no further OT is recommended upon discharge due to pt is expected to be at his baseline level of occupational function by discharge. .  Recommend patient returns to prior setting.         DME Required For Discharge: no DME required at discharge    Precautions/Restrictions: high fall risk  Weight Bearing Restrictions: no restrictions  [] Right Upper Extremity  [] Left Upper Extremity [] Right Lower Extremity  [] Left Lower Extremity     Required Braces/Orthotics: no braces required   [] Right  [] Left  Positional Restrictions:no positional restrictions    Pre-Admission Information     Lives With: spouse Extremity Dressing: supervision Comment: don/doff pullups in stance; pt instructed to sit for safety. I don/doff socks seated on EOB  Instrumental Activities of Daily Living  No IADL completed on this date. Functional Mobility  Bed Mobility  Supine to Sit: modified independent, HOB elevated  Comments:  Transfers  Sit to stand transfer:supervision  Stand to sit transfer: supervision  Stand step transfer: supervision, No AD  Comments:  Functional Mobility:  Sitting Balance: Independent. Standing Balance: supervision. Functional Mobility: . supervision  Functional Mobility Activity: functional mobility ~250 ft in roy and up/down flight of stairs; functional mobility to/from sink for grooming. Functional Mobility Device Use: no device  Comment: Pt tolerated standing ~10 min for ambulation in roy and stairs and ~12-13 min at sink for grooming, UB bathing. See PT note for further details regarding gait and ascending/descending stairs. Functional Outcomes  AM-PAC Inpatient Daily Activity Raw Score: 21    Cognition  WFL  Comments: Difficult to fully assess due to expressive aphasia  Orientation:    alert and oriented x 4  Command Following:   [facility]     Education  Barriers To Learning: language  Patient Education: patient educated on OT role and benefits, discharge recommendations  Learning Assessment:  patient verbalizes and demonstrates understanding    Assessment  Activity Tolerance: Tolerated well. Did get SOB after ambulating and climbing stairs; SPO2 92% on RA. Rapidly increased to 96%. Impairments Requiring Therapeutic Intervention: none - eval with same day discharge  Prognosis: good  Clinical Assessment: Pt is close to his baseline level of occupational function. He is supervision level for standing ADLs and functional mobilty. No further OT needs at this time.   Safety Interventions: patient left in chair, chair alarm in place, call light within reach, gait belt, and nurse notified    Plan  Frequency: Eval with same day discharge. No follow up required. Current Treatment Recommendations: not applicable, evaluation completed with same day discharge. Goals  Patient Goals: N/A   Short Term Goals:  Time Frame: S/A  Patient eval with same day discharge. No goals set as patient is at baseline status.     Therapy Session Time     Individual Group Co-treatment   Time In    0903   Time Out    0956   Minutes    53        Timed Code Treatment Minutes:   38 min  Total Treatment Minutes:  53 min       Electronically Signed By: MONA Hernandez, OTR/L, [id number]

## 2022-12-20 NOTE — PROGRESS NOTES
Office : [phone]     Fax :[phone]       Nephrology  progress Note      Patient's Name: Imtiaz Escobar  10:31 AM  12/20/2022    Reason for Consult: SANDY      Requesting Physician:  KRISTYN Welch CNP      Chief Complaint:    Chief Complaint   Patient presents with    Shortness of Breath     Pt states that he is having shortness of breath, sent by cardiology. Pt states Sob with walking. History of Present iIlness:    Imtiaz Escobar is a 74 y.o. male with prior history of DM 2, CHF, a. Fib , edema LE who presented at the PCP office yesterday with c/o SOB and leg swelling . Also, c/o palpitations . Was sent to ER . He was in A.fib with RVR. Also, initial lab work shows elevated creatinine of 2.4. baseline is 1.0. On 02 per NC     Interval hx :    Off oxygen now   Edema decreasing   No chest pain     GOOD UOP     Renal function continue to improve     I/O last 3 completed shifts:   In: 5 [P.O.:720]  Out: 4955 [Urine:4955]    Past Medical History:   Diagnosis Date    Cerebrovascular disease     CHF (congestive heart failure) ([facility] [address] 04/2017    per  nurse report but wife is unaware    DM (diabetes mellitus screen) 04/2017    boarder line    Hyperlipidemia     Hypertension     Type 2 diabetes mellitus without complication ([facility] [address]        Past Surgical History:   Procedure Laterality Date    VASECTOMY         Family History   Problem Relation Age of Onset    High Blood Pressure Mother     Cancer Mother         lung, smoker    Cancer Father         mesothelioma    Depression Sister            Current Medications:    tamsulosin (FLOMAX) capsule 0.4 mg, Daily  warfarin (COUMADIN) tablet 5 mg, Once  furosemide (LASIX) tablet 40 mg, Daily  metoprolol tartrate (LOPRESSOR) tablet 25 mg, 4 times per day  dilTIAZem (CARDIZEM) 125 mg in dextrose 5% 125 mL infusion (premix), Continuous  atorvastatin (LIPITOR) tablet 40 mg, Daily  sodium chloride flush 0.9 % injection 5-40 mL, 2 times per day  sodium chloride flush 0.9 % injection 5-40 mL, PRN  0.9 % sodium chloride infusion, PRN  ondansetron (ZOFRAN-ODT) disintegrating tablet 4 mg, Q8H PRN   Or  ondansetron (ZOFRAN) injection 4 mg, Q6H PRN  polyethylene glycol (GLYCOLAX) packet 17 g, Daily PRN  acetaminophen (TYLENOL) tablet 650 mg, Q6H PRN   Or  acetaminophen (TYLENOL) suppository 650 mg, Q6H PRN  warfarin placeholder: dosing by pharmacy, RX Placeholder        Physical exam:     Vitals:  /70   Pulse 71   Temp 97.8 °F (36.6 °C)   Resp 16   Ht 6' (1.829 m)   Wt 250 lb 12.8 oz (113.8 kg)   SpO2 96%   BMI 34.01 kg/m²   Constitutional:  OAA X3 NAD  Skin: no rash, turgor wnl  Heent:  eomi, mmm  Neck: no bruits or jvd noted  Cardiovascular:  S1, S2 without m/r/g  Respiratory: CTA B without w/r/r  Abdomen:  +bs, soft, nt, nd  Ext: 1 +  lower extremity edema      Labs:  CBC:   Recent Labs     12/18/22  0701 12/19/22  0553 12/20/22  0450   WBC 6.9 6.8 9.3   HGB 11.6* 11.8* 12.4*    195 198     BMP:    Recent Labs     12/18/22  0701 12/19/22  0553 12/20/22  0450    148* 146*   K 3.8 3.7 3.7    108 106   CO2 27 32 29   BUN 36* 31* 29*   CREATININE 2.1* 1.9* 1.8*   GLUCOSE 109* 113* 120*     Ca/Mg/Phos:   Recent Labs     12/18/22  0701 12/19/22  0553 12/20/22  0450   CALCIUM 9.6 9.6 9.4     Hepatic:   No results for input(s): AST, ALT, ALB, BILITOT, ALKPHOS in the last 72 hours. Troponin:   No results for input(s): TROPONINI in the last 72 hours. BNP: No results for input(s): BNP in the last 72 hours. Lipids: No results for input(s): CHOL, TRIG, HDL, LDLCALC, LABVLDL in the last 72 hours. ABGs: No results for input(s): PHART, PO2ART, [id number] in the last 72 hours.   INR:   Recent Labs 12/18/22  0701 12/19/22  0553 12/20/22  0450   INR 3.67* 3.31* 2.98*     UA:No results for input(s): [place], GLUCOSEU, BILIRUBINUR, KETUA, SPECGRAV, BLOODU, PHUR, PROTEINU, UROBILINOGEN, NITRU, LEUKOCYTESUR, Jasmyn Hensen in the last 72 hours. Urine Microscopic: No results for input(s): LABCAST, BACTERIA, COMU, HYALCAST, WBCUA, RBCUA, EPIU in the last 72 hours. Urine Culture: No results for input(s): LABURIN in the last 72 hours. Urine Chemistry: No results for input(s): [place], PROTEINUR, NAUR in the last 72 hours. IMAGING:  XR CHEST (2 VW)   Final Result   1. Small to moderate right pleural effusion and right basilar airspace   consolidation that could reflect atelectasis or pneumonia. 2. Cardiomegaly without evidence of CHF. Prior to Admission medications    Medication Sig Start Date End Date Taking?  Authorizing Provider   furosemide (LASIX) 40 MG tablet Take 1 tablet by mouth daily 12/20/22  Yes Dario Alfred MD   tamsulosin (FLOMAX) 0.4 MG capsule Take 1 capsule by mouth daily 12/20/22  Yes Dairo Alfred MD   metoprolol tartrate (LOPRESSOR) 50 MG tablet Take 1 tablet by mouth 2 times daily 12/20/22 1/19/23 Yes Dario Alfred MD   atorvastatin (LIPITOR) 40 MG tablet TAKE ONE TABLET BY MOUTH EVERY NIGHT 10/4/22   Ashely Collier, APRN - NP   Blood Pressure KIT 1 kit by Does not apply route daily 9/2/22   Rodriguez KRISTYN Carpio CNP   metFORMIN (GLUCOPHAGE) 500 MG tablet TAKE 1 TABLET BY MOUTH 2 TIMES A DAY WITH MEALS 8/30/22   Ivette Holloway MD   blood glucose monitor strips 1 strip by Other route daily True Metric Test strips 5/27/22   KRISTYN Martinez CNP   Lancets MISC True Metric Lancets 5/27/22   Rodriguez KRISTYN Carpio CNP   warfarin (COUMADIN) 5 MG tablet TAKE 1 AND 1/2 TABLETS BY MOUTH DAILY  Patient taking differently: TAKE 1/2 TABLETS BY MOUTH DAILY 9/8/21   NALINI Chance   Blood Glucose Monitoring Suppl AGUSTO 1 kit by Does not apply route daily True Metric testing device 12/21/17   Pasquale Lares MD   Methylsulfonylmethane (MSM PO) Take by mouth    Historical Provider, MD   Prenatal MV-Min-Fe Fum-FA-DHA (PRENATAL 1 PO) Take by mouth    Historical Provider, MD                       Assessment/Plan :      1. SANDY 2/2 hemodynamic chnages form atrial fib with RVR     Hold metformin   Hold lisinopril   CRS  Improving   On lasix 40 mg po daily     Recommend to dose adjust all medications  based on renal functions  Maintain SBP> 90 mmHg   Daily weights   AVOID NSAIDs  Avoid Nephrotoxins  Monitor Intake/Output  Call if significant decrease in urine output        2. A. Fib with RVR   On cardizem. Consult EP  Get ECHO     3. Anemia. Hb11.6  Monitor     4. Acid- base disorder. Metabolic acidosis   Monitor     5.  Monitor electrolytes   Sodium level better             D/w primary team      Thank you for allowing us to participate in care of [facility]         Electronically signed by: Gurmeet Lennon MD, 12/20/2022, 10:31 AM      [facility] [address]  Office : [phone]  Fax :[phone]

## 2022-12-20 NOTE — PROGRESS NOTES
[address]   Cardiology Progress Note     Date: 12/20/2022  Admit Date: 12/16/2022     Reason for consultation: CHF, AF    Chief Complaint:   Chief Complaint   Patient presents with    Shortness of Breath     Pt states that he is having shortness of breath, sent by cardiology. Pt states Sob with walking. History of Present Illness: History obtained from patient and medical record. Verena Pickett is a 74 y.o. 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 Hx of IVC filter. Pt presented to hospital with worsening leg swelling and SOB. He was found to have new onset AF. Reported palpitations x several weeks. Interval Hx: Today, he is being seen for follow up. Feeling better today. Improvement in activity tolerance and more energy. Continues with LE, much improved. No new complaints today. No major events overnight. Denies having chest pain, palpitations, shortness of breath, orthopnea  or dizziness. Patient seen and examined. Clinical notes reviewed. Telemetry reviewed. Allergies:  No Known Allergies    Home Meds:  Prior to Visit Medications    Medication Sig Taking?  Authorizing Provider   furosemide (LASIX) 40 MG tablet Take 1 tablet by mouth daily Yes Moy Gómez MD   tamsulosin (FLOMAX) 0.4 MG capsule Take 1 capsule by mouth daily Yes Moy Gómez MD   metoprolol tartrate (LOPRESSOR) 50 MG tablet Take 1 tablet by mouth 2 times daily Yes Moy Gómez MD   atorvastatin (LIPITOR) 40 MG tablet TAKE ONE TABLET BY MOUTH EVERY NIGHT  Ashely Tejeda, APRN - NP   Blood Pressure KIT 1 kit by Does not apply route daily  KRISTYN Shirley - CNP   metFORMIN (GLUCOPHAGE) 500 MG tablet TAKE 1 TABLET BY MOUTH 2 TIMES A DAY WITH MEALS  Hakeem Griffin MD   blood glucose monitor strips 1 strip by Other route daily True Metric Test strips  KRISTYN Shirley - CNP   Lancets MISC True Metric Lancets  Naa Hamlin APRN - CNP   warfarin (COUMADIN) 5 MG tablet TAKE 1 AND 1/2 TABLETS BY MOUTH DAILY  Patient taking differently: TAKE 1/2 TABLETS BY MOUTH DAILY  NALINI Ch   Blood Glucose Monitoring Suppl AGUSTO 1 kit by Does not apply route daily True Metric testing device  Alyse Mercedes MD   Methylsulfonylmethane (MSM PO) Take by mouth  Historical Provider, MD   Prenatal MV-Min-Fe Fum-FA-DHA (PRENATAL 1 PO) Take by mouth  Historical Provider, MD      Scheduled Meds:   tamsulosin  0.4 mg Oral Daily    warfarin  5 mg Oral Once    furosemide  40 mg Oral Daily    metoprolol tartrate  25 mg Oral 4 times per day    atorvastatin  40 mg Oral Daily    sodium chloride flush  5-40 mL IntraVENous 2 times per day    warfarin placeholder: dosing by pharmacy   Other RX Placeholder     Continuous Infusions:   dilTIAZem Stopped (12/17/22 1729)    sodium chloride       PRN Meds:sodium chloride flush, sodium chloride, ondansetron **OR** ondansetron, polyethylene glycol, acetaminophen **OR** acetaminophen     Past Medical History:  Past Medical History:   Diagnosis Date    Cerebrovascular disease     CHF (congestive heart failure) ([facility] [address] 04/2017    per  nurse report but wife is unaware    DM (diabetes mellitus screen) 04/2017    boarder line    Hyperlipidemia     Hypertension     Type 2 diabetes mellitus without complication ([facility] [address]       Past Surgical History:    has a past surgical history that includes Vasectomy. Social History:  Reviewed. reports that he quit smoking about 5 years ago. His smoking use included cigarettes. He has a 80.00 pack-year smoking history. He quit smokeless tobacco use about 5 years ago. He reports that he does not drink alcohol and does not use drugs. Family History:  Reviewed. family history includes Cancer in his father and mother; Depression in his sister; High Blood Pressure in his mother.      Review of Systems:  Limited by aphasia    Physical Examination:  Vitals:    12/20/22 0830   BP: 104/70 Pulse: 71   Resp: 16   Temp: 97.8 °F (36.6 °C)   SpO2: 96%      In: 720 [P.O.:720]  Out: 2855    Wt Readings from Last 3 Encounters:   12/20/22 250 lb 12.8 oz (113.8 kg)   12/16/22 288 lb (130.6 kg)   09/02/22 263 lb 6.4 oz (119.5 kg)       Intake/Output Summary (Last 24 hours) at 12/20/2022 1051  Last data filed at 12/20/2022 0850  Gross per 24 hour   Intake 720 ml   Output 1905 ml   Net -1185 ml         Telemetry: Personally Reviewed  - SR  Constitutional: Cooperative and in no apparent distress, and appears well nourished  Skin: Warm and pink; no pallor, cyanosis, bruising, or clubbing  HEENT: Symmetric and normocephalic. PERRL, EOM intact. Conjunctiva pink with clear sclera. Mucus membranes pink and moist. Teeth intact. Thyroid smooth without nodules or goiter. Cardiovascular: regular rate and rhythm. S1/S2 present without murmurs, rubs, or gallops. Peripheral pulses 2+, capillary refill < 3 seconds. 1+ BLE edema  Respiratory: Respirations symmetric and unlabored. Lungs clear to auscultation bilaterally, no wheezing, crackles, or rhonchi   Gastrointestinal: Abdomen soft and round. Bowel sounds normoactive in all quadrants without tenderness or masses. Musculoskeletal: Bilateral upper and lower extremity strength 5/5 with full ROM  Neurologic/Psych: Awake and orientated to person, place and time. Calm affect, appropriate mood. + Aphasia    Pertinent labs, diagnostic, device, and imaging results reviewed as a part of this visit    Labs:    BMP:   Recent Labs     12/18/22  0701 12/19/22  0553 12/20/22  0450    148* 146*   K 3.8 3.7 3.7    108 106   CO2 27 32 29   BUN 36* 31* 29*   CREATININE 2.1* 1.9* 1.8*       Estimated Creatinine Clearance: 53 mL/min (A) (based on SCr of 1.8 mg/dL (H)).    CBC:   Recent Labs     12/18/22  0701 12/19/22  0553 12/20/22  0450   WBC 6.9 6.8 9.3   HGB 11.6* 11.8* 12.4*   HCT 35.5* 34.9* 37.2*   MCV 97.5 97.4 98.4    195 198       Thyroid: No results found for: TSH, N4JOYCV, M8OLRQR, THYROIDAB  Lipids:   Lab Results   Component Value Date/Time    CHOL 103 03/10/2022 08:55 AM    HDL 48 03/10/2022 08:55 AM    TRIG 61 03/10/2022 08:55 AM     LFTS:   Lab Results   Component Value Date/Time    ALT 26 2022 04:02 PM    AST 31 2022 04:02 PM    ALKPHOS 80 2022 04:02 PM    PROT 6.6 2022 04:02 PM    AGRATIO 1.3 2022 04:02 PM    BILITOT 1.2 2022 04:02 PM     Cardiac Enzymes:   Lab Results   Component Value Date/Time    TROPONINI 0.04 2022 08:14 PM    TROPONINI 0.05 2022 04:02 PM    TROPONINI 0.06 2017 07:22 AM     Coags:   Lab Results   Component Value Date/Time    PROTIME 31.2 2022 04:50 AM    INR 2.98 2022 04:50 AM     EC22 (Personally Interpreted)   - Atrial fibrillation with RVR    ECHO: 2022   Summary   Left ventricle is dilated. Overall, left ventricular systolic function is moderately depressed. Ejection fraction is visually estimated to be 30-35%. (Walters's 35%)   There is global hypokinesis, more severe in the inferior/inferoseptal walls. Diastolic dysfunction-indeterminate grade. Moderate, posteriorly directed mitral regurgitation. Moderate tricuspid regurgitation. PASP estimated at 40 mmHg. Biatrial enlargement. 2017  - Left ventricle: Systolic function was probably normal. Images were inadequate for LV wall motion assessment.   - Right ventricle: The cavity size was dilated. Wall thickness was increased. Systolic function was moderately reduced by objective     interpretation. TAPSE: 1.3cm. Tricuspid annular systolic velocity: 3.5SG/K.   - Pulmonary arteries: Systolic pressure was moderately increased, = 50mm Hg assuming that the right atrial pressure was 15 mmHg. Stress Test: None    CXR: 22  1. Small to moderate right pleural effusion and right basilar airspace   consolidation that could reflect atelectasis or pneumonia. 2. Cardiomegaly without evidence of CHF. Problem List:   Patient Active Problem List    Diagnosis Date Noted    Elevated troponin 12/18/2022    History of DVT in adulthood 12/18/2022    Acute renal failure ([facility] [address] 12/17/2022    Acute congestive heart failure ([facility] [address] 12/17/2022    Paroxysmal atrial fibrillation ([address] 12/17/2022    Atrial fibrillation ([address] 12/16/2022    Bilateral carotid artery stenosis 08/21/2020    History of stroke 01/10/2020    Controlled type 2 diabetes mellitus with diabetic polyneuropathy, without long-term current use of insulin ([address] 01/19/2018    Essential hypertension 10/20/2017    Right homonymous hemianopsia 07/25/2017    Hemiparesis affecting right side as late effect of cerebrovascular accident ([address] 07/25/2017    Mixed hyperlipidemia 06/21/2017    Long term current use of anticoagulant therapy 06/21/2017    Peripheral vascular disease ([address] 06/06/2017    CVA (cerebral vascular accident) ([address] 05/03/2017    Pulmonary embolism ([facility] 05/03/2017    DVT (deep venous thrombosis) ([facility] 05/03/2017        Assessment and Plan:   Atrial Fibrillation  - New onset of unknown duration, complains of palpitaitons x several weeks  - Currently in AF rate 100-110's  - Continue metoprolol 25 mg QID  - [id number] score:high ; [id number] Vasc score and anticoagulation discussed. High risk for stroke and thromboembolism. Anticoagulation is recommended. Risk of bleeding was discussed.  ~ On Coumadin (Was on previously for hx of DVT/PE)    - Afib risk factors including age, HTN, obesity, inactivity and YAHAIRA were discussed with patient. Risk factor modification recommended   ~ TSH: will check       - Treatment options including cardioversion, rate control strategy, antiarrhythmics, anticoagulation and possible ablation were discussed with patient. Risks, benefits and alternative of each treatment options were explained. All questions answered    ~ S/p DCCV 12/19/2022.  Remains in SR    Acute Systolic  heart failure (NYHA Class III)  - Appears compensated, now on PO lasix, continues with BLE edema  - Continue with BB  - On PO lasix  - Monitor I&O's, Daily weights    Elevated Troponin   - mildly elevated in setting of renal insufficiency and fluid overload    Cardiomyopathy   - New onset   - Etiology? Will need ischemic evaluation and can be tachycardia induced   - Needs GDMT and if EF remains low after full workup then AICD for primary prevention is recommended. SANDY on CKD   - Remains elevated   - Monitor closely with diuresis    Hx of DVT   - On coumadin    Hx of CVA   - Aphasia at baseline  - On coumadin, statin    - Will have HF/IC evaluate with new onset CMP and recommendations for GDMT  - EP will follow peripherally, follow up in 1-2 months with EP, if AF aggressive management with AAD therapy or ablation is recommended    Multiple medical conditions with risk of decompensation. All pertinent information and plan of care discussed with the rounding physician. All questions and concerns were addressed to the patient. Alternatives to my treatment were discussed. I have discussed the above stated plan with patient and the nurse. The patient verbalized understanding and agreed with the plan. Thank you for allowing to us to participate in the care of Lali Ruiz    The patient was seen for >35 minutes. I reviewed interval history, physical exam, review of data including labs, imaging, development and implementation of treatment plan and coordination of complex care.  More than 50% of the time was devoted to counseling the patient on their diagnoses/treatments, as well as coordination of care with the other care teams    KRISTYN Ramos-CNP  [facility]   Office: [phone]

## 2022-12-20 NOTE — PROGRESS NOTES
[address] Department   Phone: [phone]    Physical Therapy    [x] Initial Evaluation            [] Daily Treatment Note         [x] Discharge Summary      Patient: Mahi Lindsey   : 1955   MRN: [id number]   Date of Service:  2022  Admitting Diagnosis: Atrial fibrillation with RVR [facility])  Current Admission Summary: Pt states that he is having shortness of breath, sent by cardiology. Pt states Sob with walking  Past Medical History:  has a past medical history of Cerebrovascular disease, CHF (congestive heart failure) ([facility] [address] DM (diabetes mellitus screen), Hyperlipidemia, Hypertension, and Type 2 diabetes mellitus without complication ([facility] [address] Past Surgical History:  has a past surgical history that includes Vasectomy. Discharge Recommendations: Mahi Lindsey scored a 19/ on the AM-PAC short mobility form. At this time, no further PT is recommended upon discharge due to pt at baseline for functional mobility. Recommend patient returns to prior setting with prior services. DME Required For Discharge: no DME required at discharge  Precautions/Restrictions: high fall risk  Weight Bearing Restrictions: no restrictions  [] Right Upper Extremity  [] Left Upper Extremity [] Right Lower Extremity  [] Left Lower Extremity     Required Braces/Orthotics: no braces required   [] Right  [] Left  Positional Restrictions:no positional restrictions    Pre-Admission Information   Lives With: spouse    Type of Home: apartment  Home Layout: one level, 3rd floor, 3 flights of steps up  Home Access:  25 step to enter with handrail. Handrails are located on B side.   Bathroom Layout: tub/shower unit    Toilet Height: standard height  Home Equipment: quad cane  Transfer Assistance: Independent without use of device  Ambulation Assistance:Independent without use of device  ADL Assistance: independent with all ADL's  IADL Assistance:  shared responsibility for cooking/cleaning  Active :        [] Yes  [x] No spouse  Hand Dominance: [] Left  [x] Right    Hobbies:   Recent Falls: 1 fall about 2 months ago  Spouse at home to assist if needed. Questionable historian secondary to expressive aphasia, does better with yes/no questions  Examination   Vision:   Vision Gross Assessment: WFL  Hearing:   WFL  Observation:   Edema: Edema located in (B) LE. Edema Level: non pitting edema  Posture:   Rounded shoulders  Sensation:   denies numbness and tingling  Proprioception:    diminished proprioception in (B) LE  Tone:   Normotonic  Coordination Testing:   WFL    ROM:   (B) LE AROM WFL  Strength:   (B) LE strength grossly WFL  Therapist Clinical Decision Making (Complexity): low complexity  Clinical Presentation: stable      Subjective  General: patient supine at start of session, agreeable to therapy this morning  Pain: 0/10  Pain Interventions: not applicable       Functional Mobility  Bed Mobility  Supine to Sit: supervision  Scooting: supervision  Comments:  Transfers  Sit to stand transfer: supervision  Stand to sit transfer: supervision  Comments:  Ambulation  Surface:level surface  Assistive Device: no device  Assistance: supervision  Distance: 150' x 1  Gait Mechanics: wide DEE DEE, decreased step length, steady with no LOB  Comments:    Stair Mobility  Number of Steps: 12  Hand Rails: (L) ascending handrail  Assistance: supervision  Comments:  Wheelchair Mobility:  No w/c mobility completed on this date.   Comments:  Balance  Static Sitting Balance: good: independent with functional balance in unsupported position  Dynamic Sitting Balance: fair (+): maintains balance at SBA/supervision without use of UE support  Static Standing Balance: fair: maintains balance at CGA without use of UE support  Dynamic Standing Balance: fair: maintains balance at CGA without use of UE support  Comments: standing balance at sink to complete ADL's with supervision    Other Therapeutic Interventions    Functional Outcomes  AM-PAC Inpatient Mobility Raw Score : 19              Cognition  Overall Cognitive Status: WFL  Orientation:    oriented to person  Command Following:   [facility]    Education  Barriers To Learning: language  Patient Education: patient educated on PT role and benefits  Learning Assessment:  patient verbalizes understanding, would benefit from continued reinforcement    Assessment  Activity Tolerance: mild SOB after ambulation  Impairments Requiring Therapeutic Intervention: none - eval with same day discharge  Prognosis: good  Clinical Assessment: patient currently presents at baseline with all functional mobility, no skilled therapy needs at this time. Safety Interventions: patient left in chair, chair alarm in place, call light within reach, and nurse notified    Plan  Frequency: Eval with same day discharge. No follow up required. Current Treatment Recommendations: not applicable, evaluation completed with same day discharge. Goals    Short Term Goals:  Time Frame:   Patient eval with same day discharge. No goals set as patient is at baseline mobility status.       Therapy Session Time      Individual Group Co-treatment   Time In     0902   Time Out     0956   Minutes     54     Timed Code Treatment Minutes:  39 Minutes  Total Treatment Minutes:  47       Electronically Signed By: Aida Mercado [id number]

## 2022-12-20 NOTE — PROGRESS NOTES
[facility] Daily Progress Note      Admit Date:  12/16/2022    ASSESSMENT AND PLAN:  Acute systolic heart failure LVEF 30-35%  Elevated troponin  New onset AF now s/p DCCV to sinus rhythm  ASNDY  Prior CVA  H/O DVT on coumadin    Plan  I discussed GDMT options with nephrologist Dr. Frances Ferrer and primary hospitalist Dr. Shekhar Max. Will start low-dose Entresto now and hold off on starting SGTL2 or MRA pending repeat evaluation of kidney function labs tomorrow    Already on beta blocker - will change to metoprolol succinate daily    Might have coronary disease as an etiology; no sympotms of angina. Will screen with Lexiscan tomorrow. Avoid iodine contrast or other nephropathic agents. Continue warfarin    Subjective:  Mr. Celso Johnson feels fine. No chest pain or shortness of breath. Had DCCV yesterday, now in sinus. Can't tell the difference. Feels the same as before DCCV. He has not been peeing much and found to have urinary retention. Awaiting urology consult.     Objective:   /70   Pulse 71   Temp 97.8 °F (36.6 °C)   Resp 16   Ht 6' (1.829 m)   Wt 250 lb 12.8 oz (113.8 kg)   SpO2 96%   BMI 34.01 kg/m²     Intake/Output Summary (Last 24 hours) at 12/20/2022 1138  Last data filed at 12/20/2022 1582  Gross per 24 hour   Intake 720 ml   Output 1905 ml   Net -1185 ml       TELEMETRY: Sinus     Physical Exam:  General:  Awake, alert, oriented x 3, NAD  Skin:  Warm and dry  Neck:  JVD none  Chest:  normal air entry  Cardiovascular:  RRR S1S2, no S3, no murmur  Abdomen:  Soft, ND, NT, No HSM  Extremities:  No edema    Medications:    tamsulosin  0.4 mg Oral Daily    warfarin  5 mg Oral Once    furosemide  40 mg Oral Daily    metoprolol tartrate  25 mg Oral 4 times per day    atorvastatin  40 mg Oral Daily    sodium chloride flush  5-40 mL IntraVENous 2 times per day    warfarin placeholder: dosing by pharmacy   Other RX Placeholder      dilTIAZem Stopped (12/17/22 0452)    sodium chloride sodium chloride flush, sodium chloride, ondansetron **OR** ondansetron, polyethylene glycol, acetaminophen **OR** acetaminophen    Lab Data:  CBC:   Recent Labs     12/18/22  0701 12/19/22  0553 12/20/22  0450   WBC 6.9 6.8 9.3   HGB 11.6* 11.8* 12.4*   HCT 35.5* 34.9* 37.2*   MCV 97.5 97.4 98.4    195 198     BMP:   Recent Labs     12/18/22  0701 12/19/22  0553 12/20/22  0450    148* 146*   K 3.8 3.7 3.7    108 106   CO2 27 32 29   BUN 36* 31* 29*   CREATININE 2.1* 1.9* 1.8*     LIVER PROFILE: No results for input(s): AST, ALT, LIPASE, BILIDIR, BILITOT, ALKPHOS in the last 72 hours. Invalid input(s): AMYLASE,  ALB  PT/INR:   Recent Labs     12/18/22  0701 12/19/22  0553 12/20/22  0450   PROTIME 36.7* 33.9* 31.2*   INR 3.67* 3.31* 2.98*     APTT: No results for input(s): APTT in the last 72 hours. BNP:    Lab Results   Component Value Date/Time    PROBNP 20,155 12/16/2022 04:02 PM       TROP:   Troponin   Date/Time Value Ref Range Status   12/16/2022 08:14 PM 0.04 (H) <0.01 ng/mL Final     Comment:     Methodology by Troponin T   12/16/2022 04:02 PM 0.05 (H) <0.01 ng/mL Final     Comment:     Methodology by Troponin T   04/20/2017 07:22 AM 0.06 (H) <0.01 ng/mL Final     Comment:     Methodology by Troponin T         LIPIDS:   Lab Results   Component Value Date/Time    CHOL 103 03/10/2022 08:55 AM    TRIG 61 03/10/2022 08:55 AM    HDL 48 03/10/2022 08:55 AM    LDLCALC 41 03/10/2022 08:55 AM    LABVLDL 14 03/10/2022 08:55 AM         IMAGING:    Echo 12/19  Personally revieweed    Left ventricle is dilated. Overall, left ventricular systolic function is moderately depressed. Ejection fraction is visually estimated to be 30-35%. (Walters's 35%)  There is global hypokinesis, more severe in the inferior/inferoseptal walls. Diastolic dysfunction-indeterminate grade. Moderate, posteriorly directed mitral regurgitation. Moderate tricuspid regurgitation. PASP estimated at 40 mmHg.   Biatrial enlargement.       Iftikhar Reyes MD, MD 12/20/2022 11:38 AM

## 2022-12-20 NOTE — DISCHARGE INSTRUCTIONS
Nazia Dorantes PAYAL   Respiratory Therapist      Progress Notes       Signed   Date of Service:  12/20/2022  4:36 PM                 Signed                                                                                                                                           The Silver Sleepiness Scale        The Silver Sleepiness Scale is widely used in the field of sleep medicine as a subjective measure of a patient's sleepiness. The test is a list of eight situations in which you rate your tendency to become sleepy on a scale of 0, no chance to 3, high chance of dozing. Your score is based on a scale of 0 to 24. The scale estimates whether you are experiencing excessive sleepiness that possibly requires medical attention. How Sleepy Are You? How sleepy are you to doze off or fall asleep in the following situations? You should rate your chances of dozing off, not just feeling tired. Even if you have not done some of these things recently try to determine how they would have affected you.  For each situation, decide whether or not you would have:      0 = No chance of dozing         1 = Slight chance of dozing      2 = Moderate chance of  dozing         3 = High change of dozing                  Situation                                                                                                                                                                                                                                                       Chance of Dozing    Sitting and reading                                                                                                                            0 =  [x]  1 =    [] 2 =    [] 3 =    []     Watching TV                                                                                                                                     0 =  []  1 =    [x] 2 =    [] 3 =    [] Sitting inactive in public place (e.g., a theater or a meeting)                                                            0 =  [x]  1 =    [] 2 =    [] 3 =    []     As a passenger in a car for an hour without a break                                                                        0  =  [x]  1 =    [] 2 =    [] 3 =    []     Lying down to rest in the afternoon when circumstances permit                                                      0 =  []  1 =    [] 2 =    [] 3 =    [x]     Sitting and talking to someone                                                                                                          0 =  []  1 =    [x] 2 =    [] 3 =    []                            Sitting quietly after a lunch without alcohol                                                                                       0 =  []  1 =    [] 2 =    [] 3 =    [x]     In a car, while stopped for a few minutes in traffic                                                                            0 =  [x]  1 =    [] 2 =    [] 3 =    []     Total Score = 7     If your total score is 10 or greater, you are experiencing excessive sleepiness and should consider seeking a medical follow-up. Take a copy of this screening test to your primary care physician on your next office visit. Interpretation:       0 -   7: It is unlikely that you are abnormally sleepy. 8 -   9: You have an average amount of daytime sleepiness. 10 - 15: You may be excessively sleepy depending on the situation. You may want to consider seeking medical attention. 16 - 24: You are excessively sleepy and should consider seeking medical attention.        Electronically signed by Ben Culver RCP on 12/20/2022 at 4:36 PM

## 2022-12-20 NOTE — CONSULTS
[facility] FAILURE PROGRAM      Oleg Marquez 1955    History:  Past Medical History:   Diagnosis Date    Cerebrovascular disease     CHF (congestive heart failure) ([facility] [address] 04/2017    per  nurse report but wife is unaware    DM (diabetes mellitus screen) 04/2017    boarder line    Hyperlipidemia     Hypertension     Type 2 diabetes mellitus without complication ([facility] [address]        ECHO:  12/19/22   Summary   Left ventricle is dilated. Overall, left ventricular systolic function is moderately depressed. Ejection fraction is visually estimated to be 30-35%. (Walters's 35%)   There is global hypokinesis, more severe in the inferior/inferoseptal walls. Diastolic dysfunction-indeterminate grade. Moderate, posteriorly directed mitral regurgitation. Moderate tricuspid regurgitation. PASP estimated at 40 mmHg. Biatrial enlargement. ACE/ARB/ARNi: Patient not on- consider if not contraindicated   BB: metoprolol tartrate 25 mg bid-- consider evidence based beta blocker if not contraindicated  Aldosterone Antagonist: Patient not on- consider if not contraindicated   SGLT2: Patient not on- consider if not contraindicated         History of sleep apnea: No  [place] Screen ordered: Yes    DM History: Yes  Consult for DM educator: No      Last Hospital Admission: 4/20/17 with cva  Code Status: full   Discharge plans:from home    Family Present: yes, wife    Oleg Marquez was admitted to the hospital with increased shortness of breath and edema. Patient states HF is new diagnosis. Was found to have HFrEF. Patient does have a scale at home. Gained 30 lb over several weeks. Patient does not add salt to season or flavor foods. Wife states they use half-salt. They eat at home most often. He is compliant with medications and follow ups.     Patient provided with both written and verbal education on CHF signs/ symptoms, causes, discharge medications, daily weights, low sodium diet, activity, and follow-up. Pt to call if gains 3 pounds in one day or 5 pounds in one week. Mutually agreed upon goals were discussed such as calling the MD as soon as they recognize symptoms and weight gain, maintaining proper diet, taking medications as prescribed, joining cardiac rehab when able. Also reviewed importance of risk factor reduction. Patient provided with CHF Zone Management tool and CHF symptoms magnet. Discussed importance of lifestyle changes: encourage daily weights    PATIENT/CAREGIVER TEACHING:    Level of patient/caregiver understanding able to:   [x ] Verbalize understanding [ ] Demonstrate understanding [ ] Teach back   [ ] Needs reinforcement [ ] Other:       Time spent teachin mins    1. WEIGHT: Admit Weight: 280 lb 3.2 oz (127.1 kg)      Today  Weight: 250 lb 12.8 oz (113.8 kg)   2. I/O   Intake/Output Summary (Last 24 hours) at 2022 1156  Last data filed at 2022 1155  Gross per 24 hour   Intake 720 ml   Output 2775 ml   Net -2055 ml       Recommendations:   1. Patient needs one week hospital follow up within a week of discharge. 2. Educate further on fluid restriction 48 oz- 64 oz during inpatient stay so he can understand how to measure intake at home. 3. Continue to educate on S/S.   4. Emphasize daily weights, diet, and knowing when and who to call  5. Provided patient with CHF Resource Line for questions and concerns. 6. Patient would benefit from cardiac rehab as an outpatient. Flyer provided.        NIEVES MARTINEZ RN 2022 11:56 AM

## 2022-12-20 NOTE — PROGRESS NOTES
The [place] Sleepiness Scale       The [place] Sleepiness Scale is widely used in the field of sleep medicine as a subjective measure of a patient's sleepiness. The test is a list of eight situations in which you rate your tendency to become sleepy on a scale of 0, no chance to 3, high chance of dozing. Your score is based on a scale of 0 to 24. The scale estimates whether you are experiencing excessive sleepiness that possibly requires medical attention. How Sleepy Are You? How sleepy are you to doze off or fall asleep in the following situations? You should rate your chances of dozing off, not just feeling tired. Even if you have not done some of these things recently try to determine how they would have affected you.  For each situation, decide whether or not you would have:     0 = No chance of dozing 1 = Slight chance of dozing   2 = Moderate chance of  dozing 3 = High change of dozing       Situation                                                                                     Chance of Dozing    Sitting and reading  0 =  [x]  1 =    [] 2 =    [] 3 =    []    Watching TV  0 =  []  1 =    [x] 2 =    [] 3 =    []      Sitting inactive in public place (e.g., a theater or a meeting)  0 =  [x]  1 =    [] 2 =    [] 3 =    []    As a passenger in a car for an hour without a break          0  =  [x]  1 =    [] 2 =    [] 3 =    []    Lying down to rest in the afternoon when circumstances permit    0 =  []  1 =    [] 2 =    [] 3 =    [x]    Sitting and talking to someone  0 =  []  1 =    [x] 2 =    [] 3 =    []      Sitting quietly after a lunch without alcohol  0 =  []  1 =    [] 2 =    [] 3 =    [x]    In a car, while stopped for a few minutes in traffic                                                                      0 =  [x]  1 =    [] 2 =    [] 3 =    []    Total Score = 7    If your total score is 10 or greater, you are experiencing excessive sleepiness and should consider seeking a medical follow-up. Take a copy of this screening test to your primary care physician on your next office visit. Interpretation:      0 -   7: It is unlikely that you are abnormally sleepy. 8 -   9: You have an average amount of daytime sleepiness. 10 - 15: You may be excessively sleepy depending on the situation. You may want to consider seeking medical attention. 16 - 24: You are excessively sleepy and should consider seeking medical attention.       Electronically signed by Lord Josafat RCP on 12/20/2022 at 4:36 PM

## 2022-12-20 NOTE — PROGRESS NOTES
[facility]    12/20/2022 9:20 AM        Name: Clair Salcedo . Admitted: 12/16/2022  Primary Care Provider: KRISTYN Obando CNP (Tel: [phone])                        Subjective:  . No acute events overnight. Resting well. Pain control. Diet ok. Labs reviewed  Denies any chest pain sob. Reviewed interval ancillary notes    Current Medications  tamsulosin (FLOMAX) capsule 0.4 mg, Daily  furosemide (LASIX) tablet 40 mg, Daily  metoprolol tartrate (LOPRESSOR) tablet 25 mg, 4 times per day  dilTIAZem (CARDIZEM) 125 mg in dextrose 5% 125 mL infusion (premix), Continuous  atorvastatin (LIPITOR) tablet 40 mg, Daily  sodium chloride flush 0.9 % injection 5-40 mL, 2 times per day  sodium chloride flush 0.9 % injection 5-40 mL, PRN  0.9 % sodium chloride infusion, PRN  ondansetron (ZOFRAN-ODT) disintegrating tablet 4 mg, Q8H PRN   Or  ondansetron (ZOFRAN) injection 4 mg, Q6H PRN  polyethylene glycol (GLYCOLAX) packet 17 g, Daily PRN  acetaminophen (TYLENOL) tablet 650 mg, Q6H PRN   Or  acetaminophen (TYLENOL) suppository 650 mg, Q6H PRN  warfarin placeholder: dosing by pharmacy, RX Placeholder      Objective:  /70   Pulse 71   Temp 97.8 °F (36.6 °C)   Resp 16   Ht 6' (1.829 m)   Wt 250 lb 12.8 oz (113.8 kg)   SpO2 96%   BMI 34.01 kg/m²     Intake/Output Summary (Last 24 hours) at 12/20/2022 0920  Last data filed at 12/20/2022 0850  Gross per 24 hour   Intake 720 ml   Output 2855 ml   Net -2135 ml      Wt Readings from Last 3 Encounters:   12/20/22 250 lb 12.8 oz (113.8 kg)   12/16/22 288 lb (130.6 kg)   09/02/22 263 lb 6.4 oz (119.5 kg)       General appearance:  Appears comfortable  Eyes: Sclera clear. Pupils equal.  ENT: Moist oral mucosa. Trachea midline, no adenopathy. Cardiovascular: Regular rhythm, normal S1, S2. No murmur.  No edema in lower extremities  Respiratory: Not using accessory muscles. Good inspiratory effort. Clear to auscultation bilaterally, no wheeze or crackles. GI: Abdomen soft, no tenderness, not distended, normal bowel sounds  Musculoskeletal: No cyanosis in digits, neck supple  Neurology: CN 2-12 grossly intact. No speech or motor deficits  Psych: Normal affect. Alert and oriented in time, place and person  Skin: Warm, dry, normal turgor    Labs and Tests:  CBC:   Recent Labs     12/18/22  0701 12/19/22  0553 12/20/22  0450   WBC 6.9 6.8 9.3   HGB 11.6* 11.8* 12.4*    195 198     BMP:    Recent Labs     12/18/22  0701 12/19/22  0553 12/20/22  0450    148* 146*   K 3.8 3.7 3.7    108 106   CO2 27 32 29   BUN 36* 31* 29*   CREATININE 2.1* 1.9* 1.8*   GLUCOSE 109* 113* 120*     Hepatic:   No results for input(s): AST, ALT, ALB, BILITOT, ALKPHOS in the last 72 hours. Discussed care with patient             Problem List  Principal Problem:    Atrial fibrillation ([address]  Active Problems:    Acute renal failure (HCC)    Acute congestive heart failure (HCC)    Paroxysmal atrial fibrillation (HCC)    Elevated troponin    History of DVT in adulthood    History of stroke  Resolved Problems:    * No resolved hospital problems. *       Assessment & Plan:   A. fib with RVR  -Likely multifactorial with underlying CHF and also with renal failure  -Patient is status post cardioversion  -Currently stable. Acute on chronic CHF  Now on p.o. Lasix      Acute renal failure  -Down to 1.8 stable      Diet: ADULT DIET;  Regular; Low Fat/Low Chol/High Fiber/2 gm Na  Code:Full Code  DVT PPX lovenox   Dispo plan for discharge today       Nam Pineda MD   12/20/2022 9:20 AM

## 2022-12-21 LAB
ANION GAP SERPL CALCULATED.3IONS-SCNC: 11 MMOL/L (ref 3–16)
BUN BLDV-MCNC: 35 MG/DL (ref 7–20)
CALCIUM SERPL-MCNC: 8.8 MG/DL (ref 8.3–10.6)
CHLORIDE BLD-SCNC: 105 MMOL/L (ref 99–110)
CO2: 29 MMOL/L (ref 21–32)
CREAT SERPL-MCNC: 2.3 MG/DL (ref 0.8–1.3)
FERRITIN: 364.5 NG/ML (ref 30–400)
GFR SERPL CREATININE-BSD FRML MDRD: 31 ML/MIN/{1.73_M2}
GLUCOSE BLD-MCNC: 118 MG/DL (ref 70–99)
INR BLD: 2.9 (ref 0.87–1.14)
IRON SATURATION: 20 % (ref 20–50)
IRON: 41 UG/DL (ref 59–158)
POTASSIUM SERPL-SCNC: 3.9 MMOL/L (ref 3.5–5.1)
PROTHROMBIN TIME: 30.5 SEC (ref 11.7–14.5)
SODIUM BLD-SCNC: 145 MMOL/L (ref 136–145)
TOTAL IRON BINDING CAPACITY: 204 UG/DL (ref 260–445)

## 2022-12-21 PROCEDURE — 1200000000 HC SEMI PRIVATE

## 2022-12-21 PROCEDURE — 36415 COLL VENOUS BLD VENIPUNCTURE: CPT

## 2022-12-21 PROCEDURE — 2580000003 HC RX 258: Performed by: FAMILY MEDICINE

## 2022-12-21 PROCEDURE — 3430000000 HC RX DIAGNOSTIC RADIOPHARMACEUTICAL: Performed by: INTERNAL MEDICINE

## 2022-12-21 PROCEDURE — 78452 HT MUSCLE IMAGE SPECT MULT: CPT

## 2022-12-21 PROCEDURE — 80048 BASIC METABOLIC PNL TOTAL CA: CPT

## 2022-12-21 PROCEDURE — 6370000000 HC RX 637 (ALT 250 FOR IP): Performed by: FAMILY MEDICINE

## 2022-12-21 PROCEDURE — 6370000000 HC RX 637 (ALT 250 FOR IP): Performed by: HOSPITALIST

## 2022-12-21 PROCEDURE — 51798 US URINE CAPACITY MEASURE: CPT

## 2022-12-21 PROCEDURE — 93017 CV STRESS TEST TRACING ONLY: CPT | Performed by: INTERNAL MEDICINE

## 2022-12-21 PROCEDURE — 83550 IRON BINDING TEST: CPT

## 2022-12-21 PROCEDURE — 85610 PROTHROMBIN TIME: CPT

## 2022-12-21 PROCEDURE — A9502 TC99M TETROFOSMIN: HCPCS | Performed by: INTERNAL MEDICINE

## 2022-12-21 PROCEDURE — 82728 ASSAY OF FERRITIN: CPT

## 2022-12-21 PROCEDURE — 6370000000 HC RX 637 (ALT 250 FOR IP): Performed by: NURSE PRACTITIONER

## 2022-12-21 PROCEDURE — 6360000002 HC RX W HCPCS: Performed by: INTERNAL MEDICINE

## 2022-12-21 PROCEDURE — 83540 ASSAY OF IRON: CPT

## 2022-12-21 RX ORDER — FINASTERIDE 5 MG/1
5 TABLET, FILM COATED ORAL DAILY
Qty: 30 TABLET | Refills: 3 | Status: SHIPPED | OUTPATIENT
Start: 2022-12-21

## 2022-12-21 RX ORDER — WARFARIN SODIUM 5 MG/1
5 TABLET ORAL
Status: DISCONTINUED | OUTPATIENT
Start: 2022-12-23 | End: 2022-12-26 | Stop reason: HOSPADM

## 2022-12-21 RX ORDER — METOPROLOL SUCCINATE 100 MG/1
100 TABLET, EXTENDED RELEASE ORAL DAILY
Qty: 30 TABLET | Refills: 3 | Status: SHIPPED | OUTPATIENT
Start: 2022-12-21 | End: 2022-12-23 | Stop reason: HOSPADM

## 2022-12-21 RX ORDER — WARFARIN SODIUM 2.5 MG/1
2.5 TABLET ORAL
Status: DISCONTINUED | OUTPATIENT
Start: 2022-12-22 | End: 2022-12-26 | Stop reason: HOSPADM

## 2022-12-21 RX ORDER — WARFARIN SODIUM 2.5 MG/1
2.5 TABLET ORAL
Status: COMPLETED | OUTPATIENT
Start: 2022-12-21 | End: 2022-12-21

## 2022-12-21 RX ADMIN — TETROFOSMIN 10 MILLICURIE: 1.38 INJECTION, POWDER, LYOPHILIZED, FOR SOLUTION INTRAVENOUS at 08:50

## 2022-12-21 RX ADMIN — ATORVASTATIN CALCIUM 40 MG: 40 TABLET, FILM COATED ORAL at 20:58

## 2022-12-21 RX ADMIN — Medication 10 ML: at 20:58

## 2022-12-21 RX ADMIN — TAMSULOSIN HYDROCHLORIDE 0.4 MG: 0.4 CAPSULE ORAL at 12:48

## 2022-12-21 RX ADMIN — WARFARIN SODIUM 2.5 MG: 2.5 TABLET ORAL at 16:25

## 2022-12-21 RX ADMIN — Medication 10 ML: at 13:02

## 2022-12-21 RX ADMIN — TETROFOSMIN 30 MILLICURIE: 1.38 INJECTION, POWDER, LYOPHILIZED, FOR SOLUTION INTRAVENOUS at 09:57

## 2022-12-21 RX ADMIN — REGADENOSON 0.4 MG: 0.08 INJECTION, SOLUTION INTRAVENOUS at 09:54

## 2022-12-21 RX ADMIN — FINASTERIDE 5 MG: 5 TABLET, FILM COATED ORAL at 12:48

## 2022-12-21 ASSESSMENT — PAIN SCALES - GENERAL
PAINLEVEL_OUTOF10: 0
PAINLEVEL_OUTOF10: 0

## 2022-12-21 NOTE — PROGRESS NOTES
[facility]    12/21/2022 12:29 PM        Name: Imtiaz Escobar . Admitted: 12/16/2022  Primary Care Provider: KRISTYN Welch CNP (Tel: [phone])                        Subjective:  . No acute events overnight. Resting well. Pain control. Diet ok. Labs reviewed  Denies any chest pain sob.      Reviewed interval ancillary notes    Current Medications  [START ON 12/22/2022] warfarin (COUMADIN) tablet 2.5 mg, Once per day on Sun Tue Thu Sat  [START ON 12/23/2022] warfarin (COUMADIN) tablet 5 mg, Once per day on Mon Wed Fri  warfarin (COUMADIN) tablet 2.5 mg, Once  tamsulosin (FLOMAX) capsule 0.4 mg, Daily  [Held by provider] sacubitril-valsartan (ENTRESTO) 24-26 MG per tablet 1 tablet, BID  finasteride (PROSCAR) tablet 5 mg, Daily  metoprolol succinate (TOPROL XL) extended release tablet 100 mg, Daily  furosemide (LASIX) tablet 40 mg, Daily  atorvastatin (LIPITOR) tablet 40 mg, Daily  sodium chloride flush 0.9 % injection 5-40 mL, 2 times per day  sodium chloride flush 0.9 % injection 5-40 mL, PRN  0.9 % sodium chloride infusion, PRN  ondansetron (ZOFRAN-ODT) disintegrating tablet 4 mg, Q8H PRN   Or  ondansetron (ZOFRAN) injection 4 mg, Q6H PRN  polyethylene glycol (GLYCOLAX) packet 17 g, Daily PRN  acetaminophen (TYLENOL) tablet 650 mg, Q6H PRN   Or  acetaminophen (TYLENOL) suppository 650 mg, Q6H PRN      Objective:  /69   Pulse 71   Temp 98.1 °F (36.7 °C) (Oral)   Resp 16   Ht 6' (1.829 m)   Wt 255 lb (115.7 kg)   SpO2 96%   BMI 34.58 kg/m²     Intake/Output Summary (Last 24 hours) at 12/21/2022 1229  Last data filed at 12/21/2022 0453  Gross per 24 hour   Intake 240 ml   Output 1800 ml   Net -1560 ml      Wt Readings from Last 3 Encounters:   12/21/22 255 lb (115.7 kg)   12/16/22 288 lb (130.6 kg)   09/02/22 263 lb 6.4 oz (119.5 kg)       General appearance:  Appears comfortable  Eyes: Sclera clear. Pupils equal.  ENT: Moist oral mucosa. Trachea midline, no adenopathy. Cardiovascular: Regular rhythm, normal S1, S2. No murmur. No edema in lower extremities  Respiratory: Not using accessory muscles. Good inspiratory effort. Clear to auscultation bilaterally, no wheeze or crackles. GI: Abdomen soft, no tenderness, not distended, normal bowel sounds  Musculoskeletal: No cyanosis in digits, neck supple  Neurology: CN 2-12 grossly intact. No speech or motor deficits  Psych: Normal affect. Alert and oriented in time, place and person  Skin: Warm, dry, normal turgor    Labs and Tests:  CBC:   Recent Labs     12/19/22  0553 12/20/22  0450   WBC 6.8 9.3   HGB 11.8* 12.4*    198     BMP:    Recent Labs     12/19/22  0553 12/20/22  0450 12/21/22  0441   * 146* 145   K 3.7 3.7 3.9    106 105   CO2 32 29 29   BUN 31* 29* 35*   CREATININE 1.9* 1.8* 2.3*   GLUCOSE 113* 120* 118*     Hepatic:   No results for input(s): AST, ALT, ALB, BILITOT, ALKPHOS in the last 72 hours. Discussed care with patient             Problem List  Principal Problem:    Atrial fibrillation with RVR (HCC)  Active Problems:    Acute renal failure (HCC)    Acute congestive heart failure (HCC)    Paroxysmal atrial fibrillation (HCC)    Elevated troponin    History of DVT in adulthood    Cardiomyopathy ([facility] [address]    Acute systolic heart failure ([address]    History of stroke  Resolved Problems:    * No resolved hospital problems. *       Assessment & Plan:   A. fib with RVR  -Likely multifactorial with underlying CHF and also with renal failure  -Patient is status post cardioversion  -    Acute on chronic CHF  -Noted EF was low  -Plan for stress test.  -Patient diuretic dose been adjusted.     Acute kidney injury  -Creatinine slightly worsened from yesterday  -Holding Entresto today        Diet: Diet NPO  Code:Full Code  DVT PPX lovenox   Disposition-pending improvement in medical adjustment    Lovely Abel MD   12/21/2022 12:29 PM

## 2022-12-21 NOTE — PLAN OF CARE
Problem: Discharge Planning  Goal: Discharge to home or other facility with appropriate resources  Outcome: Progressing  Flowsheets (Taken 12/20/2022 2122)  Discharge to home or other facility with appropriate resources: Identify barriers to discharge with patient and caregiver     Problem: Pain  Goal: Verbalizes/displays adequate comfort level or baseline comfort level  Outcome: Progressing  Flowsheets (Taken 12/20/2022 2112)  Verbalizes/displays adequate comfort level or baseline comfort level:   Encourage patient to monitor pain and request assistance   Assess pain using appropriate pain scale   Administer analgesics based on type and severity of pain and evaluate response   Implement non-pharmacological measures as appropriate and evaluate response     Problem: Safety - Adult  Goal: Free from fall injury  Outcome: Progressing     Problem: ABCDS Injury Assessment  Goal: Absence of physical injury  Outcome: Progressing     Problem: Skin/Tissue Integrity  Goal: Absence of new skin breakdown  Description: 1. Monitor for areas of redness and/or skin breakdown  2. Assess vascular access sites hourly  3. Every 4-6 hours minimum:  Change oxygen saturation probe site  4. Every 4-6 hours:  If on nasal continuous positive airway pressure, respiratory therapy assess nares and determine need for appliance change or resting period.   Outcome: Progressing     Problem: Chronic Conditions and Co-morbidities  Goal: Patient's chronic conditions and co-morbidity symptoms are monitored and maintained or improved  Outcome: Progressing  Flowsheets (Taken 12/20/2022 2122)  Care Plan - Patient's Chronic Conditions and Co-Morbidity Symptoms are Monitored and Maintained or Improved:   Monitor and assess patient's chronic conditions and comorbid symptoms for stability, deterioration, or improvement   Collaborate with multidisciplinary team to address chronic and comorbid conditions and prevent exacerbation or deterioration   Update acute care plan with appropriate goals if chronic or comorbid symptoms are exacerbated and prevent overall improvement and discharge

## 2022-12-21 NOTE — PROGRESS NOTES
Nutrition Education    Briefly reviewed heart failure diet guidelines with pt and his wife. Wife reported pt follows low sodium diet s/p stroke, keeps fluids to 2.1 L daily, only adds salt to pasta/rice. Denied need for in-depth verbal education. Left handout with RD name and number if pt/wife have any questions regarding material. Time spent with patient: 5 minutes. Educated on 12/21  Learners: Patient and Significant Other  Readiness: Acceptance  Method: Handout  Response: Verbalizes Understanding  Contact name and number provided.     Tavia Chavez, MS, RD, LD  Contact Number: [phone]

## 2022-12-21 NOTE — PROGRESS NOTES
Pharmacy to Dose Warfarin    Pharmacy consulted to dose warfarin for Afib, hx DVT/PE. INR Goal: 2-3    INR today: 2.9    Assessment/Plan:  - Warfarin 2.5 mg x1, resume home regimen 5 mg MWF and 2.5 mg all other days tomorrow   - Possible concomitant drug-drug interactions include: N/A     Pharmacy will continue to follow.     Titi Cotton PharmD, BCPS  [id number]  12/21/2022 11:47 AM

## 2022-12-21 NOTE — PROGRESS NOTES
[facility]  HEART FAILURE  Progress Note      Admit Date 12/16/2022     Reason for Consult:      Reason for Consultation/Chief Complaint: SOB    HPI:    Anirudh Armstrong is a 74 y.o. male with PMH DVT/PE, DM, HTN , HLD, CVA, IVC filter, HFpEF admitted with edema and SOB. New onset AF and had DCCV yesterday. Echo with low EF and pt had positive ST today, will need LHC. HF meds started and pt has diuresed 16L, bump in Cr and entresto is on hold      Subjective:  Patient is being seen for CHF, CMP. There were no acute overnight cardiac events. Today Mr. Fany Harkins denies chest pain, shortness of breath, palpitations and is anxious for discharge. I spent 10 minutes educating the patient on heart failure, medications, lifestyle modifications and dietary guidelines. Review of Systems - History obtained from the patient  General ROS: negative  ENT ROS: negative  Allergy and Immunology ROS: negative  Hematological and Lymphatic ROS: negative  Respiratory ROS: no cough, shortness of breath, or wheezing  Cardiovascular ROS: no chest pain or dyspnea on exertion  Gastrointestinal ROS: no abdominal pain, change in bowel habits, or black or bloody stools  Musculoskeletal ROS: negative  Neurological ROS: no TIA or stroke symptoms  Dermatological ROS: negative     Baseline Weight:    Wt Readings from Last 3 Encounters:   12/21/22 255 lb (115.7 kg)   12/16/22 288 lb (130.6 kg)   09/02/22 263 lb 6.4 oz (119.5 kg)         Cardiac Testing:   Echo 12/19/22  Personally revieweed  Left ventricle is dilated. Overall, left ventricular systolic function is moderately depressed. Ejection fraction is visually estimated to be 30-35%. (Waltres's 35%)  There is global hypokinesis, more severe in the inferior/inferoseptal walls. Diastolic dysfunction-indeterminate grade. Moderate, posteriorly directed mitral regurgitation. Moderate tricuspid regurgitation. PASP estimated at 40 mmHg. Biatrial enlargement.   ST: 12/21/22:   Summary    The overall quality of the study is good. There is subdiaphragmatic    attenuation. Left ventricular cavity size is severely dilated. RIght ventricle is normal    in size. Spect imaging demonstrates a small area of mildly decreased perfusion in the    apex. The defect is present at rest and stress, consistent with infarct. There is an additional medium size defect of moderate intensity in the mid    anterior and mid anteroseptum. The defect is present at rest and stress,    consistent with infarct. Sum stress score of 8. No visual TID. Calculated TID of 1.01. Left ventricular ejection fraction is severely reduced at 25%. Sensitivity of testing is reduced on amlodipine. **Myocardial perfusion is abnormal. Fixed defect. Moderate-to-high risk    scan. Abnormal baseline EKG.**       Core measures for HF:  EF: 30-35%   ACEi/ARB/ARNI: entresto on hold  BB: Metoprolol succinate  Julio:  none for SANDY  Hydralazine/nitrates:  SGLT2i:  sandy    NYHA Class III      Objective:   /69   Pulse 71   Temp 98.1 °F (36.7 °C) (Oral)   Resp 16   Ht 6' (1.829 m)   Wt 255 lb (115.7 kg)   SpO2 96%   BMI 34.58 kg/m²     Intake/Output Summary (Last 24 hours) at 12/21/2022 1125  Last data filed at 12/21/2022 0453  Gross per 24 hour   Intake 480 ml   Output 2670 ml   Net -2190 ml      In: 960 [P.O.:960]  Out: 2720     TELEMETRY: SR    Physical Exam:  General Appearance:  Non-obese/Well Nourished  Respiratory:  Resp Auscultation: crackels  Cardiovascular:   Auscultation: Regular rate and rhythm, normal S1S2, no m/g/r/c  Palpation: Normal    Pedal Pulses: 2+ and equal   Abdomen:  Soft, NT, ND, + bs  Extremities:  No Cyanosis or Clubbing  Extremities: Trace edema  Neurological/Psychiatric:  Oriented to time, place, and person  Non-anxious    MEDICATIONS:   Scheduled Meds:   Scheduled Meds:   tamsulosin  0.4 mg Oral Daily    [Held by provider] sacubitril-valsartan  1 tablet Oral BID    finasteride  5 mg Oral Daily    metoprolol succinate  100 mg Oral Daily    furosemide  40 mg Oral Daily    atorvastatin  40 mg Oral Daily    sodium chloride flush  5-40 mL IntraVENous 2 times per day    warfarin placeholder: dosing by pharmacy   Other RX Placeholder     Continuous Infusions:   dilTIAZem Stopped (12/17/22 1729)    sodium chloride       PRN Meds:.sodium chloride flush, sodium chloride, ondansetron **OR** ondansetron, polyethylene glycol, acetaminophen **OR** acetaminophen  Continuous Infusions:   dilTIAZem Stopped (12/17/22 1729)    sodium chloride         Intake/Output Summary (Last 24 hours) at 12/21/2022 1125  Last data filed at 12/21/2022 0453  Gross per 24 hour   Intake 480 ml   Output 2670 ml   Net -2190 ml       Lab Data:  CBC:   Lab Results   Component Value Date/Time    WBC 9.3 12/20/2022 04:50 AM    HGB 12.4 12/20/2022 04:50 AM     12/20/2022 04:50 AM     BMP:  Lab Results   Component Value Date/Time     12/21/2022 04:41 AM    K 3.9 12/21/2022 04:41 AM    K 4.6 12/16/2022 04:02 PM     12/21/2022 04:41 AM    CO2 29 12/21/2022 04:41 AM    BUN 35 12/21/2022 04:41 AM    CREATININE 2.3 12/21/2022 04:41 AM    GLUCOSE 118 12/21/2022 04:41 AM     INR:   Lab Results   Component Value Date/Time    INR 2.90 12/21/2022 04:42 AM    INR 2.98 12/20/2022 04:50 AM    INR 3.31 12/19/2022 05:53 AM        CARDIAC LABS  ENZYMES:No results for input(s): CKMB, CKMBINDEX, TROPONINI in the last 72 hours.     Invalid input(s): CKTOTAL;3  FASTING LIPID PANEL:  Lab Results   Component Value Date/Time    HDL 48 03/10/2022 08:55 AM    LDLCALC 41 03/10/2022 08:55 AM    TRIG 61 03/10/2022 08:55 AM     LIVER PROFILE:  Lab Results   Component Value Date/Time    AST 31 12/16/2022 04:02 PM    AST 17 03/10/2022 08:55 AM    ALT 26 12/16/2022 04:02 PM    ALT 17 03/10/2022 08:55 AM     BNP:   Lab Results   Component Value Date/Time    PROBNP 20,155 12/16/2022 04:02 PM     Iron Studies:  No results found for: FERRITIN  No results found for: IRON, TIBC, FERRITIN   Iron Deficiency Anemia:  check iron studies IV Iron Therapy:    2017 ACC/AHA HF Guidelines:   intravenous iron replacement in patients with New York Heart Association (NYHA) class II and III HF and iron deficiency(ferritin <100 ng/ml or 100-300 ng/ml if transferrin saturation <20%), to improve functional status and QoL. 1. WEIGHT: Admit Weight: 280 lb 3.2 oz (127.1 kg)      Today  Weight: 255 lb (115.7 kg)   2. I/O   Intake/Output Summary (Last 24 hours) at 12/21/2022 1125  Last data filed at 12/21/2022 0453  Gross per 24 hour   Intake 480 ml   Output 2670 ml   Net -2190 ml           Assessment/Plan:     AHF- 16L out with diuresis, changed to po, bump in Cr today  CMP- low Ef, ST abnl today and pt will need LHC, INR and Cr too high now, will hold entresto and diuretics, recheck in AM, continue BB  AF- s/p DCCV, continue BB and AC  HTN- controlled       The patient was seen for 40minutes. I reviewed interval history, physical exam, review of data including labs, imaging, development and implementation of treatment plan and coordination of complex care.  More than 50% of the time was devoted to counseling the patient on their diagnoses/treatments, as well as coordination of care with the other care teams    I appreciate the opportunity of cooperating in the care of this individual.    Linnea Triplett, APRN - CNP, ACNP, AGPCNP 12/21/2022, 11:25 AM  Heart Failure  The [address], [address] [place] [address]  Ph: [phone]      Core Measures:   Discharge instructions:   LVEF documented:   ACEI for LV dysfunction:   Smoking Cessation:

## 2022-12-21 NOTE — PROGRESS NOTES
Instructed on Lexiscan Stress Test Procedure including possible side effects/ adverse reactions. Patient verbalizes  understanding and denies having any questions . See [address] [place] [address] Cardiology

## 2022-12-22 ENCOUNTER — TELEPHONE (OUTPATIENT)
Dept: FAMILY MEDICINE CLINIC | Age: 67
End: 2022-12-22

## 2022-12-22 LAB
ANION GAP SERPL CALCULATED.3IONS-SCNC: 7 MMOL/L (ref 3–16)
BASOPHILS ABSOLUTE: 0 K/UL (ref 0–0.2)
BASOPHILS RELATIVE PERCENT: 0.7 %
BUN BLDV-MCNC: 30 MG/DL (ref 7–20)
CALCIUM SERPL-MCNC: 8.9 MG/DL (ref 8.3–10.6)
CHLORIDE BLD-SCNC: 106 MMOL/L (ref 99–110)
CO2: 32 MMOL/L (ref 21–32)
CREAT SERPL-MCNC: 2 MG/DL (ref 0.8–1.3)
EOSINOPHILS ABSOLUTE: 0.2 K/UL (ref 0–0.6)
EOSINOPHILS RELATIVE PERCENT: 3.7 %
GFR SERPL CREATININE-BSD FRML MDRD: 36 ML/MIN/{1.73_M2}
GLUCOSE BLD-MCNC: 111 MG/DL (ref 70–99)
HCT VFR BLD CALC: 33.7 % (ref 40.5–52.5)
HEMOGLOBIN: 11.1 G/DL (ref 13.5–17.5)
INR BLD: 3.08 (ref 0.87–1.14)
LYMPHOCYTES ABSOLUTE: 1.5 K/UL (ref 1–5.1)
LYMPHOCYTES RELATIVE PERCENT: 24.5 %
MCH RBC QN AUTO: 32.2 PG (ref 26–34)
MCHC RBC AUTO-ENTMCNC: 33.1 G/DL (ref 31–36)
MCV RBC AUTO: 97.4 FL (ref 80–100)
MONOCYTES ABSOLUTE: 0.9 K/UL (ref 0–1.3)
MONOCYTES RELATIVE PERCENT: 14 %
NEUTROPHILS ABSOLUTE: 3.5 K/UL (ref 1.7–7.7)
NEUTROPHILS RELATIVE PERCENT: 57.1 %
PDW BLD-RTO: 14.8 % (ref 12.4–15.4)
PLATELET # BLD: 174 K/UL (ref 135–450)
PMV BLD AUTO: 9.4 FL (ref 5–10.5)
POTASSIUM SERPL-SCNC: 3.3 MMOL/L (ref 3.5–5.1)
PROTHROMBIN TIME: 32 SEC (ref 11.7–14.5)
RBC # BLD: 3.46 M/UL (ref 4.2–5.9)
SODIUM BLD-SCNC: 145 MMOL/L (ref 136–145)
WBC # BLD: 6.1 K/UL (ref 4–11)

## 2022-12-22 PROCEDURE — 2580000003 HC RX 258: Performed by: FAMILY MEDICINE

## 2022-12-22 PROCEDURE — 6370000000 HC RX 637 (ALT 250 FOR IP): Performed by: NURSE PRACTITIONER

## 2022-12-22 PROCEDURE — 6370000000 HC RX 637 (ALT 250 FOR IP): Performed by: HOSPITALIST

## 2022-12-22 PROCEDURE — 80048 BASIC METABOLIC PNL TOTAL CA: CPT

## 2022-12-22 PROCEDURE — 85025 COMPLETE CBC W/AUTO DIFF WBC: CPT

## 2022-12-22 PROCEDURE — 1200000000 HC SEMI PRIVATE

## 2022-12-22 PROCEDURE — 36415 COLL VENOUS BLD VENIPUNCTURE: CPT

## 2022-12-22 PROCEDURE — 6370000000 HC RX 637 (ALT 250 FOR IP): Performed by: FAMILY MEDICINE

## 2022-12-22 PROCEDURE — 85610 PROTHROMBIN TIME: CPT

## 2022-12-22 RX ORDER — METOPROLOL SUCCINATE 50 MG/1
50 TABLET, EXTENDED RELEASE ORAL DAILY
Status: DISCONTINUED | OUTPATIENT
Start: 2022-12-22 | End: 2022-12-26 | Stop reason: HOSPADM

## 2022-12-22 RX ORDER — METOPROLOL SUCCINATE 50 MG/1
50 TABLET, EXTENDED RELEASE ORAL DAILY
Qty: 30 TABLET | Refills: 3 | OUTPATIENT
Start: 2022-12-23

## 2022-12-22 RX ORDER — METOPROLOL SUCCINATE 50 MG/1
50 TABLET, EXTENDED RELEASE ORAL DAILY
Status: DISCONTINUED | OUTPATIENT
Start: 2022-12-23 | End: 2022-12-22

## 2022-12-22 RX ORDER — FUROSEMIDE 20 MG/1
20 TABLET ORAL DAILY
Status: DISCONTINUED | OUTPATIENT
Start: 2022-12-23 | End: 2022-12-26 | Stop reason: HOSPADM

## 2022-12-22 RX ORDER — POTASSIUM CHLORIDE 20 MEQ/1
40 TABLET, EXTENDED RELEASE ORAL ONCE
Status: COMPLETED | OUTPATIENT
Start: 2022-12-22 | End: 2022-12-22

## 2022-12-22 RX ADMIN — FINASTERIDE 5 MG: 5 TABLET, FILM COATED ORAL at 08:55

## 2022-12-22 RX ADMIN — Medication 10 ML: at 20:51

## 2022-12-22 RX ADMIN — METOPROLOL SUCCINATE 50 MG: 50 TABLET, EXTENDED RELEASE ORAL at 17:37

## 2022-12-22 RX ADMIN — WARFARIN SODIUM 2.5 MG: 2.5 TABLET ORAL at 17:37

## 2022-12-22 RX ADMIN — ATORVASTATIN CALCIUM 40 MG: 40 TABLET, FILM COATED ORAL at 20:52

## 2022-12-22 RX ADMIN — POTASSIUM CHLORIDE 40 MEQ: 1500 TABLET, EXTENDED RELEASE ORAL at 08:55

## 2022-12-22 RX ADMIN — Medication 10 ML: at 09:05

## 2022-12-22 RX ADMIN — TAMSULOSIN HYDROCHLORIDE 0.4 MG: 0.4 CAPSULE ORAL at 08:55

## 2022-12-22 ASSESSMENT — PAIN SCALES - GENERAL
PAINLEVEL_OUTOF10: 0
PAINLEVEL_OUTOF10: 0

## 2022-12-22 NOTE — TELEPHONE ENCOUNTER
Called and spoke with wife. Wife is looking into becoming medical POA. Advised could speak with  at hospital but can further discuss at appointment for hospital follow up.  Patient is scheduled with Jim Kimball for Hospital follow up on 12/29/22

## 2022-12-22 NOTE — PROGRESS NOTES
Pt appears afib on tele, HR 80-90's. RT notified of need for EKG. Notified Hospitalist, and cardiologist, BP stable, pt denies s/s. Dr. Bolivar Chamberlain returned call regarding afib. Notified of POC and VSS. No new orders at this time.

## 2022-12-22 NOTE — PROGRESS NOTES
Urology Progress Note  [facility]     Patient: Verena Pickett MRN: [id number]  Room/Bed: [address] [place], [place] [address]22-58   YOB: 1955  Age/Sex: 74 y. o.male  Admission Date: 12/16/2022     Date of Service:  12/22/2022    ASSESSMENT/PLAN     73 yo male with hx of DVT/PE on Coumadin, DM, HTN, HLD, CHF, CVA and obesity. Hx of IVC filter. Presents with worsening leg swelling and SOB. Found to have new onset A. Fib. Urology has been consulted for urinary retention. Post-cath residual is 870cc's today. PSA was 1.7 on 08/2021. No cross sectional imaging. Has not previously worked with a urologist.     Today patient reports voiding well and emptying bladder better     Recommendations:  Str cath PVR's >450cc's  Start Flomax and finasteride  OOB as tolerated  Discussed indwelling cummins if unable to void by time of discharge currently patient is voiding well we will hold on this  Will need to follow up as an outpatient for PSA testing and consideration of a cystoscopy with volumetrics  Will follow    All patient questions were answered. He understands the plan as listed above. SUBJECTIVE     Chief Complaint:   Chief Complaint   Patient presents with    Shortness of Breath     Pt states that he is having shortness of breath, sent by cardiology. Pt states Sob with walking. 24 Hour Events: Today patient reports improved voiding since starting tamsulosin. Otherwise symptoms are overall improved and pain is adequately controlled. Denies nausea/vomiting. No other genitourinary symptoms. OBJECTIVE     Hospital Problem List:  Principal Problem:    Atrial fibrillation with RVR (HCC)  Active Problems:    Acute renal failure (HCC)    Acute congestive heart failure (HCC)    Paroxysmal atrial fibrillation (HCC)    Elevated troponin    History of DVT in adulthood    Cardiomyopathy ([facility] [address]    Acute systolic heart failure (HCC)    History of stroke  Resolved Problems:    * No resolved hospital problems. *      Physical Exam:  Vitals:    12/22/22 1712   BP: 119/76   Pulse: 86   Resp: 18   Temp: 98.1 °F (36.7 °C)   SpO2: 97%     CONSTITUTIONAL: The patient is well nourished/developed, with no distress noted. NEUROLOGICAL/PSYCHIATRIC: Oriented to place and time, normal affected noted. CARDIOVASCULAR: Regular rate and rhythm, no evidence of swelling noted. RESPIRATORY: Normal respiratory effort with no wheezing noted. ABDOMEN: Abdomen soft, non-tender, non-distended. No enlarged liver or spleen. No hernias noted. GENITOURINARY: No CVA tenderness bilaterally. No catheter    Ins/Outs:    Intake/Output Summary (Last 24 hours) at 12/22/2022 1745  Last data filed at 12/22/2022 1718  Gross per 24 hour   Intake 720 ml   Output 2525 ml   Net -1805 ml       Labs:  CBC   Lab Results   Component Value Date/Time    WBC 6.1 12/22/2022 06:34 AM    RBC 3.46 12/22/2022 06:34 AM    HGB 11.1 12/22/2022 06:34 AM    HCT 33.7 12/22/2022 06:34 AM    MCV 97.4 12/22/2022 06:34 AM    MCH 32.2 12/22/2022 06:34 AM    MCHC 33.1 12/22/2022 06:34 AM    RDW 14.8 12/22/2022 06:34 AM     12/22/2022 06:34 AM    MPV 9.4 12/22/2022 06:34 AM     BMP   Lab Results   Component Value Date/Time     12/22/2022 06:34 AM    K 3.3 12/22/2022 06:34 AM    K 4.6 12/16/2022 04:02 PM     12/22/2022 06:34 AM    CO2 32 12/22/2022 06:34 AM    BUN 30 12/22/2022 06:34 AM    CREATININE 2.0 12/22/2022 06:34 AM    GLUCOSE 111 12/22/2022 06:34 AM    CALCIUM 8.9 12/22/2022 06:34 AM     Urinalysis: No results found for: COLORU, GLUCOSEU, BLOODU, NITRU, LEUKOCYTESUR  Urine culture: No results for input(s): LABURIN in the last 72 hours. PSA:   Lab Results   Component Value Date    PSA 1.7 09/09/2021    PSA 1.5 08/27/2020    PSA 1.34 01/14/2019        Imaging:  XR CHEST (2 VW)    Result Date: 12/16/2022  EXAMINATION: TWO XRAY VIEWS OF THE CHEST 12/16/2022 1:01 pm COMPARISON: None.  HISTORY: ORDERING SYSTEM PROVIDED HISTORY: short of breath, suspected CHF TECHNOLOGIST PROVIDED HISTORY: Reason for exam:->short of breath, suspected CHF Reason for Exam: short of breath, suspected CHF FINDINGS: There is a small to moderate right pleural effusion and there is right basal consolidation. There is cardiomegaly. There is no vascular congestion or septal thickening. 1. Small to moderate right pleural effusion and right basilar airspace consolidation that could reflect atelectasis or pneumonia. 2. Cardiomegaly without evidence of CHF. NM Cardiac Stress Test Nuclear Imaging    Result Date: 12/21/2022  Cardiac Perfusion Imaging  Demographics   Patient Name      [facility]eg [facility]   Date of Study     12/21/2022         Gender              Male   Patient Number    [id number]         Date of Birth       1955   Visit Number      [id number]          Age                 74 year(s)   Accession Number  [id number]         Room Number         0703   Corporate ID      [id number]           NM Technician       Rolanda Aschoff   Nurse             Meseret Rollins RN  Interpreting        Derick Collins DO                                       Physician   Ordering          Demetrius Rangel MD   The procedure was explained in detail to the patient. Risks,  complications and alternative treatments were reviewed. Written consent  was obtained. Procedure Procedure Type:   Nuclear Stress Test:NM MYOCARDIAL SPECT REST EXERCISE OR RX,  Pharmacological   Study location: [address] [place] [address] Status: Inpatient. Height: 72 inches Weight: 258 pounds  Risk Factors   The patient risk factors include:obesity, cerebrovascular disease, physical  activity, treated and controlled hypercholesterolemia, treated and  controlled hypertension, family history of premature CAD, orally-treated  diabetes mellitus, dyslipidemia and prior heart failure . Conclusions   Summary  The overall quality of the study is good.  There is subdiaphragmatic attenuation. Left ventricular cavity size is severely dilated. RIght ventricle is normal  in size. Spect imaging demonstrates a small area of mildly decreased perfusion in the  apex. The defect is present at rest and stress, consistent with infarct. There is an additional medium size defect of moderate intensity in the mid  anterior and mid anteroseptum. The defect is present at rest and stress,  consistent with infarct. Sum stress score of 8. No visual TID. Calculated TID of 1.01. Left ventricular ejection fraction is severely reduced at 25%. Sensitivity of testing is reduced on amlodipine. **Myocardial perfusion is abnormal. Fixed defect. Moderate-to-high risk  scan. Abnormal baseline EKG. **   Recommendation  Cardiology consultation recommended. Results relayed to [place]  Stress Protocols   Resting ECG  Sinus rhythm. Anterior T wave inversions   Resting HR:72 bpm    Resting BP:96/61 mmHg  Stress Protocol:Pharmacologic - Lexiscan's  Peak HR:95 bpm                             HR/BP product:7790  Peak BP:82/47 mmHg  Predicted HR: 155 bpm  % of predicted HR: 61  Test duration: 4 min  Reason for termination:Completed   ECG Findings  SInus tachycardia   Arrhythmias  Frequent PACs and PVCs   Symptoms  Developed symptoms likely related to lexiscan. There was stress induced lightheadedness. Symptoms resolved with rest.   Complications  Procedure complication was none. Stress Interpretation  Non-diagnostic EKG response due to failure to reach target heart rate. Less than 0.5 mm upsloping ST deviation. Procedure Medications   - Lexiscan I.V. 0.4 mg.10 sec  Imaging Protocols   - One Day   Rest                          Stress   Isotope:Myoview/Tetrofosmin   Isotope: Myoview/Tetrofosmin  Isotope dose:9.9 mCi          Isotope dose:32.1 mCi  Administration Route:I.V.      Administration Route:I.V.  Date:12/21/2022 08:45         Date:12/21/2022 09:55                                 Technique:      Gated Imaging Results    Stress ejection    Ejection fraction:25 %    EDV :243 ml    ESV :182 ml    Stroke volume :61 ml    LV mass :221 gr  Medical History   Additional Medical History   past medical history of DVT/PE on coumadin, DM, HTN, HLD, CHF,  CVA, and obesity. Hx of IVC filter. Prior to Visit Medications  Medication Sig Taking?  Authorizing Provider  atorvastatin (LIPITOR) 40 MG tablet TAKE ONE TABLET BY MOUTH  EVERY NIGHT KRISTYN Simmons NP  Blood Pressure KIT 1 kit by Does not apply route daily KRISTYN Cai CNP  metFORMIN (GLUCOPHAGE) 500 MG tablet TAKE 1 TABLET BY MOUTH 2  TIMES A DAY WITH MEALS Rohith Gerardo., MD  lisinopril (PRINIVIL;ZESTRIL) 40 MG tablet TAKE 1 TABLET BY  MOUTH ONE TIME A DAY Rohith Gerardo., MD  amLODIPine (NORVASC) 5 MG tablet TAKE 1 TABLET BY MOUTH ONE TIME  A DAY Rohith Gerardo., MD  blood glucose monitor strips 1 strip by Other route daily True  Metric Test strips KRISTYN Cai CNP  Lancets MISC True Metric Lancets KRISTYN Cai CNP  warfarin (COUMADIN) 5 MG tablet TAKE 1 AND 1/2 TABLETS BY MOUTH  DAILY  Patient taking differently: TAKE 1/2 TABLETS BY MOUTH DAILY  NALINI Sheridan  Blood Glucose Monitoring Suppl AGUSTO 1 kit by Does not apply  route daily True Metric testing device Tha Torres MD  Methylsulfonylmethane (MSM PO) Take by mouth Historical  Provider, MD  Prenatal MV-Min-Fe Fum-FA-DHA (PRENATAL 1 PO)   Signatures   ------------------------------------------------------------------  Electronically signed by Arash Marcos DO (Interpreting  physician) on 12/21/2022 at 12:43  ------------------------------------------------------------------             Electronically signed by: Gigi Bearden MD, 12/22/2022 5:45 PM  [facility]  Office Contact: [phone]

## 2022-12-22 NOTE — PROGRESS NOTES
[facility]  HEART FAILURE  Progress Note      Admit Date 12/16/2022     Reason for Consult:      Reason for Consultation/Chief Complaint: SOB    HPI:    Rufus Seen is a 74 y.o. male with PMH DVT/PE, DM, HTN , HLD, CVA, IVC filter, HFpEF admitted with edema and SOB. New onset AF and had DCCV yesterday. Echo with low EF and pt had positive ST today, will need LHC. HF meds started and pt has diuresed 16L, bump in Cr and entresto and diuretics are on hold      Subjective:  Patient is being seen for CHF, CMP. There were no acute overnight cardiac events. Today Mr. Shivani Feldman denies chest pain, shortness of breath, palpitations and is feeling well. Discussed positive ST and need for LHC      Review of Systems - History obtained from the patient  General ROS: negative  ENT ROS: negative  Allergy and Immunology ROS: negative  Hematological and Lymphatic ROS: negative  Respiratory ROS: no cough, shortness of breath, or wheezing  Cardiovascular ROS: no chest pain or dyspnea on exertion  Gastrointestinal ROS: no abdominal pain, change in bowel habits, or black or bloody stools  Musculoskeletal ROS: negative  Neurological ROS: no TIA or stroke symptoms  Dermatological ROS: negative     Baseline Weight:    Wt Readings from Last 3 Encounters:   12/22/22 254 lb 3.2 oz (115.3 kg)   12/16/22 288 lb (130.6 kg)   09/02/22 263 lb 6.4 oz (119.5 kg)         Cardiac Testing:   Echo 12/19/22  Personally revieweed  Left ventricle is dilated. Overall, left ventricular systolic function is moderately depressed. Ejection fraction is visually estimated to be 30-35%. (Walters's 35%)  There is global hypokinesis, more severe in the inferior/inferoseptal walls. Diastolic dysfunction-indeterminate grade. Moderate, posteriorly directed mitral regurgitation. Moderate tricuspid regurgitation. PASP estimated at 40 mmHg. Biatrial enlargement. ST: 12/21/22:   Summary    The overall quality of the study is good.  There is subdiaphragmatic    attenuation. Left ventricular cavity size is severely dilated. RIght ventricle is normal    in size. Spect imaging demonstrates a small area of mildly decreased perfusion in the    apex. The defect is present at rest and stress, consistent with infarct. There is an additional medium size defect of moderate intensity in the mid    anterior and mid anteroseptum. The defect is present at rest and stress,    consistent with infarct. Sum stress score of 8. No visual TID. Calculated TID of 1.01. Left ventricular ejection fraction is severely reduced at 25%. Sensitivity of testing is reduced on amlodipine. **Myocardial perfusion is abnormal. Fixed defect. Moderate-to-high risk    scan. Abnormal baseline EKG.**       Core measures for HF:  EF: 30-35%   ACEi/ARB/ARNI: entresto on hold  BB: Metoprolol succinate  Julio:  none for SANDY  Hydralazine/nitrates:  SGLT2i:  sandy    NYHA Class III      Objective:   /71   Pulse 80   Temp 97.4 °F (36.3 °C) (Oral)   Resp 18   Ht 6' (1.829 m)   Wt 254 lb 3.2 oz (115.3 kg)   SpO2 93%   BMI 34.48 kg/m²     Intake/Output Summary (Last 24 hours) at 12/22/2022 2481  Last data filed at 12/22/2022 0553  Gross per 24 hour   Intake 480 ml   Output 1575 ml   Net -1095 ml      In: 480 [P.O.:480]  Out: 1575     TELEMETRY: SR    Physical Exam:  General Appearance:  Non-obese/Well Nourished  Respiratory:  Resp Auscultation: crackels  Cardiovascular:   Auscultation: Regular rate and rhythm, normal S1S2, no m/g/r/c  Palpation: Normal    Pedal Pulses: 2+ and equal   Abdomen:  Soft, NT, ND, + bs  Extremities:  No Cyanosis or Clubbing  Extremities: Trace edema  Neurological/Psychiatric:  Oriented to time, place, and person  Non-anxious    MEDICATIONS:   Scheduled Meds:   Scheduled Meds:   potassium chloride  40 mEq Oral Once    warfarin  2.5 mg Oral Once per day on Sun Tue Thu Sat    [START ON 12/23/2022] warfarin  5 mg Oral Once per day on Mon Wed Fri    tamsulosin  0.4 mg Oral Daily    [Held by provider] sacubitril-valsartan  1 tablet Oral BID    finasteride  5 mg Oral Daily    metoprolol succinate  100 mg Oral Daily    [Held by provider] furosemide  40 mg Oral Daily    atorvastatin  40 mg Oral Daily    sodium chloride flush  5-40 mL IntraVENous 2 times per day     Continuous Infusions:   sodium chloride       PRN Meds:.sodium chloride flush, sodium chloride, ondansetron **OR** ondansetron, polyethylene glycol, acetaminophen **OR** acetaminophen  Continuous Infusions:   sodium chloride         Intake/Output Summary (Last 24 hours) at 12/22/2022 0852  Last data filed at 12/22/2022 0553  Gross per 24 hour   Intake 480 ml   Output 1575 ml   Net -1095 ml       Lab Data:  CBC:   Lab Results   Component Value Date/Time    WBC 6.1 12/22/2022 06:34 AM    HGB 11.1 12/22/2022 06:34 AM     12/22/2022 06:34 AM     BMP:  Lab Results   Component Value Date/Time     12/22/2022 06:34 AM    K 3.3 12/22/2022 06:34 AM    K 4.6 12/16/2022 04:02 PM     12/22/2022 06:34 AM    CO2 32 12/22/2022 06:34 AM    BUN 30 12/22/2022 06:34 AM    CREATININE 2.0 12/22/2022 06:34 AM    GLUCOSE 111 12/22/2022 06:34 AM     INR:   Lab Results   Component Value Date/Time    INR 3.08 12/22/2022 06:35 AM    INR 2.90 12/21/2022 04:42 AM    INR 2.98 12/20/2022 04:50 AM        CARDIAC LABS  ENZYMES:No results for input(s): CKMB, CKMBINDEX, TROPONINI in the last 72 hours.     Invalid input(s): CKTOTAL;3  FASTING LIPID PANEL:  Lab Results   Component Value Date/Time    HDL 48 03/10/2022 08:55 AM    LDLCALC 41 03/10/2022 08:55 AM    TRIG 61 03/10/2022 08:55 AM     LIVER PROFILE:  Lab Results   Component Value Date/Time    AST 31 12/16/2022 04:02 PM    AST 17 03/10/2022 08:55 AM    ALT 26 12/16/2022 04:02 PM    ALT 17 03/10/2022 08:55 AM     BNP:   Lab Results   Component Value Date/Time    PROBNP 20,155 12/16/2022 04:02 PM     Iron Studies:    Lab Results   Component Value Date/Time    FERRITIN 364.5 12/21/2022 04:41 AM     Lab Results   Component Value Date    IRON 41 (L) 12/21/2022    TIBC 204 (L) 12/21/2022    FERRITIN 364.5 12/21/2022      Iron Deficiency Anemia: no   2017 ACC/AHA HF Guidelines:   intravenous iron replacement in patients with New York Heart Association (NYHA) class II and III HF and iron deficiency(ferritin <100 ng/ml or 100-300 ng/ml if transferrin saturation <20%), to improve functional status and QoL. 1. WEIGHT: Admit Weight: 280 lb 3.2 oz (127.1 kg)      Today  Weight: 254 lb 3.2 oz (115.3 kg)   2. I/O   Intake/Output Summary (Last 24 hours) at 12/22/2022 0852  Last data filed at 12/22/2022 0553  Gross per 24 hour   Intake 480 ml   Output 1575 ml   Net -1095 ml           Assessment/Plan:     AHF- 17L out, some improvement in Cr today, continue to hold lasix today and restart tomorrow   CMP- low EF on echo and ST abnl.  pt will need LHC but INR and Cr are still elevated so ok to schedule as an outpt test. Continue holding entresto and decrease BB for hypotension, will check labs at f/u next week and get LHC arranged if labs improved  AF- s/p DCCV, continue BB and AC  HTN- controlled, some hypotension- decrease BB as it has been held for 2 days        I appreciate the opportunity of cooperating in the care of this individual.    Linnea Triplett, APRN - CNP, ACNP, [address] 12/22/2022, 8:52 AM  Heart Failure  The [address], [address]  Ph: [phone]      Core Measures:   Discharge instructions:   LVEF documented:   ACEI for LV dysfunction:   Smoking Cessation:

## 2022-12-22 NOTE — PROGRESS NOTES
Office : [phone]     Fax :[phone]       Nephrology  progress Note      Patient's Name: Winsome Rachel  7:36 AM  12/22/2022    Reason for Consult: SANDY      Requesting Physician:  KRISTYN Carrillo CNP      Chief Complaint:    Chief Complaint   Patient presents with    Shortness of Breath     Pt states that he is having shortness of breath, sent by cardiology. Pt states Sob with walking. History of Present iIlness:    Winsome Rachel is a 74 y.o. male with prior history of DM 2, CHF, a. Fib , edema LE who presented at the PCP office yesterday with c/o SOB and leg swelling . Also, c/o palpitations . Was sent to ER . He was in A.fib with RVR. Also, initial lab work shows elevated creatinine of 2.4. baseline is 1.0. On 02 per NC     Interval hx :      Edema decreasing   No chest pain     GOOD UOP     Renal function  w  Creat 1.8 ---> 2.3 ----> 2.0     I/O last 3 completed shifts:   In: 18 [P.O.:480]  Out: 2525 [Urine:2525]    Past Medical History:   Diagnosis Date    Cerebrovascular disease     CHF (congestive heart failure) ([facility] [address] 04/2017    per  nurse report but wife is unaware    DM (diabetes mellitus screen) 04/2017    boarder line    Hyperlipidemia     Hypertension     Type 2 diabetes mellitus without complication ([facility] [address]        Past Surgical History:   Procedure Laterality Date    VASECTOMY         Family History   Problem Relation Age of Onset    High Blood Pressure Mother     Cancer Mother         lung, smoker    Cancer Father         mesothelioma    Depression Sister            Current Medications:    warfarin (COUMADIN) tablet 2.5 mg, Once per day on Sun Tue Thu Sat  [START ON 12/23/2022] warfarin (COUMADIN) tablet 5 mg, Once per day on Mon Wed Fri  tamsulosin (FLOMAX) capsule 0.4 mg, Daily  [Held by provider] sacubitril-valsartan (ENTRESTO) 24-26 MG per tablet 1 tablet, BID  finasteride (PROSCAR) tablet 5 mg, Daily  metoprolol succinate (TOPROL XL) extended release tablet 100 mg, Daily  [Held by provider] furosemide (LASIX) tablet 40 mg, Daily  atorvastatin (LIPITOR) tablet 40 mg, Daily  sodium chloride flush 0.9 % injection 5-40 mL, 2 times per day  sodium chloride flush 0.9 % injection 5-40 mL, PRN  0.9 % sodium chloride infusion, PRN  ondansetron (ZOFRAN-ODT) disintegrating tablet 4 mg, Q8H PRN   Or  ondansetron (ZOFRAN) injection 4 mg, Q6H PRN  polyethylene glycol (GLYCOLAX) packet 17 g, Daily PRN  acetaminophen (TYLENOL) tablet 650 mg, Q6H PRN   Or  acetaminophen (TYLENOL) suppository 650 mg, Q6H PRN        Physical exam:     Vitals:  /71   Pulse 80   Temp 97.4 °F (36.3 °C) (Oral)   Resp 18   Ht 6' (1.829 m)   Wt 254 lb 3.2 oz (115.3 kg)   SpO2 93%   BMI 34.48 kg/m²   Constitutional:  OAA X3 NAD  Skin: no rash, turgor wnl  Heent:  eomi, mmm  Neck: no bruits or jvd noted  Cardiovascular:  S1, S2 without m/r/g  Respiratory: CTA B without w/r/r  Abdomen:  +bs, soft, nt, nd  Ext: 1 +  lower extremity edema      Labs:  CBC:   Recent Labs     12/20/22  0450   WBC 9.3   HGB 12.4*        BMP:    Recent Labs     12/20/22  0450 12/21/22  0441   * 145   K 3.7 3.9    105   CO2 29 29   BUN 29* 35*   CREATININE 1.8* 2.3*   GLUCOSE 120* 118*     Ca/Mg/Phos:   Recent Labs     12/20/22  0450 12/21/22  0441   CALCIUM 9.4 8.8     Hepatic:   No results for input(s): AST, ALT, ALB, BILITOT, ALKPHOS in the last 72 hours. Troponin:   No results for input(s): TROPONINI in the last 72 hours. BNP: No results for input(s): BNP in the last 72 hours. Lipids: No results for input(s): CHOL, TRIG, HDL, LDLCALC, LABVLDL in the last 72 hours. ABGs: No results for input(s): PHART, PO2ART, ERP7POA in the last 72 hours.   INR:   Recent Labs 12/20/22  0450 12/21/22  0442 12/22/22  0635   INR 2.98* 2.90* 3.08*     UA:No results for input(s): Laren Dollar, GLUCOSEU, BILIRUBINUR, Paramjit Buddhism, BLOODU, PHUR, PROTEINU, UROBILINOGEN, NITRU, LEUKOCYTESUR, Ezzie Desanctis in the last 72 hours. Urine Microscopic: No results for input(s): LABCAST, BACTERIA, COMU, HYALCAST, WBCUA, RBCUA, EPIU in the last 72 hours. Urine Culture: No results for input(s): LABURIN in the last 72 hours. Urine Chemistry:   Recent Labs     12/20/22  0200   LABCREA 81.7   PROTEINUR 23.00*                IMAGING:  NM Cardiac Stress Test Nuclear Imaging   Final Result      XR CHEST (2 VW)   Final Result   1. Small to moderate right pleural effusion and right basilar airspace   consolidation that could reflect atelectasis or pneumonia. 2. Cardiomegaly without evidence of CHF. Assessment/Plan :      1. SANDY 2/2 hemodynamic chnages form atrial fib with RVR     Creat started to get better   Hold metformin   Hold lisinopril   CRS    Will have to hold entresto till renal function stabilizes   On lasix 40 mg po daily - held by cardiology     Recommend to dose adjust all medications  based on renal functions  Maintain SBP> 90 mmHg   Daily weights   AVOID NSAIDs  Avoid Nephrotoxins  Monitor Intake/Output  Call if significant decrease in urine output        2. A. Fib   SR now   S/p DCCV   On metoprolol . 3.  Acid- base disorder. Metabolic acidosis   Monitor     5.  Monitor electrolytes   Sodium level better             D/w primary team      Thank you for allowing us to participate in care of [facility]         Electronically signed by: Norma Ambrocio MD, 12/22/2022, 7:36 AM      [facility] of [address]  Office : [phone]  Fax :[phone]

## 2022-12-22 NOTE — PROGRESS NOTES
Pharmacy to Dose Warfarin    Pharmacy consulted to dose warfarin for Afib, hx DVT/PE. INR Goal: 2-3    INR today: 3.08    Assessment/Plan:  - Resume home regimen 5 mg MWF and 2.5 mg all other days  - Possible concomitant drug-drug interactions include: N/A     Pharmacy will continue to follow.     Angela Mcghee, PharmD, BCPS  [id number]  12/22/2022 12:25 PM

## 2022-12-22 NOTE — PROGRESS NOTES
[facility]    12/22/2022 9:28 AM        Name: Robbie Rea . Admitted: 12/16/2022  Primary Care Provider: KRISTYN Noel CNP (Tel: [phone])                        Subjective:  . No acute events overnight. Resting well. Pain control. Diet ok. Labs reviewed  Denies any chest pain sob.      Reviewed interval ancillary notes    Current Medications  warfarin (COUMADIN) tablet 2.5 mg, Once per day on Sun Tue Thu Sat  [START ON 12/23/2022] warfarin (COUMADIN) tablet 5 mg, Once per day on Mon Wed Fri  tamsulosin (FLOMAX) capsule 0.4 mg, Daily  [Held by provider] sacubitril-valsartan (ENTRESTO) 24-26 MG per tablet 1 tablet, BID  finasteride (PROSCAR) tablet 5 mg, Daily  metoprolol succinate (TOPROL XL) extended release tablet 100 mg, Daily  [Held by provider] furosemide (LASIX) tablet 40 mg, Daily  atorvastatin (LIPITOR) tablet 40 mg, Daily  sodium chloride flush 0.9 % injection 5-40 mL, 2 times per day  sodium chloride flush 0.9 % injection 5-40 mL, PRN  0.9 % sodium chloride infusion, PRN  ondansetron (ZOFRAN-ODT) disintegrating tablet 4 mg, Q8H PRN   Or  ondansetron (ZOFRAN) injection 4 mg, Q6H PRN  polyethylene glycol (GLYCOLAX) packet 17 g, Daily PRN  acetaminophen (TYLENOL) tablet 650 mg, Q6H PRN   Or  acetaminophen (TYLENOL) suppository 650 mg, Q6H PRN      Objective:  /74   Pulse 72   Temp 97.9 °F (36.6 °C) (Oral)   Resp 18   Ht 6' (1.829 m)   Wt 254 lb 3.2 oz (115.3 kg)   SpO2 97%   BMI 34.48 kg/m²     Intake/Output Summary (Last 24 hours) at 12/22/2022 0928  Last data filed at 12/22/2022 0905  Gross per 24 hour   Intake 720 ml   Output 1775 ml   Net -1055 ml      Wt Readings from Last 3 Encounters:   12/22/22 254 lb 3.2 oz (115.3 kg)   12/16/22 288 lb (130.6 kg)   09/02/22 263 lb 6.4 oz (119.5 kg)       General appearance: Appears comfortable  Eyes: Sclera clear. Pupils equal.  ENT: Moist oral mucosa. Trachea midline, no adenopathy. Cardiovascular: Regular rhythm, normal S1, S2. No murmur. No edema in lower extremities  Respiratory: Not using accessory muscles. Good inspiratory effort. Clear to auscultation bilaterally, no wheeze or crackles. GI: Abdomen soft, no tenderness, not distended, normal bowel sounds  Musculoskeletal: No cyanosis in digits, neck supple  Neurology: CN 2-12 grossly intact. No speech or motor deficits  Psych: Normal affect. Alert and oriented in time, place and person  Skin: Warm, dry, normal turgor    Labs and Tests:  CBC:   Recent Labs     12/20/22  0450 12/22/22  0634   WBC 9.3 6.1   HGB 12.4* 11.1*    174     BMP:    Recent Labs     12/20/22  0450 12/21/22  0441 12/22/22  0634   * 145 145   K 3.7 3.9 3.3*    105 106   CO2 29 29 32   BUN 29* 35* 30*   CREATININE 1.8* 2.3* 2.0*   GLUCOSE 120* 118* 111*     Hepatic:   No results for input(s): AST, ALT, ALB, BILITOT, ALKPHOS in the last 72 hours. Discussed care with patient             Problem List  Principal Problem:    Atrial fibrillation with RVR (HCC)  Active Problems:    Acute renal failure (HCC)    Acute congestive heart failure (HCC)    Paroxysmal atrial fibrillation (HCC)    Elevated troponin    History of DVT in adulthood    Cardiomyopathy ([address]    Acute systolic heart failure ([facility] [address]    History of stroke  Resolved Problems:    * No resolved hospital problems. *       Assessment & Plan:   A. fib with RVR  -Likely multifactorial with underlying CHF and also with renal failure  -Patient is status post cardioversion  -Anticoagulated on Coumadin INR was 3.8    Acute on chronic CHF  -Noted EF was low  -Underwent stress test which was abnormal  -Continue to monitor      Acute kidney injury  -= Creatinine is 2 no better than yesterday-need to hold Entresto and Lasix          Diet: ADULT DIET;  Regular; Low Fat/Low Chol/High Fiber/2 gm Na  Code:Full Code  DVT PPX lovenox   Disposition-pending improvement in medical adjustment    Mecca Forde MD   12/22/2022 9:28 AM

## 2022-12-23 LAB
ANION GAP SERPL CALCULATED.3IONS-SCNC: 13 MMOL/L (ref 3–16)
BUN BLDV-MCNC: 25 MG/DL (ref 7–20)
CALCIUM SERPL-MCNC: 9.1 MG/DL (ref 8.3–10.6)
CHLORIDE BLD-SCNC: 111 MMOL/L (ref 99–110)
CO2: 26 MMOL/L (ref 21–32)
CREAT SERPL-MCNC: 1.9 MG/DL (ref 0.8–1.3)
GFR SERPL CREATININE-BSD FRML MDRD: 39 ML/MIN/{1.73_M2}
GLUCOSE BLD-MCNC: 110 MG/DL (ref 70–99)
INR BLD: 2.76 (ref 0.87–1.14)
POTASSIUM SERPL-SCNC: 3.6 MMOL/L (ref 3.5–5.1)
PROTHROMBIN TIME: 29.3 SEC (ref 11.7–14.5)
SODIUM BLD-SCNC: 150 MMOL/L (ref 136–145)

## 2022-12-23 PROCEDURE — 6370000000 HC RX 637 (ALT 250 FOR IP): Performed by: INTERNAL MEDICINE

## 2022-12-23 PROCEDURE — 2580000003 HC RX 258: Performed by: FAMILY MEDICINE

## 2022-12-23 PROCEDURE — 80048 BASIC METABOLIC PNL TOTAL CA: CPT

## 2022-12-23 PROCEDURE — 36415 COLL VENOUS BLD VENIPUNCTURE: CPT

## 2022-12-23 PROCEDURE — 6370000000 HC RX 637 (ALT 250 FOR IP): Performed by: FAMILY MEDICINE

## 2022-12-23 PROCEDURE — 1200000000 HC SEMI PRIVATE

## 2022-12-23 PROCEDURE — 6370000000 HC RX 637 (ALT 250 FOR IP): Performed by: NURSE PRACTITIONER

## 2022-12-23 PROCEDURE — 85610 PROTHROMBIN TIME: CPT

## 2022-12-23 PROCEDURE — 6370000000 HC RX 637 (ALT 250 FOR IP): Performed by: HOSPITALIST

## 2022-12-23 RX ORDER — METOPROLOL SUCCINATE 50 MG/1
50 TABLET, EXTENDED RELEASE ORAL DAILY
Qty: 30 TABLET | Refills: 3 | Status: SHIPPED | OUTPATIENT
Start: 2022-12-23 | End: 2022-12-23 | Stop reason: SDUPTHER

## 2022-12-23 RX ORDER — METOPROLOL SUCCINATE 50 MG/1
50 TABLET, EXTENDED RELEASE ORAL DAILY
Qty: 30 TABLET | Refills: 1 | Status: SHIPPED | OUTPATIENT
Start: 2022-12-23

## 2022-12-23 RX ORDER — FUROSEMIDE 20 MG/1
20 TABLET ORAL DAILY
Qty: 60 TABLET | Refills: 3 | Status: SHIPPED | OUTPATIENT
Start: 2022-12-23 | End: 2022-12-23 | Stop reason: SDUPTHER

## 2022-12-23 RX ORDER — FUROSEMIDE 20 MG/1
20 TABLET ORAL DAILY
Qty: 60 TABLET | Refills: 0 | Status: SHIPPED | OUTPATIENT
Start: 2022-12-23

## 2022-12-23 RX ORDER — TAMSULOSIN HYDROCHLORIDE 0.4 MG/1
0.4 CAPSULE ORAL DAILY
Qty: 30 CAPSULE | Refills: 1 | Status: SHIPPED | OUTPATIENT
Start: 2022-12-23

## 2022-12-23 RX ORDER — POTASSIUM CHLORIDE 20 MEQ/1
40 TABLET, EXTENDED RELEASE ORAL ONCE
Status: COMPLETED | OUTPATIENT
Start: 2022-12-23 | End: 2022-12-23

## 2022-12-23 RX ADMIN — POTASSIUM CHLORIDE 40 MEQ: 1500 TABLET, EXTENDED RELEASE ORAL at 09:41

## 2022-12-23 RX ADMIN — METOPROLOL SUCCINATE 50 MG: 50 TABLET, EXTENDED RELEASE ORAL at 09:42

## 2022-12-23 RX ADMIN — Medication 10 ML: at 09:47

## 2022-12-23 RX ADMIN — TAMSULOSIN HYDROCHLORIDE 0.4 MG: 0.4 CAPSULE ORAL at 09:47

## 2022-12-23 RX ADMIN — ATORVASTATIN CALCIUM 40 MG: 40 TABLET, FILM COATED ORAL at 20:38

## 2022-12-23 RX ADMIN — FUROSEMIDE 20 MG: 20 TABLET ORAL at 09:42

## 2022-12-23 RX ADMIN — WARFARIN SODIUM 5 MG: 5 TABLET ORAL at 17:27

## 2022-12-23 RX ADMIN — FINASTERIDE 5 MG: 5 TABLET, FILM COATED ORAL at 09:42

## 2022-12-23 ASSESSMENT — PAIN SCALES - GENERAL
PAINLEVEL_OUTOF10: 0
PAINLEVEL_OUTOF10: 0

## 2022-12-23 NOTE — PROGRESS NOTES
Nutrition Note    RECOMMENDATIONS  PO Diet: Continue current diet   ONS: None  Nutrition Support: None     NUTRITION ASSESSMENT   Pt assessed for nutrition risk. Pt with good PO intake, consuming % of most meals. Skin is in tact. Weight is down 32 lbs during admission d/t diuresis; pt is -19 liters. CHF education provided 12/21. Deemed to be at low nutrition risk at this time. Will continue to monitor for changes in status. Nutrition Related Findings: -19 liters  Wounds: None  Nutrition Education:  Education completed     MALNUTRITION ASSESSMENT   Malnutrition Status: No malnutrition    NUTRITION DIAGNOSIS   No nutrition diagnosis at this time     CURRENT NUTRITION THERAPIES  ADULT DIET; Regular; Low Fat/Low Chol/High Fiber/2 gm Na     PO Intake: %   PO Supplement Intake:None Ordered    ANTHROPOMETRICS  Current Height: 6' (182.9 cm)  Current Weight: 248 lb 4.8 oz (112.6 kg)    Ideal Body Weight (IBW): 178 lbs  (81 kg)        BMI: 33.7    The patient will be monitored per nutrition standards of care. Consult dietitian if additional nutrition interventions are needed prior to RD reassessment.      Mariza Yeh, [address], [place]    Contact: [phone]

## 2022-12-23 NOTE — PROGRESS NOTES
Office : [phone]     Fax :[phone]       Nephrology  progress Note      Patient's Name: René Allen  8:08 AM  12/23/2022    Reason for Consult: SANDY      Requesting Physician:  KRISTYN Murry CNP      Chief Complaint:    Chief Complaint   Patient presents with    Shortness of Breath     Pt states that he is having shortness of breath, sent by cardiology. Pt states Sob with walking. History of Present iIlness:    René Allen is a 74 y.o. male with prior history of DM 2, CHF, a. Fib , edema LE who presented at the PCP office yesterday with c/o SOB and leg swelling . Also, c/o palpitations . Was sent to ER . He was in A.fib with RVR. Also, initial lab work shows elevated creatinine of 2.4. baseline is 1.0. On 02 per NC     Interval hx :        Sodium level increased to 150   Edema decreasing   No chest pain     GOOD UOP     Renal function    Creat 1.8 ---> 2.3 ----> 2.0 ---> 1.9     I/O last 3 completed shifts:   In: 36 [P.O.:920]  Out: 1 [Urine:4000]    Past Medical History:   Diagnosis Date    Cerebrovascular disease     CHF (congestive heart failure) ([facility] [address] 04/2017    per  nurse report but wife is unaware    DM (diabetes mellitus screen) 04/2017    boarder line    Hyperlipidemia     Hypertension     Type 2 diabetes mellitus without complication ([facility] [address]        Past Surgical History:   Procedure Laterality Date    VASECTOMY         Family History   Problem Relation Age of Onset    High Blood Pressure Mother     Cancer Mother         lung, smoker    Cancer Father         mesothelioma    Depression Sister            Current Medications:    furosemide (LASIX) tablet 20 mg, Daily  metoprolol succinate (TOPROL XL) extended release tablet 50 mg, Daily  warfarin (COUMADIN) tablet 2.5 mg, Once per day on Sun Tue Thu Sat  warfarin (COUMADIN) tablet 5 mg, Once per day on Mon Wed Fri  tamsulosin (FLOMAX) capsule 0.4 mg, Daily  [Held by provider] sacubitril-valsartan (ENTRESTO) 24-26 MG per tablet 1 tablet, BID  finasteride (PROSCAR) tablet 5 mg, Daily  atorvastatin (LIPITOR) tablet 40 mg, Daily  sodium chloride flush 0.9 % injection 5-40 mL, 2 times per day  sodium chloride flush 0.9 % injection 5-40 mL, PRN  0.9 % sodium chloride infusion, PRN  ondansetron (ZOFRAN-ODT) disintegrating tablet 4 mg, Q8H PRN   Or  ondansetron (ZOFRAN) injection 4 mg, Q6H PRN  polyethylene glycol (GLYCOLAX) packet 17 g, Daily PRN  acetaminophen (TYLENOL) tablet 650 mg, Q6H PRN   Or  acetaminophen (TYLENOL) suppository 650 mg, Q6H PRN        Physical exam:     Vitals:  /68   Pulse 81   Temp 97.7 °F (36.5 °C) (Oral)   Resp 20   Ht 6' (1.829 m)   Wt 248 lb 4.8 oz (112.6 kg)   SpO2 93%   BMI 33.68 kg/m²   Constitutional:  OAA X3 NAD  Skin: no rash, turgor wnl  Heent:  eomi, mmm  Neck: no bruits or jvd noted  Cardiovascular:  S1, S2 without m/r/g  Respiratory: CTA B without w/r/r  Abdomen:  +bs, soft, nt, nd  Ext: no  lower extremity edema      Labs:  CBC:   Recent Labs     12/22/22  0634   WBC 6.1   HGB 11.1*        BMP:    Recent Labs     12/21/22  0441 12/22/22  0634    145   K 3.9 3.3*    106   CO2 29 32   BUN 35* 30*   CREATININE 2.3* 2.0*   GLUCOSE 118* 111*     Ca/Mg/Phos:   Recent Labs     12/21/22  0441 12/22/22  0634   CALCIUM 8.8 8.9     Hepatic:   No results for input(s): AST, ALT, ALB, BILITOT, ALKPHOS in the last 72 hours. Troponin:   No results for input(s): TROPONINI in the last 72 hours. BNP: No results for input(s): BNP in the last 72 hours. Lipids: No results for input(s): CHOL, TRIG, HDL, LDLCALC, LABVLDL in the last 72 hours. ABGs: No results for input(s): PHART, PO2ART, WOL8PBS in the last 72 hours.   INR:   Recent Labs 12/21/22  0442 12/22/22  0635   INR 2.90* 3.08*     UA:No results for input(s): Avanell Pickup, GLUCOSEU, BILIRUBINUR, KETUA, SPECGRAV, BLOODU, PHUR, PROTEINU, UROBILINOGEN, NITRU, LEUKOCYTESUR, Gleda Gomez in the last 72 hours. Urine Microscopic: No results for input(s): LABCAST, BACTERIA, COMU, HYALCAST, WBCUA, RBCUA, EPIU in the last 72 hours. Urine Culture: No results for input(s): LABURIN in the last 72 hours. Urine Chemistry:   No results for input(s): Margette Sniff, PROTEINUR, NAUR in the last 72 hours. IMAGING:  NM Cardiac Stress Test Nuclear Imaging   Final Result      XR CHEST (2 VW)   Final Result   1. Small to moderate right pleural effusion and right basilar airspace   consolidation that could reflect atelectasis or pneumonia. 2. Cardiomegaly without evidence of CHF. Assessment/Plan :      1. SANDY 2/2 hemodynamic chnages form atrial fib with RVR     Creat started to get better   Hold metformin   Hold lisinopril   CRS      Recommend to dose adjust all medications  based on renal functions  Maintain SBP> 90 mmHg   Daily weights   AVOID NSAIDs  Avoid Nephrotoxins  Monitor Intake/Output  Call if significant decrease in urine output        2. A. Fib   S/p DCCV   On metoprolol . 3.  Acid- base disorder. Metabolic acidosis   Monitor     5. Hypernatremia   Hold lasix     6.  Hypokalemia - replace       Recheck BMP in am       D/w primary team      Thank you for allowing us to participate in care of [facility]         Electronically signed by: Román Rizzo MD, 12/23/2022, 8:08 AM      [facility] [address]  Office : [phone]  Fax :[phone]

## 2022-12-23 NOTE — PROGRESS NOTES
Urology Progress Note  [facility]     Patient: Anusha Santana MRN: [id number]  Room/Bed: 0O-3503/7351-78   YOB: 1955  Age/Sex: 74 y. o.male  Admission Date: 12/16/2022     Date of Service:  12/23/2022    ASSESSMENT/PLAN     75 yo male with hx of DVT/PE on Coumadin, DM, HTN, HLD, CHF, CVA and obesity. Hx of IVC filter. Presents with worsening leg swelling and SOB. Found to have new onset A. Fib. Urology has been consulted for urinary retention. Post-cath residual is 870cc's today. PSA was 1.7 on 08/2021. No cross sectional imaging. Has not previously worked with a urologist.     Today patient reports voiding well and emptying bladder better no change overnight     Recommendations:  Str cath PVR's >450cc's  Start Flomax and finasteride  OOB as tolerated  Discussed indwelling cummins if unable to void by time of discharge currently patient is voiding well we will hold on this  Will need to follow up as an outpatient for PSA testing and consideration of a cystoscopy with volumetrics  Will follow    All patient questions were answered. He understands the plan as listed above. SUBJECTIVE     Chief Complaint:   Chief Complaint   Patient presents with    Shortness of Breath     Pt states that he is having shortness of breath, sent by cardiology. Pt states Sob with walking. 24 Hour Events: Today patient reports improved voiding since starting tamsulosin. Otherwise symptoms are overall improved and pain is adequately controlled. Denies nausea/vomiting. No other genitourinary symptoms.     OBJECTIVE     Hospital Problem List:  Principal Problem:    Atrial fibrillation with RVR (HCC)  Active Problems:    Acute renal failure (HCC)    Acute congestive heart failure (HCC)    Paroxysmal atrial fibrillation (HCC)    Elevated troponin    History of DVT in adulthood    Cardiomyopathy ([facility] [address]    Acute systolic heart failure (HCC)    History of stroke  Resolved Problems:    * No resolved hospital problems. *      Physical Exam:  Vitals:    12/23/22 1230   BP: 127/72   Pulse: 74   Resp:    Temp:    SpO2:      CONSTITUTIONAL: The patient is well nourished/developed, with no distress noted. NEUROLOGICAL/PSYCHIATRIC: Oriented to place and time, normal affected noted. CARDIOVASCULAR: Regular rate and rhythm, no evidence of swelling noted. RESPIRATORY: Normal respiratory effort with no wheezing noted. ABDOMEN: Abdomen soft, non-tender, non-distended. No enlarged liver or spleen. No hernias noted. GENITOURINARY: No CVA tenderness bilaterally. No catheter    Ins/Outs:    Intake/Output Summary (Last 24 hours) at 12/23/2022 1255  Last data filed at 12/23/2022 0947  Gross per 24 hour   Intake 210 ml   Output 2100 ml   Net -1890 ml         Labs:  CBC   Lab Results   Component Value Date/Time    WBC 6.1 12/22/2022 06:34 AM    RBC 3.46 12/22/2022 06:34 AM    HGB 11.1 12/22/2022 06:34 AM    HCT 33.7 12/22/2022 06:34 AM    MCV 97.4 12/22/2022 06:34 AM    MCH 32.2 12/22/2022 06:34 AM    MCHC 33.1 12/22/2022 06:34 AM    RDW 14.8 12/22/2022 06:34 AM     12/22/2022 06:34 AM    MPV 9.4 12/22/2022 06:34 AM     BMP   Lab Results   Component Value Date/Time     12/23/2022 08:09 AM    K 3.6 12/23/2022 08:09 AM    K 4.6 12/16/2022 04:02 PM     12/23/2022 08:09 AM    CO2 26 12/23/2022 08:09 AM    BUN 25 12/23/2022 08:09 AM    CREATININE 1.9 12/23/2022 08:09 AM    GLUCOSE 110 12/23/2022 08:09 AM    CALCIUM 9.1 12/23/2022 08:09 AM     Urinalysis: No results found for: COLORU, GLUCOSEU, BLOODU, NITRU, LEUKOCYTESUR  Urine culture: No results for input(s): LABURIN in the last 72 hours. PSA:   Lab Results   Component Value Date    PSA 1.7 09/09/2021    PSA 1.5 08/27/2020    PSA 1.34 01/14/2019        Imaging:  XR CHEST (2 VW)    Result Date: 12/16/2022  EXAMINATION: TWO XRAY VIEWS OF THE CHEST 12/16/2022 1:01 pm COMPARISON: None.  HISTORY: ORDERING SYSTEM PROVIDED HISTORY: short of breath, suspected CHF TECHNOLOGIST PROVIDED HISTORY: Reason for exam:->short of breath, suspected CHF Reason for Exam: short of breath, suspected CHF FINDINGS: There is a small to moderate right pleural effusion and there is right basal consolidation. There is cardiomegaly. There is no vascular congestion or septal thickening. 1. Small to moderate right pleural effusion and right basilar airspace consolidation that could reflect atelectasis or pneumonia. 2. Cardiomegaly without evidence of CHF. NM Cardiac Stress Test Nuclear Imaging    Result Date: 12/21/2022  Cardiac Perfusion Imaging  Demographics   Patient Name      [address] [place] [address]   Date of Study     12/21/2022         Gender              Male   Patient Number    [id number]         Date of Birth       1955   Visit Number      [id number]          Age                 74 year(s)   Accession Number  [id number]         Room Number         3573   Corporate ID      [id number]           NM Technician       Harsha Goldman   Nurse             Clarence Jalloh RN  Interpreting        Jana Stack DO                                       Physician   Ordering          Davida Carson MD   The procedure was explained in detail to the patient. Risks,  complications and alternative treatments were reviewed. Written consent  was obtained. Procedure Procedure Type:   Nuclear Stress Test:NM MYOCARDIAL SPECT REST EXERCISE OR RX,  Pharmacological   Study location: [address] [place] [address] Status: Inpatient. Height: 72 inches Weight: 258 pounds  Risk Factors   The patient risk factors include:obesity, cerebrovascular disease, physical  activity, treated and controlled hypercholesterolemia, treated and  controlled hypertension, family history of premature CAD, orally-treated  diabetes mellitus, dyslipidemia and prior heart failure . Conclusions   Summary  The overall quality of the study is good.  There is subdiaphragmatic attenuation. Left ventricular cavity size is severely dilated. RIght ventricle is normal  in size. Spect imaging demonstrates a small area of mildly decreased perfusion in the  apex. The defect is present at rest and stress, consistent with infarct. There is an additional medium size defect of moderate intensity in the mid  anterior and mid anteroseptum. The defect is present at rest and stress,  consistent with infarct. Sum stress score of 8. No visual TID. Calculated TID of 1.01. Left ventricular ejection fraction is severely reduced at 25%. Sensitivity of testing is reduced on amlodipine. **Myocardial perfusion is abnormal. Fixed defect. Moderate-to-high risk  scan. Abnormal baseline EKG. **   Recommendation  Cardiology consultation recommended. Results relayed to [place]  Stress Protocols   Resting ECG  Sinus rhythm. Anterior T wave inversions   Resting HR:72 bpm    Resting BP:96/61 mmHg  Stress Protocol:Pharmacologic - Lexiscan's  Peak HR:95 bpm                             HR/BP product:7790  Peak BP:82/47 mmHg  Predicted HR: 155 bpm  % of predicted HR: 61  Test duration: 4 min  Reason for termination:Completed   ECG Findings  SInus tachycardia   Arrhythmias  Frequent PACs and PVCs   Symptoms  Developed symptoms likely related to lexiscan. There was stress induced lightheadedness. Symptoms resolved with rest.   Complications  Procedure complication was none. Stress Interpretation  Non-diagnostic EKG response due to failure to reach target heart rate. Less than 0.5 mm upsloping ST deviation. Procedure Medications   - Lexiscan I.V. 0.4 mg.10 sec  Imaging Protocols   - One Day   Rest                          Stress   Isotope:Myoview/Tetrofosmin   Isotope: Myoview/Tetrofosmin  Isotope dose:9.9 mCi          Isotope dose:32.1 mCi  Administration Route:I.V.      Administration Route:I.V.  Date:12/21/2022 08:45         Date:12/21/2022 09:55                                 Technique:      Gated Imaging Results    Stress ejection    Ejection fraction:25 %    EDV :243 ml    ESV :182 ml    Stroke volume :61 ml    LV mass :221 gr  Medical History   Additional Medical History   past medical history of DVT/PE on coumadin, DM, HTN, HLD, CHF,  CVA, and obesity. Hx of IVC filter. Prior to Visit Medications  Medication Sig Taking?  Authorizing Provider  atorvastatin (LIPITOR) 40 MG tablet TAKE ONE TABLET BY MOUTH  EVERY NIGHT Ashely Collier, APRN - NP  Blood Pressure KIT 1 kit by Does not apply route daily KRISTYN Wilson CNP  metFORMIN (GLUCOPHAGE) 500 MG tablet TAKE 1 TABLET BY MOUTH 2  TIMES A DAY WITH MEALS Ivette Holloway MD  lisinopril (PRINIVIL;ZESTRIL) 40 MG tablet TAKE 1 TABLET BY  MOUTH ONE TIME A DAY Ivette Holloway MD  amLODIPine (NORVASC) 5 MG tablet TAKE 1 TABLET BY MOUTH ONE TIME  A DAY Ivette Holloway MD  blood glucose monitor strips 1 strip by Other route daily True  Metric Test strips KRISTYN Wilson CNP  Lancets MISC True Metric Lancets KRISTYN Wilson - CNP  warfarin (COUMADIN) 5 MG tablet TAKE 1 AND 1/2 TABLETS BY MOUTH  DAILY  Patient taking differently: TAKE 1/2 TABLETS BY MOUTH DAILY  NALINI Chance  Blood Glucose Monitoring Suppl AGUSTO 1 kit by Does not apply  route daily True Metric testing device Irina Dickens MD  Methylsulfonylmethane (MSM PO) Take by mouth Historical  Provider, MD  Prenatal MV-Min-Fe Fum-FA-DHA (PRENATAL 1 PO)   Signatures   ------------------------------------------------------------------  Electronically signed by Fanta Craft DO (Interpreting  physician) on 12/21/2022 at 12:43  ------------------------------------------------------------------             Electronically signed by: Mannie Garber MD, 12/23/2022 12:55 PM  [facility]  Office Contact: [phone]

## 2022-12-23 NOTE — PROGRESS NOTES
CLINICAL PHARMACY NOTE: MEDS TO BEDS    Total # of Prescriptions Filled: 4   The following medications were delivered to the patient:  Furosemide  Tamsulosin  Finasteride  metoprolol    Additional Documentation:  Ok to deliver per Betty Carrero, patient signed

## 2022-12-23 NOTE — PROGRESS NOTES
Pharmacy to Dose Warfarin    Pharmacy consulted to dose warfarin for afib. INR Goal: 2-3    INR today: 2.76    Assessment/Plan:  - Therapeutic. Continue home regimen with 5mg due today. Pharmacy will continue to follow.     Fernando Peña, DougD, BCCCP

## 2022-12-23 NOTE — DISCHARGE SUMMARY
[facility] DISCHARGE SUMMARY    Patient Demographics    Patient. Anirudh Armstrong  Date of Birth. 1955  MRN. [id number]     Primary care provider. KRISTYN Canales CNP  (Tel: [phone])    Admit date: 12/16/2022    Discharge date (blank if same as Note Date): Note Date: 12/23/2022     Reason for Hospitalization. Chief Complaint   Patient presents with    Shortness of Breath     Pt states that he is having shortness of breath, sent by cardiology. Pt states Sob with walking. [facility] Hospital Course. Acute CHF exacerbation  -With A. fib RVR. -Patient is status cardioversion  -Treated with IV diuresis. And transition to p.o. diuresis  -React meds adjusted by cardiology    Acute kidney injury  -Creatinine as high as 3 Which came down to 1.9  -Outpatient follow-up with nephrology    Abnormal stress test will probably need outpatient work-up    A. fib with RVR  Now normal sinus rate controlled on Coumadin    Consults. IP CONSULT TO PHARMACY  IP CONSULT TO CARDIOLOGY  IP CONSULT TO CARDIOLOGY  IP CONSULT TO NEPHROLOGY  IP CONSULT TO HEART FAILURE NURSE/COORDINATOR  IP CONSULT TO UROLOGY    Physical examination on discharge day. /68   Pulse 81   Temp 97.7 °F (36.5 °C) (Oral)   Resp 20   Ht 6' (1.829 m)   Wt 248 lb 4.8 oz (112.6 kg)   SpO2 93%   BMI 33.68 kg/m²   General appearance. Alert. Looks comfortable. HEENT. Sclera clear. Moist mucus membranes. Cardiovascular. Regular rate and rhythm, normal S1, S2. No murmur. Respiratory. Not using accessory muscles. Clear to auscultation bilaterally, no wheeze. Gastrointestinal. Abdomen soft, non-tender, not distended, normal bowel sounds  Neurology. Facial symmetry. No speech deficits. Moving all extremities equally. Extremities. No edema in lower extremities. Skin.  Warm, dry, normal turgor    Condition at time of discharge stable     Medication instructions provided to patient at discharge. Medication List        START taking these medications      finasteride 5 MG tablet  Commonly known as: PROSCAR  Take 1 tablet by mouth daily     furosemide 20 MG tablet  Commonly known as: LASIX  Take 1 tablet by mouth daily     metoprolol succinate 50 MG extended release tablet  Commonly known as: TOPROL XL  Take 1 tablet by mouth daily     tamsulosin 0.4 MG capsule  Commonly known as: FLOMAX  Take 1 capsule by mouth daily            CHANGE how you take these medications      warfarin 5 MG tablet  Commonly known as: COUMADIN  Take as directed. If you are unsure how to take this medication, talk to your nurse or doctor.   Original instructions: TAKE 1 AND 1/2 TABLETS BY MOUTH DAILY  What changed: additional instructions  Notes to patient: Warfarin/ Coumadin  Use: Prevents the blood from clotting, treat blood clots, to prevent a stroke  Side effects: bleeding or bruising more easily            CONTINUE taking these medications      atorvastatin 40 MG tablet  Commonly known as: LIPITOR  TAKE ONE TABLET BY MOUTH EVERY NIGHT     blood glucose monitor kit and supplies  1 kit by Does not apply route daily True Metric testing device     blood glucose test strips  1 strip by Other route daily True Metric Test strips     Blood Pressure Kit  1 kit by Does not apply route daily     Lancets Misc  True Metric Lancets     metFORMIN 500 MG tablet  Commonly known as: GLUCOPHAGE  TAKE 1 TABLET BY MOUTH 2 TIMES A DAY WITH MEALS     MSM PO     PRENATAL 1 PO            STOP taking these medications      amLODIPine 5 MG tablet  Commonly known as: NORVASC     lisinopril 40 MG tablet  Commonly known as: PRINIVIL;ZESTRIL               Where to Get Your Medications        These medications were sent to [facility], [address] Sammie Villegas [phone]  [address] [place] [address]      Phone: [phone] finasteride 5 MG tablet  furosemide 20 MG tablet  metoprolol succinate 50 MG extended release tablet  tamsulosin 0.4 MG capsule         Discharge recommendations given to patient. Follow Up. pcp in 1 week   Disposition. home  Activity. activity as tolerated  Diet: ADULT DIET; Regular; Low Fat/Low Chol/High Fiber/2 gm Na      Spent 45  minutes in discharge process.     Signed:  Astrid Chandler MD     12/23/2022 9:26 AM

## 2022-12-24 PROBLEM — E87.0 HYPERNATREMIA: Status: ACTIVE | Noted: 2022-12-24

## 2022-12-24 LAB
ANION GAP SERPL CALCULATED.3IONS-SCNC: 9 MMOL/L (ref 3–16)
BUN BLDV-MCNC: 22 MG/DL (ref 7–20)
CALCIUM SERPL-MCNC: 9.2 MG/DL (ref 8.3–10.6)
CHLORIDE BLD-SCNC: 116 MMOL/L (ref 99–110)
CO2: 30 MMOL/L (ref 21–32)
CREAT SERPL-MCNC: 2 MG/DL (ref 0.8–1.3)
GFR SERPL CREATININE-BSD FRML MDRD: 36 ML/MIN/{1.73_M2}
GLUCOSE BLD-MCNC: 119 MG/DL (ref 70–99)
INR BLD: 2.75 (ref 0.87–1.14)
POTASSIUM SERPL-SCNC: 4.1 MMOL/L (ref 3.5–5.1)
PROTHROMBIN TIME: 29.2 SEC (ref 11.7–14.5)
SODIUM BLD-SCNC: 155 MMOL/L (ref 136–145)

## 2022-12-24 PROCEDURE — 80048 BASIC METABOLIC PNL TOTAL CA: CPT

## 2022-12-24 PROCEDURE — 6370000000 HC RX 637 (ALT 250 FOR IP): Performed by: HOSPITALIST

## 2022-12-24 PROCEDURE — 2580000003 HC RX 258: Performed by: FAMILY MEDICINE

## 2022-12-24 PROCEDURE — 85610 PROTHROMBIN TIME: CPT

## 2022-12-24 PROCEDURE — 6370000000 HC RX 637 (ALT 250 FOR IP): Performed by: FAMILY MEDICINE

## 2022-12-24 PROCEDURE — 6370000000 HC RX 637 (ALT 250 FOR IP): Performed by: NURSE PRACTITIONER

## 2022-12-24 PROCEDURE — 2580000003 HC RX 258: Performed by: INTERNAL MEDICINE

## 2022-12-24 PROCEDURE — 36415 COLL VENOUS BLD VENIPUNCTURE: CPT

## 2022-12-24 PROCEDURE — 1200000000 HC SEMI PRIVATE

## 2022-12-24 RX ORDER — DEXTROSE MONOHYDRATE 50 MG/ML
INJECTION, SOLUTION INTRAVENOUS CONTINUOUS
Status: ACTIVE | OUTPATIENT
Start: 2022-12-24 | End: 2022-12-25

## 2022-12-24 RX ADMIN — Medication 10 ML: at 11:01

## 2022-12-24 RX ADMIN — ATORVASTATIN CALCIUM 40 MG: 40 TABLET, FILM COATED ORAL at 20:09

## 2022-12-24 RX ADMIN — DEXTROSE MONOHYDRATE 50 ML/HR: 50 INJECTION, SOLUTION INTRAVENOUS at 12:24

## 2022-12-24 RX ADMIN — WARFARIN SODIUM 2.5 MG: 2.5 TABLET ORAL at 18:13

## 2022-12-24 RX ADMIN — METOPROLOL SUCCINATE 50 MG: 50 TABLET, EXTENDED RELEASE ORAL at 11:00

## 2022-12-24 RX ADMIN — Medication 10 ML: at 20:10

## 2022-12-24 RX ADMIN — TAMSULOSIN HYDROCHLORIDE 0.4 MG: 0.4 CAPSULE ORAL at 11:01

## 2022-12-24 RX ADMIN — FINASTERIDE 5 MG: 5 TABLET, FILM COATED ORAL at 11:00

## 2022-12-24 ASSESSMENT — PAIN SCALES - GENERAL
PAINLEVEL_OUTOF10: 0
PAINLEVEL_OUTOF10: 0

## 2022-12-24 NOTE — PROGRESS NOTES
[facility]  HEART FAILURE  Progress Note      Admit Date 12/16/2022     Reason for Consult:      Reason for Consultation/Chief Complaint: SOB    HPI:    Shubham Rollins is a 74 y.o. male with PMH DVT/PE, DM, HTN , HLD, CVA, IVC filter, HFpEF admitted with edema and SOB. New onset AF and had DCCV yesterday. Echo with low EF and pt had positive ST today, will need LHC. HF meds started and pt has diuresed 20L,  Cr has been elevated this week and entresto and diuretics are on hold      Subjective:  Patient is being seen for CHF, CMP. There were no acute overnight cardiac events. Today Mr. Damaris Mccormack denies chest pain, shortness of breath, palpitations and is feeling well      Review of Systems - History obtained from the patient  General ROS: negative  ENT ROS: negative  Allergy and Immunology ROS: negative  Hematological and Lymphatic ROS: negative  Respiratory ROS: no cough, shortness of breath, or wheezing  Cardiovascular ROS: no chest pain or dyspnea on exertion  Gastrointestinal ROS: no abdominal pain, change in bowel habits, or black or bloody stools  Musculoskeletal ROS: negative  Neurological ROS: no TIA or stroke symptoms  Dermatological ROS: negative     Baseline Weight:    Wt Readings from Last 3 Encounters:   12/24/22 245 lb (111.1 kg)   12/16/22 288 lb (130.6 kg)   09/02/22 263 lb 6.4 oz (119.5 kg)         Cardiac Testing:   Echo 12/19/22  Personally revieweed  Left ventricle is dilated. Overall, left ventricular systolic function is moderately depressed. Ejection fraction is visually estimated to be 30-35%. (Walters's 35%)  There is global hypokinesis, more severe in the inferior/inferoseptal walls. Diastolic dysfunction-indeterminate grade. Moderate, posteriorly directed mitral regurgitation. Moderate tricuspid regurgitation. PASP estimated at 40 mmHg. Biatrial enlargement. ST: 12/21/22:   Summary    The overall quality of the study is good.  There is subdiaphragmatic attenuation. Left ventricular cavity size is severely dilated. RIght ventricle is normal    in size. Spect imaging demonstrates a small area of mildly decreased perfusion in the    apex. The defect is present at rest and stress, consistent with infarct. There is an additional medium size defect of moderate intensity in the mid    anterior and mid anteroseptum. The defect is present at rest and stress,    consistent with infarct. Sum stress score of 8. No visual TID. Calculated TID of 1.01. Left ventricular ejection fraction is severely reduced at 25%. Sensitivity of testing is reduced on amlodipine. **Myocardial perfusion is abnormal. Fixed defect. Moderate-to-high risk    scan. Abnormal baseline EKG.**       Core measures for HF:  EF: 30-35%   ACEi/ARB/ARNI: entresto on hold  BB: Metoprolol succinate  Julio:  none for SANDY  Hydralazine/nitrates:  SGLT2i:  sandy    NYHA Class III      Objective:   /71   Pulse 68   Temp 98.1 °F (36.7 °C) (Oral)   Resp 18   Ht 6' (1.829 m)   Wt 245 lb (111.1 kg)   SpO2 93%   BMI 33.23 kg/m²     Intake/Output Summary (Last 24 hours) at 12/24/2022 0846  Last data filed at 12/24/2022 8662  Gross per 24 hour   Intake 10 ml   Output 1425 ml   Net -1415 ml      In: 10 [I.V.:10]  Out: 1425     TELEMETRY: SR    Physical Exam:  General Appearance:  Non-obese/Well Nourished  Respiratory:  Resp Auscultation: CTA  Cardiovascular:   Auscultation: Regular rate and rhythm, normal S1S2, no m/g/r/c  Palpation: Normal    Pedal Pulses: 2+ and equal   Abdomen:  Soft, NT, ND, + bs  Extremities:  No Cyanosis or Clubbing  Extremities: Trace edema  Neurological/Psychiatric:  Oriented to time, place, and person  Non-anxious    MEDICATIONS:   Scheduled Meds:   Scheduled Meds:   [Held by provider] furosemide  20 mg Oral Daily    metoprolol succinate  50 mg Oral Daily    warfarin  2.5 mg Oral Once per day on Sun Tue Thu Sat    warfarin  5 mg Oral Once per day on Mon Wed Fri    tamsulosin  0.4 mg Oral Daily    [Held by provider] sacubitril-valsartan  1 tablet Oral BID    finasteride  5 mg Oral Daily    atorvastatin  40 mg Oral Daily    sodium chloride flush  5-40 mL IntraVENous 2 times per day     Continuous Infusions:   dextrose      sodium chloride       PRN Meds:.sodium chloride flush, sodium chloride, ondansetron **OR** ondansetron, polyethylene glycol, acetaminophen **OR** acetaminophen  Continuous Infusions:   dextrose      sodium chloride         Intake/Output Summary (Last 24 hours) at 12/24/2022 0846  Last data filed at 12/24/2022 0682  Gross per 24 hour   Intake 10 ml   Output 1425 ml   Net -1415 ml       Lab Data:  CBC:   Lab Results   Component Value Date/Time    WBC 6.1 12/22/2022 06:34 AM    HGB 11.1 12/22/2022 06:34 AM     12/22/2022 06:34 AM     BMP:  Lab Results   Component Value Date/Time     12/24/2022 06:12 AM    K 4.1 12/24/2022 06:12 AM    K 4.6 12/16/2022 04:02 PM     12/24/2022 06:12 AM    CO2 30 12/24/2022 06:12 AM    BUN 22 12/24/2022 06:12 AM    CREATININE 2.0 12/24/2022 06:12 AM    GLUCOSE 119 12/24/2022 06:12 AM     INR:   Lab Results   Component Value Date/Time    INR 2.75 12/24/2022 07:43 AM    INR 2.76 12/23/2022 08:09 AM    INR 3.08 12/22/2022 06:35 AM        CARDIAC LABS  ENZYMES:No results for input(s): CKMB, CKMBINDEX, TROPONINI in the last 72 hours.     Invalid input(s): CKTOTAL;3  FASTING LIPID PANEL:  Lab Results   Component Value Date/Time    HDL 48 03/10/2022 08:55 AM    LDLCALC 41 03/10/2022 08:55 AM    TRIG 61 03/10/2022 08:55 AM     LIVER PROFILE:  Lab Results   Component Value Date/Time    AST 31 12/16/2022 04:02 PM    AST 17 03/10/2022 08:55 AM    ALT 26 12/16/2022 04:02 PM    ALT 17 03/10/2022 08:55 AM     BNP:   Lab Results   Component Value Date/Time    PROBNP 20,155 12/16/2022 04:02 PM     Iron Studies:    Lab Results   Component Value Date/Time    FERRITIN 364.5 12/21/2022 04:41 AM     Lab Results Component Value Date    IRON 41 (L) 12/21/2022    TIBC 204 (L) 12/21/2022    FERRITIN 364.5 12/21/2022      Iron Deficiency Anemia: no   2017 ACC/AHA HF Guidelines:   intravenous iron replacement in patients with New York Heart Association (NYHA) class II and III HF and iron deficiency(ferritin <100 ng/ml or 100-300 ng/ml if transferrin saturation <20%), to improve functional status and QoL. 1. WEIGHT: Admit Weight: 280 lb 3.2 oz (127.1 kg)      Today  Weight: 245 lb (111.1 kg)   2. I/O   Intake/Output Summary (Last 24 hours) at 12/24/2022 0846  Last data filed at 12/24/2022 2452  Gross per 24 hour   Intake 10 ml   Output 1425 ml   Net -1415 ml           Assessment/Plan:     AHF- 20L out, continue holding diuretics  CMP- low EF on echo and ST abnl.  pt will need LHC - deferred due to elevated Cr and INR, ok to do this as an outpt, he has f/u scheduled for Tuesday with labs if he is d/c over the weekend  AF- s/p DCCV, continue BB and AC  HTN- controlled        I appreciate the opportunity of cooperating in the care of this individual.    KRISTYN Mauricio - CNP, ACNP, [address] 12/24/2022, 8:46 AM  Heart Failure  The [address], [address]  Ph: [phone]      Core Measures:   Discharge instructions:   LVEF documented:   ACEI for LV dysfunction:   Smoking Cessation:

## 2022-12-24 NOTE — PROGRESS NOTES
[facility]    12/24/2022 10:48 AM        Name: Prateek Rojo . Admitted: 12/16/2022  Primary Care Provider: KRISTYN Whiteside CNP (Tel: [phone])                        Subjective:  . No acute events overnight. Resting well. Pain control. Diet ok. Labs reviewed  Denies any chest pain sob.      Reviewed interval ancillary notes    Current Medications  dextrose 5 % solution, Continuous  [Held by provider] furosemide (LASIX) tablet 20 mg, Daily  metoprolol succinate (TOPROL XL) extended release tablet 50 mg, Daily  warfarin (COUMADIN) tablet 2.5 mg, Once per day on Sun Tue Thu Sat  warfarin (COUMADIN) tablet 5 mg, Once per day on Mon Wed Fri  tamsulosin (FLOMAX) capsule 0.4 mg, Daily  [Held by provider] sacubitril-valsartan (ENTRESTO) 24-26 MG per tablet 1 tablet, BID  finasteride (PROSCAR) tablet 5 mg, Daily  atorvastatin (LIPITOR) tablet 40 mg, Daily  sodium chloride flush 0.9 % injection 5-40 mL, 2 times per day  sodium chloride flush 0.9 % injection 5-40 mL, PRN  0.9 % sodium chloride infusion, PRN  ondansetron (ZOFRAN-ODT) disintegrating tablet 4 mg, Q8H PRN   Or  ondansetron (ZOFRAN) injection 4 mg, Q6H PRN  polyethylene glycol (GLYCOLAX) packet 17 g, Daily PRN  acetaminophen (TYLENOL) tablet 650 mg, Q6H PRN   Or  acetaminophen (TYLENOL) suppository 650 mg, Q6H PRN      Objective:  /74   Pulse 70   Temp 98.4 °F (36.9 °C)   Resp 16   Ht 6' (1.829 m)   Wt 245 lb (111.1 kg)   SpO2 98%   BMI 33.23 kg/m²     Intake/Output Summary (Last 24 hours) at 12/24/2022 1048  Last data filed at 12/24/2022 5644  Gross per 24 hour   Intake --   Output 1425 ml   Net -1425 ml      Wt Readings from Last 3 Encounters:   12/24/22 245 lb (111.1 kg)   12/16/22 288 lb (130.6 kg)   09/02/22 263 lb 6.4 oz (119.5 kg)       General appearance:  Appears improvement in renal function     Asmita Harris MD   12/24/2022 10:48 AM

## 2022-12-24 NOTE — PROGRESS NOTES
Office : [phone]     Fax :[phone]       Nephrology  progress Note      Patient's Name: Oleg Marquez  1:25 PM  12/24/2022    Reason for Consult: SANDY      Requesting Physician:  KRISTYN Solis CNP      Chief Complaint:    Chief Complaint   Patient presents with    Shortness of Breath     Pt states that he is having shortness of breath, sent by cardiology. Pt states Sob with walking. History of Present iIlness:    Oleg Marquez is a 74 y.o. male with prior history of DM 2, CHF, a. Fib , edema LE who presented at the PCP office yesterday with c/o SOB and leg swelling . Also, c/o palpitations . Was sent to ER . He was in A.fib with RVR. Also, initial lab work shows elevated creatinine of 2.4. baseline is 1.0. On 02 per NC     Interval hx :        Sodium level increased to 150   Edema decreasing   No chest pain     GOOD UOP     Renal function    Creat 1.8 ---> 2.3 ----> 2.0 ---> 1.9     I/O last 3 completed shifts: In: 210 [P.O.:200;  I.V.:10]  Out: 5 [Urine:2900]    Past Medical History:   Diagnosis Date    Cerebrovascular disease     CHF (congestive heart failure) ([facility] [address] 04/2017    per  nurse report but wife is unaware    DM (diabetes mellitus screen) 04/2017    boarder line    Hyperlipidemia     Hypertension     Type 2 diabetes mellitus without complication [facility])        Past Surgical History:   Procedure Laterality Date    VASECTOMY         Family History   Problem Relation Age of Onset    High Blood Pressure Mother     Cancer Mother         lung, smoker    Cancer Father         mesothelioma    Depression Sister            Current Medications:    dextrose 5 % solution, Continuous  [Held by provider] furosemide (LASIX) tablet 20 mg, Daily  metoprolol succinate (TOPROL XL) extended release tablet 50 mg, Daily  warfarin (COUMADIN) tablet 2.5 mg, Once per day on Sun Tue Thu Sat  warfarin (COUMADIN) tablet 5 mg, Once per day on Mon Wed Fri  tamsulosin (FLOMAX) capsule 0.4 mg, Daily  [Held by provider] sacubitril-valsartan (ENTRESTO) 24-26 MG per tablet 1 tablet, BID  finasteride (PROSCAR) tablet 5 mg, Daily  atorvastatin (LIPITOR) tablet 40 mg, Daily  sodium chloride flush 0.9 % injection 5-40 mL, 2 times per day  sodium chloride flush 0.9 % injection 5-40 mL, PRN  0.9 % sodium chloride infusion, PRN  ondansetron (ZOFRAN-ODT) disintegrating tablet 4 mg, Q8H PRN   Or  ondansetron (ZOFRAN) injection 4 mg, Q6H PRN  polyethylene glycol (GLYCOLAX) packet 17 g, Daily PRN  acetaminophen (TYLENOL) tablet 650 mg, Q6H PRN   Or  acetaminophen (TYLENOL) suppository 650 mg, Q6H PRN        Physical exam:     Vitals:  /74   Pulse 70   Temp 98.4 °F (36.9 °C)   Resp 16   Ht 6' (1.829 m)   Wt 245 lb (111.1 kg)   SpO2 98%   BMI 33.23 kg/m²   Constitutional:  OAA X3 NAD  Skin: no rash, turgor wnl  Heent:  eomi, mmm  Neck: no bruits or jvd noted  Cardiovascular:  S1, S2 without m/r/g  Respiratory: CTA B without w/r/r  Abdomen:  +bs, soft, nt, nd  Ext: no  lower extremity edema      Labs:  CBC:   Recent Labs     12/22/22  0634   WBC 6.1   HGB 11.1*        BMP:    Recent Labs     12/22/22  0634 12/23/22  0809 12/24/22  0612    150* 155*   K 3.3* 3.6 4.1    111* 116*   CO2 32 26 30   BUN 30* 25* 22*   CREATININE 2.0* 1.9* 2.0*   GLUCOSE 111* 110* 119*     Ca/Mg/Phos:   Recent Labs     12/22/22  0634 12/23/22  0809 12/24/22  0612   CALCIUM 8.9 9.1 9.2     Hepatic:   No results for input(s): AST, ALT, ALB, BILITOT, ALKPHOS in the last 72 hours. Troponin:   No results for input(s): TROPONINI in the last 72 hours. BNP: No results for input(s): BNP in the last 72 hours.   Lipids: No results for input(s): CHOL, TRIG, HDL, LDLCALC, LABVLDL in the last 72 hours. ABGs: No results for input(s): PHART, PO2ART, DFD0UGA in the last 72 hours. INR:   Recent Labs     12/22/22  0635 12/23/22  0809 12/24/22  0743   INR 3.08* 2.76* 2.75*     UA:No results for input(s): Jose Carlos Deer, GLUCOSEU, BILIRUBINUR, Kelton Iris, SPECGRAV, BLOODU, PHUR, PROTEINU, UROBILINOGEN, NITRU, LEUKOCYTESUR, Norma Ovens in the last 72 hours. Urine Microscopic: No results for input(s): LABCAST, BACTERIA, COMU, HYALCAST, WBCUA, RBCUA, EPIU in the last 72 hours. Urine Culture: No results for input(s): LABURIN in the last 72 hours. Urine Chemistry:   No results for input(s): Nicholaus Carota, PROTEINUR, NAUR in the last 72 hours. IMAGING:  NM Cardiac Stress Test Nuclear Imaging   Final Result      XR CHEST (2 VW)   Final Result   1. Small to moderate right pleural effusion and right basilar airspace   consolidation that could reflect atelectasis or pneumonia. 2. Cardiomegaly without evidence of CHF. Assessment/Plan :      1. SANDY 2/2 hemodynamic chnages form atrial fib with RVR     Creatinine stable   Hold metformin   Hold lisinopril   CRS      Recommend to dose adjust all medications  based on renal functions  Maintain SBP> 90 mmHg   Daily weights   AVOID NSAIDs  Avoid Nephrotoxins  Monitor Intake/Output  Call if significant decrease in urine output        2. A. Fib   S/p DCCV   On metoprolol . 3.  Acid- base disorder. Metabolic acidosis   Monitor     5. Hypernatremia   Sodium worsened from 150 ---> 155   Will start D5W  at 50 ml / hr x 1 liter   Hold lasix     6.  Hypokalemia - replace       Recheck BMP in am       D/w primary team      Thank you for allowing us to participate in care of [facility]         Electronically signed by: Eleno Hodgkins, MD, 12/24/2022, 1:25 PM      [facility] of [address]  Office : [phone]  Fax :[phone]

## 2022-12-25 LAB
ANION GAP SERPL CALCULATED.3IONS-SCNC: 7 MMOL/L (ref 3–16)
BUN BLDV-MCNC: 18 MG/DL (ref 7–20)
CALCIUM SERPL-MCNC: 9.2 MG/DL (ref 8.3–10.6)
CHLORIDE BLD-SCNC: 115 MMOL/L (ref 99–110)
CO2: 32 MMOL/L (ref 21–32)
CREAT SERPL-MCNC: 1.8 MG/DL (ref 0.8–1.3)
GFR SERPL CREATININE-BSD FRML MDRD: 41 ML/MIN/{1.73_M2}
GLUCOSE BLD-MCNC: 119 MG/DL (ref 70–99)
INR BLD: 3.12 (ref 0.87–1.14)
POTASSIUM SERPL-SCNC: 3.8 MMOL/L (ref 3.5–5.1)
PROTHROMBIN TIME: 32.3 SEC (ref 11.7–14.5)
SODIUM BLD-SCNC: 154 MMOL/L (ref 136–145)

## 2022-12-25 PROCEDURE — 6370000000 HC RX 637 (ALT 250 FOR IP): Performed by: HOSPITALIST

## 2022-12-25 PROCEDURE — 36415 COLL VENOUS BLD VENIPUNCTURE: CPT

## 2022-12-25 PROCEDURE — 6370000000 HC RX 637 (ALT 250 FOR IP): Performed by: FAMILY MEDICINE

## 2022-12-25 PROCEDURE — 85610 PROTHROMBIN TIME: CPT

## 2022-12-25 PROCEDURE — 80048 BASIC METABOLIC PNL TOTAL CA: CPT

## 2022-12-25 PROCEDURE — 2580000003 HC RX 258: Performed by: INTERNAL MEDICINE

## 2022-12-25 PROCEDURE — 1200000000 HC SEMI PRIVATE

## 2022-12-25 PROCEDURE — 6360000002 HC RX W HCPCS: Performed by: INTERNAL MEDICINE

## 2022-12-25 PROCEDURE — 2580000003 HC RX 258: Performed by: FAMILY MEDICINE

## 2022-12-25 PROCEDURE — 6370000000 HC RX 637 (ALT 250 FOR IP): Performed by: NURSE PRACTITIONER

## 2022-12-25 RX ORDER — DEXTROSE MONOHYDRATE 50 MG/ML
INJECTION, SOLUTION INTRAVENOUS CONTINUOUS
Status: DISCONTINUED | OUTPATIENT
Start: 2022-12-25 | End: 2022-12-26

## 2022-12-25 RX ORDER — FUROSEMIDE 10 MG/ML
20 INJECTION INTRAMUSCULAR; INTRAVENOUS ONCE
Status: COMPLETED | OUTPATIENT
Start: 2022-12-25 | End: 2022-12-25

## 2022-12-25 RX ADMIN — ATORVASTATIN CALCIUM 40 MG: 40 TABLET, FILM COATED ORAL at 20:14

## 2022-12-25 RX ADMIN — Medication 10 ML: at 09:35

## 2022-12-25 RX ADMIN — TAMSULOSIN HYDROCHLORIDE 0.4 MG: 0.4 CAPSULE ORAL at 09:28

## 2022-12-25 RX ADMIN — METOPROLOL SUCCINATE 50 MG: 50 TABLET, EXTENDED RELEASE ORAL at 09:28

## 2022-12-25 RX ADMIN — FINASTERIDE 5 MG: 5 TABLET, FILM COATED ORAL at 09:28

## 2022-12-25 RX ADMIN — DEXTROSE MONOHYDRATE: 50 INJECTION, SOLUTION INTRAVENOUS at 09:35

## 2022-12-25 RX ADMIN — WARFARIN SODIUM 2.5 MG: 2.5 TABLET ORAL at 18:39

## 2022-12-25 RX ADMIN — FUROSEMIDE 20 MG: 10 INJECTION, SOLUTION INTRAMUSCULAR; INTRAVENOUS at 09:28

## 2022-12-25 ASSESSMENT — PAIN SCALES - GENERAL
PAINLEVEL_OUTOF10: 0

## 2022-12-25 NOTE — PROGRESS NOTES
[facility]    12/25/2022 9:45 AM        Name: Imtiaz Escobar . Admitted: 12/16/2022  Primary Care Provider: KRISTYN Welch CNP (Tel: [phone])                        Subjective:  . No acute events overnight. Resting well. Pain control. Diet ok. Labs reviewed  Denies any chest pain sob.      Reviewed interval ancillary notes    Current Medications  dextrose 5 % solution, Continuous  [Held by provider] furosemide (LASIX) tablet 20 mg, Daily  metoprolol succinate (TOPROL XL) extended release tablet 50 mg, Daily  warfarin (COUMADIN) tablet 2.5 mg, Once per day on Sun Tue Thu Sat  warfarin (COUMADIN) tablet 5 mg, Once per day on Mon Wed Fri  tamsulosin (FLOMAX) capsule 0.4 mg, Daily  [Held by provider] sacubitril-valsartan (ENTRESTO) 24-26 MG per tablet 1 tablet, BID  finasteride (PROSCAR) tablet 5 mg, Daily  atorvastatin (LIPITOR) tablet 40 mg, Daily  sodium chloride flush 0.9 % injection 5-40 mL, 2 times per day  sodium chloride flush 0.9 % injection 5-40 mL, PRN  0.9 % sodium chloride infusion, PRN  ondansetron (ZOFRAN-ODT) disintegrating tablet 4 mg, Q8H PRN   Or  ondansetron (ZOFRAN) injection 4 mg, Q6H PRN  polyethylene glycol (GLYCOLAX) packet 17 g, Daily PRN  acetaminophen (TYLENOL) tablet 650 mg, Q6H PRN   Or  acetaminophen (TYLENOL) suppository 650 mg, Q6H PRN      Objective:  /65   Pulse 70   Temp 97.9 °F (36.6 °C)   Resp 18   Ht 6' (1.829 m)   Wt 245 lb (111.1 kg)   SpO2 97%   BMI 33.23 kg/m²     Intake/Output Summary (Last 24 hours) at 12/25/2022 0945  Last data filed at 12/25/2022 0935  Gross per 24 hour   Intake 490 ml   Output 1850 ml   Net -1360 ml      Wt Readings from Last 3 Encounters:   12/25/22 245 lb (111.1 kg)   12/16/22 288 lb (130.6 kg)   09/02/22 263 lb 6.4 oz (119.5 kg)       General appearance:  Appears comfortable  Eyes: Sclera clear. Pupils equal.  ENT: Moist oral mucosa. Trachea midline, no adenopathy. Cardiovascular: Regular rhythm, normal S1, S2. No murmur. No edema in lower extremities  Respiratory: Not using accessory muscles. Good inspiratory effort. Clear to auscultation bilaterally, no wheeze or crackles. GI: Abdomen soft, no tenderness, not distended, normal bowel sounds  Musculoskeletal: No cyanosis in digits, neck supple  Neurology: CN 2-12 grossly intact. No speech or motor deficits  Psych: Normal affect. Alert and oriented in time, place and person  Skin: Warm, dry, normal turgor    Labs and Tests:  CBC:   No results for input(s): WBC, HGB, PLT in the last 72 hours. BMP:    Recent Labs     12/23/22  0809 12/24/22  0612 12/25/22  0608   * 155* 154*   K 3.6 4.1 3.8   * 116* 115*   CO2 26 30 32   BUN 25* 22* 18   CREATININE 1.9* 2.0* 1.8*   GLUCOSE 110* 119* 119*     Hepatic:   No results for input(s): AST, ALT, ALB, BILITOT, ALKPHOS in the last 72 hours. Discussed care with patient             Problem List  Principal Problem:    Atrial fibrillation with RVR (HCC)  Active Problems:    Acute renal failure (HCC)    Acute congestive heart failure (HCC)    Paroxysmal atrial fibrillation (HCC)    Elevated troponin    History of DVT in adulthood    Cardiomyopathy ([facility] [address]    Acute systolic heart failure (HCC)    Hypernatremia    History of stroke  Resolved Problems:    * No resolved hospital problems. *       Assessment & Plan:   A. fib with RVR  -Likely multifactorial with underlying CHF and also with renal failure  -Patient is status post cardioversion  -Anticoagulated on Coumadin INR was 3.8    Acute on chronic CHF  -Noted EF was low  -Underwent stress test which was abnormal  -Continue to monitor      Acute kidney injury  Soodium still high   -cratine improved  - per nephrology             Diet: ADULT DIET;  Regular; Low Fat/Low Chol/High Fiber/2 gm Na  Code:Full Code  DVT PPX lovenox Disposition-pending improvement in renal function     Sandi Muniz MD   12/25/2022 9:45 AM

## 2022-12-25 NOTE — PROGRESS NOTES
[facility]  HEART FAILURE  Progress Note      Admit Date 12/16/2022     Reason for Consult:      Reason for Consultation/Chief Complaint: SOB    HPI:    Brandon Hall is a 74 y.o. male with PMH DVT/PE, DM, HTN , HLD, CVA, IVC filter, HFpEF admitted with edema and SOB. New onset AF and had DCCV yesterday. Echo with low EF and pt had positive ST today, will need LHC. HF meds started and pt has diuresed 22L, Cr has been elevated this week and entresto and diuretics are on hold      Subjective:  Patient is being seen for CHF, CMP. There were no acute overnight cardiac events. Today Mr. Lily Lanza denies chest pain, shortness of breath, palpitations and is feeling well      Review of Systems - History obtained from the patient  General ROS: negative  ENT ROS: negative  Allergy and Immunology ROS: negative  Hematological and Lymphatic ROS: negative  Respiratory ROS: no cough, shortness of breath, or wheezing  Cardiovascular ROS: no chest pain or dyspnea on exertion  Gastrointestinal ROS: no abdominal pain, change in bowel habits, or black or bloody stools  Musculoskeletal ROS: negative  Neurological ROS: no TIA or stroke symptoms  Dermatological ROS: negative     Baseline Weight:    Wt Readings from Last 3 Encounters:   12/25/22 245 lb (111.1 kg)   12/16/22 288 lb (130.6 kg)   09/02/22 263 lb 6.4 oz (119.5 kg)         Cardiac Testing:   Echo 12/19/22  Personally revieweed  Left ventricle is dilated. Overall, left ventricular systolic function is moderately depressed. Ejection fraction is visually estimated to be 30-35%. (Walters's 35%)  There is global hypokinesis, more severe in the inferior/inferoseptal walls. Diastolic dysfunction-indeterminate grade. Moderate, posteriorly directed mitral regurgitation. Moderate tricuspid regurgitation. PASP estimated at 40 mmHg. Biatrial enlargement. ST: 12/21/22:   Summary    The overall quality of the study is good.  There is subdiaphragmatic attenuation. Left ventricular cavity size is severely dilated. RIght ventricle is normal    in size. Spect imaging demonstrates a small area of mildly decreased perfusion in the    apex. The defect is present at rest and stress, consistent with infarct. There is an additional medium size defect of moderate intensity in the mid    anterior and mid anteroseptum. The defect is present at rest and stress,    consistent with infarct. Sum stress score of 8. No visual TID. Calculated TID of 1.01. Left ventricular ejection fraction is severely reduced at 25%. Sensitivity of testing is reduced on amlodipine. **Myocardial perfusion is abnormal. Fixed defect. Moderate-to-high risk    scan. Abnormal baseline EKG.**       Core measures for HF:  EF: 30-35%   ACEi/ARB/ARNI: entresto on hold  BB: Metoprolol succinate  Julio:  none for SANDY  Hydralazine/nitrates:  SGLT2i:  sandy    NYHA Class III      Objective:   /76   Pulse 87   Temp 97.8 °F (36.6 °C) (Oral)   Resp 16   Ht 6' (1.829 m)   Wt 245 lb (111.1 kg)   SpO2 96%   BMI 33.23 kg/m²     Intake/Output Summary (Last 24 hours) at 12/25/2022 9879  Last data filed at 12/25/2022 0439  Gross per 24 hour   Intake 480 ml   Output 1850 ml   Net -1370 ml      In: 480 [P.O.:480]  Out: 1850     TELEMETRY: SR    Physical Exam:  General Appearance:  Non-obese/Well Nourished  Respiratory:  Resp Auscultation: CTA  Cardiovascular:   Auscultation: Regular rate and rhythm, normal S1S2, no m/g/r/c  Palpation: Normal    Pedal Pulses: 2+ and equal   Abdomen:  Soft, NT, ND, + bs  Extremities:  No Cyanosis or Clubbing  Extremities: Trace edema  Neurological/Psychiatric:  Oriented to time, place, and person  Non-anxious    MEDICATIONS:   Scheduled Meds:   Scheduled Meds:   [Held by provider] furosemide  20 mg Oral Daily    metoprolol succinate  50 mg Oral Daily    warfarin  2.5 mg Oral Once per day on Sun Tue Thu Sat    warfarin  5 mg Oral Once per day on Mon Wed Fri    tamsulosin  0.4 mg Oral Daily    [Held by provider] sacubitril-valsartan  1 tablet Oral BID    finasteride  5 mg Oral Daily    atorvastatin  40 mg Oral Daily    sodium chloride flush  5-40 mL IntraVENous 2 times per day     Continuous Infusions:   sodium chloride       PRN Meds:.sodium chloride flush, sodium chloride, ondansetron **OR** ondansetron, polyethylene glycol, acetaminophen **OR** acetaminophen  Continuous Infusions:   sodium chloride         Intake/Output Summary (Last 24 hours) at 12/25/2022 0828  Last data filed at 12/25/2022 0439  Gross per 24 hour   Intake 480 ml   Output 1850 ml   Net -1370 ml       Lab Data:  CBC:   Lab Results   Component Value Date/Time    WBC 6.1 12/22/2022 06:34 AM    HGB 11.1 12/22/2022 06:34 AM     12/22/2022 06:34 AM     BMP:  Lab Results   Component Value Date/Time     12/25/2022 06:08 AM    K 3.8 12/25/2022 06:08 AM    K 4.6 12/16/2022 04:02 PM     12/25/2022 06:08 AM    CO2 32 12/25/2022 06:08 AM    BUN 18 12/25/2022 06:08 AM    CREATININE 1.8 12/25/2022 06:08 AM    GLUCOSE 119 12/25/2022 06:08 AM     INR:   Lab Results   Component Value Date/Time    INR 2.75 12/24/2022 07:43 AM    INR 2.76 12/23/2022 08:09 AM    INR 3.08 12/22/2022 06:35 AM        CARDIAC LABS  ENZYMES:No results for input(s): CKMB, CKMBINDEX, TROPONINI in the last 72 hours.     Invalid input(s): CKTOTAL;3  FASTING LIPID PANEL:  Lab Results   Component Value Date/Time    HDL 48 03/10/2022 08:55 AM    LDLCALC 41 03/10/2022 08:55 AM    TRIG 61 03/10/2022 08:55 AM     LIVER PROFILE:  Lab Results   Component Value Date/Time    AST 31 12/16/2022 04:02 PM    AST 17 03/10/2022 08:55 AM    ALT 26 12/16/2022 04:02 PM    ALT 17 03/10/2022 08:55 AM     BNP:   Lab Results   Component Value Date/Time    PROBNP 20,155 12/16/2022 04:02 PM     Iron Studies:    Lab Results   Component Value Date/Time    FERRITIN 364.5 12/21/2022 04:41 AM     Lab Results   Component Value Date IRON 41 (L) 12/21/2022    TIBC 204 (L) 12/21/2022    FERRITIN 364.5 12/21/2022      Iron Deficiency Anemia: no   2017 ACC/AHA HF Guidelines:   intravenous iron replacement in patients with New York Heart Association (NYHA) class II and III HF and iron deficiency(ferritin <100 ng/ml or 100-300 ng/ml if transferrin saturation <20%), to improve functional status and QoL. 1. WEIGHT: Admit Weight: 280 lb 3.2 oz (127.1 kg)      Today  Weight: 245 lb (111.1 kg)   2. I/O   Intake/Output Summary (Last 24 hours) at 12/25/2022 0828  Last data filed at 12/25/2022 0439  Gross per 24 hour   Intake 480 ml   Output 1850 ml   Net -1370 ml           Assessment/Plan:     AHF- 22L out, diuretics on hold since 12/22 for elevated Cr, pt appears compensated  CMP- low EF on echo and ST abnl.  pt will need LHC - deferred due to elevated Cr and INR, ok to do this as an outpt, he has f/u scheduled for Tuesday with labs if he is d/c over the weekend  AF- s/p DCCV, continue BB and AC  HTN- controlled        I appreciate the opportunity of cooperating in the care of this individual.    Margaret Patel, KRISTYN - CNP, ACNP, [address] 12/25/2022, 8:28 AM  Heart Failure  [facility], [address] [place] [address]  Ph: [phone]      Core Measures:   Discharge instructions:   LVEF documented:   ACEI for LV dysfunction:   Smoking Cessation:

## 2022-12-25 NOTE — PROGRESS NOTES
Pharmacy to Dose Warfarin    Pharmacy consulted to dose warfarin for afib. INR Goal: 2-3    INR today: 2.75    Assessment/Plan:  - Therapeutic. Continue home regimen with 2.5 mg due today. Pharmacy will continue to follow.     Ke Pearson, PharmD  PGY-1 Pharmacy Resident  [id number]

## 2022-12-25 NOTE — PROGRESS NOTES
Office : [phone]     Fax :[phone]       Nephrology  progress Note      Patient's Name: Stan Watt  9:05 AM  12/25/2022    Reason for Consult: SANDY      Requesting Physician:  KRISTYN Anderson CNP      Chief Complaint:    Chief Complaint   Patient presents with    Shortness of Breath     Pt states that he is having shortness of breath, sent by cardiology. Pt states Sob with walking. History of Present iIlness:    Stan Watt is a 74 y.o. male with prior history of DM 2, CHF, a. Fib , edema LE who presented at the PCP office yesterday with c/o SOB and leg swelling . Also, c/o palpitations . Was sent to ER . He was in A.fib with RVR. Also, initial lab work shows elevated creatinine of 2.4. baseline is 1.0. On 02 per NC     Interval hx :        Sodium level increased to 154   Edema decreasing   No chest pain     GOOD UOP     Renal function    Creat 1.8 ---> 2.3 ----> 2.0 ---> 1.9     I/O last 3 completed shifts:   In: 18 [P.O.:480]  Out: 3275 [Urine:3275]    Past Medical History:   Diagnosis Date    Cerebrovascular disease     CHF (congestive heart failure) ([facility] [address] 04/2017    per  nurse report but wife is unaware    DM (diabetes mellitus screen) 04/2017    boarder line    Hyperlipidemia     Hypertension     Type 2 diabetes mellitus without complication ([facility] [address]        Past Surgical History:   Procedure Laterality Date    VASECTOMY         Family History   Problem Relation Age of Onset    High Blood Pressure Mother     Cancer Mother         lung, smoker    Cancer Father         mesothelioma    Depression Sister            Current Medications:    dextrose 5 % solution, Continuous  furosemide (LASIX) injection 20 mg, Once  [Held by provider] furosemide (LASIX) tablet 20 mg, Daily  metoprolol succinate (TOPROL XL) extended release tablet 50 mg, Daily  warfarin (COUMADIN) tablet 2.5 mg, Once per day on Sun Tue Thu Sat  warfarin (COUMADIN) tablet 5 mg, Once per day on Mon Wed Fri  tamsulosin (FLOMAX) capsule 0.4 mg, Daily  [Held by provider] sacubitril-valsartan (ENTRESTO) 24-26 MG per tablet 1 tablet, BID  finasteride (PROSCAR) tablet 5 mg, Daily  atorvastatin (LIPITOR) tablet 40 mg, Daily  sodium chloride flush 0.9 % injection 5-40 mL, 2 times per day  sodium chloride flush 0.9 % injection 5-40 mL, PRN  0.9 % sodium chloride infusion, PRN  ondansetron (ZOFRAN-ODT) disintegrating tablet 4 mg, Q8H PRN   Or  ondansetron (ZOFRAN) injection 4 mg, Q6H PRN  polyethylene glycol (GLYCOLAX) packet 17 g, Daily PRN  acetaminophen (TYLENOL) tablet 650 mg, Q6H PRN   Or  acetaminophen (TYLENOL) suppository 650 mg, Q6H PRN        Physical exam:     Vitals:  /76   Pulse 87   Temp 97.8 °F (36.6 °C) (Oral)   Resp 16   Ht 6' (1.829 m)   Wt 245 lb (111.1 kg)   SpO2 96%   BMI 33.23 kg/m²   Constitutional:  OAA X3 NAD  Skin: no rash, turgor wnl  Heent:  eomi, mmm  Neck: no bruits or jvd noted  Cardiovascular:  S1, S2 without m/r/g  Respiratory: CTA B without w/r/r  Abdomen:  +bs, soft, nt, nd  Ext: no  lower extremity edema      Labs:  CBC:   No results for input(s): WBC, HGB, PLT in the last 72 hours. BMP:    Recent Labs     12/23/22  0809 12/24/22  0612 12/25/22  0608   * 155* 154*   K 3.6 4.1 3.8   * 116* 115*   CO2 26 30 32   BUN 25* 22* 18   CREATININE 1.9* 2.0* 1.8*   GLUCOSE 110* 119* 119*     Ca/Mg/Phos:   Recent Labs     12/23/22  0809 12/24/22  0612 12/25/22  0608   CALCIUM 9.1 9.2 9.2     Hepatic:   No results for input(s): AST, ALT, ALB, BILITOT, ALKPHOS in the last 72 hours. Troponin:   No results for input(s): TROPONINI in the last 72 hours. BNP: No results for input(s): BNP in the last 72 hours.   Lipids: No results for input(s): CHOL, TRIG, HDL, LDLCALC, LABVLDL in the last 72 hours. ABGs: No results for input(s): PHART, PO2ART, YHA6FAA in the last 72 hours. INR:   Recent Labs     12/23/22  0809 12/24/22  0743   INR 2.76* 2.75*     UA:No results for input(s): Anat Maryjane, GLUCOSEU, BILIRUBINUR, Abiola Star, SPECGRAV, BLOODU, PHUR, PROTEINU, UROBILINOGEN, NITRU, LEUKOCYTESUR, Eleanor Medel in the last 72 hours. Urine Microscopic: No results for input(s): LABCAST, BACTERIA, COMU, HYALCAST, WBCUA, RBCUA, EPIU in the last 72 hours. Urine Culture: No results for input(s): LABURIN in the last 72 hours. Urine Chemistry:   No results for input(s): Blythe Taye, PROTEINUR, NAUR in the last 72 hours. IMAGING:  NM Cardiac Stress Test Nuclear Imaging   Final Result      XR CHEST (2 VW)   Final Result   1. Small to moderate right pleural effusion and right basilar airspace   consolidation that could reflect atelectasis or pneumonia. 2. Cardiomegaly without evidence of CHF. Assessment/Plan :      1. SANDY 2/2 hemodynamic chnages form atrial fib with RVR     Creatinine stable   Hold metformin   Hold lisinopril   CRS      Recommend to dose adjust all medications  based on renal functions  Maintain SBP> 90 mmHg   Daily weights   AVOID NSAIDs  Avoid Nephrotoxins  Monitor Intake/Output  Call if significant decrease in urine output        2. A. Fib   S/p DCCV   On metoprolol . 3.  Acid- base disorder. Metabolic acidosis   Monitor     5. Hypernatremia   Sodium worsened from 150 ---> 155 ---> 154   Increase D5w at 100 ml/ hr  Give  lasix 20 mg iv x 1     6.  Hypokalemia - replace       Recheck BMP in am       D/w primary team      Thank you for allowing us to participate in care of [facility]         Electronically signed by: Kathy Squires MD, 12/25/2022, 9:05 AM      [facility] of [address]  Office : [phone]  Fax :[phone]

## 2022-12-26 VITALS
HEIGHT: 72 IN | SYSTOLIC BLOOD PRESSURE: 118 MMHG | WEIGHT: 245.9 LBS | OXYGEN SATURATION: 98 % | TEMPERATURE: 98.4 F | RESPIRATION RATE: 18 BRPM | BODY MASS INDEX: 33.31 KG/M2 | DIASTOLIC BLOOD PRESSURE: 69 MMHG | HEART RATE: 66 BPM

## 2022-12-26 LAB
ANION GAP SERPL CALCULATED.3IONS-SCNC: 9 MMOL/L (ref 3–16)
BUN BLDV-MCNC: 16 MG/DL (ref 7–20)
CALCIUM SERPL-MCNC: 9 MG/DL (ref 8.3–10.6)
CHLORIDE BLD-SCNC: 108 MMOL/L (ref 99–110)
CO2: 29 MMOL/L (ref 21–32)
CREAT SERPL-MCNC: 1.7 MG/DL (ref 0.8–1.3)
GFR SERPL CREATININE-BSD FRML MDRD: 44 ML/MIN/{1.73_M2}
GLUCOSE BLD-MCNC: 128 MG/DL (ref 70–99)
INR BLD: 2.97 (ref 0.87–1.14)
POTASSIUM SERPL-SCNC: 3.6 MMOL/L (ref 3.5–5.1)
PROTHROMBIN TIME: 31.1 SEC (ref 11.7–14.5)
SODIUM BLD-SCNC: 146 MMOL/L (ref 136–145)

## 2022-12-26 PROCEDURE — 6370000000 HC RX 637 (ALT 250 FOR IP): Performed by: HOSPITALIST

## 2022-12-26 PROCEDURE — 6370000000 HC RX 637 (ALT 250 FOR IP): Performed by: NURSE PRACTITIONER

## 2022-12-26 PROCEDURE — 2580000003 HC RX 258: Performed by: FAMILY MEDICINE

## 2022-12-26 PROCEDURE — 36415 COLL VENOUS BLD VENIPUNCTURE: CPT

## 2022-12-26 PROCEDURE — 80048 BASIC METABOLIC PNL TOTAL CA: CPT

## 2022-12-26 PROCEDURE — 85610 PROTHROMBIN TIME: CPT

## 2022-12-26 PROCEDURE — 6370000000 HC RX 637 (ALT 250 FOR IP): Performed by: INTERNAL MEDICINE

## 2022-12-26 RX ORDER — ASPIRIN 81 MG/1
81 TABLET ORAL DAILY
Status: DISCONTINUED | OUTPATIENT
Start: 2022-12-26 | End: 2022-12-26 | Stop reason: HOSPADM

## 2022-12-26 RX ADMIN — FUROSEMIDE 20 MG: 20 TABLET ORAL at 10:37

## 2022-12-26 RX ADMIN — TAMSULOSIN HYDROCHLORIDE 0.4 MG: 0.4 CAPSULE ORAL at 09:16

## 2022-12-26 RX ADMIN — METOPROLOL SUCCINATE 50 MG: 50 TABLET, EXTENDED RELEASE ORAL at 09:16

## 2022-12-26 RX ADMIN — ASPIRIN 81 MG: 81 TABLET, COATED ORAL at 09:16

## 2022-12-26 RX ADMIN — FINASTERIDE 5 MG: 5 TABLET, FILM COATED ORAL at 09:16

## 2022-12-26 RX ADMIN — Medication 10 ML: at 09:16

## 2022-12-26 ASSESSMENT — PAIN SCALES - GENERAL
PAINLEVEL_OUTOF10: 0
PAINLEVEL_OUTOF10: 0

## 2022-12-26 NOTE — PROGRESS NOTES
Office : [phone]     Fax :[phone]       Nephrology  progress Note      Patient's Name: Romeo Monte  11:00 AM  12/26/2022    Reason for Consult: SANDY      Requesting Physician:  KRISTYN Abrams CNP      Chief Complaint:    Chief Complaint   Patient presents with    Shortness of Breath     Pt states that he is having shortness of breath, sent by cardiology. Pt states Sob with walking. History of Present iIlness:    Romeo Monte is a 74 y.o. male with prior history of DM 2, CHF, a. Fib , edema LE who presented at the PCP office yesterday with c/o SOB and leg swelling . Also, c/o palpitations . Was sent to ER . He was in A.fib with RVR. Also, initial lab work shows elevated creatinine of 2.4. baseline is 1.0. On 02 per NC     Interval hx :        Sodium level increased to 154   Edema decreasing   No chest pain     GOOD UOP     Renal function    Creat 1.8 ---> 2.3 ----> 2.0 ---> 1.9     I/O last 3 completed shifts:   In: 2356 [P.O.:480; I.V.:1876]  Out: 200 [Urine:1850]    Past Medical History:   Diagnosis Date    Cerebrovascular disease     CHF (congestive heart failure) ([facility] [address] 04/2017    per  nurse report but wife is unaware    DM (diabetes mellitus screen) 04/2017    boarder line    Hyperlipidemia     Hypertension     Type 2 diabetes mellitus without complication ([facility] [address]        Past Surgical History:   Procedure Laterality Date    VASECTOMY         Family History   Problem Relation Age of Onset    High Blood Pressure Mother     Cancer Mother         lung, smoker    Cancer Father         mesothelioma    Depression Sister            Current Medications:    aspirin EC tablet 81 mg, Daily  furosemide (LASIX) tablet 20 mg, Daily  metoprolol succinate (TOPROL XL) extended release tablet 50 mg, Daily  warfarin (COUMADIN) tablet 2.5 mg, Once per day on Sun Tue Thu Sat  warfarin (COUMADIN) tablet 5 mg, Once per day on Mon Wed Fri  tamsulosin (FLOMAX) capsule 0.4 mg, Daily  [Held by provider] sacubitril-valsartan (ENTRESTO) 24-26 MG per tablet 1 tablet, BID  finasteride (PROSCAR) tablet 5 mg, Daily  atorvastatin (LIPITOR) tablet 40 mg, Daily  sodium chloride flush 0.9 % injection 5-40 mL, 2 times per day  sodium chloride flush 0.9 % injection 5-40 mL, PRN  0.9 % sodium chloride infusion, PRN  ondansetron (ZOFRAN-ODT) disintegrating tablet 4 mg, Q8H PRN   Or  ondansetron (ZOFRAN) injection 4 mg, Q6H PRN  polyethylene glycol (GLYCOLAX) packet 17 g, Daily PRN  acetaminophen (TYLENOL) tablet 650 mg, Q6H PRN   Or  acetaminophen (TYLENOL) suppository 650 mg, Q6H PRN        Physical exam:     Vitals:  /69   Pulse 66   Temp 98.4 °F (36.9 °C) (Oral)   Resp 18   Ht 6' (1.829 m)   Wt 245 lb 14.4 oz (111.5 kg)   SpO2 98%   BMI 33.35 kg/m²   Constitutional:  OAA X3 NAD  Skin: no rash, turgor wnl  Heent:  eomi, mmm  Neck: no bruits or jvd noted  Cardiovascular:  S1, S2 without m/r/g  Respiratory: CTA B without w/r/r  Abdomen:  +bs, soft, nt, nd  Ext: no  lower extremity edema      Labs:  CBC:   No results for input(s): WBC, HGB, PLT in the last 72 hours. BMP:    Recent Labs     12/24/22  0612 12/25/22  0608 12/26/22  0830   * 154* 146*   K 4.1 3.8 3.6   * 115* 108   CO2 30 32 29   BUN 22* 18 16   CREATININE 2.0* 1.8* 1.7*   GLUCOSE 119* 119* 128*     Ca/Mg/Phos:   Recent Labs     12/24/22  0612 12/25/22  0608 12/26/22  0830   CALCIUM 9.2 9.2 9.0     Hepatic:   No results for input(s): AST, ALT, ALB, BILITOT, ALKPHOS in the last 72 hours. Troponin:   No results for input(s): TROPONINI in the last 72 hours. BNP: No results for input(s): BNP in the last 72 hours.   Lipids: No results for input(s): CHOL, TRIG, HDL, LDLCALC, LABVLDL in the last 72 hours.  ABGs: No results for input(s): PHART, PO2ART, ZDZ2IHS in the last 72 hours. INR:   Recent Labs     12/24/22  0743 12/25/22  1845 12/26/22  0830   INR 2.75* 3.12* 2.97*     UA:No results for input(s): Jose Carlos Deer, GLUCOSEU, BILIRUBINUR, Waunita Patch, BLOODU, PHUR, PROTEINU, UROBILINOGEN, NITRU, LEUKOCYTESUR, Norma Ovens in the last 72 hours. Urine Microscopic: No results for input(s): LABCAST, BACTERIA, COMU, HYALCAST, WBCUA, RBCUA, EPIU in the last 72 hours. Urine Culture: No results for input(s): LABURIN in the last 72 hours. Urine Chemistry:   No results for input(s): Nicholaus Carota, PROTEINUR, NAUR in the last 72 hours. IMAGING:  NM Cardiac Stress Test Nuclear Imaging   Final Result      XR CHEST (2 VW)   Final Result   1. Small to moderate right pleural effusion and right basilar airspace   consolidation that could reflect atelectasis or pneumonia. 2. Cardiomegaly without evidence of CHF. Assessment/Plan :      1. SANDY 2/2 hemodynamic chnages form atrial fib with RVR     Creatinine stable   Hold metformin   Hold lisinopril   CRS  Resume lasix 20 mg po daily     Recommend to dose adjust all medications  based on renal functions  Maintain SBP> 90 mmHg   Daily weights   AVOID NSAIDs  Avoid Nephrotoxins  Monitor Intake/Output  Call if significant decrease in urine output        2. A. Fib   S/p DCCV   On metoprolol . 3.  Acid- base disorder. Metabolic acidosis   Monitor     5. Hypernatremia   Sodium worsened from 150 ---> 155 ---> 154 ---> 146   Dc d5w    6.  Hypokalemia - replace       Recheck BMP in am     Outpt follow up       D/w primary team      Thank you for allowing us to participate in care of [facility]         Electronically signed by: Eleno Hodgkins, MD, 12/26/2022, 11:00 AM      [facility] [address]  Office : [phone]  Fax :[phone]

## 2022-12-26 NOTE — DISCHARGE SUMMARY
Hospital Medicine Discharge Summary    Patient: Issa Mccann     Gender: male  : 1955   Age: 74 y.o. MRN: [id number]    Admitting Physician: Kailey Ramos MD  Discharge Physician: Ananias Lombard, MD     Code Status: Full Code     Admit Date: 2022   Discharge Date:   2022    Disposition:  Home  Time spent arranging discharge: 35 minutes    Discharge Diagnoses: Active Hospital Problems    Diagnosis Date Noted    Hypernatremia [E87.0] 2022     Priority: Medium    Cardiomyopathy ([address] [I42.9] 2022     Priority: Medium    Acute systolic heart failure ([address] [I50.21] 2022     Priority: Medium    Elevated troponin [R77.8] 2022     Priority: Medium    History of DVT in adulthood [Z86.718] 2022     Priority: Medium    Acute renal failure ([address] [N17.9] 2022     Priority: Medium    Acute congestive heart failure ([address] [I50.9] 2022     Priority: Medium    Paroxysmal atrial fibrillation ([address] [I48.0] 2022     Priority: Medium    Atrial fibrillation with RVR ([address] [I48.91] 2022     Priority: Medium    History of stroke [Z86.73] 01/10/2020         Condition at Discharge:  Stable    Hospital Course: To hospital with A. jane with RVR underwent cardioversion which was successful had acute on chronic exacerbation diuresed had a stress test which was abnormal we will follow-up with cardiology for outpatient left heart cath per cardiology patient SANDY and hyponatremia which improved discharge by nephrology will follow up with nephrology as outpatient    Discharge Exam:    /69   Pulse 66   Temp 98.4 °F (36.9 °C) (Oral)   Resp 18   Ht 6' (1.829 m)   Wt 245 lb 14.4 oz (111.5 kg)   SpO2 98%   BMI 33.35 kg/m²   General appearance:  Appears comfortable.  AAOx3  HEENT: atraumatic, Pupils equal, muscous membranes moist, no masses appreciated  Cardiovascular: Regular rate and rhythm no murmurs appreciated  Respiratory: CTAB no wheezing  Gastrointestinal: Abdomen soft, non-tender, BS+  EXT: no edema  Neurology: no gross focal deficts  Psychiatry: Appropriate affect.  Not agitated  Skin: Warm, dry, no rashes appreciated    Discharge Medications:   Current Discharge Medication List        START taking these medications    Details   furosemide (LASIX) 20 MG tablet Take 1 tablet by mouth daily  Qty: 60 tablet, Refills: 0      metoprolol succinate (TOPROL XL) 50 MG extended release tablet Take 1 tablet by mouth daily  Qty: 30 tablet, Refills: 1      tamsulosin (FLOMAX) 0.4 MG capsule Take 1 capsule by mouth daily  Qty: 30 capsule, Refills: 1      finasteride (PROSCAR) 5 MG tablet Take 1 tablet by mouth daily  Qty: 30 tablet, Refills: 3           Current Discharge Medication List        Current Discharge Medication List        CONTINUE these medications which have NOT CHANGED    Details   atorvastatin (LIPITOR) 40 MG tablet TAKE ONE TABLET BY MOUTH EVERY NIGHT  Qty: 90 tablet, Refills: 1    Associated Diagnoses: Mixed hyperlipidemia      Blood Pressure KIT 1 kit by Does not apply route daily  Qty: 1 kit, Refills: 0    Associated Diagnoses: Essential hypertension      metFORMIN (GLUCOPHAGE) 500 MG tablet TAKE 1 TABLET BY MOUTH 2 TIMES A DAY WITH MEALS  Qty: 180 tablet, Refills: 1      blood glucose monitor strips 1 strip by Other route daily True Metric Test strips  Qty: 100 strip, Refills: 3    Associated Diagnoses: Uncontrolled type 2 diabetes mellitus with polyneuropathy; Uncontrolled type 2 diabetes mellitus with diabetic polyneuropathy      Lancets MISC True Metric Lancets  Qty: 100 each, Refills: 3    Associated Diagnoses: Uncontrolled type 2 diabetes mellitus with polyneuropathy; Uncontrolled type 2 diabetes mellitus with diabetic polyneuropathy      warfarin (COUMADIN) 5 MG tablet TAKE 1 AND 1/2 TABLETS BY MOUTH DAILY  Qty: 135 tablet, Refills: 5      Blood Glucose Monitoring Suppl AGUSTO 1 kit by Does not apply route daily True Metric testing device  Qty: 1 Device, Refills: 0    Associated Diagnoses: Uncontrolled type 2 diabetes mellitus without complication, without long-term current use of insulin      Methylsulfonylmethane (MSM PO) Take by mouth      Prenatal MV-Min-Fe Fum-FA-DHA (PRENATAL 1 PO) Take by mouth           Current Discharge Medication List        STOP taking these medications       lisinopril (PRINIVIL;ZESTRIL) 40 MG tablet Comments:   Reason for Stopping:         amLODIPine (NORVASC) 5 MG tablet Comments:   Reason for Stopping:               Labs: For convenience and continuity at follow-up the following most recent labs are provided:    Lab Results   Component Value Date/Time    WBC 6.1 12/22/2022 06:34 AM    HGB 11.1 12/22/2022 06:34 AM    HCT 33.7 12/22/2022 06:34 AM    MCV 97.4 12/22/2022 06:34 AM     12/22/2022 06:34 AM     12/26/2022 08:30 AM    K 3.6 12/26/2022 08:30 AM    K 4.6 12/16/2022 04:02 PM     12/26/2022 08:30 AM    CO2 29 12/26/2022 08:30 AM    BUN 16 12/26/2022 08:30 AM    CREATININE 1.7 12/26/2022 08:30 AM    CALCIUM 9.0 12/26/2022 08:30 AM    ALKPHOS 80 12/16/2022 04:02 PM    ALT 26 12/16/2022 04:02 PM    AST 31 12/16/2022 04:02 PM    BILITOT 1.2 12/16/2022 04:02 PM    LABALBU 3.7 12/16/2022 04:02 PM    LDLCALC 41 03/10/2022 08:55 AM    TRIG 61 03/10/2022 08:55 AM     Lab Results   Component Value Date    INR 2.97 (H) 12/26/2022    INR 3.12 (H) 12/25/2022    INR 2.75 (H) 12/24/2022       Radiology:  XR CHEST (2 VW)    Result Date: 12/16/2022  EXAMINATION: TWO XRAY VIEWS OF THE CHEST 12/16/2022 1:01 pm COMPARISON: None. HISTORY: ORDERING SYSTEM PROVIDED HISTORY: short of breath, suspected CHF TECHNOLOGIST PROVIDED HISTORY: Reason for exam:->short of breath, suspected CHF Reason for Exam: short of breath, suspected CHF FINDINGS: There is a small to moderate right pleural effusion and there is right basal consolidation. There is cardiomegaly.   There is no vascular congestion or septal thickening. 1. Small to moderate right pleural effusion and right basilar airspace consolidation that could reflect atelectasis or pneumonia. 2. Cardiomegaly without evidence of CHF. NM Cardiac Stress Test Nuclear Imaging    Result Date: 12/21/2022  Cardiac Perfusion Imaging  Demographics   Patient Name      [facility]   Date of Study     12/21/2022         Gender              Male   Patient Number    [id number]         Date of Birth       1955   Visit Number      [id number]          Age                 74 year(s)   Accession Number  [id number]         Room Number         9130   Corporate ID      [id number]           NM Technician       Will Knott   Nurse             Soco Small RN  Interpreting        Therese Landeros DO                                       Physician   Ordering          Anne Marie Cahcon MD   The procedure was explained in detail to the patient. Risks,  complications and alternative treatments were reviewed. Written consent  was obtained. Procedure Procedure Type:   Nuclear Stress Test:NM MYOCARDIAL SPECT REST EXERCISE OR RX,  Pharmacological   Study location: [address] [place] [address] Status: Inpatient. Height: 72 inches Weight: 258 pounds  Risk Factors   The patient risk factors include:obesity, cerebrovascular disease, physical  activity, treated and controlled hypercholesterolemia, treated and  controlled hypertension, family history of premature CAD, orally-treated  diabetes mellitus, dyslipidemia and prior heart failure . Conclusions   Summary  The overall quality of the study is good. There is subdiaphragmatic  attenuation. Left ventricular cavity size is severely dilated. RIght ventricle is normal  in size. Spect imaging demonstrates a small area of mildly decreased perfusion in the  apex. The defect is present at rest and stress, consistent with infarct.   There is an additional medium size defect of moderate Medications  Medication Sig Taking?  Authorizing Provider  atorvastatin (LIPITOR) 40 MG tablet TAKE ONE TABLET BY MOUTH  EVERY NIGHT KRISTYN Bhat NP  Blood Pressure KIT 1 kit by Does not apply route daily KRISTYN Lopez CNP  metFORMIN (GLUCOPHAGE) 500 MG tablet TAKE 1 TABLET BY MOUTH 2  TIMES A DAY WITH MEALS Hakeem Griffin MD  lisinopril (PRINIVIL;ZESTRIL) 40 MG tablet TAKE 1 TABLET BY  MOUTH ONE TIME A DAY Hakeem Griffin MD  amLODIPine (NORVASC) 5 MG tablet TAKE 1 TABLET BY MOUTH ONE TIME  A DAY Hakeem Griffin MD  blood glucose monitor strips 1 strip by Other route daily True  Metric Test strips KRISTYN Lopez CNP  Lancets MISC True Metric Lancets KRISTYN Lopez CNP  warfarin (COUMADIN) 5 MG tablet TAKE 1 AND 1/2 TABLETS BY MOUTH  DAILY  Patient taking differently: TAKE 1/2 TABLETS BY MOUTH DAILY  NALINI Corona  Blood Glucose Monitoring Suppl AGUSTO 1 kit by Does not apply  route daily True Metric testing device Federica Ernst MD  Methylsulfonylmethane (MSM PO) Take by mouth Historical  Provider, MD  Prenatal MV-Min-Fe Fum-FA-DHA (PRENATAL 1 PO)   Signatures   ------------------------------------------------------------------  Electronically signed by Abad Wang DO (Interpreting  physician) on 12/21/2022 at 12:43  ------------------------------------------------------------------          Signed:    Arsh Quiñonez MD   12/26/2022

## 2022-12-26 NOTE — PLAN OF CARE
Problem: Discharge Planning  Goal: Discharge to home or other facility with appropriate resources  Outcome: Completed     Problem: Pain  Goal: Verbalizes/displays adequate comfort level or baseline comfort level  Outcome: Completed     Problem: Safety - Adult  Goal: Free from fall injury  Outcome: Completed     Problem: ABCDS Injury Assessment  Goal: Absence of physical injury  Outcome: Completed     Problem: Skin/Tissue Integrity  Goal: Absence of new skin breakdown  Description: 1. Monitor for areas of redness and/or skin breakdown  2. Assess vascular access sites hourly  3. Every 4-6 hours minimum:  Change oxygen saturation probe site  4. Every 4-6 hours:  If on nasal continuous positive airway pressure, respiratory therapy assess nares and determine need for appliance change or resting period.   Outcome: Completed     Problem: Chronic Conditions and Co-morbidities  Goal: Patient's chronic conditions and co-morbidity symptoms are monitored and maintained or improved  Outcome: Completed     Problem: Neurosensory - Adult  Goal: Achieves maximal functionality and self care  Outcome: Completed     Problem: Respiratory - Adult  Goal: Achieves optimal ventilation and oxygenation  Outcome: Completed     Problem: Cardiovascular - Adult  Goal: Maintains optimal cardiac output and hemodynamic stability  Outcome: Completed  Goal: Absence of cardiac dysrhythmias or at baseline  Outcome: Completed     Problem: Skin/Tissue Integrity - Adult  Goal: Skin integrity remains intact  Outcome: Completed  Goal: Oral mucous membranes remain intact  Outcome: Completed     Problem: Musculoskeletal - Adult  Goal: Return mobility to safest level of function  Outcome: Completed  Goal: Return ADL status to a safe level of function  Outcome: Completed     Problem: Gastrointestinal - Adult  Goal: Minimal or absence of nausea and vomiting  Outcome: Completed  Goal: Maintains or returns to baseline bowel function  Outcome: Completed  Goal: Maintains adequate nutritional intake  Outcome: Completed     Problem: Genitourinary - Adult  Goal: Absence of urinary retention  Outcome: Completed     Problem: Infection - Adult  Goal: Absence of infection at discharge  Outcome: Completed  Goal: Absence of infection during hospitalization  Outcome: Completed     Problem: Metabolic/Fluid and Electrolytes - Adult  Goal: Electrolytes maintained within normal limits  Outcome: Completed  Goal: Hemodynamic stability and optimal renal function maintained  Outcome: Completed  Goal: Glucose maintained within prescribed range  Outcome: Completed     Problem: Hematologic - Adult  Goal: Maintains hematologic stability  Outcome: Completed

## 2022-12-26 NOTE — PROGRESS NOTES
Cardiovascular Progress Note      Chief Complaint:   Chief Complaint   Patient presents with    Shortness of Breath     Pt states that he is having shortness of breath, sent by cardiology. Pt states Sob with walking. Impression/Recommendations:    Acute Systolic CHF (LVEF 65%), now compensated  Abnormal stress test   New onset AF, S/P DCCV, remains in SR  SANDY, improving Cr 2.4-->1.7, baseline 1.1  Hypertension  Hyperlipidemia  Hx. CVA  Hx. DVT/PE, on Coumadin      Follow up with CHF clinic. Continue Aspirin/Lipitor/Beta blocker/Low dose diuretic. Revisit ACE inhibitor and eventual MRA following outpatient Nephrology follow up. Outpatient LHC per primary cardiologist following clearance from Nephrology. Interval History:   No events overnight. No chest pain/heaviness/tightness. No shortness of breath/orthopnea/edema. Lives closeby and comfortable with DC pending Nephro. INR 3.1  Wt 245 lbs stable  Net negative 4.2 L    Echo 12/19/22  Left ventricle is dilated. Overall, left ventricular systolic function is moderately depressed. Ejection fraction is visually estimated to be 30-35%. (Walters's 35%)  There is global hypokinesis, more severe in the inferior/inferoseptal walls. Diastolic dysfunction-indeterminate grade. Moderate, posteriorly directed mitral regurgitation. Moderate tricuspid regurgitation. PASP estimated at 40 mmHg. Biatrial enlargement. ST: 12/21/22:   Summary    The overall quality of the study is good. There is subdiaphragmatic    attenuation. Left ventricular cavity size is severely dilated. RIght ventricle is normal    in size. Spect imaging demonstrates a small area of mildly decreased perfusion in the    apex. The defect is present at rest and stress, consistent with infarct. There is an additional medium size defect of moderate intensity in the mid    anterior and mid anteroseptum. The defect is present at rest and stress,    consistent with infarct. Sum stress score of 8. No visual TID. Calculated TID of 1.01. Left ventricular ejection fraction is severely reduced at 25%. Sensitivity of testing is reduced on amlodipine. **Myocardial perfusion is abnormal. Fixed defect. Moderate-to-high risk    scan. Abnormal baseline EKG. **          Medications:  [Held by provider] furosemide (LASIX) tablet 20 mg, Daily  metoprolol succinate (TOPROL XL) extended release tablet 50 mg, Daily  warfarin (COUMADIN) tablet 2.5 mg, Once per day on Sun Tue Thu Sat  warfarin (COUMADIN) tablet 5 mg, Once per day on Mon Wed Fri  tamsulosin (FLOMAX) capsule 0.4 mg, Daily  [Held by provider] sacubitril-valsartan (ENTRESTO) 24-26 MG per tablet 1 tablet, BID  finasteride (PROSCAR) tablet 5 mg, Daily  atorvastatin (LIPITOR) tablet 40 mg, Daily  sodium chloride flush 0.9 % injection 5-40 mL, 2 times per day  sodium chloride flush 0.9 % injection 5-40 mL, PRN  0.9 % sodium chloride infusion, PRN  ondansetron (ZOFRAN-ODT) disintegrating tablet 4 mg, Q8H PRN   Or  ondansetron (ZOFRAN) injection 4 mg, Q6H PRN  polyethylene glycol (GLYCOLAX) packet 17 g, Daily PRN  acetaminophen (TYLENOL) tablet 650 mg, Q6H PRN   Or  acetaminophen (TYLENOL) suppository 650 mg, Q6H PRN        I/O:     Intake/Output Summary (Last 24 hours) at 12/26/2022 0856  Last data filed at 12/26/2022 0528  Gross per 24 hour   Intake 1876 ml   Output --   Net 1876 ml       Physical Exam:    /70   Pulse 64   Temp 98.1 °F (36.7 °C) (Oral)   Resp 18   Ht 6' (1.829 m)   Wt 245 lb 14.4 oz (111.5 kg)   SpO2 97%   BMI 33.35 kg/m²   Wt Readings from Last 3 Encounters:   12/26/22 245 lb 14.4 oz (111.5 kg)   12/16/22 288 lb (130.6 kg)   09/02/22 263 lb 6.4 oz (119.5 kg)       GENERAL: Well developed, well nourished, no acute distress  NEUROLOGICAL: Alert and oriented x3  PSYCH: Normal mood and affect   SKIN: Warm and dry, without lesions  HEENT: Normocephalic, atraumatic, Sclera non-icteric, mucous membranes moist  NECK: supple, JVP normal, thyroid not enlarged   CAROTID: Normal upstroke, no bruits  CARDIAC: Normal PMI, regular rate and rhythm, normal S1S2, no murmur, rub  RESPIRATORY: Normal respiratory effort, clear to auscultation bilaterally  EXTREMITIES: No cyanosis, clubbing or edema, palpable pulses bilaterally   MUSCULOSKELETAL: No joint swelling or tenderness, no chest wall tenderness  GASTROINTESTINAL:  soft, non-tender, no bruit    Data Review:    Recent Labs     12/24/22  0612 12/25/22  0608   * 154*   K 4.1 3.8   * 115*   CO2 30 32   BUN 22* 18   CREATININE 2.0* 1.8*       Recent Labs     12/24/22  0743 12/25/22  1845   PROTIME 29.2* 32.3*   INR 2.75* 3.12*       Sharyn Ahn DO, [facility]  Interventional Cardiology     o: [phone]  [address] [place], [place] [address] [address] [place], [address]      NOTE:  This report was transcribed using voice recognition software. Every effort was made to ensure accuracy; however, inadvertent computerized transcription errors may be present.

## 2022-12-26 NOTE — PROGRESS NOTES
Data- discharge order received, pt verbalized agreement to discharge, disposition to previous residence, no needs for HHC/DME. Action- discharge instructions prepared and given to Gabrielle Kitchen but also reviewed with wide via phone- instructed to read material in packet then call back with questions. pt verbalized understanding. Medication information packet given r/t NEW and/or CHANGED prescriptions emphasizing name/purpose/side effects, pt verbalized understanding. Discharge instruction summary: Diet- Low sodium, Activity- as tolerated, Primary Care Physician as KRISTYN Oden - [facility] [phone] f/u appointment already made for next week as well as follow up with [facility] tomorrow (to get blood work-restart Entresto and talk about [facility]) pt to follow up with EP in feb or earlier if need. Wife to call Urology and make follow up as well. Prescription medications filled via [facility] already filled and delivered on Fri 12/23. CHF Education reviewed. Pt/ Family has had a total of 60 minutes CHF education this admission encounter. 1. WEIGHT: Admit Weight: 280 lb 3.2 oz (127.1 kg) (12/16/22 2038)        Today  Weight: 245 lb 14.4 oz (111.5 kg) (12/26/22 0810)       2. O2 SAT.: SpO2: 98 % (12/26/22 0810)    Response- Pt belongings gathered, IV removed. Disposition is home (no HHC/DME needs), transported to [facility] via w/c w/ belongings to await [employer]-called wife for ETA coming home, no complications.

## 2022-12-27 ENCOUNTER — TELEPHONE (OUTPATIENT)
Dept: FAMILY MEDICINE CLINIC | Age: 67
End: 2022-12-27

## 2022-12-27 ENCOUNTER — OFFICE VISIT (OUTPATIENT)
Dept: CARDIOLOGY CLINIC | Age: 67
End: 2022-12-27

## 2022-12-27 VITALS
WEIGHT: 246 LBS | OXYGEN SATURATION: 99 % | HEIGHT: 72 IN | HEART RATE: 66 BPM | BODY MASS INDEX: 33.32 KG/M2 | DIASTOLIC BLOOD PRESSURE: 58 MMHG | SYSTOLIC BLOOD PRESSURE: 94 MMHG

## 2022-12-27 DIAGNOSIS — I50.21 ACUTE SYSTOLIC HEART FAILURE (HCC): Primary | ICD-10-CM

## 2022-12-27 DIAGNOSIS — N28.9 RENAL INSUFFICIENCY: ICD-10-CM

## 2022-12-27 DIAGNOSIS — E87.0 HYPERNATREMIA: ICD-10-CM

## 2022-12-27 DIAGNOSIS — I48.91 ATRIAL FIBRILLATION STATUS POST CARDIOVERSION (HCC): ICD-10-CM

## 2022-12-27 DIAGNOSIS — I10 PRIMARY HYPERTENSION: ICD-10-CM

## 2022-12-27 NOTE — TELEPHONE ENCOUNTER
Reviewed dosing in hospital - no change from last INR on 12/6:    5mg MWF; 2.5mg all other days. INR therapeutic on 12/26 at 2.97. Repeat in 2 weeks for monitoring.

## 2022-12-27 NOTE — PROGRESS NOTES
[address]  Progress Note    Primary Care Doctor:  Brooke Saeed, KRISTYN - CNP    Chief Complaint   Patient presents with    Cardiomyopathy        History of Present Illness:  74 y.o. male with history of DVT/PE, DM, hypertension, hyperlipidemia, CVA, IVC filter      HFpEF admitted with edema and SOB. New onset AF and had DCCV yesterday. Echo with low EF and pt had positive ST today, will need LHC. HF meds started and pt has diuresed 22L, Cr has been elevated this week and entresto and diuretics are on hold     I had the pleasure of seeing Marvin Tobin in follow up for hospitalization 12/16-26/2022 for SANDY, hypernatremia (155), AF with CV on warfarin (patient had been on this in past for CVA). Stress test abnormal and will need LHC done once creat improves. He was diuresed 280-244.8. He is ambulatory and with his wife, Axel Torres. He states that he feels much improved and abdomen a lot less distended. He denies any shortness of breath, chest pain, palpitations or lightheadedness. He has bilateral lower ankle to mid calf edema. He is taking all his medications. EKG today shows NSR      Past Medical History:   has a past medical history of Cerebrovascular disease, CHF (congestive heart failure) ([address] DM (diabetes mellitus screen), Hyperlipidemia, Hypertension, and Type 2 diabetes mellitus without complication ([address] Surgical History:   has a past surgical history that includes Vasectomy. Social History:   reports that he quit smoking about 5 years ago. His smoking use included cigarettes. He has a 80.00 pack-year smoking history. He quit smokeless tobacco use about 5 years ago. He reports that he does not drink alcohol and does not use drugs.    Family History:   Family History   Problem Relation Age of Onset    High Blood Pressure Mother     Cancer Mother         lung, smoker    Cancer Father         mesothelioma    Depression Sister        Home Medications:  Prior to Admission medications Medication Sig Start Date End Date Taking? Authorizing Provider   furosemide (LASIX) 20 MG tablet Take 1 tablet by mouth daily 12/23/22  Yes Lin Smith MD   metoprolol succinate (TOPROL XL) 50 MG extended release tablet Take 1 tablet by mouth daily 12/23/22  Yes Lin Smith MD   tamsulosin (FLOMAX) 0.4 MG capsule Take 1 capsule by mouth daily 12/23/22  Yes Lin Smith MD   finasteride (PROSCAR) 5 MG tablet Take 1 tablet by mouth daily 12/21/22  Yes Lin Smith MD   atorvastatin (LIPITOR) 40 MG tablet TAKE ONE TABLET BY MOUTH EVERY NIGHT 10/4/22  Yes Janeen Babinski, APRN - NP   metFORMIN (GLUCOPHAGE) 500 MG tablet TAKE 1 TABLET BY MOUTH 2 TIMES A DAY WITH MEALS 8/30/22  Yes Sg Lindsay MD   warfarin (COUMADIN) 5 MG tablet TAKE 1 AND 1/2 TABLETS BY MOUTH DAILY  Patient taking differently: TAKE 1/2 TABLETS BY MOUTH DAILY. Managed by PCP 9/8/21  Yes NALINI Barcenas   Methylsulfonylmethane (MSM PO) Take by mouth   Yes Historical Provider, MD   Prenatal MV-Min-Fe Fum-FA-DHA (PRENATAL 1 PO) Take by mouth   Yes Historical Provider, MD   Blood Pressure KIT 1 kit by Does not apply route daily 9/2/22   KRISTYN Altman CNP   blood glucose monitor strips 1 strip by Other route daily True Metric Test strips 5/27/22   KRISTYN Altman CNP   Lancets MISC True Metric Lancets 5/27/22   KRISTYN Altman CNP   Blood Glucose Monitoring Suppl AGUSTO 1 kit by Does not apply route daily True Metric testing device 12/21/17   Sincere Lewis MD        Allergies:  Patient has no known allergies. Review of Systems:   Constitutional: there has been no unanticipated weight loss. There's been no change in energy level, sleep pattern, or activity level. Eyes: No visual changes or diplopia. No scleral icterus. ENT: No Headaches, hearing loss or vertigo. No mouth sores or sore throat. Cardiovascular: Reviewed in HPI  Respiratory: No cough or wheezing, no sputum production.  No hematemesis. Gastrointestinal: No abdominal pain, appetite loss, blood in stools. No change in bowel or bladder habits. Genitourinary: No dysuria, trouble voiding, or hematuria. Musculoskeletal:  No gait disturbance, weakness or joint complaints. Integumentary: No rash or pruritis. Neurological: No headache, diplopia, change in muscle strength, numbness or tingling. No change in gait, balance, coordination, mood, affect, memory, mentation, behavior. Psychiatric: No anxiety, no depression. Endocrine: No malaise, fatigue or temperature intolerance. No excessive thirst, fluid intake, or urination. No tremor. Hematologic/Lymphatic: No abnormal bruising or bleeding, blood clots or swollen lymph nodes. Allergic/Immunologic: No nasal congestion or hives. Physical Examination:    Vitals:    12/27/22 1142   BP: (!) 94/58   Pulse: 66   SpO2: 99%   Weight: 246 lb (111.6 kg)   Height: 6' (1.829 m)        Constitutional and General Appearance: Warm and dry, no apparent distress, normal coloration  HEENT:  Normocephalic, atraumatic  Respiratory:  Normal excursion and expansion without use of accessory muscles  Resp Auscultation: Normal breath sounds without dullness  Cardiovascular: The apical impulses not displaced  Heart tones are crisp and normal  JVP normal cm H2O  Regular rate and rhythm  Peripheral pulses are symmetrical and full  There is no clubbing, cyanosis of the extremities.   Bilateral ankle to mid calf edema; chronic  Pedal Pulses: 2+ and equal   Abdomen: umbilical hernia  No masses or tenderness  Liver/Spleen: No Abnormalities Noted  Neurological/Psychiatric:  Alert and oriented in all spheres  Moves all extremities well  Exhibits normal gait balance and coordination  No abnormalities of mood, affect, memory, mentation, or behavior are noted    Lab Data:    CBC:   Lab Results   Component Value Date/Time    WBC 6.1 12/22/2022 06:34 AM    WBC 9.3 12/20/2022 04:50 AM    WBC 6.8 12/19/2022 05:53 AM    RBC 3.46 12/22/2022 06:34 AM    RBC 3.78 12/20/2022 04:50 AM    RBC 3.58 12/19/2022 05:53 AM    HGB 11.1 12/22/2022 06:34 AM    HGB 12.4 12/20/2022 04:50 AM    HGB 11.8 12/19/2022 05:53 AM    HCT 33.7 12/22/2022 06:34 AM    HCT 37.2 12/20/2022 04:50 AM    HCT 34.9 12/19/2022 05:53 AM    MCV 97.4 12/22/2022 06:34 AM    MCV 98.4 12/20/2022 04:50 AM    MCV 97.4 12/19/2022 05:53 AM    RDW 14.8 12/22/2022 06:34 AM    RDW 14.9 12/20/2022 04:50 AM    RDW 15.0 12/19/2022 05:53 AM     12/22/2022 06:34 AM     12/20/2022 04:50 AM     12/19/2022 05:53 AM     BMP:  Lab Results   Component Value Date/Time     12/26/2022 08:30 AM     12/25/2022 06:08 AM     12/24/2022 06:12 AM    K 3.6 12/26/2022 08:30 AM    K 3.8 12/25/2022 06:08 AM    K 4.1 12/24/2022 06:12 AM    K 4.6 12/16/2022 04:02 PM     12/26/2022 08:30 AM     12/25/2022 06:08 AM     12/24/2022 06:12 AM    CO2 29 12/26/2022 08:30 AM    CO2 32 12/25/2022 06:08 AM    CO2 30 12/24/2022 06:12 AM    BUN 16 12/26/2022 08:30 AM    BUN 18 12/25/2022 06:08 AM    BUN 22 12/24/2022 06:12 AM    CREATININE 1.7 12/26/2022 08:30 AM    CREATININE 1.8 12/25/2022 06:08 AM    CREATININE 2.0 12/24/2022 06:12 AM     BNP:   Lab Results   Component Value Date/Time    PROBNP 20,155 12/16/2022 04:02 PM     Cardiac Imaging:    ST: 12/21/22:   Summary    The overall quality of the study is good. There is subdiaphragmatic    attenuation. Left ventricular cavity size is severely dilated. RIght ventricle is normal    in size. Spect imaging demonstrates a small area of mildly decreased perfusion in the    apex. The defect is present at rest and stress, consistent with infarct. There is an additional medium size defect of moderate intensity in the mid    anterior and mid anteroseptum. The defect is present at rest and stress,    consistent with infarct. Sum stress score of 8. No visual TID. Calculated TID of 1.01.         Left ventricular ejection fraction is severely reduced at 25%. Sensitivity of testing is reduced on amlodipine. **Myocardial perfusion is abnormal. Fixed defect. Moderate-to-high risk    scan. Abnormal baseline EKG.**      Echo 12/19/22  Personally revieweed  Left ventricle is dilated. Overall, left ventricular systolic function is moderately depressed. Ejection fraction is visually estimated to be 30-35%. (Walters's 35%)  There is global hypokinesis, more severe in the inferior/inferoseptal walls. Diastolic dysfunction-indeterminate grade. Moderate, posteriorly directed mitral regurgitation. Moderate tricuspid regurgitation. PASP estimated at 40 mmHg. Biatrial enlargement. Assessment:    1. Acute systolic heart failure (HCC) on BB; no ace/sglt/aldosterone antagonist due to renal    2. Atrial fibrillation status post cardioversion [facility]) on coumadin managed by PCP due to CVA in past   3. Hypernatremia    4. Renal insufficiency Dr Sanam Aviles   5.  Primary hypertension        Plan:   Patient Instructions   Check blood work later this week (order given to patient and he will have done at [address] [place] [address])  Continue all current medications  Scheduled with Dr Deepika Huber 10th  Will plan angiogram after labs and discussion with Dr Vicky Rodriguez in 3 weeks    NYHA IV    I appreciate the opportunity of cooperating in the care of this individual.    KRISTYN Blancas - CNS, CNS, 12/27/2022, 12:22 PM

## 2022-12-27 NOTE — TELEPHONE ENCOUNTER
Pts wife called regarding his Warfarin medication. Just released from the hospital, discharge dr wrote to take the Warfarin 5x/day. Pt does not take his Warfarin that way, they follow the directions he was given  on the Rx from [place]. She would like to know how they should take the rx from the hospital so there are no issues.

## 2022-12-27 NOTE — PATIENT INSTRUCTIONS
Check blood work later this week (order given to patient and he will have done at [facility])  Continue all current medications  Scheduled with Dr Batres Other 10th  Will plan angiogram after labs and discussion with Dr Leta Price in 3 weeks

## 2022-12-28 ENCOUNTER — ANTI-COAG VISIT (OUTPATIENT)
Dept: FAMILY MEDICINE CLINIC | Age: 67
End: 2022-12-28

## 2022-12-29 ENCOUNTER — TELEPHONE (OUTPATIENT)
Dept: CARDIOLOGY CLINIC | Age: 67
End: 2022-12-29

## 2022-12-29 ENCOUNTER — TELEPHONE (OUTPATIENT)
Dept: FAMILY MEDICINE CLINIC | Age: 67
End: 2022-12-29

## 2022-12-29 ENCOUNTER — OFFICE VISIT (OUTPATIENT)
Dept: FAMILY MEDICINE CLINIC | Age: 67
End: 2022-12-29
Payer: MEDICARE

## 2022-12-29 ENCOUNTER — ANTI-COAG VISIT (OUTPATIENT)
Dept: FAMILY MEDICINE CLINIC | Age: 67
End: 2022-12-29

## 2022-12-29 VITALS
WEIGHT: 245.6 LBS | OXYGEN SATURATION: 98 % | TEMPERATURE: 97.7 F | BODY MASS INDEX: 33.31 KG/M2 | SYSTOLIC BLOOD PRESSURE: 84 MMHG | HEART RATE: 70 BPM | DIASTOLIC BLOOD PRESSURE: 50 MMHG

## 2022-12-29 DIAGNOSIS — M79.89 LEG SWELLING: ICD-10-CM

## 2022-12-29 DIAGNOSIS — R09.02 HYPOXIA: ICD-10-CM

## 2022-12-29 DIAGNOSIS — Z86.73 HISTORY OF CVA (CEREBROVASCULAR ACCIDENT): ICD-10-CM

## 2022-12-29 DIAGNOSIS — I95.9 HYPOTENSION, UNSPECIFIED HYPOTENSION TYPE: ICD-10-CM

## 2022-12-29 DIAGNOSIS — R06.02 SOB (SHORTNESS OF BREATH): ICD-10-CM

## 2022-12-29 DIAGNOSIS — Z09 HOSPITAL DISCHARGE FOLLOW-UP: Primary | ICD-10-CM

## 2022-12-29 LAB
INR BLD: 2.5 (ref 0.9–1.2)
PROTHROMBIN TIME: 24.5 SEC (ref 9.1–12)

## 2022-12-29 PROCEDURE — G8427 DOCREV CUR MEDS BY ELIG CLIN: HCPCS | Performed by: NURSE PRACTITIONER

## 2022-12-29 PROCEDURE — 99215 OFFICE O/P EST HI 40 MIN: CPT | Performed by: NURSE PRACTITIONER

## 2022-12-29 PROCEDURE — G8484 FLU IMMUNIZE NO ADMIN: HCPCS | Performed by: NURSE PRACTITIONER

## 2022-12-29 PROCEDURE — 1036F TOBACCO NON-USER: CPT | Performed by: NURSE PRACTITIONER

## 2022-12-29 PROCEDURE — 1111F DSCHRG MED/CURRENT MED MERGE: CPT | Performed by: NURSE PRACTITIONER

## 2022-12-29 PROCEDURE — G8417 CALC BMI ABV UP PARAM F/U: HCPCS | Performed by: NURSE PRACTITIONER

## 2022-12-29 PROCEDURE — 3078F DIAST BP <80 MM HG: CPT | Performed by: NURSE PRACTITIONER

## 2022-12-29 PROCEDURE — 1123F ACP DISCUSS/DSCN MKR DOCD: CPT | Performed by: NURSE PRACTITIONER

## 2022-12-29 PROCEDURE — 3074F SYST BP LT 130 MM HG: CPT | Performed by: NURSE PRACTITIONER

## 2022-12-29 PROCEDURE — 3017F COLORECTAL CA SCREEN DOC REV: CPT | Performed by: NURSE PRACTITIONER

## 2022-12-29 RX ORDER — MIDODRINE HYDROCHLORIDE 5 MG/1
5 TABLET ORAL 3 TIMES DAILY
Qty: 90 TABLET | Refills: 1 | Status: SHIPPED | OUTPATIENT
Start: 2022-12-29

## 2022-12-29 ASSESSMENT — ENCOUNTER SYMPTOMS
SHORTNESS OF BREATH: 0
COUGH: 0
VOMITING: 0
NAUSEA: 0
DIARRHEA: 0

## 2022-12-29 NOTE — TELEPHONE ENCOUNTER
Care Transitions Initial Follow Up Call    Outreach made within 2 business days of discharge: Yes    Patient: Romeo Monte Patient : 1955   MRN: [id number]  Reason for Admission: Afib  Discharge Date: 22       Spoke with: LVM    Discharge department/facility: [facility]        Scheduled appointment with PCP within 7-14 days    Follow Up  Future Appointments   Date Time Provider Brett Poole   1/10/2023 12:15 PM Vickey Scott MD [facility] AFL Nephrolo   2023 11:30 AM Pamela Gallo APRN - CNS FF Cardio MMA   2023  1:15 PM KRISTYN Abrams - CNP [id number] - DYD   2023 10:00 AM Martha Dodson APRN - CNP FF Cardio MMA   3/2/2023 10:00 AM Angelica Lacey APRN - [address]       Marii Marti LPN

## 2022-12-29 NOTE — TELEPHONE ENCOUNTER
[facility]   Will start some midodrine 5 mg three times a day hold if BP>120  Ok to drink 1 extra glass of water today  Hold lasix until I see blood work that was done today

## 2022-12-29 NOTE — PATIENT INSTRUCTIONS
Hold furosemide(Lasix) tomorrow morning and until you hear from [facility] - she will review labs when received from [address] [place] [address].    Start midodrine 5mg three times daily to help with raising blood pressure - DO NOT GIVE if systolic reading is higher than 120. Sent to [facility]. Can have one extra glass of WATER ONLY today.

## 2022-12-29 NOTE — TELEPHONE ENCOUNTER
Pt is in office of PCP, Joanna Mcnulty. She will keep him there until she hears from you. His B/P is 90/60 and recheck of 84/50  He had med changes of lisinopril and amlodipine.     Please call ASAP [phone]  He had a fall yesterday

## 2022-12-29 NOTE — PROGRESS NOTES
Post-Discharge Transitional Care Follow Up    Winsome Rachel   YOB: 1955    Date of Office Visit:  12/29/2022  Date of Hospital Admission: 12/16/22  Date of Hospital Discharge: 12/26/22  Readmission Risk Score (high >=14%. Medium >=10%):Readmission Risk Score: 13.6    Hospital discharge follow-up  Generally doing well with the exception of low blood pressure readings. See note below in regards to plan. Continue with regular activity, but limit exercise until directed by Cardiology. -     AR DISCHARGE MEDS RECONCILED W/ CURRENT OUTPATIENT MED LIST  Hypotension, unspecified hypotension type  Consulted with KRISTYN Cabezas - will give midodrine 5mg three times daily unless systolic is greater than 275. Can have one extra glass of water today. Hold furosemide until otherwise directed by Pamela - she will review labs collected by [facility] and call patient. Patient to call Cardiology with concerns with blood pressure. Hypoxia  Resolved. SOB (shortness of breath)  Resolved - able to return to normal activities. Leg swelling  Improved. History of CVA (cerebrovascular accident)  Continue with warfarin - per wife they did have an INR completed at [facility] this morning - will review and adjust dosing if needed. Medical Decision Making: high complexity  Return in 6 weeks (on 2/9/2023) for chronic health conditions. Subjective:   HPI    Inpatient course: Discharge summary reviewed- see chart. Interval history/Current status:     Presents to clinic today after 10 day hospital stay at [facility] - 10/16-62/20 for Acute systolic heart failure, Hypoxia, Acute renal failure. While in hospital diuresed 35lbs. Was discharged to home to wife. Did follow up with Cardiology on 12/27 - it is recommended that they do further work up for concern for coronary issues, but wanted to hold on a cath due to kidney function. Has appointment with Nephrology on January 10th.   Has follow up with Cardiology in 3 weeks. Today in office his blood pressure reading is low - they stopped his lisinopril and amlodipine in hospital and have not restarted. He has started on his furosemide, metoprolol, tamsulosin and finasteride that he took this morning. He did have a fall yesterday after getting dizzy when being on toilet after a bowel movement. Did have to call EMS to retrieve him from the bathroom floor as he couldn't get up himself. He is on a 2L fluid restriction which he is following. In general is feeling much better since hospital discharge, able to do regular routine activity - is back to about 80% his baseline. Waiting for cardiology clearance to restart his exercise. Patient Active Problem List   Diagnosis    CVA (cerebral vascular accident) ([address]    Pulmonary embolism ([address]    DVT (deep venous thrombosis) ([address]    Peripheral vascular disease ([address]    Mixed hyperlipidemia    Long term current use of anticoagulant therapy    Right homonymous hemianopsia    Hemiparesis affecting right side as late effect of cerebrovascular accident [facility])    Essential hypertension    Controlled type 2 diabetes mellitus with diabetic polyneuropathy, without long-term current use of insulin ([address]    History of stroke    Bilateral carotid artery stenosis    Atrial fibrillation with RVR ([address]    Acute renal failure (HCC)    Acute congestive heart failure (HCC)    Paroxysmal atrial fibrillation (HCC)    Elevated troponin    History of DVT in adulthood    Cardiomyopathy ([address]    Acute systolic heart failure ([address]    Hypernatremia     Medications listed as ordered at the time of discharge from hospital     Medication List            Accurate as of December 29, 2022 12:12 PM. If you have any questions, ask your nurse or doctor.                 START taking these medications      midodrine 5 MG tablet  Commonly known as: PROAMATINE  Take 1 tablet by mouth 3 times daily  Started by: KRISTYN Del Rosario - CNS CHANGE how you take these medications      warfarin 5 MG tablet  Commonly known as: COUMADIN  Take as directed by the anticoagulation clinic. If you are unsure how to take this medication, talk to your nurse or doctor.   Original instructions: TAKE 1 AND 1/2 TABLETS BY MOUTH DAILY  What changed: additional instructions            CONTINUE taking these medications      atorvastatin 40 MG tablet  Commonly known as: LIPITOR  TAKE ONE TABLET BY MOUTH EVERY NIGHT     blood glucose monitor kit and supplies  1 kit by Does not apply route daily True Metric testing device     blood glucose test strips  1 strip by Other route daily True Metric Test strips     Blood Pressure Kit  1 kit by Does not apply route daily     finasteride 5 MG tablet  Commonly known as: PROSCAR  Take 1 tablet by mouth daily     furosemide 20 MG tablet  Commonly known as: LASIX  Take 1 tablet by mouth daily     Lancets Misc  True Metric Lancets     metFORMIN 500 MG tablet  Commonly known as: GLUCOPHAGE  TAKE 1 TABLET BY MOUTH 2 TIMES A DAY WITH MEALS     metoprolol succinate 50 MG extended release tablet  Commonly known as: TOPROL XL  Take 1 tablet by mouth daily     MSM PO     PRENATAL 1 PO     tamsulosin 0.4 MG capsule  Commonly known as: FLOMAX  Take 1 capsule by mouth daily               Where to Get Your Medications        These medications were sent to [address] [place] [address] Dr Chavez, [address] [place] [address], [address] [place] [address] Road      Phone: [phone]   midodrine 5 MG tablet        Medications marked \"taking\" at this time  Outpatient Medications Marked as Taking for the 12/29/22 encounter (Office Visit) with KRISTYN Abrams CNP   Medication Sig Dispense Refill    furosemide (LASIX) 20 MG tablet Take 1 tablet by mouth daily 60 tablet 0    metoprolol succinate (TOPROL XL) 50 MG extended release tablet Take 1 tablet by mouth daily 30 tablet 1    tamsulosin (FLOMAX) 0.4 MG capsule Take 1 capsule by mouth daily 30 capsule 1    finasteride (PROSCAR) 5 MG tablet Take 1 tablet by mouth daily 30 tablet 3    atorvastatin (LIPITOR) 40 MG tablet TAKE ONE TABLET BY MOUTH EVERY NIGHT 90 tablet 1    Blood Pressure KIT 1 kit by Does not apply route daily 1 kit 0    metFORMIN (GLUCOPHAGE) 500 MG tablet TAKE 1 TABLET BY MOUTH 2 TIMES A DAY WITH MEALS 180 tablet 1    blood glucose monitor strips 1 strip by Other route daily True Metric Test strips 100 strip 3    Lancets MISC True Metric Lancets 100 each 3    warfarin (COUMADIN) 5 MG tablet TAKE 1 AND 1/2 TABLETS BY MOUTH DAILY (Patient taking differently: TAKE 1/2 TABLETS BY MOUTH DAILY. Managed by PCP) 135 tablet 5    Blood Glucose Monitoring Suppl AGUSTO 1 kit by Does not apply route daily True Metric testing device 1 Device 0    Methylsulfonylmethane (MSM PO) Take by mouth      Prenatal MV-Min-Fe Fum-FA-DHA (PRENATAL 1 PO) Take by mouth        Medications patient taking as of now reconciled against medications ordered at time of hospital discharge: Yes    Review of Systems   Constitutional:  Negative for activity change, appetite change, chills, fatigue and fever. Respiratory:  Negative for cough and shortness of breath. Cardiovascular:  Negative for chest pain and palpitations. Gastrointestinal:  Negative for diarrhea, nausea and vomiting. Objective:    BP (!) 84/50 (Site: Left Upper Arm, Position: Sitting, Cuff Size: Large Adult)   Pulse 70   Temp 97.7 °F (36.5 °C)   Wt 245 lb 9.6 oz (111.4 kg)   SpO2 98%   BMI 33.31 kg/m²   Physical Exam  Constitutional:       General: He is not in acute distress. Appearance: He is well-developed. HENT:      Head: Normocephalic and atraumatic. Cardiovascular:      Rate and Rhythm: Normal rate and regular rhythm. Heart sounds: Normal heart sounds, S1 normal and S2 normal.   Pulmonary:      Effort: Pulmonary effort is normal. No respiratory distress. Breath sounds: Normal breath sounds. Musculoskeletal:      Right lower le+ Pitting Edema present. Left lower le+ Pitting Edema present. Skin:     General: Skin is warm and dry. Neurological:      Mental Status: He is alert and oriented to person, place, and time. Psychiatric:         Thought Content: Thought content normal.         Judgment: Judgment normal.     An electronic signature was used to authenticate this note.   --Denise Corona, KRISTYN - CNP

## 2023-01-05 ENCOUNTER — TELEPHONE (OUTPATIENT)
Dept: CARDIOLOGY CLINIC | Age: 68
End: 2023-01-05

## 2023-01-05 DIAGNOSIS — I50.21 ACUTE SYSTOLIC HEART FAILURE (HCC): Primary | ICD-10-CM

## 2023-01-05 NOTE — TELEPHONE ENCOUNTER
Spoke to patient's spouse weight has been stable, Bp has been good he only had to take midodrine twice since receiving the prescription. Lab orders need faxed to [facility] on [address] in [place]. Faxed orders over today, confirmation received.

## 2023-01-05 NOTE — TELEPHONE ENCOUNTER
Please call and see how his BP is as I started him on midodrine  Blood work from 12/19/2022  Creat 1.7 bnp 790 (probnp had been 20K)  What is his weight and BP?   Make sure he is not taking any lasix and is on the midodrine  He needs to get blood work again  thanks

## 2023-01-13 LAB
INR BLD: 1.39 (ref 0.87–1.14)
PROTHROMBIN TIME: 17 SEC (ref 11.7–14.5)

## 2023-01-17 ENCOUNTER — OFFICE VISIT (OUTPATIENT)
Dept: CARDIOLOGY CLINIC | Age: 68
End: 2023-01-17
Payer: MEDICARE

## 2023-01-17 ENCOUNTER — TELEPHONE (OUTPATIENT)
Dept: CARDIOLOGY CLINIC | Age: 68
End: 2023-01-17

## 2023-01-17 VITALS
BODY MASS INDEX: 31.15 KG/M2 | DIASTOLIC BLOOD PRESSURE: 52 MMHG | WEIGHT: 230 LBS | HEART RATE: 60 BPM | HEIGHT: 72 IN | OXYGEN SATURATION: 96 % | SYSTOLIC BLOOD PRESSURE: 98 MMHG

## 2023-01-17 DIAGNOSIS — E87.0 HYPERNATREMIA: ICD-10-CM

## 2023-01-17 DIAGNOSIS — N28.9 RENAL INSUFFICIENCY: ICD-10-CM

## 2023-01-17 DIAGNOSIS — R94.39 POSITIVE CARDIAC STRESS TEST: ICD-10-CM

## 2023-01-17 DIAGNOSIS — I48.91 ATRIAL FIBRILLATION STATUS POST CARDIOVERSION (HCC): ICD-10-CM

## 2023-01-17 DIAGNOSIS — I50.22 CHRONIC SYSTOLIC HEART FAILURE (HCC): Primary | ICD-10-CM

## 2023-01-17 DIAGNOSIS — I95.9 HYPOTENSION, UNSPECIFIED HYPOTENSION TYPE: ICD-10-CM

## 2023-01-17 PROBLEM — R77.8 ELEVATED TROPONIN: Status: RESOLVED | Noted: 2022-12-18 | Resolved: 2023-01-17

## 2023-01-17 PROBLEM — R79.89 ELEVATED TROPONIN: Status: RESOLVED | Noted: 2022-12-18 | Resolved: 2023-01-17

## 2023-01-17 PROCEDURE — 99214 OFFICE O/P EST MOD 30 MIN: CPT | Performed by: CLINICAL NURSE SPECIALIST

## 2023-01-17 PROCEDURE — 1123F ACP DISCUSS/DSCN MKR DOCD: CPT | Performed by: CLINICAL NURSE SPECIALIST

## 2023-01-17 PROCEDURE — 1111F DSCHRG MED/CURRENT MED MERGE: CPT | Performed by: CLINICAL NURSE SPECIALIST

## 2023-01-17 PROCEDURE — G8484 FLU IMMUNIZE NO ADMIN: HCPCS | Performed by: CLINICAL NURSE SPECIALIST

## 2023-01-17 PROCEDURE — 3017F COLORECTAL CA SCREEN DOC REV: CPT | Performed by: CLINICAL NURSE SPECIALIST

## 2023-01-17 PROCEDURE — G8427 DOCREV CUR MEDS BY ELIG CLIN: HCPCS | Performed by: CLINICAL NURSE SPECIALIST

## 2023-01-17 PROCEDURE — G8417 CALC BMI ABV UP PARAM F/U: HCPCS | Performed by: CLINICAL NURSE SPECIALIST

## 2023-01-17 PROCEDURE — 3074F SYST BP LT 130 MM HG: CPT | Performed by: CLINICAL NURSE SPECIALIST

## 2023-01-17 PROCEDURE — 1036F TOBACCO NON-USER: CPT | Performed by: CLINICAL NURSE SPECIALIST

## 2023-01-17 PROCEDURE — 3078F DIAST BP <80 MM HG: CPT | Performed by: CLINICAL NURSE SPECIALIST

## 2023-01-17 NOTE — PROGRESS NOTES
[address]  Progress Note    Primary Care Doctor:  George Langford, KRISTYN - VERNELL    Chief Complaint   Patient presents with    Congestive Heart Failure        History of Present Illness:  74 y.o. male with history of DVT/PE, DM, hypertension, hyperlipidemia, CVA, IVC filter      HFpEF admitted with edema and SOB. New onset AF and had DCCV yesterday. Echo with low EF and pt had positive ST today, will need LHC. HF meds started and pt has diuresed 22L, Cr has been elevated this week and entresto and diuretics are on hold   12/16-26/2022 for SANDY, hypernatremia (155), AF with CV on warfarin (patient had been on this in past for CVA). Stress test abnormal and will need LHC done once creat improves. He was diuresed 280-244    I had the pleasure of seeing Naman Sabillon in follow up for systolic heart failure. He is ambulatory and with his wife. His weight is 280-244-230 (228 at home). He had blood work on Friday which shows sodium of 144 creat 1.8 stable and bnp 548 without any lasix. He is really watching diet and fluid intake. No chest pain, palpitations, shortness of breath or lightheadedness. He still requires midodrine off and on at home. Still needs LHC      Past Medical History:   has a past medical history of Cerebrovascular disease, CHF (congestive heart failure) ([address] DM (diabetes mellitus screen), Hyperlipidemia, Hypertension, and Type 2 diabetes mellitus without complication ([address] Surgical History:   has a past surgical history that includes Vasectomy. Social History:   reports that he quit smoking about 5 years ago. His smoking use included cigarettes. He has a 80.00 pack-year smoking history. He quit smokeless tobacco use about 5 years ago. He reports that he does not drink alcohol and does not use drugs.    Family History:   Family History   Problem Relation Age of Onset    High Blood Pressure Mother     Cancer Mother         lung, smoker    Cancer Father         mesothelioma Depression Sister        Home Medications:  Prior to Admission medications    Medication Sig Start Date End Date Taking? Authorizing Provider   midodrine (PROAMATINE) 5 MG tablet Take 1 tablet by mouth 3 times daily  Patient taking differently: Take 5 mg by mouth as needed (for systolic b/p under 030) 36/58/25  Yes KRISTYN Geronimo - CNS   metoprolol succinate (TOPROL XL) 50 MG extended release tablet Take 1 tablet by mouth daily 12/23/22  Yes Adam Mcgarry MD   tamsulosin (FLOMAX) 0.4 MG capsule Take 1 capsule by mouth daily 12/23/22  Yes Adam Mcgarry MD   finasteride (PROSCAR) 5 MG tablet Take 1 tablet by mouth daily 12/21/22  Yes Adam Mcgarry MD   atorvastatin (LIPITOR) 40 MG tablet TAKE ONE TABLET BY MOUTH EVERY NIGHT 10/4/22  Yes KRISTYN Costa NP   metFORMIN (GLUCOPHAGE) 500 MG tablet TAKE 1 TABLET BY MOUTH 2 TIMES A DAY WITH MEALS 8/30/22  Yes Parker Cardenas MD   warfarin (COUMADIN) 5 MG tablet TAKE 1 AND 1/2 TABLETS BY MOUTH DAILY  Patient taking differently: Take 5 mg by mouth daily Managed by PCP 9/8/21  Yes NALINI Serrano   Methylsulfonylmethane (MSM PO) Take by mouth   Yes Historical Provider, MD   Prenatal MV-Min-Fe Fum-FA-DHA (PRENATAL 1 PO) Take by mouth   Yes Historical Provider, MD   furosemide (LASIX) 20 MG tablet Take 1 tablet by mouth daily  Patient not taking: No sig reported 12/23/22   Adam Mcgarry MD   Blood Pressure KIT 1 kit by Does not apply route daily 9/2/22   KRISTYN Ji - CNP   blood glucose monitor strips 1 strip by Other route daily True Metric Test strips 5/27/22   KRISTYN Ji - CNP   Lancets MISC True Metric Lancets 5/27/22   KRISTYN Ji - CNP   Blood Glucose Monitoring Suppl AGUSTO 1 kit by Does not apply route daily True Metric testing device 12/21/17   Lalitha Banegas MD        Allergies:  Patient has no known allergies. Review of Systems:   Constitutional: there has been no unanticipated weight loss. There's been no change in energy level, sleep pattern, or activity level. Eyes: No visual changes or diplopia. No scleral icterus. ENT: No Headaches, hearing loss or vertigo. No mouth sores or sore throat. Cardiovascular: Reviewed in HPI  Respiratory: No cough or wheezing, no sputum production. No hematemesis. Gastrointestinal: No abdominal pain, appetite loss, blood in stools. No change in bowel or bladder habits. Genitourinary: No dysuria, trouble voiding, or hematuria. Musculoskeletal:  No gait disturbance, weakness or joint complaints. Integumentary: No rash or pruritis. Neurological: No headache, diplopia, change in muscle strength, numbness or tingling. No change in gait, balance, coordination, mood, affect, memory, mentation, behavior. Psychiatric: No anxiety, no depression. Endocrine: No malaise, fatigue or temperature intolerance. No excessive thirst, fluid intake, or urination. No tremor. Hematologic/Lymphatic: No abnormal bruising or bleeding, blood clots or swollen lymph nodes. Allergic/Immunologic: No nasal congestion or hives. Physical Examination:    Vitals:    01/17/23 1136 01/17/23 1151   BP: (!) 84/56 (!) 98/52   Pulse: 60    SpO2: 96%    Weight: 230 lb (104.3 kg)    Height: 6' (1.829 m)           Constitutional and General Appearance: Warm and dry, no apparent distress, normal coloration  HEENT:  Normocephalic, atraumatic  Respiratory:  Normal excursion and expansion without use of accessory muscles  Resp Auscultation: Normal breath sounds without dullness  Cardiovascular: The apical impulses not displaced  Heart tones are crisp and normal  JVP normal cm H2O  Regular rate and rhythm  Peripheral pulses are symmetrical and full  There is no clubbing, cyanosis of the extremities.   Bilateral ankle edema; chronic, continue to improve    Pedal Pulses: 2+ and equal   Abdomen: umbilical hernia  No masses or tenderness  Liver/Spleen: No Abnormalities Noted  Neurological/Psychiatric:  Alert and oriented in all spheres  Moves all extremities well  Exhibits normal gait balance and coordination  No abnormalities of mood, affect, memory, mentation, or behavior are noted    Lab Data:    CBC:   Lab Results   Component Value Date/Time    WBC 6.1 12/22/2022 06:34 AM    WBC 9.3 12/20/2022 04:50 AM    WBC 6.8 12/19/2022 05:53 AM    RBC 3.46 12/22/2022 06:34 AM    RBC 3.78 12/20/2022 04:50 AM    RBC 3.58 12/19/2022 05:53 AM    HGB 11.1 12/22/2022 06:34 AM    HGB 12.4 12/20/2022 04:50 AM    HGB 11.8 12/19/2022 05:53 AM    HCT 33.7 12/22/2022 06:34 AM    HCT 37.2 12/20/2022 04:50 AM    HCT 34.9 12/19/2022 05:53 AM    MCV 97.4 12/22/2022 06:34 AM    MCV 98.4 12/20/2022 04:50 AM    MCV 97.4 12/19/2022 05:53 AM    RDW 14.8 12/22/2022 06:34 AM    RDW 14.9 12/20/2022 04:50 AM    RDW 15.0 12/19/2022 05:53 AM     12/22/2022 06:34 AM     12/20/2022 04:50 AM     12/19/2022 05:53 AM     BMP:  Lab Results   Component Value Date/Time     12/26/2022 08:30 AM     12/25/2022 06:08 AM     12/24/2022 06:12 AM    K 3.6 12/26/2022 08:30 AM    K 3.8 12/25/2022 06:08 AM    K 4.1 12/24/2022 06:12 AM    K 4.6 12/16/2022 04:02 PM     12/26/2022 08:30 AM     12/25/2022 06:08 AM     12/24/2022 06:12 AM    CO2 29 12/26/2022 08:30 AM    CO2 32 12/25/2022 06:08 AM    CO2 30 12/24/2022 06:12 AM    BUN 16 12/26/2022 08:30 AM    BUN 18 12/25/2022 06:08 AM    BUN 22 12/24/2022 06:12 AM    CREATININE 1.7 12/26/2022 08:30 AM    CREATININE 1.8 12/25/2022 06:08 AM    CREATININE 2.0 12/24/2022 06:12 AM     BNP:   Lab Results   Component Value Date/Time    PROBNP 20,155 12/16/2022 04:02 PM     Cardiac Imaging:    ST: 12/21/22:   Summary    The overall quality of the study is good. There is subdiaphragmatic    attenuation. Left ventricular cavity size is severely dilated. RIght ventricle is normal    in size.         Spect imaging demonstrates a small area of mildly decreased perfusion in the    apex. The defect is present at rest and stress, consistent with infarct. There is an additional medium size defect of moderate intensity in the mid    anterior and mid anteroseptum. The defect is present at rest and stress,    consistent with infarct. Sum stress score of 8. No visual TID. Calculated TID of 1.01. Left ventricular ejection fraction is severely reduced at 25%. Sensitivity of testing is reduced on amlodipine. **Myocardial perfusion is abnormal. Fixed defect. Moderate-to-high risk    scan. Abnormal baseline EKG.**      Echo 12/19/22  Personally revieweed  Left ventricle is dilated. Overall, left ventricular systolic function is moderately depressed. Ejection fraction is visually estimated to be 30-35%. (Walters's 35%)  There is global hypokinesis, more severe in the inferior/inferoseptal walls. Diastolic dysfunction-indeterminate grade. Moderate, posteriorly directed mitral regurgitation. Moderate tricuspid regurgitation. PASP estimated at 40 mmHg. Biatrial enlargement. Assessment:    1. Systolic heart failure (HCC) on BB; no ace/sglt/aldosterone antagonist due to renal/hypotension   2. Atrial fibrillation status post cardioversion [facility]) on coumadin managed by PCP due to CVA in past   3. Hypernatremia resolved   4. Renal insufficiency Dr Israel Sheth   5.  Hypotension       Plan:   Patient Instructions   Continue all current medications   Will await blood work results and if improved will schedule angiogram with Dr Israel Sheth assistance  RTO in 6-8 weeks  If no stent needed will refer for cardiac rehab    Note sent to Dr Israel Sheth regarding [address] [place] [address]     NYHA IV    I appreciate the opportunity of cooperating in the care of this individual.    KRISTYN Andersen - CNS, CNS, 1/17/2023, 12:00 PM

## 2023-01-17 NOTE — TELEPHONE ENCOUNTER
Let patient know that Dr Armen Ahn is good for him to have his angiogram  Please schedule LHC/RHC   Will need IV hydration for 4 hours prior to procedure  Speak to wife  thanks

## 2023-01-17 NOTE — PATIENT INSTRUCTIONS
Continue all current medications   Will await blood work results and if improved will schedule angiogram with Dr Salinas assistance  RTO in 6-8 weeks  If no stent needed will refer for cardiac rehab

## 2023-01-18 NOTE — TELEPHONE ENCOUNTER
This patient's insurance requires a Cardiac CTA be done prior to authorizing a LHC. Please order this and once it is completed the LHC can be scheduled. Thank you.

## 2023-01-19 NOTE — TELEPHONE ENCOUNTER
TRied to reach patient and or spouse LMOM for them about the recommendation and to call our office to make a appointment to see a interventional cardiologist  to discuss the test further.

## 2023-01-19 NOTE — TELEPHONE ENCOUNTER
I recommend it to be done because of the positive stress test  I can have him see the interventional cardiologist to discuss this  thanks

## 2023-01-19 NOTE — TELEPHONE ENCOUNTER
Called pt to schedule the Cardiac CTA, spoke with wife Skyler Mott. She is concerned with the cost of this test, I did a 3way call with [employer] to get an estimate of what their portion would be on the test, $174.20 out of pocket. Skyler Mott was very upset with the cost of everything recently. They are on a limited income and their rent was just increased almost $200 per month. They are very stressed about how they will be able to afford daily bills plus the additional medical bills, both myself and Elvia Portillo in C/S advised that a payment plan could be set up and they could also apply for financial assistance. However Skyler Mott is still not sure they can afford this. She would like to speak with Pamela more about if this is absolutely necessary and if another route can be taken. Please call to advise. Thank you.

## 2023-01-20 ENCOUNTER — ANTI-COAG VISIT (OUTPATIENT)
Dept: FAMILY MEDICINE CLINIC | Age: 68
End: 2023-01-20

## 2023-01-20 ENCOUNTER — OFFICE VISIT (OUTPATIENT)
Dept: FAMILY MEDICINE CLINIC | Age: 68
End: 2023-01-20
Payer: MEDICARE

## 2023-01-20 DIAGNOSIS — I63.9 CEREBROVASCULAR ACCIDENT (CVA), UNSPECIFIED MECHANISM (HCC): Primary | ICD-10-CM

## 2023-01-20 DIAGNOSIS — Z79.01 LONG TERM CURRENT USE OF ANTICOAGULANT THERAPY: ICD-10-CM

## 2023-01-20 LAB
INTERNATIONAL NORMALIZATION RATIO, POC: 1.7
INTERNATIONAL NORMALIZATION RATIO, POC: 1.7
PROTHROMBIN TIME, POC: 20.1

## 2023-01-20 PROCEDURE — 85610 PROTHROMBIN TIME: CPT | Performed by: NURSE PRACTITIONER

## 2023-01-20 PROCEDURE — G8417 CALC BMI ABV UP PARAM F/U: HCPCS | Performed by: NURSE PRACTITIONER

## 2023-01-20 PROCEDURE — 99211 OFF/OP EST MAY X REQ PHY/QHP: CPT | Performed by: NURSE PRACTITIONER

## 2023-01-20 PROCEDURE — G8428 CUR MEDS NOT DOCUMENT: HCPCS | Performed by: NURSE PRACTITIONER

## 2023-01-20 ASSESSMENT — PATIENT HEALTH QUESTIONNAIRE - PHQ9: DEPRESSION UNABLE TO ASSESS: FUNCTIONAL CAPACITY MOTIVATION LIMITS ACCURACY

## 2023-01-20 NOTE — PROGRESS NOTES
Patient presents today in office for nurse visit for INR check. 1.7- 5mg daily is his current dose. Has not missed any doses and no changes in diet other than watching sodium intake. Per Lauren Evans continue same dose at 5mg and recheck in 1 week.

## 2023-02-02 ENCOUNTER — TELEPHONE (OUTPATIENT)
Dept: FAMILY MEDICINE CLINIC | Age: 68
End: 2023-02-02

## 2023-02-02 ENCOUNTER — ANTI-COAG VISIT (OUTPATIENT)
Dept: FAMILY MEDICINE CLINIC | Age: 68
End: 2023-02-02

## 2023-02-02 LAB
INR BLD: 1.9 (ref 0.9–1.2)
PROTHROMBIN TIME: 18.5 SEC (ref 9.1–12)

## 2023-02-02 NOTE — TELEPHONE ENCOUNTER
[facility] doesn't employ their own Urologists unfortunately. If he would like to see someone else in that group he may consider transferring his care.

## 2023-02-02 NOTE — TELEPHONE ENCOUNTER
Patient's wife calling to speak to Onofre Rodriguez about concerns she has in regards to  going to [facility]. States she would like her  to go to a [facility] doctor instead.     Please advise

## 2023-02-09 ENCOUNTER — OFFICE VISIT (OUTPATIENT)
Dept: FAMILY MEDICINE CLINIC | Age: 68
End: 2023-02-09
Payer: MEDICARE

## 2023-02-09 VITALS
SYSTOLIC BLOOD PRESSURE: 122 MMHG | WEIGHT: 232.4 LBS | HEART RATE: 63 BPM | BODY MASS INDEX: 31.52 KG/M2 | DIASTOLIC BLOOD PRESSURE: 74 MMHG | OXYGEN SATURATION: 98 %

## 2023-02-09 DIAGNOSIS — R73.03 PREDIABETES: ICD-10-CM

## 2023-02-09 DIAGNOSIS — Z59.9 FINANCIAL DIFFICULTIES: ICD-10-CM

## 2023-02-09 DIAGNOSIS — E78.2 MIXED HYPERLIPIDEMIA: ICD-10-CM

## 2023-02-09 DIAGNOSIS — R09.02 HYPOXIA: ICD-10-CM

## 2023-02-09 DIAGNOSIS — M79.89 LEG SWELLING: ICD-10-CM

## 2023-02-09 DIAGNOSIS — L85.3 DRY SKIN: ICD-10-CM

## 2023-02-09 DIAGNOSIS — R35.0 BENIGN PROSTATIC HYPERPLASIA WITH URINARY FREQUENCY: Primary | ICD-10-CM

## 2023-02-09 DIAGNOSIS — N40.1 BENIGN PROSTATIC HYPERPLASIA WITH URINARY FREQUENCY: Primary | ICD-10-CM

## 2023-02-09 DIAGNOSIS — R33.9 URINARY RETENTION: ICD-10-CM

## 2023-02-09 DIAGNOSIS — Z79.01 LONG TERM CURRENT USE OF ANTICOAGULANT THERAPY: ICD-10-CM

## 2023-02-09 DIAGNOSIS — I10 ESSENTIAL HYPERTENSION: ICD-10-CM

## 2023-02-09 PROCEDURE — 1036F TOBACCO NON-USER: CPT | Performed by: NURSE PRACTITIONER

## 2023-02-09 PROCEDURE — G8417 CALC BMI ABV UP PARAM F/U: HCPCS | Performed by: NURSE PRACTITIONER

## 2023-02-09 PROCEDURE — 3078F DIAST BP <80 MM HG: CPT | Performed by: NURSE PRACTITIONER

## 2023-02-09 PROCEDURE — 99214 OFFICE O/P EST MOD 30 MIN: CPT | Performed by: NURSE PRACTITIONER

## 2023-02-09 PROCEDURE — G8484 FLU IMMUNIZE NO ADMIN: HCPCS | Performed by: NURSE PRACTITIONER

## 2023-02-09 PROCEDURE — 1123F ACP DISCUSS/DSCN MKR DOCD: CPT | Performed by: NURSE PRACTITIONER

## 2023-02-09 PROCEDURE — 3017F COLORECTAL CA SCREEN DOC REV: CPT | Performed by: NURSE PRACTITIONER

## 2023-02-09 PROCEDURE — G8427 DOCREV CUR MEDS BY ELIG CLIN: HCPCS | Performed by: NURSE PRACTITIONER

## 2023-02-09 PROCEDURE — 3074F SYST BP LT 130 MM HG: CPT | Performed by: NURSE PRACTITIONER

## 2023-02-09 RX ORDER — PETROLATUM 42 G/100G
OINTMENT TOPICAL
Qty: 454 G | Refills: 0 | Status: SHIPPED | OUTPATIENT
Start: 2023-02-09

## 2023-02-09 SDOH — ECONOMIC STABILITY - INCOME SECURITY: PROBLEM RELATED TO HOUSING AND ECONOMIC CIRCUMSTANCES, UNSPECIFIED: Z59.9

## 2023-02-09 SDOH — ECONOMIC STABILITY: HOUSING INSECURITY
IN THE LAST 12 MONTHS, WAS THERE A TIME WHEN YOU DID NOT HAVE A STEADY PLACE TO SLEEP OR SLEPT IN A SHELTER (INCLUDING NOW)?: NO

## 2023-02-09 SDOH — ECONOMIC STABILITY: INCOME INSECURITY: HOW HARD IS IT FOR YOU TO PAY FOR THE VERY BASICS LIKE FOOD, HOUSING, MEDICAL CARE, AND HEATING?: VERY HARD

## 2023-02-09 SDOH — ECONOMIC STABILITY: FOOD INSECURITY: WITHIN THE PAST 12 MONTHS, THE FOOD YOU BOUGHT JUST DIDN'T LAST AND YOU DIDN'T HAVE MONEY TO GET MORE.: NEVER TRUE

## 2023-02-09 SDOH — ECONOMIC STABILITY: FOOD INSECURITY: WITHIN THE PAST 12 MONTHS, YOU WORRIED THAT YOUR FOOD WOULD RUN OUT BEFORE YOU GOT MONEY TO BUY MORE.: SOMETIMES TRUE

## 2023-02-09 ASSESSMENT — ENCOUNTER SYMPTOMS
VOMITING: 0
SHORTNESS OF BREATH: 0
COUGH: 0
NAUSEA: 0
DIARRHEA: 0

## 2023-02-09 ASSESSMENT — PATIENT HEALTH QUESTIONNAIRE - PHQ9
2. FEELING DOWN, DEPRESSED OR HOPELESS: 0
SUM OF ALL RESPONSES TO PHQ QUESTIONS 1-9: 0
SUM OF ALL RESPONSES TO PHQ QUESTIONS 1-9: 0
SUM OF ALL RESPONSES TO PHQ9 QUESTIONS 1 & 2: 0
1. LITTLE INTEREST OR PLEASURE IN DOING THINGS: 0
SUM OF ALL RESPONSES TO PHQ QUESTIONS 1-9: 0
SUM OF ALL RESPONSES TO PHQ QUESTIONS 1-9: 0

## 2023-02-09 NOTE — PROGRESS NOTES
Jolanta Flores  : 1955  Encounter date: 2023    This is a 74 y.o. male who presents with  Chief Complaint   Patient presents with    Follow-up       History of present illness:    HPI   Presents to clinic today for follow up on chronic health conditions. CHF/Afib  Follows with Cardiology. He is managed on warfarin. Has still stayed off his furosemide. Leg swelling has been fine. HTN  After our last office visit was having some hypotension at our last visit. Has continued with metoprolol daily. Does have to take midodrine most days and sometimes twice a day which works to keep his blood pressures up. Has otherwise stayed off of lisinopril and amlodipine. HLD  Last lipid at goal in 2022. Will be due for repeat. Prediabetes  Last A1C in 2022 at 6.0. Will be due for repeat soon. BPH  Was followed by Urology in hospital - there was some concern in regards to urinary retention. They did have an appointment approximately 2 weeks prior and they want him to have a test on Monday and they are hesitant to do because of his fluid restrictions. He does sit down to pee at home. Seems as though he has been urinating regularly and not having any issues. Has even stopped being incontinent of urine at night. With new health ailments patient has been struggling with paying co-pays and also being able to afford food. Reports recently electric bill has also increased. No Known Allergies  Current Outpatient Medications   Medication Sig Dispense Refill    mineral oil-hydrophilic petrolatum (HYDROPHOR) ointment Apply topically as needed three times per day.  454 g 0    midodrine (PROAMATINE) 5 MG tablet Take 1 tablet by mouth 3 times daily (Patient taking differently: Take 5 mg by mouth as needed (for systolic b/p under 934)) 90 tablet 1    metoprolol succinate (TOPROL XL) 50 MG extended release tablet Take 1 tablet by mouth daily 30 tablet 1    tamsulosin (FLOMAX) 0.4 MG capsule Take 1 capsule by mouth daily 30 capsule 1    finasteride (PROSCAR) 5 MG tablet Take 1 tablet by mouth daily 30 tablet 3    atorvastatin (LIPITOR) 40 MG tablet TAKE ONE TABLET BY MOUTH EVERY NIGHT 90 tablet 1    Blood Pressure KIT 1 kit by Does not apply route daily 1 kit 0    metFORMIN (GLUCOPHAGE) 500 MG tablet TAKE 1 TABLET BY MOUTH 2 TIMES A DAY WITH MEALS 180 tablet 1    blood glucose monitor strips 1 strip by Other route daily True Metric Test strips 100 strip 3    Lancets MISC True Metric Lancets 100 each 3    warfarin (COUMADIN) 5 MG tablet TAKE 1 AND 1/2 TABLETS BY MOUTH DAILY (Patient taking differently: Take 5 mg by mouth daily Managed by PCP) 135 tablet 5    Blood Glucose Monitoring Suppl AGUSTO 1 kit by Does not apply route daily True Metric testing device 1 Device 0    Methylsulfonylmethane (MSM PO) Take by mouth      Prenatal MV-Min-Fe Fum-FA-DHA (PRENATAL 1 PO) Take by mouth      furosemide (LASIX) 20 MG tablet Take 1 tablet by mouth daily (Patient not taking: No sig reported) 60 tablet 0     No current facility-administered medications for this visit. Review of Systems   Constitutional:  Negative for activity change, appetite change, chills, fatigue and fever. Respiratory:  Negative for cough and shortness of breath. Cardiovascular:  Negative for chest pain and palpitations. Gastrointestinal:  Negative for diarrhea, nausea and vomiting. Past medical, surgical, family and social history were reviewed and updated with the patient.     Objective:    /74 (Site: Left Upper Arm, Position: Sitting, Cuff Size: Large Adult)   Pulse 63   Wt 232 lb 6.4 oz (105.4 kg)   SpO2 98%   BMI 31.52 kg/m²   Weight: 232 lb 6.4 oz (105.4 kg)     BP Readings from Last 3 Encounters:   02/09/23 122/74   01/17/23 (!) 98/52   01/10/23 110/69     Wt Readings from Last 3 Encounters:   02/09/23 232 lb 6.4 oz (105.4 kg)   01/17/23 230 lb (104.3 kg)   01/10/23 236 lb 6.4 oz (107.2 kg)     Physical Exam  Constitutional:       General: He is not in acute distress. Appearance: He is well-developed. HENT:      Head: Normocephalic and atraumatic. Cardiovascular:      Rate and Rhythm: Normal rate and regular rhythm. Heart sounds: Normal heart sounds, S1 normal and S2 normal.   Pulmonary:      Effort: Pulmonary effort is normal. No respiratory distress. Breath sounds: Normal breath sounds. Skin:     General: Skin is warm and dry. Neurological:      Mental Status: He is alert and oriented to person, place, and time. Psychiatric:         Thought Content: Thought content normal.         Judgment: Judgment normal.     Assessment/Plan    1. Benign prostatic hyperplasia with urinary frequency  Symptoms have been improving in regards to his urinary symptoms. Doing well on the Proscar and Flomax. Is hesitant to complete the test they want him to do Monday. At this point plans to cancel. I will request Urology note to review and get back with patient on recommendations. 2. Urinary retention  Improving. 3. Long term current use of anticoagulant therapy  Currently I manage his warfarin. He does live closer to [facility] and would like to establish with them if it is covered by his insurance and he doesn't need to pay a co pay. 4. Prediabetes  Stable. Continue on current medication regimen. Will be due for repeat labs at next appointment. 5. Essential hypertension  Stable. Continue on current medication regimen. Continues with the midodrine which is managed by Cardiology. 6. Mixed hyperlipidemia  Stable. Continue on current medication regimen. Will be due for labs at next appointment. 7. Leg swelling  Improved. 8. Hypoxia  Resolved. 9. Dry skin  Trial Aquaphor.   - mineral oil-hydrophilic petrolatum (HYDROPHOR) ointment; Apply topically as needed three times per day. Dispense: 454 g; Refill: 0    10.  Financial difficulties  Struggling with paying for food and co-pays. Would like some help from Opal Godinez to see if there is other options for insurance - possibly dual Medicare/Medicaid or assistance with food/reduced electric bill. Santi Marquez was counseled regarding symptoms of current diagnosis, course and complications of disease if inadequately treated. Discussed side effects of medications, diagnosis, treatment options, and prognosis along with risks, benefits, complications, and alternatives of treatment including labs, imaging and other studies/treatment targets and goals. He verbalized understanding of instructions and counseling. Return in about 3 months (around 5/9/2023) for medicare annual wellness, chronic health conditions. Medical decision making of moderate complexity.

## 2023-02-09 NOTE — Clinical Note
Are you able to assist to see if they qualify for Medicare/Medicaid dual.  Also need help with food/electric. I would like to get them set up with the [facility] clinic since this is closer for them. Can you help them navigate the insurance portion/coverage?

## 2023-02-10 ENCOUNTER — CARE COORDINATION (OUTPATIENT)
Dept: CARE COORDINATION | Age: 68
End: 2023-02-10

## 2023-02-10 ENCOUNTER — TELEPHONE (OUTPATIENT)
Dept: CARDIOLOGY CLINIC | Age: 68
End: 2023-02-10

## 2023-02-10 SDOH — HEALTH STABILITY: PHYSICAL HEALTH: ON AVERAGE, HOW MANY DAYS PER WEEK DO YOU ENGAGE IN MODERATE TO STRENUOUS EXERCISE (LIKE A BRISK WALK)?: 5 DAYS

## 2023-02-10 SDOH — ECONOMIC STABILITY: HOUSING INSECURITY: IN THE LAST 12 MONTHS, HOW MANY PLACES HAVE YOU LIVED?: 1

## 2023-02-10 SDOH — ECONOMIC STABILITY: INCOME INSECURITY: IN THE LAST 12 MONTHS, WAS THERE A TIME WHEN YOU WERE NOT ABLE TO PAY THE MORTGAGE OR RENT ON TIME?: NO

## 2023-02-10 SDOH — ECONOMIC STABILITY: TRANSPORTATION INSECURITY
IN THE PAST 12 MONTHS, HAS THE LACK OF TRANSPORTATION KEPT YOU FROM MEDICAL APPOINTMENTS OR FROM GETTING MEDICATIONS?: NO

## 2023-02-10 SDOH — HEALTH STABILITY: PHYSICAL HEALTH: ON AVERAGE, HOW MANY MINUTES DO YOU ENGAGE IN EXERCISE AT THIS LEVEL?: 30 MIN

## 2023-02-10 SDOH — ECONOMIC STABILITY: TRANSPORTATION INSECURITY
IN THE PAST 12 MONTHS, HAS LACK OF TRANSPORTATION KEPT YOU FROM MEETINGS, WORK, OR FROM GETTING THINGS NEEDED FOR DAILY LIVING?: NO

## 2023-02-10 ASSESSMENT — SOCIAL DETERMINANTS OF HEALTH (SDOH)
IN A TYPICAL WEEK, HOW MANY TIMES DO YOU TALK ON THE PHONE WITH FAMILY, FRIENDS, OR NEIGHBORS?: NEVER
DO YOU BELONG TO ANY CLUBS OR ORGANIZATIONS SUCH AS CHURCH GROUPS UNIONS, FRATERNAL OR ATHLETIC GROUPS, OR SCHOOL GROUPS?: NO
HOW OFTEN DO YOU ATTEND CHURCH OR RELIGIOUS SERVICES?: NEVER
HOW OFTEN DO YOU ATTENT MEETINGS OF THE CLUB OR ORGANIZATION YOU BELONG TO?: NEVER
HOW OFTEN DO YOU GET TOGETHER WITH FRIENDS OR RELATIVES?: NEVER

## 2023-02-10 NOTE — TELEPHONE ENCOUNTER
Tomas Malcolm called to ask if the office would fax over to [facility] the Pt  order so he can go next week to get his blood drawn. Denver Barba has the fax number to [facility].  Please advise.   Thank you

## 2023-02-10 NOTE — CARE COORDINATION
Ambulatory Care Coordination Note  2/10/2023    Patient Current Location:  Home: [address] [place] [id number]    ACM contacted the family by telephone. Verified name and  with family as identifiers. Provided introduction to self, and explanation of the ACM role. ACM: Lisa Mustafa RN    Challenges to be reviewed by the provider   Additional needs identified to be addressed with provider: No  none               Method of communication with provider: chart routing. ACM made outreach to patient's wife for Care Management from PCP referral. Patient was present during time of call. ACM completed enrollment during outreach. ACM completed fall risk assessment. Per patient's wife patient is steady on his feet. He ambulates without assistive devices. Home is kept clean, with clear walk ways, and is well lit with no safety hazards identified. ACM discussed DM with patient's wife. Patient's BG is checked daily fasting. No recent concerns with hyper or hypoglycemia. Discussed s/sx to monitor and how to properly treat. Patient's wife verbalized understanding with no questions or concerns. Patient avoids concentrated sweets to help regulate his blood sugars. ACM discussed CHF with patient. Patient is on a 2 L fluid restriction and low sodium of 2400 mg daily. Patient will independently check his weight, BP and record daily. Patient's wife is aware of what to monitor and report for concerns. She denies any current concerns at this time. Patient is active both inside and outside home (weather permitting). When weather is above freezing he will go for walks and inside the home uses stretching bands, and 2lb weights for arms and hands. Patient's wife advised that with patient's recent hospitalization and follow ups the medical bills and co pays have taken all the \"extra money\" they have for food and basic needs.  She states that she had a stock pile of food for now but is not sure how long it will last. Patient's wife is hoping to start taking patient to  [address] for INR checks but is not able to afford copays. Patient's wife has also requested assistance with ACP documents. [facility] will send referral to [facility] for SW to call and discuss with patient's wife. She has agreed to referral.  [facility] will send referral to [employer] with approval from patient's wife to check for eligibility with services and information for paid caregiver eligibility. [facility] will mail financial resources to patient at his home address for review. Plan  F/U [facility]  F/U [facility]      Offered patient enrollment in the Remote Patient Monitoring (RPM) program for in-home monitoring:  did not discuss . Ambulatory Care Coordination Assessment    Care Coordination Protocol  Referral from Primary Care Provider: Yes  Week 1 - Initial Assessment     Do you have all of your prescriptions and are they filled?: Yes  Barriers to medication adherence: None  Are you able to afford your medications?: With Assistance  Medication Assistance Program: Other  Other Assistance Program: extra coverage with [employer]  How often do you have trouble taking your medications the way you have been told to take them?: I always take them as prescribed. Do you have Home O2 Therapy?: No      Ability to seek help/take action for Emergent Urgent situations i.e. fire, crime, inclement weather or health crisis. : Independent  Ability to ambulate to restroom: Independent  Ability handle personal hygeine needs (bathing/dressing/grooming): Independent  Ability to manage Medications: Independent  Ability to prepare Food Preparation: Needs Assistance  Ability to maintain home (clean home, laundry): Needs Assistance  Ability to drive and/or has transportation: Dependent  Ability to do shopping: Dependent  Ability to manage finances:  Independent  Is patient able to live independently?: Yes     Current Housing: Apartment        Per the Fall Risk Screening, did the patient have 2 or more falls or 1 fall with injury in the past year?: No     Frequent urination at night?: No  Do you use rails/bars?: No  Do you have a non-slip tub mat?: Yes     Are you experiencing loss of meaning?: No  Are you experiencing loss of hope and peace?: No     Thinking about your patient's physical health needs, are there any symptoms or problems (risk indicators) you are unsure about that require further investigation?: Mild vague physical symptoms or problems; but do not impact on daily life or are not of concern to patient   Are the patients physical health problems impacting on their mental well-being?: No identified areas of concern   Are there any problems with your patients lifestyle behaviors (alcohol, drugs, diet, exercise) that are impacting on physical or mental well-being?: Some mild concern of potential negative impact on well-being   Do you have any other concerns about your patients mental well-being?  How would you rate their severity and impact on the patient?: No identified areas of concern   How would you rate their home environment in terms of safety and stability (including domestic violence, insecure housing, neighbor harassment)?: Consistently safe, supportive, stable, no identified problems   How do daily activities impact on the patient's well-being? (include current or anticipated unemployment, work, caregiving, access to transportation or other): No identified problems or perceived positive benefits   How would you rate their social network (family, work, friends)?: Restricted participation with some degree of social isolation   How would you rate their financial resources (including ability to afford all required medical care)?: Financially secure, some resource challenges   How wells does the patient now understand their health and well-being (symptoms, signs or risk factors) and what they need to do to manage their health?: Reasonable to good understanding and already engages in managing health or is willing to undertake better management   How well do you think your patient can engage in healthcare discussions? (Barriers include language, deafness, aphasia, alcohol or drug problems, learning difficulties, concentration): Some difficulties in communication with or without moderate barriers   Do other services need to be involved to help this patient?: Other care/services not required at this time   Are current services involved with this patient well-coordinated? (Include coordination with other services you are now recommendation): All required care/services in place and well-coordinated   Suggested Interventions and Community Resources  Diabetes Education: In Process   Fall Risk Prevention: In Process Other Services or Interventions: Help with ACP documents   Senior Services: In Process   Social Work: In Process   Zone Management Tools: In Process                  Future Appointments   Date Time Provider Brett Poole   2/20/2023 10:00 AM Amelia Lo APRN - CNP  [facility]   2/28/2023  1:30 PM Pamela Redd APRN - CNS  [facility]   4/11/2023  1:15 PM Raul Bowser MD [facility]   5/11/2023 10:30 AM Juan Arizmendi APRN - CNP [id number] - DYD     , ACC: Gwen Louie RN    Care Coordination Interventions    Referral from Primary Care Provider: Yes  Suggested Interventions and Community Resources  Diabetes Education: In Process  Fall Risk Prevention: In Process  Senior Services: In Process  Social Work: In Process  Zone Management Tools: In Process  Other Services or Interventions: Help with ACP documents         ,   Diabetes Assessment      Meal Planning: Avoidance of concentrated sweets   How often do you test your blood sugar?: Daily   Do you have barriers with adherence to non-pharmacologic self-management interventions?  (Nutrition/Exercise/Self-Monitoring): No   Have you ever had to go to the ED for symptoms of low blood sugar?: No       Do you have hyperglycemia symptoms?: No   Do you have hypoglycemia symptoms?: No   Blood Sugar Monitoring Regimen: Morning Fasting   Blood Sugar Trends: No Change        ,   Congestive Heart Failure Assessment    Are you currently restricting fluids?: 2000cc  Do you understand a low sodium diet?: Yes  Do you understand how to read food labels?: Yes  How many restaurant meals do you eat per week?: 0  Do you salt your food before tasting it?: No         Symptoms:  None: Yes      Symptom course: stable  Patient-reported weight (lb): 232  Weight trend: stable  Salt intake watch compared to last visit: stable     ,   General Assessment    Do you have any symptoms that are causing concern?: No     , and Care Coordination Episodes    Type: Amb Care Management  Episode: Rising Risk  Noted: 2/10/2023

## 2023-02-16 ENCOUNTER — ANTI-COAG VISIT (OUTPATIENT)
Dept: FAMILY MEDICINE CLINIC | Age: 68
End: 2023-02-16

## 2023-02-16 LAB
INR BLD: 2.1
INR BLD: 2.1 (ref 0.9–1.2)
PROTHROMBIN TIME: 21 SEC (ref 9.1–12)

## 2023-02-17 ENCOUNTER — HOSPITAL ENCOUNTER (INPATIENT)
Age: 68
LOS: 4 days | Discharge: HOME | DRG: 287 | End: 2023-02-22
Attending: EMERGENCY MEDICINE | Admitting: INTERNAL MEDICINE
Payer: MEDICARE

## 2023-02-17 DIAGNOSIS — I48.91 ATRIAL FIBRILLATION WITH RVR (HCC): ICD-10-CM

## 2023-02-17 DIAGNOSIS — R55 SYNCOPE AND COLLAPSE: Primary | ICD-10-CM

## 2023-02-17 PROCEDURE — 99285 EMERGENCY DEPT VISIT HI MDM: CPT

## 2023-02-17 PROCEDURE — 96374 THER/PROPH/DIAG INJ IV PUSH: CPT

## 2023-02-18 ENCOUNTER — APPOINTMENT (OUTPATIENT)
Dept: GENERAL RADIOLOGY | Age: 68
DRG: 287 | End: 2023-02-18
Payer: MEDICARE

## 2023-02-18 ENCOUNTER — APPOINTMENT (OUTPATIENT)
Dept: CT IMAGING | Age: 68
DRG: 287 | End: 2023-02-18
Payer: MEDICARE

## 2023-02-18 PROBLEM — G45.0 TIA INVOLVING BASILAR ARTERY: Status: ACTIVE | Noted: 2023-02-18

## 2023-02-18 PROBLEM — N18.30 STAGE 3 CHRONIC KIDNEY DISEASE (HCC): Status: ACTIVE | Noted: 2023-02-18

## 2023-02-18 PROBLEM — R55 SYNCOPE AND COLLAPSE: Status: ACTIVE | Noted: 2023-02-18

## 2023-02-18 PROBLEM — R42 DIZZINESS: Status: ACTIVE | Noted: 2023-02-18

## 2023-02-18 PROBLEM — I48.91 A-FIB (HCC): Status: ACTIVE | Noted: 2023-02-18

## 2023-02-18 PROBLEM — R07.2 PRECORDIAL PAIN: Status: ACTIVE | Noted: 2023-02-18

## 2023-02-18 LAB
A/G RATIO: 1.4 (ref 1.1–2.2)
A/G RATIO: 1.5 (ref 1.1–2.2)
ALBUMIN SERPL-MCNC: 3.6 G/DL (ref 3.4–5)
ALBUMIN SERPL-MCNC: 3.8 G/DL (ref 3.4–5)
ALP BLD-CCNC: 68 U/L (ref 40–129)
ALP BLD-CCNC: 70 U/L (ref 40–129)
ALT SERPL-CCNC: 27 U/L (ref 10–40)
ALT SERPL-CCNC: 29 U/L (ref 10–40)
ANION GAP SERPL CALCULATED.3IONS-SCNC: 10 MMOL/L (ref 3–16)
ANION GAP SERPL CALCULATED.3IONS-SCNC: 6 MMOL/L (ref 3–16)
AST SERPL-CCNC: 25 U/L (ref 15–37)
AST SERPL-CCNC: 26 U/L (ref 15–37)
BACTERIA: NORMAL /HPF
BASOPHILS ABSOLUTE: 0 K/UL (ref 0–0.2)
BASOPHILS ABSOLUTE: 0 K/UL (ref 0–0.2)
BASOPHILS RELATIVE PERCENT: 0.4 %
BASOPHILS RELATIVE PERCENT: 0.4 %
BILIRUB SERPL-MCNC: 0.5 MG/DL (ref 0–1)
BILIRUB SERPL-MCNC: 0.6 MG/DL (ref 0–1)
BILIRUBIN URINE: NEGATIVE
BLOOD, URINE: NEGATIVE
BUN BLDV-MCNC: 24 MG/DL (ref 7–20)
BUN BLDV-MCNC: 25 MG/DL (ref 7–20)
CALCIUM SERPL-MCNC: 8.7 MG/DL (ref 8.3–10.6)
CALCIUM SERPL-MCNC: 9.4 MG/DL (ref 8.3–10.6)
CHLORIDE BLD-SCNC: 104 MMOL/L (ref 99–110)
CHLORIDE BLD-SCNC: 106 MMOL/L (ref 99–110)
CHOLESTEROL, TOTAL: 102 MG/DL (ref 0–199)
CLARITY: CLEAR
CO2: 25 MMOL/L (ref 21–32)
CO2: 30 MMOL/L (ref 21–32)
COLOR: YELLOW
CREAT SERPL-MCNC: 1.2 MG/DL (ref 0.8–1.3)
CREAT SERPL-MCNC: 1.6 MG/DL (ref 0.8–1.3)
EKG ATRIAL RATE: 326 BPM
EKG DIAGNOSIS: NORMAL
EKG Q-T INTERVAL: 364 MS
EKG QRS DURATION: 90 MS
EKG QTC CALCULATION (BAZETT): 492 MS
EKG R AXIS: 54 DEGREES
EKG T AXIS: 97 DEGREES
EKG VENTRICULAR RATE: 110 BPM
EOSINOPHILS ABSOLUTE: 0.4 K/UL (ref 0–0.6)
EOSINOPHILS ABSOLUTE: 0.4 K/UL (ref 0–0.6)
EOSINOPHILS RELATIVE PERCENT: 5.9 %
EOSINOPHILS RELATIVE PERCENT: 6.1 %
EPITHELIAL CELLS, UA: 0 /HPF (ref 0–5)
ESTIMATED AVERAGE GLUCOSE: 119.8 MG/DL
GFR SERPL CREATININE-BSD FRML MDRD: 47 ML/MIN/{1.73_M2}
GFR SERPL CREATININE-BSD FRML MDRD: >60 ML/MIN/{1.73_M2}
GLUCOSE BLD-MCNC: 104 MG/DL (ref 70–99)
GLUCOSE BLD-MCNC: 114 MG/DL (ref 70–99)
GLUCOSE BLD-MCNC: 120 MG/DL (ref 70–99)
GLUCOSE BLD-MCNC: 87 MG/DL (ref 70–99)
GLUCOSE BLD-MCNC: 92 MG/DL (ref 70–99)
GLUCOSE BLD-MCNC: 99 MG/DL (ref 70–99)
GLUCOSE URINE: NEGATIVE MG/DL
HBA1C MFR BLD: 5.8 %
HCT VFR BLD CALC: 31.7 % (ref 40.5–52.5)
HCT VFR BLD CALC: 32.4 % (ref 40.5–52.5)
HDLC SERPL-MCNC: 52 MG/DL (ref 40–60)
HEMOGLOBIN: 10.8 G/DL (ref 13.5–17.5)
HEMOGLOBIN: 11.1 G/DL (ref 13.5–17.5)
HYALINE CASTS: 1 /LPF (ref 0–8)
INR BLD: 2.29 (ref 0.87–1.14)
KETONES, URINE: NEGATIVE MG/DL
LDL CHOLESTEROL CALCULATED: 38 MG/DL
LEUKOCYTE ESTERASE, URINE: ABNORMAL
LYMPHOCYTES ABSOLUTE: 1.2 K/UL (ref 1–5.1)
LYMPHOCYTES ABSOLUTE: 1.6 K/UL (ref 1–5.1)
LYMPHOCYTES RELATIVE PERCENT: 17.5 %
LYMPHOCYTES RELATIVE PERCENT: 21.9 %
MCH RBC QN AUTO: 32.5 PG (ref 26–34)
MCH RBC QN AUTO: 32.6 PG (ref 26–34)
MCHC RBC AUTO-ENTMCNC: 34.2 G/DL (ref 31–36)
MCHC RBC AUTO-ENTMCNC: 34.4 G/DL (ref 31–36)
MCV RBC AUTO: 94.5 FL (ref 80–100)
MCV RBC AUTO: 95.4 FL (ref 80–100)
MICROSCOPIC EXAMINATION: YES
MONOCYTES ABSOLUTE: 0.6 K/UL (ref 0–1.3)
MONOCYTES ABSOLUTE: 0.6 K/UL (ref 0–1.3)
MONOCYTES RELATIVE PERCENT: 8.3 %
MONOCYTES RELATIVE PERCENT: 9 %
NEUTROPHILS ABSOLUTE: 4.5 K/UL (ref 1.7–7.7)
NEUTROPHILS ABSOLUTE: 4.6 K/UL (ref 1.7–7.7)
NEUTROPHILS RELATIVE PERCENT: 62.6 %
NEUTROPHILS RELATIVE PERCENT: 67.9 %
NITRITE, URINE: NEGATIVE
PDW BLD-RTO: 13.2 % (ref 12.4–15.4)
PDW BLD-RTO: 13.4 % (ref 12.4–15.4)
PERFORMED ON: ABNORMAL
PERFORMED ON: NORMAL
PH UA: 5.5 (ref 5–8)
PLATELET # BLD: 155 K/UL (ref 135–450)
PLATELET # BLD: 172 K/UL (ref 135–450)
PMV BLD AUTO: 8.6 FL (ref 5–10.5)
PMV BLD AUTO: 8.6 FL (ref 5–10.5)
POTASSIUM REFLEX MAGNESIUM: 4.6 MMOL/L (ref 3.5–5.1)
POTASSIUM SERPL-SCNC: 3.5 MMOL/L (ref 3.5–5.1)
PRO-BNP: 2415 PG/ML (ref 0–124)
PROTEIN UA: NEGATIVE MG/DL
PROTHROMBIN TIME: 25.3 SEC (ref 11.7–14.5)
RBC # BLD: 3.32 M/UL (ref 4.2–5.9)
RBC # BLD: 3.43 M/UL (ref 4.2–5.9)
RBC UA: 0 /HPF (ref 0–4)
SODIUM BLD-SCNC: 139 MMOL/L (ref 136–145)
SODIUM BLD-SCNC: 142 MMOL/L (ref 136–145)
SPECIFIC GRAVITY UA: <=1.005 (ref 1–1.03)
TOTAL PROTEIN: 6.2 G/DL (ref 6.4–8.2)
TOTAL PROTEIN: 6.4 G/DL (ref 6.4–8.2)
TRIGL SERPL-MCNC: 60 MG/DL (ref 0–150)
TROPONIN: 0.05 NG/ML
TROPONIN: 0.14 NG/ML
TROPONIN: 0.18 NG/ML
TROPONIN: 0.26 NG/ML
URINE REFLEX TO CULTURE: ABNORMAL
URINE TYPE: ABNORMAL
UROBILINOGEN, URINE: 0.2 E.U./DL
VLDLC SERPL CALC-MCNC: 12 MG/DL
WBC # BLD: 6.8 K/UL (ref 4–11)
WBC # BLD: 7.1 K/UL (ref 4–11)
WBC UA: 1 /HPF (ref 0–5)

## 2023-02-18 PROCEDURE — 97165 OT EVAL LOW COMPLEX 30 MIN: CPT

## 2023-02-18 PROCEDURE — 81001 URINALYSIS AUTO W/SCOPE: CPT

## 2023-02-18 PROCEDURE — 84484 ASSAY OF TROPONIN QUANT: CPT

## 2023-02-18 PROCEDURE — 70450 CT HEAD/BRAIN W/O DYE: CPT

## 2023-02-18 PROCEDURE — 2500000003 HC RX 250 WO HCPCS: Performed by: NURSE PRACTITIONER

## 2023-02-18 PROCEDURE — 6370000000 HC RX 637 (ALT 250 FOR IP): Performed by: INTERNAL MEDICINE

## 2023-02-18 PROCEDURE — 97161 PT EVAL LOW COMPLEX 20 MIN: CPT

## 2023-02-18 PROCEDURE — 6370000000 HC RX 637 (ALT 250 FOR IP): Performed by: NURSE PRACTITIONER

## 2023-02-18 PROCEDURE — 85610 PROTHROMBIN TIME: CPT

## 2023-02-18 PROCEDURE — 1200000000 HC SEMI PRIVATE

## 2023-02-18 PROCEDURE — 85025 COMPLETE CBC W/AUTO DIFF WBC: CPT

## 2023-02-18 PROCEDURE — 36415 COLL VENOUS BLD VENIPUNCTURE: CPT

## 2023-02-18 PROCEDURE — 83880 ASSAY OF NATRIURETIC PEPTIDE: CPT

## 2023-02-18 PROCEDURE — 92610 EVALUATE SWALLOWING FUNCTION: CPT

## 2023-02-18 PROCEDURE — 80061 LIPID PANEL: CPT

## 2023-02-18 PROCEDURE — 93880 EXTRACRANIAL BILAT STUDY: CPT

## 2023-02-18 PROCEDURE — 2580000003 HC RX 258: Performed by: INTERNAL MEDICINE

## 2023-02-18 PROCEDURE — 93005 ELECTROCARDIOGRAM TRACING: CPT | Performed by: EMERGENCY MEDICINE

## 2023-02-18 PROCEDURE — 93010 ELECTROCARDIOGRAM REPORT: CPT | Performed by: INTERNAL MEDICINE

## 2023-02-18 PROCEDURE — 97530 THERAPEUTIC ACTIVITIES: CPT

## 2023-02-18 PROCEDURE — 99223 1ST HOSP IP/OBS HIGH 75: CPT | Performed by: INTERNAL MEDICINE

## 2023-02-18 PROCEDURE — 83036 HEMOGLOBIN GLYCOSYLATED A1C: CPT

## 2023-02-18 PROCEDURE — 80053 COMPREHEN METABOLIC PANEL: CPT

## 2023-02-18 PROCEDURE — 2580000003 HC RX 258: Performed by: NURSE PRACTITIONER

## 2023-02-18 PROCEDURE — 71045 X-RAY EXAM CHEST 1 VIEW: CPT

## 2023-02-18 RX ORDER — POLYETHYLENE GLYCOL 3350 17 G/17G
17 POWDER, FOR SOLUTION ORAL DAILY PRN
Status: DISCONTINUED | OUTPATIENT
Start: 2023-02-18 | End: 2023-02-22 | Stop reason: HOSPADM

## 2023-02-18 RX ORDER — LABETALOL HYDROCHLORIDE 5 MG/ML
10 INJECTION, SOLUTION INTRAVENOUS EVERY 10 MIN PRN
Status: DISCONTINUED | OUTPATIENT
Start: 2023-02-18 | End: 2023-02-18

## 2023-02-18 RX ORDER — POTASSIUM CHLORIDE 7.45 MG/ML
10 INJECTION INTRAVENOUS PRN
Status: DISCONTINUED | OUTPATIENT
Start: 2023-02-18 | End: 2023-02-22 | Stop reason: HOSPADM

## 2023-02-18 RX ORDER — DILTIAZEM HYDROCHLORIDE 5 MG/ML
10 INJECTION INTRAVENOUS ONCE
Status: COMPLETED | OUTPATIENT
Start: 2023-02-18 | End: 2023-02-18

## 2023-02-18 RX ORDER — METOPROLOL SUCCINATE 50 MG/1
50 TABLET, EXTENDED RELEASE ORAL DAILY
Status: DISCONTINUED | OUTPATIENT
Start: 2023-02-18 | End: 2023-02-18

## 2023-02-18 RX ORDER — ASPIRIN 300 MG/1
300 SUPPOSITORY RECTAL DAILY
Status: DISCONTINUED | OUTPATIENT
Start: 2023-02-18 | End: 2023-02-21

## 2023-02-18 RX ORDER — AMIODARONE HYDROCHLORIDE 200 MG/1
200 TABLET ORAL 2 TIMES DAILY
Status: DISCONTINUED | OUTPATIENT
Start: 2023-02-18 | End: 2023-02-22 | Stop reason: HOSPADM

## 2023-02-18 RX ORDER — SODIUM CHLORIDE 0.9 % (FLUSH) 0.9 %
10 SYRINGE (ML) INJECTION EVERY 12 HOURS SCHEDULED
Status: DISCONTINUED | OUTPATIENT
Start: 2023-02-18 | End: 2023-02-22 | Stop reason: HOSPADM

## 2023-02-18 RX ORDER — DILTIAZEM HCL IN NACL,ISO-OSM 125 MG/125
2.5-15 PLASTIC BAG, INJECTION (ML) INTRAVENOUS CONTINUOUS
Status: DISCONTINUED | OUTPATIENT
Start: 2023-02-18 | End: 2023-02-18

## 2023-02-18 RX ORDER — ACETAMINOPHEN 325 MG/1
650 TABLET ORAL EVERY 6 HOURS PRN
Status: DISCONTINUED | OUTPATIENT
Start: 2023-02-18 | End: 2023-02-18

## 2023-02-18 RX ORDER — SODIUM CHLORIDE 0.9 % (FLUSH) 0.9 %
10 SYRINGE (ML) INJECTION PRN
Status: DISCONTINUED | OUTPATIENT
Start: 2023-02-18 | End: 2023-02-22 | Stop reason: HOSPADM

## 2023-02-18 RX ORDER — DEXTROSE MONOHYDRATE 100 MG/ML
INJECTION, SOLUTION INTRAVENOUS CONTINUOUS PRN
Status: DISCONTINUED | OUTPATIENT
Start: 2023-02-18 | End: 2023-02-22 | Stop reason: HOSPADM

## 2023-02-18 RX ORDER — ACETAMINOPHEN 650 MG/1
650 SUPPOSITORY RECTAL EVERY 6 HOURS PRN
Status: DISCONTINUED | OUTPATIENT
Start: 2023-02-18 | End: 2023-02-22 | Stop reason: HOSPADM

## 2023-02-18 RX ORDER — INSULIN LISPRO 100 [IU]/ML
0-4 INJECTION, SOLUTION INTRAVENOUS; SUBCUTANEOUS NIGHTLY
Status: DISCONTINUED | OUTPATIENT
Start: 2023-02-18 | End: 2023-02-22 | Stop reason: HOSPADM

## 2023-02-18 RX ORDER — ASPIRIN 81 MG/1
81 TABLET ORAL DAILY
Status: DISCONTINUED | OUTPATIENT
Start: 2023-02-18 | End: 2023-02-22 | Stop reason: HOSPADM

## 2023-02-18 RX ORDER — ONDANSETRON 2 MG/ML
4 INJECTION INTRAMUSCULAR; INTRAVENOUS EVERY 6 HOURS PRN
Status: DISCONTINUED | OUTPATIENT
Start: 2023-02-18 | End: 2023-02-22 | Stop reason: HOSPADM

## 2023-02-18 RX ORDER — WARFARIN SODIUM 5 MG/1
5 TABLET ORAL DAILY
Status: DISCONTINUED | OUTPATIENT
Start: 2023-02-18 | End: 2023-02-18

## 2023-02-18 RX ORDER — ATORVASTATIN CALCIUM 80 MG/1
80 TABLET, FILM COATED ORAL NIGHTLY
Status: DISCONTINUED | OUTPATIENT
Start: 2023-02-18 | End: 2023-02-22 | Stop reason: HOSPADM

## 2023-02-18 RX ORDER — MAGNESIUM SULFATE IN WATER 40 MG/ML
2000 INJECTION, SOLUTION INTRAVENOUS PRN
Status: DISCONTINUED | OUTPATIENT
Start: 2023-02-18 | End: 2023-02-22 | Stop reason: HOSPADM

## 2023-02-18 RX ORDER — PROMETHAZINE HYDROCHLORIDE 25 MG/1
12.5 TABLET ORAL EVERY 6 HOURS PRN
Status: DISCONTINUED | OUTPATIENT
Start: 2023-02-18 | End: 2023-02-22 | Stop reason: HOSPADM

## 2023-02-18 RX ORDER — MIDODRINE HYDROCHLORIDE 5 MG/1
5 TABLET ORAL ONCE
Status: COMPLETED | OUTPATIENT
Start: 2023-02-18 | End: 2023-02-18

## 2023-02-18 RX ORDER — SODIUM CHLORIDE 9 MG/ML
INJECTION, SOLUTION INTRAVENOUS PRN
Status: DISCONTINUED | OUTPATIENT
Start: 2023-02-18 | End: 2023-02-22 | Stop reason: HOSPADM

## 2023-02-18 RX ORDER — INSULIN LISPRO 100 [IU]/ML
0-4 INJECTION, SOLUTION INTRAVENOUS; SUBCUTANEOUS
Status: DISCONTINUED | OUTPATIENT
Start: 2023-02-18 | End: 2023-02-22 | Stop reason: HOSPADM

## 2023-02-18 RX ADMIN — ATORVASTATIN CALCIUM 80 MG: 80 TABLET, FILM COATED ORAL at 20:42

## 2023-02-18 RX ADMIN — AMIODARONE HYDROCHLORIDE 200 MG: 200 TABLET ORAL at 12:31

## 2023-02-18 RX ADMIN — AMIODARONE HYDROCHLORIDE 200 MG: 200 TABLET ORAL at 20:42

## 2023-02-18 RX ADMIN — DILTIAZEM HYDROCHLORIDE 10 MG: 5 INJECTION INTRAVENOUS at 03:06

## 2023-02-18 RX ADMIN — ASPIRIN 81 MG: 81 TABLET, COATED ORAL at 09:05

## 2023-02-18 RX ADMIN — Medication 10 ML: at 20:43

## 2023-02-18 RX ADMIN — DILTIAZEM HYDROCHLORIDE 2.5 MG/HR: 5 INJECTION INTRAVENOUS at 07:40

## 2023-02-18 RX ADMIN — Medication 10 ML: at 09:05

## 2023-02-18 RX ADMIN — METOPROLOL SUCCINATE 75 MG: 50 TABLET, EXTENDED RELEASE ORAL at 09:05

## 2023-02-18 RX ADMIN — MIDODRINE HYDROCHLORIDE 5 MG: 5 TABLET ORAL at 01:08

## 2023-02-18 ASSESSMENT — ENCOUNTER SYMPTOMS
VOMITING: 0
NAUSEA: 0
ABDOMINAL PAIN: 0
CHEST TIGHTNESS: 0
DIARRHEA: 0
SHORTNESS OF BREATH: 0

## 2023-02-18 ASSESSMENT — LIFESTYLE VARIABLES
HOW OFTEN DO YOU HAVE A DRINK CONTAINING ALCOHOL: NEVER
HOW MANY STANDARD DRINKS CONTAINING ALCOHOL DO YOU HAVE ON A TYPICAL DAY: PATIENT DOES NOT DRINK

## 2023-02-18 NOTE — ED PROVIDER NOTES
[facility]        Pt Name: Anusha Santana  MRN: [id number]  [place] 1955  Date of evaluation: 2/17/2023  Provider: KRISTYN Givens CNP  PCP: KRISTYN Rush CNP  Note Started: 2:34 AM EST 2/18/23       I have seen and evaluated this patient with my supervising physician Jimmy Morales MD.      [facility]       Chief Complaint   Patient presents with    Dizziness     Pt arrived in the ED via [facility]  Pt was at home when he started feeling dizzy. Pt stated he had some chest pains and palpitations beforehand   Pt has extensive cardiac hx        HISTORY OF PRESENT ILLNESS: 1 or more Elements     History from : Patient and Family patient has history of aphasia    Limitations to history : aphasia    Anusha Santana is a 74 y.o. male who presents to the emergency department with complaint of lightheadedness and syncope versus near syncope, the patient was walking and became lightheaded, experienced chest pain and palpitations, and sunk to the ground. He is uncertain if he passed out, he did call out for family prior to falling down the wall. He did not hit his head. Patient is in atrial fibrillation with RVR upon arrival.  He does have history of atrial fibrillation with recent cardioversion over Christmas of 2022, history of congestive heart failure, cardiomyopathy, type 2 diabetes, CVA with aphasia, hypertension, pulmonary embolism anticoagulated on warfarin. Patient was supposed to follow-up outpatient for heart catheterization, angiogram, however the family has been unable to afford the co-pay and has not made the appointment. Denies any headache, fever, lightheadedness, dizziness, visual disturbances. No neck or back pain. No abdominal pain, nausea, vomiting, diarrhea, constipation, or dysuria. No rash.     Nursing Notes were all reviewed and agreed with or any disagreements were addressed in the HPI. REVIEW OF SYSTEMS :      Review of Systems   Constitutional:  Negative for activity change, chills and fever. Respiratory:  Negative for chest tightness and shortness of breath. Cardiovascular:  Positive for chest pain and palpitations. Gastrointestinal:  Negative for abdominal pain, diarrhea, nausea and vomiting. Genitourinary:  Negative for dysuria. Neurological:  Positive for syncope and light-headedness. All other systems reviewed and are negative. Positives and Pertinent negatives as per HPI. SURGICAL HISTORY     Past Surgical History:   Procedure Laterality Date    VASECTOMY         CURRENTMEDICATIONS       Previous Medications    ATORVASTATIN (LIPITOR) 40 MG TABLET    TAKE ONE TABLET BY MOUTH EVERY NIGHT    BLOOD GLUCOSE MONITOR STRIPS    1 strip by Other route daily True Metric Test strips    BLOOD GLUCOSE MONITORING SUPPL AGUSTO    1 kit by Does not apply route daily True Metric testing device    BLOOD PRESSURE KIT    1 kit by Does not apply route daily    FINASTERIDE (PROSCAR) 5 MG TABLET    Take 1 tablet by mouth daily    FUROSEMIDE (LASIX) 20 MG TABLET    Take 1 tablet by mouth daily    LANCETS MISC    True Metric Lancets    METFORMIN (GLUCOPHAGE) 500 MG TABLET    TAKE 1 TABLET BY MOUTH 2 TIMES A DAY WITH MEALS    METHYLSULFONYLMETHANE (MSM PO)    Take by mouth    METOPROLOL SUCCINATE (TOPROL XL) 50 MG EXTENDED RELEASE TABLET    Take 1 tablet by mouth daily    MIDODRINE (PROAMATINE) 5 MG TABLET    Take 1 tablet by mouth 3 times daily    MINERAL OIL-HYDROPHILIC PETROLATUM (HYDROPHOR) OINTMENT    Apply topically as needed three times per day. PRENATAL MV-MIN-FE FUM-FA-DHA (PRENATAL 1 PO)    Take by mouth    TAMSULOSIN (FLOMAX) 0.4 MG CAPSULE    Take 1 capsule by mouth daily    WARFARIN (COUMADIN) 5 MG TABLET    TAKE 1 AND 1/2 TABLETS BY MOUTH DAILY       ALLERGIES     Patient has no known allergies.     FAMILYHISTORY       Family History   Problem Relation Age of Onset    High Blood Pressure Mother     Cancer Mother         lung, smoker    Cancer Father         mesothelioma    Depression Sister         SOCIAL HISTORY       Social History     Tobacco Use    Smoking status: Former     Packs/day: 2.00     Years: 40.00     Pack years: 80.00     Types: Cigarettes     Quit date: 2017     Years since quittin.7    Smokeless tobacco: Former     Quit date: 2017   Vaping Use    Vaping Use: Never used   Substance Use Topics    Alcohol use: No     Comment: quit , history of alcoholism    Drug use: No       SCREENINGS        [place] Coma Scale  Eye Opening: Spontaneous  Best Verbal Response: Oriented  Best Motor Response: Obeys commands  [place] Coma Scale Score: 15                CIWA Assessment  BP: 121/68  Heart Rate: (!) 113           PHYSICAL EXAM  1 or more Elements     ED Triage Vitals   BP Temp Temp Source Heart Rate Resp SpO2 Height Weight   23 0011 23 0006 23 0006 23 0011 23 0011 23 0030 -- --   (!) 98/57 (!) 96.1 °F (35.6 °C) Oral (!) 107 18 99 %         Physical Exam  Vitals and nursing note reviewed. Constitutional:       Appearance: He is well-developed. He is ill-appearing. He is not diaphoretic. HENT:      Head: Normocephalic and atraumatic. Right Ear: External ear normal.      Left Ear: External ear normal.   Eyes:      General:         Right eye: No discharge. Left eye: No discharge. Neck:      Vascular: No JVD. Cardiovascular:      Rate and Rhythm: Tachycardia present. Rhythm irregular. Pulses: Normal pulses. Pulmonary:      Effort: Pulmonary effort is normal. No respiratory distress. Abdominal:      Palpations: Abdomen is soft. Tenderness: There is no abdominal tenderness. Musculoskeletal:         General: Normal range of motion. Skin:     General: Skin is warm and dry. Coloration: Skin is not pale. Neurological:      Mental Status: He is alert.    Psychiatric: Behavior: Behavior normal.           DIAGNOSTIC RESULTS   LABS:    Labs Reviewed   CBC WITH AUTO DIFFERENTIAL - Abnormal; Notable for the following components:       Result Value    RBC 3.32 (*)     Hemoglobin 10.8 (*)     Hematocrit 31.7 (*)     All other components within normal limits   COMPREHENSIVE METABOLIC PANEL - Abnormal; Notable for the following components:    BUN 24 (*)     Total Protein 6.2 (*)     All other components within normal limits   TROPONIN - Abnormal; Notable for the following components:    Troponin 0.05 (*)     All other components within normal limits   BRAIN NATRIURETIC PEPTIDE - Abnormal; Notable for the following components:    Pro-BNP 2,415 (*)     All other components within normal limits   PROTIME-INR - Abnormal; Notable for the following components:    Protime 25.3 (*)     INR 2.29 (*)     All other components within normal limits   URINALYSIS WITH REFLEX TO CULTURE       When ordered only abnormal lab results are displayed. All other labs were within normal range or not returned as of this dictation. EKG: When ordered, EKG's are interpreted by the Emergency Department Physician in the absence of a cardiologist.  Please see their note for interpretation of EKG. RADIOLOGY:   Non-plain film images such as CT, Ultrasound and MRI are read by the radiologist. Plain radiographic images are visualized and preliminarily interpreted by the ED Provider with the below findings:        Interpretation per the Radiologist below, if available at the time of this note:    XR CHEST PORTABLE   Final Result   No acute airspace disease identified. CT HEAD WO CONTRAST    (Results Pending)     XR CHEST PORTABLE    Result Date: 2/18/2023  EXAMINATION: ONE XRAY VIEW OF THE CHEST 2/18/2023 12:52 am COMPARISON: None. HISTORY: ORDERING SYSTEM PROVIDED HISTORY: SOB TECHNOLOGIST PROVIDED HISTORY: Reason for exam:->SOB FINDINGS: Cardiac silhouette enlargement again noted.   There is no consolidation, pneumothorax or evidence for edema. No evidence for effusion. No acute osseous abnormality is identified. No acute airspace disease identified. No results found. PROCEDURES   Unless otherwise noted below, none     Procedures    CRITICAL CARE TIME (.cctime)   There was a high probability of life-threatening clinical deterioration in the patient's condition requiring my urgent intervention. I personally saw the patient and independently provided 33 minutes of non-concurrent critical care out of the total shared critical care time provided, excluding separately reportable procedures. PAST MEDICAL HISTORY      has a past medical history of Cerebrovascular disease, CHF (congestive heart failure) ([address] (04/2017), DM (diabetes mellitus screen) (04/2017), Hyperlipidemia, Hypertension, and Type 2 diabetes mellitus without complication ([address] Chronic Conditions affecting Care:  has a past medical history of Cerebrovascular disease, CHF (congestive heart failure) ([facility] (04/2017), DM (diabetes mellitus screen) (04/2017), Hyperlipidemia, Hypertension, and Type 2 diabetes mellitus without complication ([facility] EMERGENCY DEPARTMENT COURSE and DIFFERENTIAL DIAGNOSIS/MDM:   Vitals:    Vitals:    02/18/23 0200 02/18/23 0215 02/18/23 0230 02/18/23 0309   BP: 123/75 113/84 121/68    Pulse: (!) 118 99 (!) 113    Resp: 28 22 26    Temp:    97.2 °F (36.2 °C)   TempSrc:    Oral   SpO2: 99% 98% 99%        Patient was given the following medications:  Medications   dilTIAZem injection 10 mg (10 mg IntraVENous Given 2/18/23 0306)     Followed by   dilTIAZem HCl-Sodium Chloride 125 mg / 125 mL infusion (has no administration in time range)   midodrine (PROAMATINE) tablet 5 mg (5 mg Oral Given 2/18/23 0108)             Is this patient to be included in the SEP-1 Core Measure due to severe sepsis or septic shock?    No   Exclusion criteria - the patient is NOT to be included for SEP-1 Core Measure due to:  Infection is not suspected    CONSULTS: (Who and What was discussed)  None  Discussion with Other Profesionals : Admitting Team Arely    Social Determinants : Patient has / had difficulty affording medications  and cannot afford needed medical care    Records Reviewed : Inpatient Notes last admission 12/2022    CC/HPI Summary, DDx, ED Course, and Reassessment:     Briefly, this is a 74year old male who presents to the emergency department with complaint of lightheadedness and syncope versus near syncope, the patient was walking and became lightheaded, experienced chest pain and palpitations, and sunk to the ground. He is uncertain if he passed out, he did call out for family prior to falling down the wall. He did not hit his head. Patient is in atrial fibrillation with RVR upon arrival.  He does have history of atrial fibrillation with recent cardioversion over Christmas of 2022, history of congestive heart failure, cardiomyopathy, type 2 diabetes, CVA with aphasia, hypertension, pulmonary embolism anticoagulated on warfarin. Patient was supposed to follow-up outpatient for heart catheterization, angiogram, however the family has been unable to afford the co-pay and has not made the appointment. He had a soft blood pressure upon arrival, does take midodrine at home, I did give him his home dose of midodrine prior to initiating the Cardizem bolus and drip. CBC is unremarkable. CMP unremarkable. Troponin at patient's baseline 0.05. BNP 2415. PT/INR shows an INR of 2.29. XR CHEST PORTABLE (Final result)  Result time 02/18/23 01:04:53  Final result by Campos Rodriguez MD (02/18/23 01:04:53)                Impression:    No acute airspace disease identified. Will contact hospitalist services for admission of syncope vs. Near syncope with a.fib rvr. Hospitalist did request CT head prior to admission, this is reasonable as the patient did possibly syncopized unwitnessed.   He is anticoagulated on warfarin. Patient and family are agreeable regarding plan of care. Disposition Considerations (include 1 Tests not done, Shared Decision Making, Pt Expectation of Test or Tx.): I did consider ct chest, however patient is not hypoxic. Shared decision making used throughout    admit      I am the Primary Clinician of Record. FINAL IMPRESSION      1. Syncope and collapse    2. Atrial fibrillation with RVR ([address]          DISPOSITION/PLAN     DISPOSITION Decision To Admit 02/18/2023 02:49:41 AM      PATIENT REFERRED TO:  No follow-up provider specified.     DISCHARGE MEDICATIONS:  New Prescriptions    No medications on file       DISCONTINUED MEDICATIONS:  Discontinued Medications    No medications on file              (Please note that portions of this note were completed with a voice recognition program.  Efforts were made to edit the dictations but occasionally words are mis-transcribed.)    KRISTYN Douglass CNP (electronically signed)           KRISTYN Douglass CNP  02/18/23 701 KRISTYN Nagy CNP  02/18/23 [id number]

## 2023-02-18 NOTE — CONSULTS
In patient Neurology consult        [facility] Neurology      Antione Marie MD      Lisa Morris  1955    Date of Service: 2/18/2023    Referring Physician: Viola Monterroso MD      Reason for the consult and CC: Acute dizziness    HPI:   The patient is a 74y.o.  years old male history of diabetes, hypertension, cardiomyopathy and prior CVA who was admitted to the hospital with new onset dizziness. Symptoms started 2 to 3 days ago. Description sudden dizziness at home without spinning sensation or falling or injury. Degree was severe. Duration was persistent. Other associated symptoms included palpitation. He came to the ED yesterday where he was found to be in A-fib with RVR. He was admitted to the hospital.  Initial imaging with CT head showed no acute finding and remote left MCA stroke. Today feels back to his baseline. No residual deficit. He is on Coumadin and INR was above 2. Seen by cardiology. Other review of system was unremarkable. Constitutional:   Vitals:    02/18/23 0500 02/18/23 0615 02/18/23 0900 02/18/23 1228   BP: 117/85 120/68 129/68 116/66   Pulse: 77 82 74 77   Resp: 14 18 17 18   Temp:  97.2 °F (36.2 °C) 98 °F (36.7 °C) 98.2 °F (36.8 °C)   TempSrc:  Oral Oral Oral   SpO2: 97% 94% 99% 98%   Weight:  226 lb (102.5 kg)     Height:  6' 1\" (1.854 m)           I personally reviewed and updated social history, past medical history, medications, allergy, surgical history, and family history as documented in the patient's electronic health records. ROS: 10-14 ROS reviewed with the patient/nurse/family which were unremarkable except mentioned in H&P. General appearance:  Normal development and appear in no acute distress. Mental Status:   Oriented to person, place, problem, and time. Memory: Good immediate recall. Intact remote memory  Normal attention span and concentration.   Language: intact naming, repeating and  mild difficulties with word per fluency  Good fund of Knowledge. Aware of current events and vocabulary   Cranial Nerves:   II: Visual fields: Full. Pupils: equal, round, reactive to light, bilaterally  III,IV,VI: Extra Ocular Movements are intact. No nystagmus  V: Facial sensation is intact  VII: Facial strength and movements: intact and symmetric  IX: Normal palatal elevation and shoulder shrug  XII: Tongue movements are normal  Musculoskeletal: 5/5 in the right side. Mildly weak on the left 4+/5. Good range of motion. No muscle atrophy. Tone: Normal tone. Reflexes: Symmetric 2+ in the arms and 2+ in the legs   Planters: Flexor bilaterally  Coordination: no pronator drift, no dysmetria with FNF and normal REM  Sensation: normal in both arms and legs. Gait/Posture: steady gait with normal posturing and station. Medical decision making:  Data: reviewed   LABS:   Lab Results   Component Value Date/Time     02/18/2023 06:31 AM    K 4.6 02/18/2023 06:31 AM     02/18/2023 06:31 AM    CO2 30 02/18/2023 06:31 AM    BUN 25 02/18/2023 06:31 AM    CREATININE 1.6 02/18/2023 06:31 AM    GFRAA 78 09/09/2021 08:52 AM    LABGLOM 47 02/18/2023 06:31 AM    GLUCOSE 92 02/18/2023 06:31 AM    CALCIUM 9.4 02/18/2023 06:31 AM     Lab Results   Component Value Date/Time    WBC 7.1 02/18/2023 06:32 AM    RBC 3.43 02/18/2023 06:32 AM    HGB 11.1 02/18/2023 06:32 AM    HCT 32.4 02/18/2023 06:32 AM    MCV 94.5 02/18/2023 06:32 AM    RDW 13.4 02/18/2023 06:32 AM     02/18/2023 06:32 AM     Lab Results   Component Value Date    INR 2.29 (H) 02/18/2023    PROTIME 25.3 (H) 02/18/2023       Neuroimaging was independently reviewed by myself and discussed results with the patient  Reviewed notes from different physicians including H&P and ED notes. Reviewed lab and blood testing    Impression:  New onset dizziness likely secondary to cardiac arrhythmia and A-fib with RVR versus peripheral vertigo.   Agree with MRI of the brain exclude new ischemic stroke since patient has multiple risk factors for strokes.  Remote left MCA stroke  Chronic anticoagulation  Hypertension  Hyperlipidemia  Diabetes    Recommendation:  MRI brain   carotid Doppler  Continue current supportive care with cardiac work-up  and follow troponin  Continue anticoagulation and monitor INR with Coumadin  Aspirin  Statin  Diabetic control with insulin sliding scale  Follow LDL and A1c  Telemetry  Stroke education provided to the patient today  PT, OT and speech  DC planning when medically stable        Thank you for referring such patient. If you have any questions regarding my consult note, please don't hesitate to call me.     Trung Saenz MD  [phone]    This dictation was generated by voice recognition computer software. Although all attempts are made to edit the dictation for accuracy, there may be errors in the  transcription that are not intended

## 2023-02-18 NOTE — PROGRESS NOTES
[facility] [address]   Phone: [phone]    Occupational Therapy    [x] Initial Evaluation            [] Daily Treatment Note         [x] Discharge Summary      Patient: René Allen   : 1955   MRN: [id number]   Date of Service:  2023    Admitting Diagnosis:  Syncope and collapse  Current Admission Summary: Per H&P \"81 y.o. male who presents to the emergency department with complaint of lightheadedness and syncope versus near syncope, the patient was walking and became lightheaded, experienced chest pain and palpitations, and sunk to the ground. He is uncertain if he passed out, he did call out for family prior to falling down the wall. He did not hit his head. \"  Past Medical History:  has a past medical history of Cerebrovascular disease, CHF (congestive heart failure) ([facility] [address] DM (diabetes mellitus screen), Hyperlipidemia, Hypertension, and Type 2 diabetes mellitus without complication ([facility] Past Surgical History:  has a past surgical history that includes Vasectomy. Discharge Recommendations: René Allen scored a 24/24 on the -[facility] ADL Inpatient form. At this time, no further OT is recommended upon discharge due to patient at independent level. Recommend patient returns to prior setting with prior services. DME Required For Discharge: No DME required    Precautions/Restrictions: medium fall risk  Positional Restrictions:no positional restrictions    Pre-Admission Information   Lives With: spouse                  Type of Home: apartment  Home Layout: one level, 3rd floor, 3 flights of steps up  Home Access:  25 step to enter with handrail. Handrails are located on B side.   Bathroom Layout: tub/shower unit     Toilet Height: standard height  Home Equipment: quad cane  Transfer Assistance: Independent without use of device  Ambulation Assistance:Independent without use of device  ADL Assistance: independent with all ADL's  IADL Assistance:  shared responsibility for cooking/cleaning  Active :        [] Yes                 [x] No spouse  Hand Dominance: [] Left                 [x] Right     Hobbies:   Recent Falls: 1 fall about 2 months ago  Spouse at home to assist if needed. expressive aphasia noted, does better with yes/no questions. Examination   Vision:   Vision Gross Assessment: WFL  Hearing:   WFL  Observation:   General Observation:  pt in bed with HOB elevated, on RA  Posture:   Rounded shoulders, forward head  Sensation:   WFL  ROM:   (B) UE ROM WFL  Strength:   (B) UE gross strength WFL    Therapist Clinical Decision Making (Complexity): low complexity  Clinical Presentation: stable      Subjective  General: Pt supine in bed upon arrival, pleasant and agreeable to OT evaluation and treat. Pain: 0/10  Pain Interventions: not applicable        Activities of Daily Living  Basic Activities of Daily Living  General Comments: pt declined ADL participation but states he has been IND completing in room during hospital stay  Instrumental Activities of Daily Living  No IADL completed on this date. Functional Mobility  Bed Mobility  Supine to Sit: modified independent  Sit to Supine: modified independent  Comments: HOB elevated  Transfers  Sit to stand transfer:Independent  Stand to sit transfer: Independent  Comments:  Functional Mobility:  Sitting Balance: Independent. Sitting Balance Comment: seated EOB  Standing Balance: Independent. Standing Balance Comment: functional transfers/mobility  Functional Mobility: .   Independent  Functional Mobility Activity: 300' in hospital hallway  Functional Mobility Device Use: no device  Functional Mobility Comment: steady gait     Other Therapeutic Interventions    Functional Outcomes  AM-PAC Inpatient Daily Activity Raw Score: 24    Cognition  WFL  Orientation:    A&O x 4  Command Following:   [facility]     Education  Barriers To Learning: language expressive aphasia   Patient Education: Patient educated on goals, OT role and benefits, plan of care, discharge recommendations  Learning Assessment:  Patient verbalized and demonstrates understanding    Assessment  Activity Tolerance: tolerated well. BP taken and as follows:  1) supine : 131/90  2) seated 109/74  3) standing 90/59  4) after 3-4 minute standing 99/57  5) after ambulation 99/57  Impairments Requiring Therapeutic Intervention: none - eval with same day discharge  Prognosis: good without need for therapy intervention  Clinical Assessment: Patient presenting at independent level for completion of required self care tasks for return to home. Eval with d/c at this time. No therapy services indicated. Safety Interventions: patient left in bed, call light within reach, nurse notified, and family/caregiver present    Plan  Frequency: Eval with same day discharge. No follow up required. Current Treatment Recommendations: Not applicable, evaluation completed with same day discharge. Goals  Patient eval with same day discharge. No goals set as patient is at baseline self care status.       Therapy Session Time     Individual Group Co-treatment   Time In    1329   Time Out    1356   Minutes    27        Timed Code Treatment Minutes:  Timed Code Treatment Minutes: 12 Minutes  Total Treatment Minutes:  27 minutes total       Electronically Signed By: Ayde Arroyo OT, Ayde Arroyo OTR/GINGER [id number]

## 2023-02-18 NOTE — CONSULTS
[facility]   Electrophysiology Consultation   Date: 2/18/2023  Reason for Consultation: Near syncope, chest pain  Consult Requesting Physician: Todd Tolbert MD     Chief Complaint   Patient presents with    Dizziness     Pt arrived in the ED via [facility]  Pt was at home when he started feeling dizzy. Pt stated he had some chest pains and palpitations beforehand   Pt has extensive cardiac hx      HPI: Lali Ruiz is a 74 y.o. male with history of cardiomyopathy, diabetes, hypertension, hyperlipidemia, atrial fibrillation status post cardioversion in December who was at home when he started feeling dizzy, palpitations and some chest pain. Patient has been evaluated previously for chest pain and had an abnormal stress test and the plan was to do a left heart catheterization as an outpatient. He is feeling better today. He was found to be in atrial fibrillation with RVR. Past Medical History:   Diagnosis Date    Cerebrovascular disease     CHF (congestive heart failure) ([facility] 04/2017    per  nurse report but wife is unaware    DM (diabetes mellitus screen) 04/2017    boarder line    Hyperlipidemia     Hypertension     Type 2 diabetes mellitus without complication ([facility] [address]         Past Surgical History:   Procedure Laterality Date    VASECTOMY         No Known Allergies    Social History:  Reviewed. reports that he quit smoking about 5 years ago. His smoking use included cigarettes. He has a 80.00 pack-year smoking history. He quit smokeless tobacco use about 5 years ago. He reports that he does not drink alcohol and does not use drugs. Family History:  Reviewed. family history includes Cancer in his father and mother; Depression in his sister; High Blood Pressure in his mother. Review of System:  All other systems reviewed and are negative except for that noted above.  Pertinent negatives are:     General: negative for fever, chills   Ophthalmic ROS: negative for - eye pain or loss of vision  ENT ROS: negative for - headaches, sore throat   Respiratory: negative for - cough, sputum  Cardiovascular: Reviewed in HPI  Gastrointestinal: negative for - abdominal pain, diarrhea, N/V  Hematology: negative for - bleeding, blood clots, bruising or jaundice  Genito-Urinary:  negative for - Dysuria or incontinence  Musculoskeletal: negative for - Joint swelling, muscle pain  Neurological: negative for - confusion, dizziness, headaches   Psychiatric: No anxiety, no depression. Dermatological: negative for - rash    Physical Examination:  Vitals:    23 0615   BP: 120/68   Pulse: 82   Resp: 18   Temp: 97.2 °F (36.2 °C)   SpO2: 94%      No intake/output data recorded. Wt Readings from Last 3 Encounters:   23 226 lb (102.5 kg)   23 232 lb 6.4 oz (105.4 kg)   23 230 lb (104.3 kg)     Temp  Av.8 °F (36 °C)  Min: 96.1 °F (35.6 °C)  Max: 97.2 °F (36.2 °C)  Pulse  Av.6  Min: 68  Max: 118  BP  Min: 98/57  Max: 129/89  SpO2  Av.7 %  Min: 94 %  Max: 100 %  No intake or output data in the 24 hours ending 23 0836    Telemetry: Atrial fibrillation  Constitutional: Oriented. No distress. Head: Normocephalic and atraumatic. Mouth/Throat: Oropharynx is clear and moist.   Eyes: Conjunctivae normal. EOM are normal.   Neck: Neck supple. No rigidity. No JVD present. Cardiovascular: Normal rate, ir this is Dr. Sandra Rosado is it for chest pain thank you okay to by regular rhythm, S1&S2. Pulmonary/Chest: Bilateral respiratory sounds. No wheezes, No rhonchi. Abdominal: Soft. Bowel sounds present. No distension, No tenderness. Musculoskeletal: No tenderness. No edema    Lymphadenopathy: Has no cervical adenopathy. Neurological: Alert and oriented. Cranial nerve appears intact, No Gross deficit   Skin: Skin is warm and dry. No rash noted. Psychiatric: Has a normal behavior     Labs, diagnostic and imaging results reviewed. Reviewed.    Recent Labs 02/18/23  0103 02/18/23  0631    142   K 3.5 4.6    106   CO2 25 30   BUN 24* 25*   CREATININE 1.2 1.6*     Recent Labs     02/18/23  0103 02/18/23  0632   WBC 6.8 7.1   HGB 10.8* 11.1*   HCT 31.7* 32.4*   MCV 95.4 94.5    172     Lab Results   Component Value Date/Time    TROPONINI 0.26 02/18/2023 06:31 AM     No results found for: BNP  Lab Results   Component Value Date/Time    PROTIME 25.3 02/18/2023 01:03 AM    PROTIME 18.5 02/02/2023 11:21 AM    PROTIME 20.1 01/20/2023 12:38 PM    PROTIME 17.0 01/13/2023 01:49 PM    INR 2.29 02/18/2023 01:03 AM    INR 2.10 02/16/2023 12:00 AM    INR 1.9 02/02/2023 11:21 AM     Lab Results   Component Value Date/Time    CHOL 103 03/10/2022 08:55 AM    HDL 48 03/10/2022 08:55 AM    TRIG 61 03/10/2022 08:55 AM       ECG: Atrial fibrillation with rapid ventricular response with premature ventricular or aberrantly conducted complexesNonspecific T wave abnormality   Echo: Conclusions      Summary   Left ventricle is dilated. Overall, left ventricular systolic function is moderately depressed. Ejection fraction is visually estimated to be 30-35%. (Awlters's 35%)   There is global hypokinesis, more severe in the inferior/inferoseptal walls. Diastolic dysfunction-indeterminate grade. Moderate, posteriorly directed mitral regurgitation. Moderate tricuspid regurgitation. PASP estimated at 40 mmHg. Biatrial enlargement. Cath:   Conclusions        Summary    The overall quality of the study is good. There is subdiaphragmatic    attenuation. Left ventricular cavity size is severely dilated. RIght ventricle is normal    in size. Spect imaging demonstrates a small area of mildly decreased perfusion in the    apex. The defect is present at rest and stress, consistent with infarct. There is an additional medium size defect of moderate intensity in the mid    anterior and mid anteroseptum.  The defect is present at rest and stress,    consistent with infarct. Sum stress score of 8. No visual TID. Calculated TID of 1.01. Left ventricular ejection fraction is severely reduced at 25%. Sensitivity of testing is reduced on amlodipine. **Myocardial perfusion is abnormal. Fixed defect. Moderate-to-high risk    scan. Abnormal baseline EKG.**       Chest x-ray  Impression   No acute airspace disease identified.      Scheduled Meds:   sodium chloride flush  10 mL IntraVENous 2 times per day    aspirin  81 mg Oral Daily    Or    aspirin  300 mg Rectal Daily    atorvastatin  80 mg Oral Nightly    metoprolol succinate  50 mg Oral Daily    insulin lispro  0-4 Units SubCUTAneous TID WC    insulin lispro  0-4 Units SubCUTAneous Nightly    warfarin  5 mg Oral Daily     Continuous Infusions:   sodium chloride      dextrose       PRN Meds:.sodium chloride flush, sodium chloride, potassium chloride, magnesium sulfate, promethazine **OR** ondansetron, [DISCONTINUED] acetaminophen **OR** acetaminophen, polyethylene glycol, dextrose bolus **OR** dextrose bolus, glucagon (rDNA), dextrose     Patient Active Problem List    Diagnosis Date Noted    Syncope and collapse 02/18/2023    A-fib ([address] 02/18/2023    Hypernatremia 12/24/2022    Cardiomyopathy ([address] 86/22/4382    Acute systolic heart failure ([address] 12/20/2022    History of DVT in adulthood 12/18/2022    Acute renal failure ([address] 12/17/2022    Acute congestive heart failure ([address] 12/17/2022    Paroxysmal atrial fibrillation ([address] 12/17/2022    Atrial fibrillation with RVR ([address] 12/16/2022    Bilateral carotid artery stenosis 08/21/2020    History of stroke 01/10/2020    Controlled type 2 diabetes mellitus with diabetic polyneuropathy, without long-term current use of insulin ([address] 01/19/2018    Essential hypertension 10/20/2017    Right homonymous hemianopsia 07/25/2017    Hemiparesis affecting right side as late effect of cerebrovascular accident ([address] 07/25/2017    Mixed hyperlipidemia 06/21/2017 Long term current use of anticoagulant therapy 06/21/2017    Peripheral vascular disease ([facility] 06/06/2017    CVA (cerebral vascular accident) ([facility] 05/03/2017    Pulmonary embolism ([facility] 05/03/2017    DVT (deep venous thrombosis) ([facility] 05/03/2017      Active Hospital Problems    Diagnosis Date Noted    Syncope and collapse [R55] 02/18/2023     Priority: Medium    A-fib [facility]) [I48.91] 02/18/2023     Priority: Medium       Assessment:       Plan:    -Near syncope and lightheadedness  Could be multifactorial including possibility of A-fib and RVR versus an ischemic event. Continue to monitor vitals.      -Chest pain and elevated troponin, abnormal stress test    His troponin is more elevated than before although it is in the context of worsening renal failure. He had an abnormal stress test.  He does not have any chest pain today. EKG did not show any acute MI. We will keep him n.p.o. after midnight Sunday night for heart catheterization on Monday. Continue aspirin and statin      Atrial Fibrillation  At the time of of discharge from the hospital he was in sinus rhythm. It is not clear whether he went back into A-fib immediately or this episode of dizziness is related to recurrence of A-fib. Given his high [id number] score, patient needs to be on anticoagulation. However I will hold warfarin today and switch to IV heparin versus Lovenox when the INR is below 2 for preparation for cardiac catheterization. I increase the dose of metoprolol to 75 mg daily. I will start him on amiodarone later on to see if he can maintain sinus rhythm after another cardioversion. Cardiomyopathy                       -Continue guideline directed medical therapy with beta-blocker   He was not on any ACE inhibitor or ARB due to low blood pressure. I will start him on SGLT2 inhibitor.     CKD              -Avoid nephrotoxic drugs   Will monitor in preparation for left heart cath     Hx of DVT              - On coumadin    Hold Coumadin and switch to L  Lovenox or IV heparin in anticipation of catheterization    Hx of CVA              - Aphasia at baseline  - On coumadin, statin    I independently reviewed and interpreted ECG, cardiac labs, other relevant labs,  echo stress test    CXR  . Unless otherwise reflected in the note, my interpretation is in agreement with the report. Thank you for allowing me to participate in the care of Nahumusama Mendez     NOTE: This report was transcribed using voice recognition software. Every effort was made to ensure accuracy, however, inadvertent computerized transcription errors may be present.

## 2023-02-18 NOTE — H&P
HOSPITALISTS HISTORY AND PHYSICAL    2/18/2023 7:43 AM    Patient Information:  Kadi Pereira is a 74 y.o. male [id number]  PCP:  KRISTYN Solis CNP (Tel: [phone] )    Chief complaint:    Chief Complaint   Patient presents with    Dizziness     Pt arrived in the ED via [facility]  Pt was at home when he started feeling dizzy. Pt stated he had some chest pains and palpitations beforehand   Pt has extensive cardiac hx         History of Present Illness:  Oleg Marquez is a 74 y.o. male  with PMHx of CVA, hypertension, hyperlipidemia, HFrEF, A-fib; status post cardioversion on 12/2022, DVT/PE; on Coumadin, s/p IVC filter and diabetes mellitus presented with dizziness and near syncope. Per patient report he became dizzy and lightheaded while walking and lowered himself to the ground. Patient reported palpitations and chest pain during that episode but denied any shortness of breath, loss of consciousness, head trauma tinnitus, visual or speech deficits, shaking movements, bowel or bladder dysfunction. On admission, patient was noted to be in A-fib with RVR and was given diltiazem bolus. Initial diagnostic lab work showed troponin: 0.05, BNP: 2415, INR: 2.29. CT head negative for any acute intracranial pathology. REVIEW OF SYSTEMS:   Detailed 12 point ROS obtained which were negative except what mentioned above in HPI    Past Medical History:   has a past medical history of Cerebrovascular disease, CHF (congestive heart failure) ([address] DM (diabetes mellitus screen), Hyperlipidemia, Hypertension, and Type 2 diabetes mellitus without complication ([address] Past Surgical History:   has a past surgical history that includes Vasectomy. Medications:  No current facility-administered medications on file prior to encounter.      Current Outpatient Medications on File Prior to Encounter   Medication Sig Dispense Refill    mineral oil-hydrophilic petrolatum (HYDROPHOR) ointment Apply topically as needed three times per day. 454 g 0    midodrine (PROAMATINE) 5 MG tablet Take 1 tablet by mouth 3 times daily (Patient taking differently: Take 5 mg by mouth as needed (for systolic b/p under 170)) 90 tablet 1    furosemide (LASIX) 20 MG tablet Take 1 tablet by mouth daily (Patient not taking: No sig reported) 60 tablet 0    metoprolol succinate (TOPROL XL) 50 MG extended release tablet Take 1 tablet by mouth daily 30 tablet 1    tamsulosin (FLOMAX) 0.4 MG capsule Take 1 capsule by mouth daily 30 capsule 1    finasteride (PROSCAR) 5 MG tablet Take 1 tablet by mouth daily 30 tablet 3    atorvastatin (LIPITOR) 40 MG tablet TAKE ONE TABLET BY MOUTH EVERY NIGHT 90 tablet 1    Blood Pressure KIT 1 kit by Does not apply route daily 1 kit 0    metFORMIN (GLUCOPHAGE) 500 MG tablet TAKE 1 TABLET BY MOUTH 2 TIMES A DAY WITH MEALS 180 tablet 1    blood glucose monitor strips 1 strip by Other route daily True Metric Test strips 100 strip 3    Lancets MISC True Metric Lancets 100 each 3    warfarin (COUMADIN) 5 MG tablet TAKE 1 AND 1/2 TABLETS BY MOUTH DAILY (Patient taking differently: Take 5 mg by mouth daily Managed by PCP) 135 tablet 5    Blood Glucose Monitoring Suppl AGUSTO 1 kit by Does not apply route daily True Metric testing device 1 Device 0    Methylsulfonylmethane (MSM PO) Take by mouth      Prenatal MV-Min-Fe Fum-FA-DHA (PRENATAL 1 PO) Take by mouth         Allergies:  No Known Allergies     Social History:   reports that he quit smoking about 5 years ago. His smoking use included cigarettes. He has a 80.00 pack-year smoking history. He quit smokeless tobacco use about 5 years ago. He reports that he does not drink alcohol and does not use drugs. Family History:  family history includes Cancer in his father and mother; Depression in his sister; High Blood Pressure in his mother.      Physical Exam:  /68   Pulse 82   Temp 97.2 °F (36.2 °C) (Oral)   Resp 18   Ht 6' 1\" (1.854 m)   Wt 226 lb (102.5 kg)   SpO2 94%   BMI 29.82 kg/m²     General appearance: No apparent distress appears stated age and cooperative. HEENT Normal cephalic, atraumatic without obvious deformity. PERRLA  Neck: Supple, No jugular venous distention/bruits. Trachea midline without thyromegaly   Lungs: Clear to auscultation, bilaterally without Rales/Wheezes/Rhonchi   Heart: Regular rate and rhythm with Normal S1/S2 without murmurs  Abdomen: Soft, non-tender, non-distended. Positive bowel sounds all four quadrants. Extremities: No clubbing, cyanosis, or edema bilaterally. Neurologic: CN 2-12 grossly intact. No speech or motor deficits  Psychiatry: Appropriate affect. Not agitated  Skin: Warm, dry, normal turgor, no rash  Brisk capillary refill, peripheral pulses palpable     Labs:  CBC:   Lab Results   Component Value Date/Time    WBC 7.1 02/18/2023 06:32 AM    RBC 3.43 02/18/2023 06:32 AM    HGB 11.1 02/18/2023 06:32 AM    HCT 32.4 02/18/2023 06:32 AM    MCV 94.5 02/18/2023 06:32 AM    MCH 32.5 02/18/2023 06:32 AM    MCHC 34.4 02/18/2023 06:32 AM    RDW 13.4 02/18/2023 06:32 AM     02/18/2023 06:32 AM    MPV 8.6 02/18/2023 06:32 AM     BMP:    Lab Results   Component Value Date/Time     02/18/2023 06:31 AM    K 4.6 02/18/2023 06:31 AM     02/18/2023 06:31 AM    CO2 30 02/18/2023 06:31 AM    BUN 25 02/18/2023 06:31 AM    CREATININE 1.6 02/18/2023 06:31 AM    CALCIUM 9.4 02/18/2023 06:31 AM    GFRAA 78 09/09/2021 08:52 AM    LABGLOM 47 02/18/2023 06:31 AM    GLUCOSE 92 02/18/2023 06:31 AM     CT HEAD WO CONTRAST   Final Result   No acute intracranial abnormality identified. Sequela of large territory   infarct involving the left temporal and occipital lobes. XR CHEST PORTABLE   Final Result   No acute airspace disease identified.          MRI brain without contrast    (Results Pending) Vascular carotid duplex bilateral    (Results Pending)       Discussed case  with ED physician    Problem List  Principal Problem:    Syncope and collapse  Resolved Problems:    * No resolved hospital problems. *        Assessment/Plan:     A-fib with RVR present on admission  Status post cardioversion on 12/2022  Cardiology consulted; metoprolol dose was increased and started on amiodarone  Monitor on telemetry    Dizziness and near syncope likely secondary to hypotension in setting of A-fib with RVR  CT head negative for any acute intracranial abnormality; showed sequela of large territory infarct involving left parietal and occipital lobes  Carotid Dopplers and MRI brain ordered  Neurology consulted. Obtain orthostatic vitals. HFrEF/cardiomyopathy  Last echo on 12/2022 showed EF of 30 to 35%, severe global hypokinesis  Cardiology board; continue medical management. Holding ACE/ARB  Strict and output, daily weights    Elevated troponin; Troponin 0.04 on admission; trended up to 0.26  Had abnormal stress test; denies any chest pain today. EKG negative any acute ischemic changes  Cardiology on board;Plan for cardiac cath on Monday  Monitor on telemetry and trend troponin    Diabetes mellitus. Hold oral antidiabetic medications  Hemoglobin A1c ordered. Continue SSI monitor blood glucose closely    CKD stage III; Creatinine around baseline on admission. Nephrology consulted; appreciate their recs  Avoid nephrotoxin, strict input, daily weights and monitor renal function closely    Hyperlipidemia: Continue statins    Hypertension; BP soft on admission. Monitor BP closely. Will adjust medication dose if needed    Hx of prior CVA; continue aspirin and statins    Hx of PE/DVT; on Coumadin. INR 2.29 on admission; hold Coumadin for today. Plan to start nabumetone tomorrow      DVT prophylaxis: Coumadin held. Plan to heparin drip tomorrow  Code status: Full    Admit as inpatient.  I anticipate hospitalization spanning more than two midnights for investigation and treatment of the above medically necessary diagnoses. Please note that some part of this chart was generated using Dragon dictation software. Although every effort was made to ensure the accuracy of this automated transcription, some errors in transcription may have occurred inadvertently. If you may need any clarification, please do not hesitate to contact me through [facility].        Nury Keyes MD    2/18/2023 7:43 AM

## 2023-02-18 NOTE — PROGRESS NOTES
Clinical Pharmacy Note: Pharmacy to Dose Warfarin    Pharmacy consulted by Dr. Misty Salgado to dose warfarin. Issa Mccann is a 74 y.o. male  is receiving warfarin for indication: afib, hx PE. INR Goal Range: 2-3  Prior to admission warfarin dosing regimen: 5mg daily  INR today:   Lab Results   Component Value Date/Time    INR 2.29 02/18/2023 01:03 AM       Assessment/Plan:  INR is therapeutic on prior to admission dosing regimen. Possible concomitant drug-drug interactions include: aspirin   Based on today's assessment, dose warfarin 5mg daily (home regimen). Daily INR is ordered. Pharmacy will continue to monitor and make adjustments to regimen as necessary.      Thank you for the consult,     Jerri Epstein, PharmD, BCCCP

## 2023-02-18 NOTE — PROGRESS NOTES
[facility] [address]   Phone: [phone]    Physical Therapy    [x] Initial Evaluation            [] Daily Treatment Note         [x] Discharge Summary      Patient: René Allen   : 1955   MRN: [id number]   Date of Service:  2023  Admitting Diagnosis: Syncope and collapse    Current Admission Summary: René Allen is a 74 y.o. male who presents to the emergency department with complaint of lightheadedness and syncope versus near syncope, the patient was walking and became lightheaded, experienced chest pain and palpitations, and sunk to the ground. He is uncertain if he passed out, he did call out for family prior to falling down the wall. He did not hit his head. Patient is in atrial fibrillation with RVR upon arrival.  He does have history of atrial fibrillation with recent cardioversion over  of , history of congestive heart failure, cardiomyopathy, type 2 diabetes, CVA with aphasia, hypertension, pulmonary embolism anticoagulated on warfarin. Patient was supposed to follow-up outpatient for heart catheterization, angiogram, however the family has been unable to afford the co-pay and has not made the appointment. Past Medical History:  has a past medical history of Cerebrovascular disease, CHF (congestive heart failure) ([address] DM (diabetes mellitus screen), Hyperlipidemia, Hypertension, and Type 2 diabetes mellitus without complication ([address] Past Surgical History:  has a past surgical history that includes Vasectomy. Discharge Recommendations: René Allen scored a 24/24 on the AM-PAC short mobility form. At this time, no further PT is recommended upon discharge due to patient at independent level. Recommend patient returns to prior setting with prior services.     DME Required For Discharge: No new DME required  Precautions/Restrictions: medium fall risk  Positional Restrictions:no positional restrictions    Pre-Admission Information   Lives With: spouse                  Type of Home: apartment  Home Layout: one level, 3rd floor, 3 flights of steps up  Home Access:  25 step to enter with handrail. Handrails are located on B side. Bathroom Layout: tub/shower unit     Toilet Height: standard height  Home Equipment: quad cane  Transfer Assistance: Independent without use of device  Ambulation Assistance:Independent without use of device  ADL Assistance: independent with all ADL's  IADL Assistance:  shared responsibility for cooking/cleaning  Active :        [] Yes                 [x] No spouse  Hand Dominance: [] Left                 [x] Right     Hobbies:   Recent Falls: 1 fall about 2 months ago  Spouse at home to assist if needed. Questionable historian secondary to expressive aphasia, does better with yes/no questions    Examination   Vision:   Vision Gross Assessment: WFL  Hearing:   WFL  Observation:   General Observation:  Patient in bed w/ HOB elevated. Posture:   WFL  Sensation:   WFL  ROM:   (B) LE ROM WFL  Strength:   (B) LE gross strength WFL  Therapist Clinical Decision Making (Complexity): low complexity  Clinical Presentation: stable      Subjective  General: Patient is aphasic from CVA 6-7 years ago, word finding difficulties, patient seems aware. He reports right visual field cut from previous infarct. Pain: 0/10  Pain Interventions: not applicable       Functional Mobility  Bed Mobility  Supine to Sit: Independent  Sit to Supine: Independent  Scooting: Independent  Comments:  Supine /90. Sitting /74. Transfers  Sit to stand transfer: Independent  Stand to sit transfer: Independent  Bed to chair transfer: Independent  Comments:  Standing BP 90/59. Standing BP 99/57 after standing 2 minutes. BP 99/57 after 300' of ambulation.   Ambulation  Surface:level surface  Assistive Device: no device  Assistance: Independent  Distance: 300'  Gait Mechanics: reciprocal, steady  Comments:    Stair Mobility  Stair mobility not completed on this date. Comments:  Balance  Static Sitting Balance: good: independent with functional balance in unsupported position  Dynamic Sitting Balance: good: independent with functional balance in unsupported position  Static Standing Balance: good: independent with functional balance in unsupported position  Dynamic Standing Balance: good: independent with functional balance in unsupported position  Comments:    Other Therapeutic Interventions:  Patient educate on moving positions slowly given his systolic BP changes. Functional Outcomes  AM-PAC Inpatient Mobility Raw Score : 24              Cognition  WFL  Orientation:    A&O x 4    Education  Barriers To Learning: none  Patient Education: patient educated on goals, PT role and benefits, plan of care, discharge recommendations  Learning Assessment:  Pt verbalized and demonstrates understanding    Assessment  Activity Tolerance: Orthostatic hypotension. Impairments Requiring Therapeutic Intervention: none - eval with same day discharge  Prognosis: good without need for therapy intervention  Clinical Assessment: Patient presenting at independent level for completion of required mobility tasks for return to home. Eval with d/c at this time. No therapy services indicated. Safety Interventions: patient left in bed, call light within reach, nurse notified, and family/caregiver present    Plan  Frequency: Eval with same day discharge. No follow up required. Current Treatment Recommendations: Not applicable, evaluation completed with same day discharge. Goals  Patient eval with same day discharge. No goals set as patient is at baseline mobility status.       Therapy Session Time      Individual Group Co-treatment   Time In     1329   Time Out     1356   Minutes     27     Timed Code Treatment Minutes:  12  Total Treatment Minutes:  27       Electronically Signed By: Kendy Julien PT, DPT, [facility]-R [id number]

## 2023-02-18 NOTE — PLAN OF CARE
Problem: Discharge Planning  Goal: Discharge to home or other facility with appropriate resources  Outcome: Progressing     Problem: Chronic Conditions and Co-morbidities  Goal: Patient's chronic conditions and co-morbidity symptoms are monitored and maintained or improved  Outcome: Progressing     Problem: Safety - Adult  Goal: Free from fall injury  Outcome: Progressing     Problem: Neurosensory - Adult  Goal: Achieves stable or improved neurological status  Outcome: Progressing     Problem: Cardiovascular - Adult  Goal: Maintains optimal cardiac output and hemodynamic stability  Outcome: Progressing     Problem: Metabolic/Fluid and Electrolytes - Adult  Goal: Electrolytes maintained within normal limits  Outcome: Progressing

## 2023-02-18 NOTE — ED PROVIDER NOTES
[address]  [address] [id number]  Dept: [phone]  Loc: [phone]    Open Note Time:  12:35 AM EST    I independently performed a history and physical on [facility]. Chief Complaint  Dizziness (Pt arrived in the ED via [facility]/Pt was at home when he started feeling dizzy. /Pt stated he had some chest pains and palpitations beforehand /Pt has extensive cardiac hx )       This is a very pleasant 74 y.o. male  who was evaluated in the emergency department for syncope    EKG  The Ekg interpreted by me shows  atrial fibrillation with a rate of 110  Axis is   Normal  QTc is  within an acceptable range  Intervals and Durations are unremarkable. ST Segments: no acute change  Delta waves, Brugada Syndrome, and Short [place] are not present. Prior EKG to compare with was available. No significant changes compared to prior EKG from December 27, 2022      Unless otherwise stated in this report or unable to obtain because of the patient's clinical or mental status as evidenced by the medical record, this patient's positive and negative responses for review of systems, constitutional, psych, eyes, ENT, cardiovascular, respiratory, gastrointestinal, neurological, genitourinary, musculoskeletal, integument systems and systems related to the presenting problem are either stated in the preceding paragraph or were not pertinent or were negative for the symptoms and/or complaints related to the medical problem. I wore goggles, surgical mask, N95 mask and gloves when I evaluated the patient. Focused exam:  There is no height or weight on file to calculate BMI.   The physical exam reveals an alert and oriented patient who does not appear to be confused, non-ill-appearing, no abnormal heart sounds, no abnormal lung sounds, speaking comfortably in full sentences, benign abdominal exam, patient has venous stasis changes along with edema of bilateral lower extremity    Brief ED course/MDM:       I evaluated the patient in room ED-0006/06    Procedures/interventions/images ordered for this visit  Orders Placed This Encounter   Procedures    XR CHEST PORTABLE    CBC with Auto Differential    Comprehensive Metabolic Panel    Troponin    Brain Natriuretic Peptide    Protime-INR    Urinalysis with Reflex to Culture    EKG 12 Lead    Insert peripheral IV       Medications ordered for this visit  Orders Placed This Encounter   Medications    midodrine (PROAMATINE) tablet 5 mg       ED course notes for this visit       Consults ordered:  None  Discussion with Other Professionals : None    Social Determinants : Patient has / had difficulty affording medications     Chronic Conditions:  See HPI and PMHx    Records Reviewed : Care Everywhere patient does have care management at home to a centimeter      Disposition Considerations   (include 1 Tests not done, Shared Decision Making, Pt expectation of Test or Tx.):  Results for orders placed or performed during the hospital encounter of 02/17/23   CBC with Auto Differential   Result Value Ref Range    WBC 6.8 4.0 - 11.0 K/uL    RBC 3.32 (L) 4.20 - 5.90 M/uL    Hemoglobin 10.8 (L) 13.5 - 17.5 g/dL    Hematocrit 31.7 (L) 40.5 - 52.5 %    MCV 95.4 80.0 - 100.0 fL    MCH 32.6 26.0 - 34.0 pg    MCHC 34.2 31.0 - 36.0 g/dL    RDW 13.2 12.4 - 15.4 %    Platelets 107 836 - 326 K/uL    MPV 8.6 5.0 - 10.5 fL    Neutrophils % 67.9 %    Lymphocytes % 17.5 %    Monocytes % 8.3 %    Eosinophils % 5.9 %    Basophils % 0.4 %    Neutrophils Absolute 4.6 1.7 - 7.7 K/uL    Lymphocytes Absolute 1.2 1.0 - 5.1 K/uL    Monocytes Absolute 0.6 0.0 - 1.3 K/uL    Eosinophils Absolute 0.4 0.0 - 0.6 K/uL    Basophils Absolute 0.0 0.0 - 0.2 K/uL   Comprehensive Metabolic Panel   Result Value Ref Range    Sodium 139 136 - 145 mmol/L    Potassium 3.5 3.5 - 5.1 mmol/L    Chloride 104 99 - 110 mmol/L    CO2 25 21 - 32 mmol/L    Anion Gap 10 3 - 16    Glucose 99 70 - 99 mg/dL    BUN 24 (H) 7 - 20 mg/dL    Creatinine 1.2 0.8 - 1.3 mg/dL    Est, Glom Filt Rate >60 >60    Calcium 8.7 8.3 - 10.6 mg/dL    Total Protein 6.2 (L) 6.4 - 8.2 g/dL    Albumin 3.6 3.4 - 5.0 g/dL    Albumin/Globulin Ratio 1.4 1.1 - 2.2    Total Bilirubin 0.5 0.0 - 1.0 mg/dL    Alkaline Phosphatase 68 40 - 129 U/L    ALT 27 10 - 40 U/L    AST 25 15 - 37 U/L   Troponin   Result Value Ref Range    Troponin 0.05 (H) <0.01 ng/mL   Brain Natriuretic Peptide   Result Value Ref Range    Pro-BNP 2,415 (H) 0 - 124 pg/mL   Protime-INR   Result Value Ref Range    Protime 25.3 (H) 11.7 - 14.5 sec    INR 2.29 (H) 0.87 - 1.14   EKG 12 Lead   Result Value Ref Range    Ventricular Rate 110 BPM    Atrial Rate 326 BPM    QRS Duration 90 ms    Q-T Interval 364 ms    QTc Calculation (Bazett) 492 ms    R Axis 54 degrees    T Axis 97 degrees    Diagnosis       Atrial fibrillation with rapid ventricular response with premature ventricular or aberrantly conducted complexesNonspecific T wave abnormality , probably digitalis effect        As I have deemed necessary from their history, physical, and studies, I have considered and evaluated Romeo Monte for the following diagnoses:  Differential Diagnosis: Cardiac arrhythmia, drop attack from a subarachnoid hemorrhage, sepsis, dehydration, vasovagal syncope, other        FINAL IMPRESSION  1. Syncope and collapse        Vitals:  Blood pressure 123/75, pulse (!) 118, temperature (!) 96.1 °F (35.6 °C), temperature source Oral, resp. rate 18, SpO2 99 %. Disposition  Patient understands that they are going to be admitted to the hospital.  There was shared decision making between myself as well as the patient and/or their surrogate and we are in agreement with admission. There is an opportunity for questions and all questions answered to the best of my ability and to the satisfaction of the patient and/or patient's family.     Pt is in stable condition upon Admit to telemetry. All diagnostic, treatment, and disposition decisions were made by myself in conjunction with the TOMY/resident. For further details of the patient's emergency department visit, please see TOMY/resident documentation. Initial history and physical exam information was obtained by the TOMY/resident, also dictated a record of this visit. I independently examined and evaluated this patient and participated in the diagnostic, treatment and disposition decisions. I personally saw the patient and performed a substantive portion of the visit including all aspects of the medical decision making. I personally saw the patient and independently provided  non-concurrent critical care out of the total shared critical care time provided. Please note, critical care time was at least 15 minutes, obtaining history, conducting a physical exam, performing and monitoring interventions, ordering, collecting and interpreting tests, and establishing medical decision-making and discussion with the patient and/or family, specifically for management of the presenting complaint and symptoms initially, direct bedside care, reevaluation, review of records, and consultation. There was a high probability of clinically significant life-threatening deterioration in the patient's condition, which required my urgent intervention. This time does not include separately billable procedures. Pt was seen during the [place] 19 pandemic. Appropriate PPE worn by this writer during patient encounters. Pt seen during a time with constrained hospital bed capacity and other potential inpatient and outpatient resources were constrained due to the viral pandemic. The note was completed using Dragon voice recognition transcription. Every effort was made to ensure accuracy; however, inadvertent transcription errors may be present despite my best efforts to edit errors.     Shelbie Krueger MD  [facility] Christopher Barron MD  02/18/23 [id number]

## 2023-02-18 NOTE — PROGRESS NOTES
Facility/Department: [address]  Speech Language Pathology  DYSPHAGIA BEDSIDE SWALLOW EVALUATION    Patient: Armando Ventura   : 1955   MRN: [id number]      Evaluation Date: 2023   Admitting Diagnosis: Syncope and collapse [R55]  Atrial fibrillation with RVR ([facility] [address] [I48.91]  A-fib ([facility] [address] [I48.91]  Pain: Denies                                                       H&P: Armando Ventura is a 74 y.o. male  with PMHx of CVA, hypertension, hyperlipidemia, HFrEF, A-fib; status post cardioversion on 2022, DVT/PE; on Coumadin, s/p IVC filter and diabetes mellitus      with complaint of lightheadedness and syncope versus near syncope, the patient was walking and became lightheaded, experienced chest pain and palpitations, and sunk to the ground. He is uncertain if he passed out, he did call out for family prior to falling down the wall. He did not hit his head. Patient is in atrial fibrillation with RVR upon arrival.  He does have history of atrial fibrillation with recent cardioversion over 2022, history of congestive heart failure, cardiomyopathy, type 2 diabetes, CVA with aphasia, hypertension, pulmonary embolism anticoagulated on warfarin. Patient was supposed to follow-up outpatient for heart catheterization, angiogram, however the family has been unable to afford the co-pay and has not made the appointment. Imaging:  CT Chest: 23  Impression   No acute airspace disease identified. Head CT: 23  Impression   No acute intracranial abnormality identified. Sequela of large territory   infarct involving the left temporal and occipital lobes. History/Prior Level of Function:   Living Status: Home with spouse  Prior Dysphagia History: Per chart review, pt had previous CVA 17: L MCA occluded with interval increase in size of L parietal, temporal, and occipital infarct, stable L thalamic infarct .  Pt treated at [facility] and then transferred to [facility] acute rehab for therapy. Pt treated for Expressive/ Receptive Aphasia; Significant oral apraxia, Mild Oropharyngeal Dysphagia. At discharge from rehab, pt was tolerating a regular texture diet/thin liquids with no difficulty. He completed outpatient therapy with continued aphasia/apraxia at discharge. Per RN, pt and family report pt's current speech-language skills are at baseline. Pt denies dysphagia. Pending results of MRI, pt may benefit from full speech-language evaluation if clinically indicated. Reason for referral: SLP evaluation orders received due to CVA protocol     Dysphagia Impressions/Diagnosis: Oropharyngeal Dysphagia   Pt re-positioned self upright in bed upon arrival.  [facility] revealed s/s of oral apraxia with decreased lingual/labial coordination. Receptive/expressive aphasia apparent. Per RN and pt, this is baseline. Pt provided various texture trials to evaluate overall swallow function. Pt demonstrated grossly within functional limitation swallow function with slightly prolonged mastication with regular textures due to nature of dentition and suspected mild premature loss of bolus to pharynx. Pt tolerated all texture trials with no overt clinical s/s of aspiration/penetration. No further follow-up for dysphagia therapy recommended. Pending results of MRI, pt may benefit from speech-language evaluation. Recommended Diet and Intervention 2/18/2023:  Diet Solids Recommendation:  Regular texture diet  Liquid Consistency Recommendation:   Thin liquids  Recommended form of Meds: Meds in puree           Compensatory Swallowing Strategies:  Upright as possible with all PO intake , Small bites/sips , Eat/feed slowly, Remain upright 30-45 min     SHORT TERM DYSPHAGIA GOALS/PLAN OF CARE: Speech therapy for dysphagia tx No further follow-up indicated for dysphagia and Recommend full speech language evaluation as able to tolerate pending results of MRI and if clinically indicated    Dysphagia Therapeutic Intervention: No further dysphagia tx indicated at this time    Discharge Recommendations: Further speech/dysphagia treatment follow-up is pending MRI results with further treatment as indicated     Patient Positioning: Upright in chair    Current Diet Level (prior to evaluation): Regular texture diet  Thin liquids      Respiratory Status:   [x]Room Air   []O2 via nasal cannula   []Other:    Dentition:  []Adequate  []Dentures   [x]Missing Many Teeth  []Edentulous  []Other:    Baseline Vocal Quality:  [x]Normal  []Dysphonic   []Aphonic   []Hoarse  []Wet  []Weak  []Other:    Volitional Cough:  Elicited: Strong     Volitional Swallow:   []Absent   []Delayed     [x]Adequate     []Required use of drink     Oral Mechanism Exam:  []WFL [x]Mild   [x] Moderate  []Severe  []To be assessed  Impaired:   []Left side      []Right side    [x]Labial ROM/Coordination    []Labial Symmetry   [x]Lingual ROM/Coordination   []Lingual Symmetry  []Gag  [x]Other: oral apraxia    Oral Phase: []WFL [x]Mild   [] Moderate  []Severe  []To be assessed   [x]Impaired/Prolonged Mastication: due to nature of dentition   []Oral Holding:   []Spillage Left:   []Spillage Right:  []Pocketing Left:   []Pocketing Right:   []Decreased Anterior to Posterior Transit:   [x]Suspected Premature Bolus Loss:   []Lingual/Palatal Residue:   []Other:     Pharyngeal Phase: [x]WFL []Mild   [] Moderate  []Severe  []To be assessed   []Delayed Swallow:   []Suspected Pharyngeal Pooling:   []Decreased Laryngeal Elevation:   []Absent Swallow:  []Wet Vocal Quality:   []Throat Clearing-Immediate:   []Throat Clearing-Delayed:   []Cough-Immediate:   []Cough-Delayed:  []Change in Vital Signs:  []Suspected Delayed Pharyngeal Clearing:  []Other:     Eating Assistance:  [x]Independent  []Setup or clean-up assistance   [] Supervision or touching assistance   [] Partial or moderate assistance   [] Substantial or maximal assistance  [] Dependent EDUCATION:   Provided education regarding role of SLP, results of assessment, recommendations and general speech pathology plan of care. [x] Pt verbalized understanding and agreement   [x] Pt requires ongoing learning   [] No evidence of comprehension     If patient discharges prior to next visit, this note will serve as discharge. Treatment time:  Timed Code Treatment Minutes: 0 minutes  Total Treatment time: 18 minutes    Electronically signed by:     Angi VERDUGO  CCC-SLP S.PHilario [id number]  Speech-Language Pathologist

## 2023-02-18 NOTE — CONSULTS
Office : [phone]     Fax :[phone]       Nephrology Consult Note      Patient's Name: Nikko Raymond  3:55 PM  2/18/2023    Reason for Consult:  CKD 3 b, planned for left heart cath      Requesting Physician:  KRISTYN Olivares CNP      Chief Complaint:    Chief Complaint   Patient presents with    Dizziness     Pt arrived in the ED via [facility]  Pt was at home when he started feeling dizzy. Pt stated he had some chest pains and palpitations beforehand   Pt has extensive cardiac hx            History of Present iIlness:    Nikko Raymond is a 74 y.o. male with prior history of CHF, HTN, Dm 2, CVA who was admitted for chest pain. Has baseline creat around 1. 6. he is planned for left heart cath Monday. Nephrology consulted for risk stratification to contrast exposure. No intake/output data recorded. Past Medical History:   Diagnosis Date    Cerebrovascular disease     CHF (congestive heart failure) ([facility] [address] 04/2017    per  nurse report but wife is unaware    DM (diabetes mellitus screen) 04/2017    boarder line    Hyperlipidemia     Hypertension     Type 2 diabetes mellitus without complication [facility])        Past Surgical History:   Procedure Laterality Date    VASECTOMY         Family History   Problem Relation Age of Onset    High Blood Pressure Mother     Cancer Mother         lung, smoker    Cancer Father         mesothelioma    Depression Sister         reports that he quit smoking about 5 years ago. His smoking use included cigarettes. He has a 80.00 pack-year smoking history. He quit smokeless tobacco use about 5 years ago. He reports that he does not drink alcohol and does not use drugs. Allergies:  Patient has no known allergies.     Current Medications:    sodium chloride flush 0.9 % injection 10 mL, 2 times per day  sodium chloride flush 0.9 % injection 10 mL, PRN  0.9 % sodium chloride infusion, PRN  potassium chloride 10 mEq/100 mL IVPB (Peripheral Line), PRN  magnesium sulfate 2000 mg in 50 mL IVPB premix, PRN  promethazine (PHENERGAN) tablet 12.5 mg, Q6H PRN   Or  ondansetron (ZOFRAN) injection 4 mg, Q6H PRN  acetaminophen (TYLENOL) suppository 650 mg, Q6H PRN  polyethylene glycol (GLYCOLAX) packet 17 g, Daily PRN  aspirin EC tablet 81 mg, Daily   Or  aspirin suppository 300 mg, Daily  atorvastatin (LIPITOR) tablet 80 mg, Nightly  dextrose bolus 10% 125 mL, PRN   Or  dextrose bolus 10% 250 mL, PRN  glucagon (rDNA) injection 1 mg, PRN  dextrose 10 % infusion, Continuous PRN  insulin lispro (HUMALOG) injection vial 0-4 Units, TID WC  insulin lispro (HUMALOG) injection vial 0-4 Units, Nightly  metoprolol succinate (TOPROL XL) extended release tablet 75 mg, Daily  amiodarone (CORDARONE) tablet 200 mg, BID        Review of Systems:   14 point ROS obtained but were negative except mentioned in HPI      Physical exam:     Vitals:  /66   Pulse 77   Temp 98.2 °F (36.8 °C) (Oral)   Resp 18   Ht 6' 1\" (1.854 m)   Wt 226 lb (102.5 kg)   SpO2 98%   BMI 29.82 kg/m²   Constitutional:  OAA X3 NAD  Skin: no rash, turgor wnl  Heent:  eomi, mmm  Neck: no bruits or jvd noted  Cardiovascular:  S1, S2 without m/r/g  Respiratory: CTA B without w/r/r  Abdomen:  +bs, soft, nt, nd  Ext: no  lower extremity edema  Psychiatric: mood and affect appropriate  Musculoskeletal:  Rom, muscular strength intact    Labs:  CBC:   Recent Labs     02/18/23  0103 02/18/23  0632   WBC 6.8 7.1   HGB 10.8* 11.1*    172     BMP:    Recent Labs     02/18/23  0103 02/18/23  0631    142   K 3.5 4.6    106   CO2 25 30   BUN 24* 25*   CREATININE 1.2 1.6*   GLUCOSE 99 92     Ca/Mg/Phos:   Recent Labs     02/18/23  0103 02/18/23  0631   CALCIUM 8.7 9.4     Hepatic: Recent Labs     02/18/23  0103 02/18/23  0631   AST 25 26   ALT 27 29   BILITOT 0.5 0.6   ALKPHOS 68 70     Troponin:   Recent Labs     02/18/23  0103 02/18/23 0631   TROPONINI 0.05* 0.26*     BNP: No results for input(s): BNP in the last 72 hours. Lipids:   Recent Labs     02/18/23 0631   CHOL 102   TRIG 60   HDL 52   LDLCALC 38   LABVLDL 12     ABGs: No results for input(s): PHART, PO2ART, WIY5DAA in the last 72 hours. INR:   Recent Labs     02/16/23  0000 02/18/23 0103   INR 2.10 2.29*     UA:  Recent Labs     02/18/23  0913   COLORU Yellow   CLARITYU Clear   GLUCOSEU Negative   BILIRUBINUR Negative   KETUA Negative   SPECGRAV <=1.005   BLOODU Negative   PHUR 5.5   PROTEINU Negative   UROBILINOGEN 0.2   NITRU Negative   LEUKOCYTESUR TRACE*   LABMICR YES   URINETYPE Voided      Urine Microscopic:   Recent Labs     02/18/23 0913   BACTERIA None Seen   HYALCAST 1   WBCUA 1   RBCUA 0   EPIU 0     Urine Culture: No results for input(s): LABURIN in the last 72 hours. Urine Chemistry: No results for input(s): Nicci Area, PROTEINUR, NAUR in the last 72 hours. IMAGING:  Vascular carotid duplex bilateral         CT HEAD WO CONTRAST   Final Result   No acute intracranial abnormality identified. Sequela of large territory   infarct involving the left temporal and occipital lobes. XR CHEST PORTABLE   Final Result   No acute airspace disease identified. MRI brain without contrast    (Results Pending)         Assessment/Plan :      1.  CKD 3 B  H/o DM 2  Risk for TOMMY is low to moderate risk. Baseline creat is 1.6     Hold lasix on day of LHC     2. HTN  Controlled. 3. Anemia. Hb 11.1       4. Acid- base disorder. HCo3 25     5.  Electrolytes  Monitor     Recommend to dose adjust all medications  based on renal functions  Maintain SBP> 90 mmHg   Daily weights   AVOID NSAIDs  Avoid Nephrotoxins  Monitor Intake/Output  Call if significant decrease in urine output            D/w primary team      Thank you for allowing us to participate in care of René Fat         Electronically signed by: Norma Ambrocio MD, 2/18/2023, 3:55 PM      [facility] [address]  Office : [phone]  Fax :[phone]

## 2023-02-19 LAB
A/G RATIO: 1.2 (ref 1.1–2.2)
ALBUMIN SERPL-MCNC: 3.5 G/DL (ref 3.4–5)
ALP BLD-CCNC: 70 U/L (ref 40–129)
ALT SERPL-CCNC: 26 U/L (ref 10–40)
ANION GAP SERPL CALCULATED.3IONS-SCNC: 10 MMOL/L (ref 3–16)
AST SERPL-CCNC: 33 U/L (ref 15–37)
BASOPHILS ABSOLUTE: 0 K/UL (ref 0–0.2)
BASOPHILS RELATIVE PERCENT: 0.5 %
BILIRUB SERPL-MCNC: 0.5 MG/DL (ref 0–1)
BUN BLDV-MCNC: 25 MG/DL (ref 7–20)
CALCIUM SERPL-MCNC: 8.9 MG/DL (ref 8.3–10.6)
CHLORIDE BLD-SCNC: 107 MMOL/L (ref 99–110)
CO2: 26 MMOL/L (ref 21–32)
CREAT SERPL-MCNC: 1.4 MG/DL (ref 0.8–1.3)
EOSINOPHILS ABSOLUTE: 0.4 K/UL (ref 0–0.6)
EOSINOPHILS RELATIVE PERCENT: 5.6 %
GFR SERPL CREATININE-BSD FRML MDRD: 56 ML/MIN/{1.73_M2}
GLUCOSE BLD-MCNC: 110 MG/DL (ref 70–99)
GLUCOSE BLD-MCNC: 110 MG/DL (ref 70–99)
GLUCOSE BLD-MCNC: 113 MG/DL (ref 70–99)
GLUCOSE BLD-MCNC: 128 MG/DL (ref 70–99)
GLUCOSE BLD-MCNC: 98 MG/DL (ref 70–99)
HCT VFR BLD CALC: 35 % (ref 40.5–52.5)
HEMOGLOBIN: 11.6 G/DL (ref 13.5–17.5)
INR BLD: 2.36 (ref 0.87–1.14)
LYMPHOCYTES ABSOLUTE: 1.8 K/UL (ref 1–5.1)
LYMPHOCYTES RELATIVE PERCENT: 26.1 %
MCH RBC QN AUTO: 31.6 PG (ref 26–34)
MCHC RBC AUTO-ENTMCNC: 33.2 G/DL (ref 31–36)
MCV RBC AUTO: 95.2 FL (ref 80–100)
MONOCYTES ABSOLUTE: 0.6 K/UL (ref 0–1.3)
MONOCYTES RELATIVE PERCENT: 9.3 %
NEUTROPHILS ABSOLUTE: 4 K/UL (ref 1.7–7.7)
NEUTROPHILS RELATIVE PERCENT: 58.5 %
PDW BLD-RTO: 13.4 % (ref 12.4–15.4)
PERFORMED ON: ABNORMAL
PERFORMED ON: NORMAL
PLATELET # BLD: 168 K/UL (ref 135–450)
PMV BLD AUTO: 8.5 FL (ref 5–10.5)
POTASSIUM REFLEX MAGNESIUM: 4.5 MMOL/L (ref 3.5–5.1)
PROTHROMBIN TIME: 25.9 SEC (ref 11.7–14.5)
RBC # BLD: 3.67 M/UL (ref 4.2–5.9)
SODIUM BLD-SCNC: 143 MMOL/L (ref 136–145)
TOTAL PROTEIN: 6.4 G/DL (ref 6.4–8.2)
TROPONIN: 0.09 NG/ML
TROPONIN: 0.11 NG/ML
TROPONIN: 0.11 NG/ML
TROPONIN: 0.13 NG/ML
WBC # BLD: 6.8 K/UL (ref 4–11)

## 2023-02-19 PROCEDURE — 84484 ASSAY OF TROPONIN QUANT: CPT

## 2023-02-19 PROCEDURE — 85025 COMPLETE CBC W/AUTO DIFF WBC: CPT

## 2023-02-19 PROCEDURE — 80053 COMPREHEN METABOLIC PANEL: CPT

## 2023-02-19 PROCEDURE — 2580000003 HC RX 258: Performed by: INTERNAL MEDICINE

## 2023-02-19 PROCEDURE — 85610 PROTHROMBIN TIME: CPT

## 2023-02-19 PROCEDURE — 1200000000 HC SEMI PRIVATE

## 2023-02-19 PROCEDURE — 6370000000 HC RX 637 (ALT 250 FOR IP): Performed by: INTERNAL MEDICINE

## 2023-02-19 PROCEDURE — 36415 COLL VENOUS BLD VENIPUNCTURE: CPT

## 2023-02-19 RX ADMIN — AMIODARONE HYDROCHLORIDE 200 MG: 200 TABLET ORAL at 07:52

## 2023-02-19 RX ADMIN — ASPIRIN 81 MG: 81 TABLET, COATED ORAL at 07:52

## 2023-02-19 RX ADMIN — Medication 10 ML: at 20:54

## 2023-02-19 RX ADMIN — AMIODARONE HYDROCHLORIDE 200 MG: 200 TABLET ORAL at 20:54

## 2023-02-19 RX ADMIN — Medication 10 ML: at 07:51

## 2023-02-19 RX ADMIN — ATORVASTATIN CALCIUM 80 MG: 80 TABLET, FILM COATED ORAL at 20:54

## 2023-02-19 RX ADMIN — METOPROLOL SUCCINATE 75 MG: 50 TABLET, EXTENDED RELEASE ORAL at 07:52

## 2023-02-19 NOTE — PROGRESS NOTES
[facility]  DAILY PROGRESS NOTE    Admit Date: 2/17/2023       Chief Complaint: near syncope, chest pain     Interval History:   Patient seen and examined and notes reviewed. Patient is being followed for AF, CMP. Today he denies CP, palpitations, dizziness or SOB.      In: 26 [P.O.:460]  Out: 975    Wt Readings from Last 2 Encounters:   02/19/23 225 lb 1.6 oz (102.1 kg)   02/18/23 226 lb (102.5 kg)         Data:   Scheduled Meds:   Scheduled Meds:   sodium chloride flush  10 mL IntraVENous 2 times per day    aspirin  81 mg Oral Daily    Or    aspirin  300 mg Rectal Daily    atorvastatin  80 mg Oral Nightly    insulin lispro  0-4 Units SubCUTAneous TID WC    insulin lispro  0-4 Units SubCUTAneous Nightly    metoprolol succinate  75 mg Oral Daily    amiodarone  200 mg Oral BID     Continuous Infusions:   sodium chloride      dextrose       PRN Meds:.sodium chloride flush, sodium chloride, potassium chloride, magnesium sulfate, promethazine **OR** ondansetron, [DISCONTINUED] acetaminophen **OR** acetaminophen, polyethylene glycol, dextrose bolus **OR** dextrose bolus, glucagon (rDNA), dextrose  Continuous Infusions:   sodium chloride      dextrose         Intake/Output Summary (Last 24 hours) at 2/19/2023 0813  Last data filed at 2/19/2023 0755  Gross per 24 hour   Intake 460 ml   Output 975 ml   Net -515 ml     Lab Data:  CBC:   Lab Results   Component Value Date/Time    WBC 6.8 02/19/2023 02:05 AM    HGB 11.6 02/19/2023 02:05 AM     02/19/2023 02:05 AM     BMP:  Lab Results   Component Value Date/Time     02/19/2023 02:05 AM    K 4.5 02/19/2023 02:05 AM     02/19/2023 02:05 AM    CO2 26 02/19/2023 02:05 AM    BUN 25 02/19/2023 02:05 AM    CREATININE 1.4 02/19/2023 02:05 AM    GLUCOSE 113 02/19/2023 02:05 AM     INR:   Lab Results   Component Value Date/Time    INR 2.36 02/19/2023 02:05 AM    INR 2.29 02/18/2023 01:03 AM    INR 2.10 02/16/2023 12:00 AM        CARDIAC LABS  ENZYMES:  Recent Labs     02/18/23  1428 02/18/23  2114 02/19/23  0205   TROPONINI 0.18* 0.14* 0.13*     FASTING LIPID PANEL:  Lab Results   Component Value Date/Time    HDL 52 02/18/2023 06:31 AM    LDLCALC 38 02/18/2023 06:31 AM    TRIG 60 02/18/2023 06:31 AM     LIVER PROFILE:  Lab Results   Component Value Date/Time    AST 33 02/19/2023 02:05 AM    AST 26 02/18/2023 06:31 AM    ALT 26 02/19/2023 02:05 AM    ALT 29 02/18/2023 06:31 AM     BNP:   Lab Results   Component Value Date/Time    PROBNP 2,415 02/18/2023 01:03 AM    PROBNP 20,155 12/16/2022 04:02 PM     Iron Studies:    Lab Results   Component Value Date/Time    FERRITIN 364.5 12/21/2022 04:41 AM     Iron Deficiency Anemia:  No IV Iron Therapy:  No  2017 ACC/AHA HF Guidelines:   intravenous iron replacement in patients with New York Heart Association (NYHA) class II and III HF and iron deficiency(ferritin <100 ng/ml or 100-300 ng/ml if transferrin saturation <20%), to improve functional status and QoL. 1. WEIGHT: Admit Weight: 226 lb (102.5 kg)      Today  Weight: 225 lb 1.6 oz (102.1 kg)   2. I/O   Intake/Output Summary (Last 24 hours) at 2/19/2023 0813  Last data filed at 2/19/2023 0755  Gross per 24 hour   Intake 460 ml   Output 975 ml   Net -515 ml     Cardiac Testing:   Left ventricle is dilated. Overall, left ventricular systolic function is moderately depressed. Ejection fraction is visually estimated to be 30-35%. (Walters's 35%)   There is global hypokinesis, more severe in the inferior/inferoseptal walls. Diastolic dysfunction-indeterminate grade. Moderate, posteriorly directed mitral regurgitation. Moderate tricuspid regurgitation. PASP estimated at 40 mmHg. Biatrial enlargement. Cath:   Conclusions        Summary    The overall quality of the study is good. There is subdiaphragmatic    attenuation. Left ventricular cavity size is severely dilated. RIght ventricle is normal    in size.         Spect imaging demonstrates a small area of mildly decreased perfusion in the    apex. The defect is present at rest and stress, consistent with infarct. There is an additional medium size defect of moderate intensity in the mid    anterior and mid anteroseptum. The defect is present at rest and stress,    consistent with infarct. Sum stress score of 8. No visual TID. Calculated TID of 1.01. Left ventricular ejection fraction is severely reduced at 25%. Sensitivity of testing is reduced on amlodipine. **Myocardial perfusion is abnormal. Fixed defect. Moderate-to-high risk    scan. Abnormal baseline EKG.**         Telemetry: AF    Review of Systems - History obtained from the patient  General ROS: negative  Respiratory ROS: no cough, shortness of breath, or wheezing  Cardiovascular ROS: no chest pain or dyspnea on exertion  Gastrointestinal ROS: no abdominal pain, change in bowel habits, or black or bloody stools  Musculoskeletal ROS: negative  Neurological ROS: no TIA or stroke symptoms     Objective:   Vitals: /78   Pulse 79   Temp 98.1 °F (36.7 °C) (Oral)   Resp 17   Ht 6' 1\" (1.854 m)   Wt 225 lb 1.6 oz (102.1 kg)   SpO2 95%   BMI 29.70 kg/m²     Physical Exam:  General Appearance:  Non-obese/Well Nourished  Respiratory:  Resp Auscultation: Normal breath sounds without dullness  Cardiovascular:   Auscultation: Regular rate and rhythm, normal S1S2, no m/g/r/c  Palpation: Normal    Pedal Pulses: 2+ and equal   Abdomen:  Soft, NT, ND, + bs  Extremities:  No Cyanosis or Clubbing  Extremities: Trace edema  Neurological/Psychiatric:  Oriented to time, place, and person  Non-anxious    Patient Active Problem List:     CVA (cerebral vascular accident) ([address]     Pulmonary embolism ([address]     DVT (deep venous thrombosis) ([address]     Peripheral vascular disease ([address]     Mixed hyperlipidemia     Long term current use of anticoagulant therapy     Right homonymous hemianopsia     Hemiparesis affecting right side as late effect of cerebrovascular accident [facility])     Essential hypertension     Controlled type 2 diabetes mellitus with diabetic polyneuropathy, without long-term current use of insulin (HCC)     History of stroke     Bilateral carotid artery stenosis     Atrial fibrillation with RVR (HCC)     Acute renal failure (HCC)     Acute congestive heart failure (HCC)     Paroxysmal atrial fibrillation (HCC)     Elevated troponin     History of DVT (deep vein thrombosis)     Cardiomyopathy (HCC)     Acute systolic heart failure (HCC)     Hypernatremia     Syncope and collapse     A-fib (HCC)     Precordial pain     Stage 3 chronic kidney disease (HCC)     TIA involving basilar artery     Dizziness        Assessment/Plan:     Near syncope- sx improved, unclear etiology, continue to monitor  Chest pain, elevated troponin, abnl ST- plans for [facility] tomorrow, NPO after MN, continue BB, ASA, statin  CMP- continued, BB increased, add ACE after C  AF- rate controlled on BB and Amio, INR 2.36 today,  AC on hold, switch to heparin vs lovenox when INR<2   Hx DVT- AC as above          KRISTYN BROWN - CNP, ACNP, AGPCNP  Heart Failure  [facility]       Core Measures:   Discharge instructions:   LVEF documented:   ACEI for LV dysfunction:   Smoking Cessation:

## 2023-02-19 NOTE — PROGRESS NOTES
Office : [phone]     Fax :[phone]       Nephrology progress  Note      Patient's Name: Irving Henriquez  9:00 AM  2/19/2023    Reason for Consult:  CKD 3 b, planned for left heart cath      Requesting Physician:  KRISTYN Guevara CNP      Chief Complaint:    Chief Complaint   Patient presents with    Dizziness     Pt arrived in the ED via [facility]  Pt was at home when he started feeling dizzy. Pt stated he had some chest pains and palpitations beforehand   Pt has extensive cardiac hx            History of Present iIlness:    Irving Henriquez is a 74 y.o. male with prior history of CHF, HTN, Dm 2, CVA who was admitted for chest pain. Has baseline creat around 1. 6. he is planned for left heart cath Monday. Nephrology consulted for risk stratification to contrast exposure. Interval hx     Renal function better   No SOB   No edema   I/O last 3 completed shifts: In: 240 [P.O.:240]  Out: 56 [Urine:675]    Past Medical History:   Diagnosis Date    Cerebrovascular disease     CHF (congestive heart failure) ([facility] [address] 04/2017    per  nurse report but wife is unaware    DM (diabetes mellitus screen) 04/2017    boarder line    Hyperlipidemia     Hypertension     Type 2 diabetes mellitus without complication [facility])        Past Surgical History:   Procedure Laterality Date    VASECTOMY         Family History   Problem Relation Age of Onset    High Blood Pressure Mother     Cancer Mother         lung, smoker    Cancer Father         mesothelioma    Depression Sister         reports that he quit smoking about 5 years ago. His smoking use included cigarettes. He has a 80.00 pack-year smoking history. He quit smokeless tobacco use about 5 years ago.  He reports that he does not drink alcohol and does not use drugs. Allergies:  Patient has no known allergies.     Current Medications:    sodium chloride flush 0.9 % injection 10 mL, 2 times per day  sodium chloride flush 0.9 % injection 10 mL, PRN  0.9 % sodium chloride infusion, PRN  potassium chloride 10 mEq/100 mL IVPB (Peripheral Line), PRN  magnesium sulfate 2000 mg in 50 mL IVPB premix, PRN  promethazine (PHENERGAN) tablet 12.5 mg, Q6H PRN   Or  ondansetron (ZOFRAN) injection 4 mg, Q6H PRN  acetaminophen (TYLENOL) suppository 650 mg, Q6H PRN  polyethylene glycol (GLYCOLAX) packet 17 g, Daily PRN  aspirin EC tablet 81 mg, Daily   Or  aspirin suppository 300 mg, Daily  atorvastatin (LIPITOR) tablet 80 mg, Nightly  dextrose bolus 10% 125 mL, PRN   Or  dextrose bolus 10% 250 mL, PRN  glucagon (rDNA) injection 1 mg, PRN  dextrose 10 % infusion, Continuous PRN  insulin lispro (HUMALOG) injection vial 0-4 Units, TID WC  insulin lispro (HUMALOG) injection vial 0-4 Units, Nightly  metoprolol succinate (TOPROL XL) extended release tablet 75 mg, Daily  amiodarone (CORDARONE) tablet 200 mg, BID      Review of Systems:   14 point ROS obtained but were negative except mentioned in HPI      Physical exam:     Vitals:  /78   Pulse 79   Temp 98.1 °F (36.7 °C) (Oral)   Resp 17   Ht 6' 1\" (1.854 m)   Wt 225 lb 1.6 oz (102.1 kg)   SpO2 95%   BMI 29.70 kg/m²   Constitutional:  OAA X3 NAD  Skin: no rash, turgor wnl  Heent:  eomi, mmm  Neck: no bruits or jvd noted  Cardiovascular:  S1, S2 without m/r/g  Respiratory: CTA B without w/r/r  Abdomen:  +bs, soft, nt, nd  Ext: no  lower extremity edema  Psychiatric: mood and affect appropriate  Musculoskeletal:  Rom, muscular strength intact    Labs:  CBC:   Recent Labs     02/18/23  0103 02/18/23  0632 02/19/23  0205   WBC 6.8 7.1 6.8   HGB 10.8* 11.1* 11.6*    172 168     BMP:    Recent Labs     02/18/23  0103 02/18/23  0631 02/19/23  0205    142 143   K 3.5 4.6 4.5    106 107 CO2 25 30 26   BUN 24* 25* 25*   CREATININE 1.2 1.6* 1.4*   GLUCOSE 99 92 113*     Ca/Mg/Phos:   Recent Labs     02/18/23  0103 02/18/23  0631 02/19/23  0205   CALCIUM 8.7 9.4 8.9     Hepatic:   Recent Labs     02/18/23  0103 02/18/23  0631 02/19/23  0205   AST 25 26 33   ALT 27 29 26   BILITOT 0.5 0.6 0.5   ALKPHOS 68 70 70     Troponin:   Recent Labs     02/18/23  1428 02/18/23  2114 02/19/23  0205   TROPONINI 0.18* 0.14* 0.13*     BNP: No results for input(s): BNP in the last 72 hours. Lipids:   Recent Labs     02/18/23  0631   CHOL 102   TRIG 60   HDL 52   LDLCALC 38   LABVLDL 12     ABGs: No results for input(s): PHART, PO2ART, ZRS4AYW in the last 72 hours. INR:   Recent Labs     02/18/23 0103 02/19/23  0205   INR 2.29* 2.36*     UA:  Recent Labs     02/18/23  0913   COLORU Yellow   CLARITYU Clear   GLUCOSEU Negative   BILIRUBINUR Negative   KETUA Negative   SPECGRAV <=1.005   BLOODU Negative   PHUR 5.5   PROTEINU Negative   UROBILINOGEN 0.2   NITRU Negative   LEUKOCYTESUR TRACE*   LABMICR YES   URINETYPE Voided      Urine Microscopic:   Recent Labs     02/18/23  0913   BACTERIA None Seen   HYALCAST 1   WBCUA 1   RBCUA 0   EPIU 0     Urine Culture: No results for input(s): LABURIN in the last 72 hours. Urine Chemistry: No results for input(s): Quirino Gails, PROTEINUR, NAUR in the last 72 hours. IMAGING:  Vascular carotid duplex bilateral         CT HEAD WO CONTRAST   Final Result   No acute intracranial abnormality identified. Sequela of large territory   infarct involving the left temporal and occipital lobes. XR CHEST PORTABLE   Final Result   No acute airspace disease identified. MRI brain without contrast    (Results Pending)         Assessment/Plan :      1.  CKD 3 B  H/o DM 2  Risk for TOMMY is low to moderate risk. Baseline creat is 1.6     Hold lasix   2. HTN  Controlled. 3. Anemia. Hb 11.1       4. Acid- base disorder. HCo3 25     5.  Electrolytes  Monitor Recommend to dose adjust all medications  based on renal functions  Maintain SBP> 90 mmHg   Daily weights   AVOID NSAIDs  Avoid Nephrotoxins  Monitor Intake/Output  Call if significant decrease in urine output            D/w primary team      Thank you for allowing us to participate in care of Gilbert Chinchilla         Electronically signed by: Natan Carrillo MD, 2/19/2023, 9:00 AM      [facility] [address] [place] [address] [place]  Office : [phone]  Fax :[phone]

## 2023-02-19 NOTE — PLAN OF CARE
Problem: Discharge Planning  Goal: Discharge to home or other facility with appropriate resources  2/18/2023 2209 by Yashira Roberts RN  Outcome: Progressing  2/18/2023 1210 by Josefina Webster RN  Outcome: Progressing     Problem: Chronic Conditions and Co-morbidities  Goal: Patient's chronic conditions and co-morbidity symptoms are monitored and maintained or improved  2/18/2023 2209 by Yashira Roberts RN  Outcome: Progressing  2/18/2023 1210 by Josfeina Webster RN  Outcome: Progressing     Problem: Safety - Adult  Goal: Free from fall injury  2/18/2023 2209 by Yashira Roberts RN  Outcome: Progressing  2/18/2023 1210 by Josefina Webster RN  Outcome: Progressing     Problem: Neurosensory - Adult  Goal: Achieves stable or improved neurological status  2/18/2023 2209 by Yashira Roberts RN  Outcome: Progressing  2/18/2023 1210 by Josefina Webster RN  Outcome: Progressing     Problem: Cardiovascular - Adult  Goal: Maintains optimal cardiac output and hemodynamic stability  2/18/2023 2209 by Yashira Roberts RN  Outcome: Progressing  2/18/2023 1210 by Josefina Webster RN  Outcome: Progressing     Problem: Metabolic/Fluid and Electrolytes - Adult  Goal: Electrolytes maintained within normal limits  2/18/2023 2209 by Yashira Roberts RN  Outcome: Progressing  2/18/2023 1210 by Josefina Webster RN  Outcome: Progressing

## 2023-02-19 NOTE — PROGRESS NOTES
HOSPITALISTS PROGRESS NOTE    2/19/2023 9:01 AM        Name: Veronika Morgan . Admitted: 2/17/2023  Primary Care Provider: KRISTYN Humphrey CNP (Tel: [phone])      Brief Course: This 74 y.o. male  with PMHx of CVA, hypertension, hyperlipidemia, HFrEF, A-fib; status post cardioversion on 12/2022, DVT/PE; on Coumadin, s/p IVC filter and diabetes mellitus presented with dizziness and near syncope. Per patient report he became dizzy and lightheaded while walking and lowered himself to the ground. Patient reported palpitations and chest pain during that episode but denied any shortness of breath, loss of consciousness, head trauma tinnitus, visual or speech deficits, shaking movements, bowel or bladder dysfunction. On admission, patient was noted to be in A-fib with RVR and was given diltiazem bolus. Initial diagnostic lab work showed troponin: 0.05, BNP: 2415, INR: 2.29. CT head negative for any acute intracranial pathology. Interval history:   Pt seen and examined today   Overnight events noted and interval ancillary notes reviewed. troponin trended down to 0.13. Plan for cardiac cath tomorrow   creatinine down to 1.4  INR 2.36 this morning hold anticoagulation. Plan to switch to heparin/Lovenox when INR less than 2  denied any fevers, chills, chest pain, palpitations, cough, SOB, nausea vomiting abdominal pain      Assessment & Plan:     A-fib with RVR present on admission  Status post cardioversion on 12/2022  Cardiology consulted; metoprolol dose was increased and started on amiodarone  Monitor on telemetry     Dizziness and near syncope likely secondary to hypotension in setting of A-fib with RVR  CT head negative for any acute intracranial abnormality; showed sequela of large territory infarct involving left parietal and occipital lobes.   Carotid Dopplers negative for any significant stenosis  Carotid Dopplers and MRI brain ordered  Neurology consulted. Obtain orthostatic vitals. HFrEF/cardiomyopathy  Last echo on 12/2022 showed EF of 30 to 35%, severe global hypokinesis  Cardiology board; continue medical management. Holding ACE/ARB  Strict and output, daily weights     Elevated troponin; Troponin initially trended up up to 0.26; trending down  Had abnormal stress test; denies any chest pain today. EKG negative any acute ischemic changes  Cardiology on board;Plan for cardiac cath on Monday  Monitor on telemetry and trend troponin     Diabetes mellitus. Hold oral antidiabetic medications  Hemoglobin A1c ordered. Continue SSI monitor blood glucose closely     CKD stage III; Creatinine around baseline on admission. Nephrology consulted; appreciate their recs  Avoid nephrotoxin, strict input, daily weights and monitor renal function closely     Hyperlipidemia: Continue statins     Hypertension; BP soft on admission. Monitor BP closely. Will adjust medication dose if needed     Hx of prior CVA; continue aspirin and statins     Hx of PE/DVT; on Coumadin. INR 2.29 on admission; hold Coumadin. Plan to start on heparin drip or therapeutic Lovenox once INR less than 2     DVT PPX: On Coumadin. Currently on hold.   INR elevated to 2.36  Code:Full Code    Disposition: Once acute medical issues have resolved    Current Medications  sodium chloride flush 0.9 % injection 10 mL, 2 times per day  sodium chloride flush 0.9 % injection 10 mL, PRN  0.9 % sodium chloride infusion, PRN  potassium chloride 10 mEq/100 mL IVPB (Peripheral Line), PRN  magnesium sulfate 2000 mg in 50 mL IVPB premix, PRN  promethazine (PHENERGAN) tablet 12.5 mg, Q6H PRN   Or  ondansetron (ZOFRAN) injection 4 mg, Q6H PRN  acetaminophen (TYLENOL) suppository 650 mg, Q6H PRN  polyethylene glycol (GLYCOLAX) packet 17 g, Daily PRN  aspirin EC tablet 81 mg, Daily   Or  aspirin suppository 300 mg, Daily  atorvastatin (LIPITOR) tablet 80 mg, Nightly  dextrose bolus 10% 125 mL, PRN   Or  dextrose bolus 10% 250 mL, PRN  glucagon (rDNA) injection 1 mg, PRN  dextrose 10 % infusion, Continuous PRN  insulin lispro (HUMALOG) injection vial 0-4 Units, TID WC  insulin lispro (HUMALOG) injection vial 0-4 Units, Nightly  metoprolol succinate (TOPROL XL) extended release tablet 75 mg, Daily  amiodarone (CORDARONE) tablet 200 mg, BID        Objective:  /78   Pulse 79   Temp 98.1 °F (36.7 °C) (Oral)   Resp 17   Ht 6' 1\" (1.854 m)   Wt 225 lb 1.6 oz (102.1 kg)   SpO2 95%   BMI 29.70 kg/m²     Intake/Output Summary (Last 24 hours) at 2/19/2023 0901  Last data filed at 2/19/2023 0755  Gross per 24 hour   Intake 460 ml   Output 975 ml   Net -515 ml      Wt Readings from Last 3 Encounters:   02/19/23 225 lb 1.6 oz (102.1 kg)   02/18/23 226 lb (102.5 kg)   02/09/23 232 lb 6.4 oz (105.4 kg)       Physical Examination:   General appearance:  No apparent distress, appears stated age and cooperative. HEENT: Normocephalic, sclera clear., PERRLA. Trachea midline, no adenopathy. Cardiovascular: Regular rate and rhythm, normal S1, S2. No murmur. Respiratory:Clear to auscultation bilaterally, no wheeze or crackles. GI: Abdomen soft, no tenderness, not distended, normal bowel sounds  Musculoskeletal: No cyanosis in digits. No BLE edema present  Neurology: CN 2-12 grossly intact.  No speech or motor deficits  Psych: Not agitated, appropriate affect  Skin: Warm, dry, normal turgor    Labs and Tests:  CBC:   Recent Labs     02/18/23  0103 02/18/23  0632 02/19/23  0205   WBC 6.8 7.1 6.8   HGB 10.8* 11.1* 11.6*    172 168     BMP:    Recent Labs     02/18/23  0103 02/18/23  0631 02/19/23  0205    142 143   K 3.5 4.6 4.5    106 107   CO2 25 30 26   BUN 24* 25* 25*   CREATININE 1.2 1.6* 1.4*   GLUCOSE 99 92 113*     Hepatic:   Recent Labs     02/18/23  0103 02/18/23  0631 02/19/23  0205   AST 25 26 33   ALT 27 29 26   BILITOT 0.5 0.6 0.5   ALKPHOS 68 70 70     Vascular carotid duplex bilateral         CT HEAD WO CONTRAST   Final Result   No acute intracranial abnormality identified. Sequela of large territory   infarct involving the left temporal and occipital lobes. XR CHEST PORTABLE   Final Result   No acute airspace disease identified. MRI brain without contrast    (Results Pending)       Problem List  Principal Problem:    Syncope and collapse  Active Problems:    Elevated troponin    History of DVT (deep vein thrombosis)    A-fib (HCC)    Precordial pain    Stage 3 chronic kidney disease (HCC)    TIA involving basilar artery    Dizziness    Essential hypertension  Resolved Problems:    * No resolved hospital problems.  Monica Garcia MD   2/19/2023 9:01 AM

## 2023-02-20 ENCOUNTER — APPOINTMENT (OUTPATIENT)
Dept: MRI IMAGING | Age: 68
DRG: 287 | End: 2023-02-20
Payer: MEDICARE

## 2023-02-20 ENCOUNTER — TELEPHONE (OUTPATIENT)
Dept: FAMILY MEDICINE CLINIC | Age: 68
End: 2023-02-20

## 2023-02-20 LAB
ANION GAP SERPL CALCULATED.3IONS-SCNC: 8 MMOL/L (ref 3–16)
BUN BLDV-MCNC: 21 MG/DL (ref 7–20)
CALCIUM SERPL-MCNC: 9.4 MG/DL (ref 8.3–10.6)
CHLORIDE BLD-SCNC: 109 MMOL/L (ref 99–110)
CO2: 29 MMOL/L (ref 21–32)
CREAT SERPL-MCNC: 1.6 MG/DL (ref 0.8–1.3)
GFR SERPL CREATININE-BSD FRML MDRD: 47 ML/MIN/{1.73_M2}
GLUCOSE BLD-MCNC: 102 MG/DL (ref 70–99)
GLUCOSE BLD-MCNC: 103 MG/DL (ref 70–99)
GLUCOSE BLD-MCNC: 111 MG/DL (ref 70–99)
GLUCOSE BLD-MCNC: 118 MG/DL (ref 70–99)
GLUCOSE BLD-MCNC: 131 MG/DL (ref 70–99)
GLUCOSE BLD-MCNC: 93 MG/DL (ref 70–99)
INR BLD: 1.91 (ref 0.87–1.14)
INR BLD: 1.99 (ref 0.87–1.14)
PERFORMED ON: ABNORMAL
PERFORMED ON: NORMAL
POTASSIUM SERPL-SCNC: 4.3 MMOL/L (ref 3.5–5.1)
PROTHROMBIN TIME: 21.9 SEC (ref 11.7–14.5)
PROTHROMBIN TIME: 22.7 SEC (ref 11.7–14.5)
SODIUM BLD-SCNC: 146 MMOL/L (ref 136–145)
TROPONIN: 0.06 NG/ML
TROPONIN: 0.07 NG/ML
TROPONIN: 0.07 NG/ML
TROPONIN: 0.09 NG/ML

## 2023-02-20 PROCEDURE — 99233 SBSQ HOSP IP/OBS HIGH 50: CPT | Performed by: INTERNAL MEDICINE

## 2023-02-20 PROCEDURE — 84484 ASSAY OF TROPONIN QUANT: CPT

## 2023-02-20 PROCEDURE — 6370000000 HC RX 637 (ALT 250 FOR IP): Performed by: INTERNAL MEDICINE

## 2023-02-20 PROCEDURE — 2580000003 HC RX 258: Performed by: INTERNAL MEDICINE

## 2023-02-20 PROCEDURE — 80048 BASIC METABOLIC PNL TOTAL CA: CPT

## 2023-02-20 PROCEDURE — 36415 COLL VENOUS BLD VENIPUNCTURE: CPT

## 2023-02-20 PROCEDURE — 85610 PROTHROMBIN TIME: CPT

## 2023-02-20 PROCEDURE — 6360000002 HC RX W HCPCS: Performed by: INTERNAL MEDICINE

## 2023-02-20 PROCEDURE — 1200000000 HC SEMI PRIVATE

## 2023-02-20 PROCEDURE — 70551 MRI BRAIN STEM W/O DYE: CPT

## 2023-02-20 RX ORDER — ENOXAPARIN SODIUM 100 MG/ML
1 INJECTION SUBCUTANEOUS 2 TIMES DAILY
Status: DISCONTINUED | OUTPATIENT
Start: 2023-02-20 | End: 2023-02-22

## 2023-02-20 RX ORDER — SODIUM CHLORIDE 9 MG/ML
INJECTION, SOLUTION INTRAVENOUS CONTINUOUS
Status: ACTIVE | OUTPATIENT
Start: 2023-02-20 | End: 2023-02-21

## 2023-02-20 RX ORDER — METOPROLOL SUCCINATE 50 MG/1
50 TABLET, EXTENDED RELEASE ORAL 2 TIMES DAILY
Status: DISCONTINUED | OUTPATIENT
Start: 2023-02-20 | End: 2023-02-22 | Stop reason: HOSPADM

## 2023-02-20 RX ADMIN — SODIUM CHLORIDE: 9 INJECTION, SOLUTION INTRAVENOUS at 13:36

## 2023-02-20 RX ADMIN — ATORVASTATIN CALCIUM 80 MG: 80 TABLET, FILM COATED ORAL at 20:40

## 2023-02-20 RX ADMIN — METOPROLOL SUCCINATE 50 MG: 50 TABLET, EXTENDED RELEASE ORAL at 22:38

## 2023-02-20 RX ADMIN — Medication 10 ML: at 09:56

## 2023-02-20 RX ADMIN — METOPROLOL SUCCINATE 75 MG: 50 TABLET, EXTENDED RELEASE ORAL at 09:56

## 2023-02-20 RX ADMIN — ENOXAPARIN SODIUM 100 MG: 100 INJECTION SUBCUTANEOUS at 13:36

## 2023-02-20 RX ADMIN — AMIODARONE HYDROCHLORIDE 200 MG: 200 TABLET ORAL at 20:40

## 2023-02-20 RX ADMIN — AMIODARONE HYDROCHLORIDE 200 MG: 200 TABLET ORAL at 09:56

## 2023-02-20 RX ADMIN — ASPIRIN 81 MG: 81 TABLET, COATED ORAL at 09:56

## 2023-02-20 NOTE — PROGRESS NOTES
HOSPITALISTS PROGRESS NOTE    2/20/2023 9:10 AM        Name: Nikko Raymond . Admitted: 2/17/2023  Primary Care Provider: KRISTYN Olivares CNP (Tel: [phone])      Brief Course: This 74 y.o. male  with PMHx of CVA, hypertension, hyperlipidemia, HFrEF, A-fib; status post cardioversion on 12/2022, DVT/PE; on Coumadin, s/p IVC filter and diabetes mellitus presented with dizziness and near syncope. On admission, patient was noted to be in A-fib with RVR and was given diltiazem bolus. Initial diagnostic lab work showed troponin: 0.05, BNP: 2415, INR: 2.29. CT head negative for any acute intracranial pathology. Interval history:   Pt seen and examined today. Overnight events noted, interval ancillary notes and labs reviewed. Troponin down to 0.07. INR 1.99 this morning. Plan for cardiac cath tomorrow pending INR and creatinine  denied any fever, chills, SOB, chest pain, nausea, vomiting or abdominal pain      Assessment & Plan:     A-fib with RVR present on admission  Status post cardioversion on 12/2022  Cardiology consulted; metoprolol dose was increased and started on amiodarone  Monitor on telemetry     Dizziness and near syncope likely secondary to hypotension in setting of A-fib with RVR  CT head negative for any acute intracranial abnormality; showed sequela of large territory infarct involving left parietal and occipital lobes. Carotid Dopplers negative for any significant stenosis  MRI brain negative for any acute intracranial abnormality. Neurology input appreciated     HFrEF/cardiomyopathy  Last echo on 12/2022 showed EF of 30 to 35%, severe global hypokinesis  Cardiology board; continue medical management. Holding ACE/ARB  Strict and output, daily weights     Elevated troponin; Troponin initially trended up up to 0.26; trending down  Had abnormal stress test; denies any chest pain today.   EKG negative any acute ischemic changes  Cardiology on board;Plan for cardiac cath on Monday  Monitor on telemetry and trend troponin     Diabetes mellitus. Hold oral antidiabetic medications  Hemoglobin A1c ordered. Continue SSI monitor blood glucose closely     CKD stage III; Creatinine around baseline on admission. Nephrology consulted; appreciate their recs  Avoid nephrotoxin, strict input, daily weights and monitor renal function closely     Hyperlipidemia: Continue statins     Hypertension; BP soft on admission. Monitor BP closely. Will adjust medication dose if needed     Hx of prior CVA; continue aspirin and statins     Hx of PE/DVT; on Coumadin. INR 2.29 on admission; hold Coumadin. Plan to start on heparin drip or therapeutic Lovenox once INR less than 2     DVT PPX: On Coumadin. Currently on hold.   INR elevated to 2.36  Code:Full Code    Disposition: Once acute medical issues have resolved    Current Medications  sodium chloride flush 0.9 % injection 10 mL, 2 times per day  sodium chloride flush 0.9 % injection 10 mL, PRN  0.9 % sodium chloride infusion, PRN  potassium chloride 10 mEq/100 mL IVPB (Peripheral Line), PRN  magnesium sulfate 2000 mg in 50 mL IVPB premix, PRN  promethazine (PHENERGAN) tablet 12.5 mg, Q6H PRN   Or  ondansetron (ZOFRAN) injection 4 mg, Q6H PRN  acetaminophen (TYLENOL) suppository 650 mg, Q6H PRN  polyethylene glycol (GLYCOLAX) packet 17 g, Daily PRN  aspirin EC tablet 81 mg, Daily   Or  aspirin suppository 300 mg, Daily  atorvastatin (LIPITOR) tablet 80 mg, Nightly  dextrose bolus 10% 125 mL, PRN   Or  dextrose bolus 10% 250 mL, PRN  glucagon (rDNA) injection 1 mg, PRN  dextrose 10 % infusion, Continuous PRN  insulin lispro (HUMALOG) injection vial 0-4 Units, TID WC  insulin lispro (HUMALOG) injection vial 0-4 Units, Nightly  metoprolol succinate (TOPROL XL) extended release tablet 75 mg, Daily  amiodarone (CORDARONE) tablet 200 mg, BID      Objective:  /78   Pulse 90   Temp 98 °F (36.7 °C) (Oral)   Resp 18   Ht 6' 1\" (1.854 m)   Wt 222 lb 4.8 oz (100.8 kg)   SpO2 96%   BMI 29.33 kg/m²     Intake/Output Summary (Last 24 hours) at 2/20/2023 0910  Last data filed at 2/20/2023 9770  Gross per 24 hour   Intake 490 ml   Output 1550 ml   Net -1060 ml        Wt Readings from Last 3 Encounters:   02/20/23 222 lb 4.8 oz (100.8 kg)   02/18/23 226 lb (102.5 kg)   02/09/23 232 lb 6.4 oz (105.4 kg)       Physical Examination:   General appearance:  No apparent distress, appears stated age and cooperative. HEENT: Normocephalic, sclera clear., PERRLA. Trachea midline, no adenopathy. Cardiovascular: Regular rate and rhythm, normal S1, S2. No murmur. Respiratory:Clear to auscultation bilaterally, no wheeze or crackles. GI: Abdomen soft, no tenderness, not distended, normal bowel sounds  Musculoskeletal: No cyanosis in digits. No BLE edema present  Neurology: CN 2-12 grossly intact. No speech or motor deficits  Psych: Not agitated, appropriate affect  Skin: Warm, dry, normal turgor    Labs and Tests:  CBC:   Recent Labs     02/18/23  0103 02/18/23  0632 02/19/23  0205   WBC 6.8 7.1 6.8   HGB 10.8* 11.1* 11.6*    172 168       BMP:    Recent Labs     02/18/23  0631 02/19/23  0205 02/20/23  0716    143 146*   K 4.6 4.5 4.3    107 109   CO2 30 26 29   BUN 25* 25* 21*   CREATININE 1.6* 1.4* 1.6*   GLUCOSE 92 113* 118*       Hepatic:   Recent Labs     02/18/23  0103 02/18/23  0631 02/19/23  0205   AST 25 26 33   ALT 27 29 26   BILITOT 0.5 0.6 0.5   ALKPHOS 68 70 70       Vascular carotid duplex bilateral         CT HEAD WO CONTRAST   Final Result   No acute intracranial abnormality identified. Sequela of large territory   infarct involving the left temporal and occipital lobes. XR CHEST PORTABLE   Final Result   No acute airspace disease identified.          MRI brain without contrast    (Results Pending)       Problem List  Principal Problem:    Syncope and collapse  Active Problems:    Elevated troponin    History of DVT (deep vein thrombosis)    A-fib (HCC)    Precordial pain    Stage 3 chronic kidney disease (HCC)    TIA involving basilar artery    Dizziness    Essential hypertension  Resolved Problems:    * No resolved hospital problems.  Mendel Schimke, MD   2/20/2023 9:10 AM

## 2023-02-20 NOTE — PROGRESS NOTES
Cardiovascular Progress Note      Chief Complaint:   Chief Complaint   Patient presents with    Dizziness     Pt arrived in the ED via [place] [facility]  Pt was at home when he started feeling dizzy. Pt stated he had some chest pains and palpitations beforehand   Pt has extensive cardiac hx      Impression/Recommendations:    NSTEMI  Near syncope/Dizziness & Chest pain   Abnormal nuclear stress test 12/2022  Cardiomyopathy, likely ischemic (LVEF 30-35%, inferoseptal/inferolateral HK with posterior moderate MR)  Chronic Systolic CHF, compensated  PAF s/p DCCV 12/19/22, Dr. Mary Escobar, long term [facility]  Hypertension, controlled   Hyperlipidemia, controlled (2/2023:  TG 60 HDL 52 LDL 38)  DM, A1C 5.8  CKD stage III (baseline Scr ~ 1.6)  Hx. Remote CVA, expressive aphasia   Hx. DVT/PE wit IVC filter, on Coumadin      Abnormal nuclear stress test in December 2022- reduced LVEF with infarct, no ischemia. C on hold and planned as an outpatient pending Nephrology workup at the time. Recurrent chest pain. Near syncope with fall. Troponin peak 0.26 this admission. ASA, statin, beta blocker. AC on hold. No ACEI/ARB/MRA/ARNI with CKD. MRI brain this morning per Neurology, negative for acute CVA. C tomorrow pending INR and Cr. Risks, benefits, goals, and alternatives of left heart catheterization with the potential for percutaneous coronary intervention discussed with patient; including stroke, heart attack, kidney damage, death, paralysis, disability, damage to nerves/arteries/veins. All questions answered and informed consent obtained. Further recommendations pending coronary angiography and clinical course. Interval History:  MRI brain negative for acute CVA. INR 1.99 this AM. Recheck INR 1.91. Multiple episodes of HR as high as 170s overnight by telemetry. Reuben Counter reported mild dizziness with episodes. No chest pain/SOB today. No oxygen requirements. States aphasia is at baseline over the past 7 years. Shows understanding of recommendation for coronary angiogram and wishes to proceed- \"do what it takes to help me\". MRI Brain 2/20/23  Impression   1. No acute intracranial abnormality. Specifically, no evidence of acute   infarct. 2. Chronic extensive left temporo-occipital encephalomalacia/gliosis. 3. Involutional parenchymal changes with mild microvascular ischemic disease. CT Head 2/18/23  Impression   No acute intracranial abnormality identified. Sequela of large territory   infarct involving the left temporal and occipital lobes. CXR 2/18/23  Impression   No acute airspace disease identified. ECG 2/18/23  Atrial fibrillation with rapid ventricular response with premature ventricular or aberrantly conducted complexes  Nonspecific T wave abnormality    Echo 12/19/22  Left ventricle is dilated. Overall, left ventricular systolic function is moderately depressed. Ejection fraction is visually estimated to be 30-35%. (Walters's 35%)  There is global hypokinesis, more severe in the inferior/inferoseptal walls. Diastolic dysfunction-indeterminate grade. Moderate, posteriorly directed mitral regurgitation. Moderate tricuspid regurgitation. PASP estimated at 40 mmHg. Biatrial enlargement. SPECT 12/21/22:   Summary    The overall quality of the study is good. There is subdiaphragmatic    attenuation. Left ventricular cavity size is severely dilated. RIght ventricle is normal    in size. Spect imaging demonstrates a small area of mildly decreased perfusion in the    apex. The defect is present at rest and stress, consistent with infarct. There is an additional medium size defect of moderate intensity in the mid    anterior and mid anteroseptum. The defect is present at rest and stress,    consistent with infarct. Sum stress score of 8. No visual TID. Calculated TID of 1.01. Left ventricular ejection fraction is severely reduced at 25%.         Sensitivity of testing is reduced on amlodipine. **Myocardial perfusion is abnormal. Fixed defect. Moderate-to-high risk    scan. Abnormal baseline EKG. **       Medications:  sodium chloride flush 0.9 % injection 10 mL, 2 times per day  sodium chloride flush 0.9 % injection 10 mL, PRN  0.9 % sodium chloride infusion, PRN  potassium chloride 10 mEq/100 mL IVPB (Peripheral Line), PRN  magnesium sulfate 2000 mg in 50 mL IVPB premix, PRN  promethazine (PHENERGAN) tablet 12.5 mg, Q6H PRN   Or  ondansetron (ZOFRAN) injection 4 mg, Q6H PRN  acetaminophen (TYLENOL) suppository 650 mg, Q6H PRN  polyethylene glycol (GLYCOLAX) packet 17 g, Daily PRN  aspirin EC tablet 81 mg, Daily   Or  aspirin suppository 300 mg, Daily  atorvastatin (LIPITOR) tablet 80 mg, Nightly  dextrose bolus 10% 125 mL, PRN   Or  dextrose bolus 10% 250 mL, PRN  glucagon (rDNA) injection 1 mg, PRN  dextrose 10 % infusion, Continuous PRN  insulin lispro (HUMALOG) injection vial 0-4 Units, TID WC  insulin lispro (HUMALOG) injection vial 0-4 Units, Nightly  metoprolol succinate (TOPROL XL) extended release tablet 75 mg, Daily  amiodarone (CORDARONE) tablet 200 mg, BID      I/O:     Intake/Output Summary (Last 24 hours) at 2/20/2023 1218  Last data filed at 2/20/2023 5167  Gross per 24 hour   Intake 490 ml   Output 1550 ml   Net -1060 ml       Physical Exam:    /79   Pulse 71   Temp 97.7 °F (36.5 °C) (Oral)   Resp 18   Ht 6' 1\" (1.854 m)   Wt 222 lb 4.8 oz (100.8 kg)   SpO2 96%   BMI 29.33 kg/m²   Wt Readings from Last 3 Encounters:   02/20/23 222 lb 4.8 oz (100.8 kg)   02/18/23 226 lb (102.5 kg)   02/09/23 232 lb 6.4 oz (105.4 kg)       GENERAL: Well developed, well nourished, no acute distress  NEUROLOGICAL: Alert and oriented x3  PSYCH: Normal mood and affect   SKIN: Warm and dry, without lesions  HEENT: Normocephalic, atraumatic, Sclera non-icteric, mucous membranes moist  NECK: supple, JVP normal, thyroid not enlarged   CAROTID: Normal upstroke, no bruits  CARDIAC: Normal PMI, regular rate and rhythm, normal S1S2, no murmur, rub  RESPIRATORY: Normal respiratory effort, clear to auscultation bilaterally  EXTREMITIES: No cyanosis, clubbing or edema, palpable pulses bilaterally   MUSCULOSKELETAL: No joint swelling or tenderness, no chest wall tenderness  GASTROINTESTINAL:  soft, non-tender, no bruit    Data Review:    CBC:   Recent Labs     02/18/23  0103 02/18/23  0632 02/19/23  0205   WBC 6.8 7.1 6.8   HGB 10.8* 11.1* 11.6*   HCT 31.7* 32.4* 35.0*   MCV 95.4 94.5 95.2    172 168     BMP:   Recent Labs     02/18/23  0631 02/19/23  0205 02/20/23  0716    143 146*   K 4.6 4.5 4.3    107 109   CO2 30 26 29   BUN 25* 25* 21*   CREATININE 1.6* 1.4* 1.6*     LFTS:   Recent Labs     02/18/23  0103 02/18/23  0631 02/19/23  0205   ALT 27 29 26   AST 25 26 33   ALKPHOS 68 70 70   PROT 6.2* 6.4 6.4   AGRATIO 1.4 1.5 1.2   BILITOT 0.5 0.6 0.5     Cardiac Enzymes:   Recent Labs     02/19/23  1855 02/20/23  0146 02/20/23  0716   TROPONINI 0.09* 0.09* 0.07*     PT/INR:   Recent Labs     02/19/23  0205 02/20/23  0716 02/20/23  1127   PROTIME 25.9* 22.7* 21.9*   INR 2.36* 1.99* 1.91*     Basia Mirza DO, [facility]  Interventional Cardiology     o: [phone]  [address] [place] [address] [address]  [place] [address]      NOTE:  This report was transcribed using voice recognition software.  Every effort was made to ensure accuracy; however, inadvertent computerized transcription errors may be present.

## 2023-02-20 NOTE — TELEPHONE ENCOUNTER
Pts wife called to inform us of pt's fall on Friday and [facility]  Wanted Norm Woo to be aware

## 2023-02-20 NOTE — PROGRESS NOTES
Pt HR hit 170 when RN came into room pt stated he was \"fine just a little light headed\". Pt stayed with and pt denies chest pain or SOB light headedness has passed. Pt HR coming down and pt is in bed.

## 2023-02-20 NOTE — PROGRESS NOTES
Pt up to restroom HR reached 170's again with pt feeling slightly light headed. Pt stayed with and helped back to bed no issues with movement. Pt HR coming back down at this time.

## 2023-02-20 NOTE — PROGRESS NOTES
Pt wanted to stand again to see if HR would increase HR increased again to 170's pt helped back to bed again and bed alarm turned on pt is aware and agreeable.

## 2023-02-20 NOTE — PLAN OF CARE
Problem: Discharge Planning  Goal: Discharge to home or other facility with appropriate resources  2/20/2023 0921 by Nikhil Keita RN  Outcome: Progressing     Problem: Chronic Conditions and Co-morbidities  Goal: Patient's chronic conditions and co-morbidity symptoms are monitored and maintained or improved  2/20/2023 0921 by Nikhil Keita RN  Outcome: Progressing     Problem: Safety - Adult  Goal: Free from fall injury  2/20/2023 0921 by Nikhil Keita RN  Outcome: Progressing     Problem: Neurosensory - Adult  Goal: Achieves stable or improved neurological status  2/20/2023 0921 by Nikhil Keita RN  Outcome: Progressing     Problem: Cardiovascular - Adult  Goal: Maintains optimal cardiac output and hemodynamic stability  2/20/2023 0921 by Nikhil Keita RN  Outcome: Progressing     Problem: Metabolic/Fluid and Electrolytes - Adult  Goal: Electrolytes maintained within normal limits  2/20/2023 0921 by Nikhil Keita RN  Outcome: Progressing

## 2023-02-20 NOTE — PROGRESS NOTES
Office : [phone]     Fax :[phone]       Nephrology progress  Note      Patient's Name: Romeo Monte  7:06 AM  2/20/2023    Reason for Consult:  CKD 3 b, planned for left heart cath      Requesting Physician:  KRISTYN Abrams CNP      Chief Complaint:    Chief Complaint   Patient presents with    Dizziness     Pt arrived in the ED via [facility]  Pt was at home when he started feeling dizzy. Pt stated he had some chest pains and palpitations beforehand   Pt has extensive cardiac hx            History of Present iIlness:    Romeo Monte is a 74 y.o. male with prior history of CHF, HTN, Dm 2, CVA who was admitted for chest pain. Has baseline creat around 1. 6. he is planned for left heart cath Monday. Nephrology consulted for risk stratification to contrast exposure. Interval hx     Renal function better   No SOB   No edema   I/O last 3 completed shifts: In: 12 [P.O.:950]  Out: 2525 [Urine:2525]    Past Medical History:   Diagnosis Date    Cerebrovascular disease     CHF (congestive heart failure) ([facility] [address] 04/2017    per  nurse report but wife is unaware    DM (diabetes mellitus screen) 04/2017    boarder line    Hyperlipidemia     Hypertension     Type 2 diabetes mellitus without complication [facility])        Past Surgical History:   Procedure Laterality Date    VASECTOMY         Family History   Problem Relation Age of Onset    High Blood Pressure Mother     Cancer Mother         lung, smoker    Cancer Father         mesothelioma    Depression Sister         reports that he quit smoking about 5 years ago. His smoking use included cigarettes. He has a 80.00 pack-year smoking history. He quit smokeless tobacco use about 5 years ago.  He reports that he does not drink alcohol and does not use drugs.        Allergies:  Patient has no known allergies.    Current Medications:    sodium chloride flush 0.9 % injection 10 mL, 2 times per day  sodium chloride flush 0.9 % injection 10 mL, PRN  0.9 % sodium chloride infusion, PRN  potassium chloride 10 mEq/100 mL IVPB (Peripheral Line), PRN  magnesium sulfate 2000 mg in 50 mL IVPB premix, PRN  promethazine (PHENERGAN) tablet 12.5 mg, Q6H PRN   Or  ondansetron (ZOFRAN) injection 4 mg, Q6H PRN  acetaminophen (TYLENOL) suppository 650 mg, Q6H PRN  polyethylene glycol (GLYCOLAX) packet 17 g, Daily PRN  aspirin EC tablet 81 mg, Daily   Or  aspirin suppository 300 mg, Daily  atorvastatin (LIPITOR) tablet 80 mg, Nightly  dextrose bolus 10% 125 mL, PRN   Or  dextrose bolus 10% 250 mL, PRN  glucagon (rDNA) injection 1 mg, PRN  dextrose 10 % infusion, Continuous PRN  insulin lispro (HUMALOG) injection vial 0-4 Units, TID WC  insulin lispro (HUMALOG) injection vial 0-4 Units, Nightly  metoprolol succinate (TOPROL XL) extended release tablet 75 mg, Daily  amiodarone (CORDARONE) tablet 200 mg, BID          Physical exam:     Vitals:  BP (!) 122/56   Pulse 97   Temp 97.7 °F (36.5 °C) (Oral)   Resp 18   Ht 6' 1\" (1.854 m)   Wt 222 lb 4.8 oz (100.8 kg)   SpO2 98%   BMI 29.33 kg/m²   Constitutional:  OAA X3 NAD  Skin: no rash, turgor wnl  Heent:  eomi, mmm  Neck: no bruits or jvd noted  Cardiovascular:  S1, S2 without m/r/g  Respiratory: CTA B without w/r/r  Abdomen:  +bs, soft, nt, nd  Ext: no  lower extremity edema      Labs:  CBC:   Recent Labs     02/18/23 0103 02/18/23  0632 02/19/23  0205   WBC 6.8 7.1 6.8   HGB 10.8* 11.1* 11.6*    172 168     BMP:    Recent Labs     02/18/23 0103 02/18/23  0631 02/19/23  0205    142 143   K 3.5 4.6 4.5    106 107   CO2 25 30 26   BUN 24* 25* 25*   CREATININE 1.2 1.6* 1.4*   GLUCOSE 99 92 113*     Ca/Mg/Phos:   Recent Labs     02/18/23  0103 02/18/23  0631 02/19/23  0205   CALCIUM  8.7 9.4 8.9     Hepatic:   Recent Labs     02/18/23  0103 02/18/23  0631 02/19/23  0205   AST 25 26 33   ALT 27 29 26   BILITOT 0.5 0.6 0.5   ALKPHOS 68 70 70     Troponin:   Recent Labs     02/19/23  1431 02/19/23  1855 02/20/23  0146   TROPONINI 0.11* 0.09* 0.09*     BNP: No results for input(s): BNP in the last 72 hours.  Lipids:   Recent Labs     02/18/23  0631   CHOL 102   TRIG 60   HDL 52   LDLCALC 38   LABVLDL 12     ABGs: No results for input(s): PHART, PO2ART, YJH4ESF in the last 72 hours.  INR:   Recent Labs     02/18/23  0103 02/19/23  0205   INR 2.29* 2.36*     UA:  Recent Labs     02/18/23  0913   COLORU Yellow   CLARITYU Clear   GLUCOSEU Negative   BILIRUBINUR Negative   KETUA Negative   SPECGRAV <=1.005   BLOODU Negative   PHUR 5.5   PROTEINU Negative   UROBILINOGEN 0.2   NITRU Negative   LEUKOCYTESUR TRACE*   LABMICR YES   URINETYPE Voided      Urine Microscopic:   Recent Labs     02/18/23  0913   BACTERIA None Seen   HYALCAST 1   WBCUA 1   RBCUA 0   EPIU 0     Urine Culture: No results for input(s): LABURIN in the last 72 hours.  Urine Chemistry: No results for input(s): CLUR, LABCREA, PROTEINUR, NAUR in the last 72 hours.             IMAGING:  Vascular carotid duplex bilateral         CT HEAD WO CONTRAST   Final Result   No acute intracranial abnormality identified.  Sequela of large territory   infarct involving the left temporal and occipital lobes.         XR CHEST PORTABLE   Final Result   No acute airspace disease identified.         MRI brain without contrast    (Results Pending)         Assessment/Plan :      1.  CKD 3 B  H/o DM 2  Risk for TOMMY is low  risk.     Creat better at 1.4     Hold lasix   2. HTN  Controlled.       3. Anemia. Hb 11.1       4. Acid- base disorder.  HCo3 25     5. Electrolytes  Monitor     MRI negative for any acute CVA     Recommend to dose adjust all medications  based on renal functions  Maintain SBP> 90 mmHg   Daily weights   AVOID NSAIDs  Avoid  Nephrotoxins  Monitor Intake/Output  Call if significant decrease in urine output            D/w primary team      Thank you for allowing us to participate in care of [facility]         Electronically signed by: Manuel Cruz MD, 2/20/2023, 7:06 AM      [facility]  Office : [phone]  Fax :[phone]

## 2023-02-20 NOTE — PLAN OF CARE
Problem: Discharge Planning  Goal: Discharge to home or other facility with appropriate resources  2/20/2023 0207 by Laly Snow RN  Outcome: Progressing  2/19/2023 1449 by Herbert Bustillos RN  Outcome: Progressing     Problem: Chronic Conditions and Co-morbidities  Goal: Patient's chronic conditions and co-morbidity symptoms are monitored and maintained or improved  2/20/2023 0207 by Laly Snow RN  Outcome: Progressing  2/19/2023 1449 by Herbert Bustillos RN  Outcome: Progressing     Problem: Safety - Adult  Goal: Free from fall injury  2/20/2023 0207 by Laly Snow RN  Outcome: Progressing  2/19/2023 1449 by Herbert Bustillos RN  Outcome: Progressing     Problem: Neurosensory - Adult  Goal: Achieves stable or improved neurological status  2/20/2023 0207 by Laly Snow RN  Outcome: Progressing  2/19/2023 1449 by Herbert Bustillos RN  Outcome: Progressing     Problem: Cardiovascular - Adult  Goal: Maintains optimal cardiac output and hemodynamic stability  2/20/2023 0207 by Laly Snow RN  Outcome: Progressing  2/19/2023 1449 by Herbert Bustillos RN  Outcome: Progressing     Problem: Metabolic/Fluid and Electrolytes - Adult  Goal: Electrolytes maintained within normal limits  2/20/2023 0207 by Laly Snow RN  Outcome: Progressing  2/19/2023 1449 by Herbert Bustillos RN  Outcome: Progressing

## 2023-02-21 LAB
ANION GAP SERPL CALCULATED.3IONS-SCNC: 8 MMOL/L (ref 3–16)
BUN BLDV-MCNC: 26 MG/DL (ref 7–20)
CALCIUM SERPL-MCNC: 8.7 MG/DL (ref 8.3–10.6)
CHLORIDE BLD-SCNC: 107 MMOL/L (ref 99–110)
CO2: 27 MMOL/L (ref 21–32)
CREAT SERPL-MCNC: 1.5 MG/DL (ref 0.8–1.3)
GFR SERPL CREATININE-BSD FRML MDRD: 51 ML/MIN/{1.73_M2}
GLUCOSE BLD-MCNC: 106 MG/DL (ref 70–99)
GLUCOSE BLD-MCNC: 111 MG/DL (ref 70–99)
GLUCOSE BLD-MCNC: 117 MG/DL (ref 70–99)
GLUCOSE BLD-MCNC: 131 MG/DL (ref 70–99)
HCT VFR BLD CALC: 32.2 % (ref 40.5–52.5)
HEMOGLOBIN: 11.1 G/DL (ref 13.5–17.5)
INR BLD: 1.84 (ref 0.87–1.14)
LV EF: 25 %
LVEF MODALITY: NORMAL
MCH RBC QN AUTO: 32.9 PG (ref 26–34)
MCHC RBC AUTO-ENTMCNC: 34.6 G/DL (ref 31–36)
MCV RBC AUTO: 95.1 FL (ref 80–100)
PDW BLD-RTO: 13.3 % (ref 12.4–15.4)
PERFORMED ON: ABNORMAL
PLATELET # BLD: 158 K/UL (ref 135–450)
PMV BLD AUTO: 8.7 FL (ref 5–10.5)
POTASSIUM REFLEX MAGNESIUM: 4 MMOL/L (ref 3.5–5.1)
PROTHROMBIN TIME: 21.3 SEC (ref 11.7–14.5)
RBC # BLD: 3.39 M/UL (ref 4.2–5.9)
SODIUM BLD-SCNC: 142 MMOL/L (ref 136–145)
TROPONIN: 0.05 NG/ML
WBC # BLD: 6.6 K/UL (ref 4–11)

## 2023-02-21 PROCEDURE — 99153 MOD SED SAME PHYS/QHP EA: CPT

## 2023-02-21 PROCEDURE — C1894 INTRO/SHEATH, NON-LASER: HCPCS

## 2023-02-21 PROCEDURE — 36415 COLL VENOUS BLD VENIPUNCTURE: CPT

## 2023-02-21 PROCEDURE — 93458 L HRT ARTERY/VENTRICLE ANGIO: CPT | Performed by: INTERNAL MEDICINE

## 2023-02-21 PROCEDURE — 99024 POSTOP FOLLOW-UP VISIT: CPT | Performed by: INTERNAL MEDICINE

## 2023-02-21 PROCEDURE — 6360000002 HC RX W HCPCS

## 2023-02-21 PROCEDURE — 2500000003 HC RX 250 WO HCPCS

## 2023-02-21 PROCEDURE — 99152 MOD SED SAME PHYS/QHP 5/>YRS: CPT | Performed by: INTERNAL MEDICINE

## 2023-02-21 PROCEDURE — 93458 L HRT ARTERY/VENTRICLE ANGIO: CPT

## 2023-02-21 PROCEDURE — B2111ZZ FLUOROSCOPY OF MULTIPLE CORONARY ARTERIES USING LOW OSMOLAR CONTRAST: ICD-10-PCS | Performed by: INTERNAL MEDICINE

## 2023-02-21 PROCEDURE — 6370000000 HC RX 637 (ALT 250 FOR IP): Performed by: INTERNAL MEDICINE

## 2023-02-21 PROCEDURE — 1200000000 HC SEMI PRIVATE

## 2023-02-21 PROCEDURE — C8924 2D TTE W OR W/O FOL W/CON,FU: HCPCS

## 2023-02-21 PROCEDURE — 93325 DOPPLER ECHO COLOR FLOW MAPG: CPT

## 2023-02-21 PROCEDURE — 2580000003 HC RX 258: Performed by: INTERNAL MEDICINE

## 2023-02-21 PROCEDURE — 99152 MOD SED SAME PHYS/QHP 5/>YRS: CPT

## 2023-02-21 PROCEDURE — C1760 CLOSURE DEV, VASC: HCPCS

## 2023-02-21 PROCEDURE — 6360000004 HC RX CONTRAST MEDICATION: Performed by: INTERNAL MEDICINE

## 2023-02-21 PROCEDURE — 2709999900 HC NON-CHARGEABLE SUPPLY

## 2023-02-21 PROCEDURE — 6360000002 HC RX W HCPCS: Performed by: INTERNAL MEDICINE

## 2023-02-21 PROCEDURE — C1769 GUIDE WIRE: HCPCS

## 2023-02-21 PROCEDURE — 80048 BASIC METABOLIC PNL TOTAL CA: CPT

## 2023-02-21 PROCEDURE — 85027 COMPLETE CBC AUTOMATED: CPT

## 2023-02-21 PROCEDURE — 4A023N7 MEASUREMENT OF CARDIAC SAMPLING AND PRESSURE, LEFT HEART, PERCUTANEOUS APPROACH: ICD-10-PCS | Performed by: INTERNAL MEDICINE

## 2023-02-21 PROCEDURE — 84484 ASSAY OF TROPONIN QUANT: CPT

## 2023-02-21 PROCEDURE — 85610 PROTHROMBIN TIME: CPT

## 2023-02-21 RX ORDER — SODIUM CHLORIDE 9 MG/ML
INJECTION, SOLUTION INTRAVENOUS PRN
Status: DISCONTINUED | OUTPATIENT
Start: 2023-02-21 | End: 2023-02-22 | Stop reason: HOSPADM

## 2023-02-21 RX ORDER — SODIUM CHLORIDE 9 MG/ML
INJECTION, SOLUTION INTRAVENOUS CONTINUOUS
Status: ACTIVE | OUTPATIENT
Start: 2023-02-21 | End: 2023-02-21

## 2023-02-21 RX ORDER — ACETAMINOPHEN 325 MG/1
650 TABLET ORAL EVERY 4 HOURS PRN
Status: DISCONTINUED | OUTPATIENT
Start: 2023-02-21 | End: 2023-02-22 | Stop reason: HOSPADM

## 2023-02-21 RX ORDER — SODIUM CHLORIDE 0.9 % (FLUSH) 0.9 %
5-40 SYRINGE (ML) INJECTION PRN
Status: DISCONTINUED | OUTPATIENT
Start: 2023-02-21 | End: 2023-02-22 | Stop reason: HOSPADM

## 2023-02-21 RX ORDER — SODIUM CHLORIDE 0.9 % (FLUSH) 0.9 %
5-40 SYRINGE (ML) INJECTION EVERY 12 HOURS SCHEDULED
Status: DISCONTINUED | OUTPATIENT
Start: 2023-02-21 | End: 2023-02-22 | Stop reason: HOSPADM

## 2023-02-21 RX ADMIN — METOPROLOL SUCCINATE 50 MG: 50 TABLET, EXTENDED RELEASE ORAL at 15:30

## 2023-02-21 RX ADMIN — METOPROLOL SUCCINATE 50 MG: 50 TABLET, EXTENDED RELEASE ORAL at 20:41

## 2023-02-21 RX ADMIN — AMIODARONE HYDROCHLORIDE 200 MG: 200 TABLET ORAL at 20:41

## 2023-02-21 RX ADMIN — AMIODARONE HYDROCHLORIDE 200 MG: 200 TABLET ORAL at 15:30

## 2023-02-21 RX ADMIN — ATORVASTATIN CALCIUM 80 MG: 80 TABLET, FILM COATED ORAL at 20:41

## 2023-02-21 RX ADMIN — Medication 10 ML: at 20:36

## 2023-02-21 RX ADMIN — ASPIRIN 81 MG: 81 TABLET, COATED ORAL at 15:30

## 2023-02-21 RX ADMIN — ENOXAPARIN SODIUM 100 MG: 100 INJECTION SUBCUTANEOUS at 20:42

## 2023-02-21 RX ADMIN — IOPAMIDOL 65 ML: 755 INJECTION, SOLUTION INTRAVENOUS at 08:14

## 2023-02-21 ASSESSMENT — PAIN SCALES - GENERAL
PAINLEVEL_OUTOF10: 0
PAINLEVEL_OUTOF10: 0

## 2023-02-21 NOTE — PRE SEDATION
Pre-Op Note/Sedation Assessment    Ankita Cole  1955  [id number]  7:37 AM    Planned Procedure: Cardiac Catheterization Procedure  Post Procedure Plan: Return to same level of care  Consent: I have discussed with the patient and/or the patient representative the indication, alternatives, and the possible risks and/or complications of the planned procedure and the anesthesia methods. The patient and/or patient representative appear to understand and agree to proceed. Chief Complaint:   Anginal Equivalent      Indications for Cath Procedure:  Presentation:  ACS <= 24 hrs  2. Anginal Classification within 2 weeks:  CCS IV - Inability to perform any activity without angina or angina at rest, i.e., severe limitation  3. Angina Symptoms Assessment:  Typical Chest Pain  4. Heart Failure Class within last 2 weeks:  Yes:  Heart Failure Type: Systolic Severity:  Class III - Symptoms of HF on less-than-ordinary exertion  5. Cardiovascular Instability:  No    Prior Ischemic Workup/Eval:  Pre-Procedural Medications: Yes: Aspirin, Beta Blockers, and STATIN  2. Stress Test Completed? Yes:  Stress or Imaging Studies Performed (within ANY time period):   Type:  Stress Nuclear  Results:  Positive:  Myocardial Perfusion Defects (Nuclear) Extent of Ischemia:  Intermediate    Does Patient need surgery?   Cath Valve Surgery:  No    Pre-Procedure Medical History:  Vital Signs:  /86   Pulse 93   Temp 97.6 °F (36.4 °C) (Oral)   Resp 18   Ht 6' 1\" (1.854 m)   Wt 228 lb 6.4 oz (103.6 kg)   SpO2 98%   BMI 30.13 kg/m²     Allergies:  No Known Allergies  Medications:    Current Facility-Administered Medications   Medication Dose Route Frequency Provider Last Rate Last Admin    [Held by provider] enoxaparin (LOVENOX) injection 100 mg  1 mg/kg SubCUTAneous BID [facility] DO   100 mg at 02/20/23 1336    metoprolol succinate (TOPROL XL) extended release tablet 50 mg  50 mg Oral BID [facility] Hermes, DO   50 mg at 02/20/23 2238    sodium chloride flush 0.9 % injection 10 mL  10 mL IntraVENous 2 times per day Fransico Fong PRAMOD Henryzi, DO   10 mL at 02/20/23 0956    sodium chloride flush 0.9 % injection 10 mL  10 mL IntraVENous PRN Cristhianmad PRAMOD Arely, DO        0.9 % sodium chloride infusion   IntraVENous PRN [facility]        potassium chloride 10 mEq/100 mL IVPB (Peripheral Line)  10 mEq IntraVENous PRN Fransico Fraserelyse Henryzi, DO        magnesium sulfate 2000 mg in 50 mL IVPB premix  2,000 mg IntraVENous PRN Fransico Griffithjazi, DO        promethazine (PHENERGAN) tablet 12.5 mg  12.5 mg Oral Q6H PRN mad A Arely, DO        Or    ondansetron (ZOFRAN) injection 4 mg  4 mg IntraVENous Q6H PRN Ahmad A Arely, DO        acetaminophen (TYLENOL) suppository 650 mg  650 mg Rectal Q6H PRN [facility], [facility]        polyethylene glycol (GLYCOLAX) packet 17 g  17 g Oral Daily PRN [facility] A Arely, DO        aspirin EC tablet 81 mg  81 mg Oral Daily Ahmad A Arely, DO   81 mg at 02/20/23 3148    Or    aspirin suppository 300 mg  300 mg Rectal Daily Ahmad A Arely, DO        atorvastatin (LIPITOR) tablet 80 mg  80 mg Oral Nightly Ahmad A Arely, DO   80 mg at 02/20/23 2040    dextrose bolus 10% 125 mL  125 mL IntraVENous PRN Sid Gandara MD        Or    dextrose bolus 10% 250 mL  250 mL IntraVENous PRN Sid Gandara MD        glucagon (rDNA) injection 1 mg  1 mg SubCUTAneous PRN Sid Gandara MD        dextrose 10 % infusion   IntraVENous Continuous PRN Sid Gandara MD        insulin lispro (HUMALOG) injection vial 0-4 Units  0-4 Units SubCUTAneous TID  Sid Gandara MD        insulin lispro (HUMALOG) injection vial 0-4 Units  0-4 Units SubCUTAneous Nightly Sid Gandara MD        amiodarone (CORDARONE) tablet 200 mg  200 mg Oral BID Chai Hill MD   200 mg at 02/20/23 2040       Past Medical History:    Past Medical History:   Diagnosis Date    Cerebrovascular disease     CHF (congestive heart failure) ([facility] [address] 04/2017 per  nurse report but wife is unaware    DM (diabetes mellitus screen) 04/2017    boarder line    Hyperlipidemia     Hypertension     Type 2 diabetes mellitus without complication [facility])        Surgical History:    Past Surgical History:   Procedure Laterality Date    VASECTOMY         Pre-Sedation:  Pre-Sedation Documentation and Exam:  I have assessed the patient and reviewed the H&P on the chart. Prior History of Anesthesia Complications:   none    Modified Mallampati:  II (soft palate, uvula, fauces visible)    ASA Classification:  Class 3 - A patient with severe systemic disease that limits activity but is not incapacitating    Vicki Scale: Activity:  2 - Able to move 4 extremities voluntarily on command  Respiration:  2 - Able to breathe deeply and cough freely  Circulation:  2 - BP+/- 20mmHg of normal  Consciousness:  2 - Fully awake  Oxygen Saturation (color):  2 - Able to maintain oxygen saturation >92% on room air    Sedation/Anesthesia Plan:  Guard the patient's safety and welfare. Minimize physical discomfort and pain. Minimize negative psychological responses to treatment by providing sedation and analgesia and maximize the potential amnesia. Patient to meet pre-procedure discharge plan.     Medication Planned:  midazolam intravenously and fentanyl intravenously    Patient is an appropriate candidate for plan of sedation:   yes      Effie Blackwell DO, [facility]  Interventional Cardiology     o: [phone]  [address], [address]

## 2023-02-21 NOTE — PROGRESS NOTES
Cardiovascular Progress Note      Chief Complaint:   Chief Complaint   Patient presents with    Dizziness     Pt arrived in the ED via [facility]  Pt was at home when he started feeling dizzy. Pt stated he had some chest pains and palpitations beforehand   Pt has extensive cardiac hx      Impression/Recommendations:    NSTEMI  Abnormal nuclear stress test 12/2022  Cardiomyopathy (LVEF 30-35%, inferoseptal/inferolateral HK with posterior moderate MR)  Chronic Systolic CHF, compensated  PAF s/p DCCV 12/19/22, Dr. Juanita Beard, long term [facility]  Hypertension, controlled   Hyperlipidemia, controlled (2/2023:  TG 60 HDL 52 LDL 38)  DM, A1C 5.8  CKD stage III (baseline Scr ~ 1.6)  Hx. Remote CVA, expressive aphasia   Hx. DVT/PE wit IVC filter, on Coumadin      Mild single vessel nonobstructive CAD. Nonischemic cardiomyopathy. ASA, statin, beta blocker. No ACEI/ARB/MRA/ARNI with CKD. Restart AC for AF. Limited echo (follow up LVEF on GDMT) prior to DC. Interval History:  No events overnight. No chest pain, shortness of breath, orthopnea, edema. Agreeable to [facility]. MRI Brain 2/20/23  Impression   1. No acute intracranial abnormality. Specifically, no evidence of acute   infarct. 2. Chronic extensive left temporo-occipital encephalomalacia/gliosis. 3. Involutional parenchymal changes with mild microvascular ischemic disease. CT Head 2/18/23  Impression   No acute intracranial abnormality identified. Sequela of large territory   infarct involving the left temporal and occipital lobes. CXR 2/18/23  Impression   No acute airspace disease identified. ECG 2/18/23  Atrial fibrillation with rapid ventricular response with premature ventricular or aberrantly conducted complexes  Nonspecific T wave abnormality    Echo 12/19/22  Left ventricle is dilated. Overall, left ventricular systolic function is moderately depressed. Ejection fraction is visually estimated to be 30-35%.  (Walters's 35%)  There is global hypokinesis, more severe in the inferior/inferoseptal walls. Diastolic dysfunction-indeterminate grade. Moderate, posteriorly directed mitral regurgitation. Moderate tricuspid regurgitation. PASP estimated at 40 mmHg. Biatrial enlargement. SPECT 12/21/22:   Summary    The overall quality of the study is good. There is subdiaphragmatic    attenuation. Left ventricular cavity size is severely dilated. RIght ventricle is normal    in size. Spect imaging demonstrates a small area of mildly decreased perfusion in the    apex. The defect is present at rest and stress, consistent with infarct. There is an additional medium size defect of moderate intensity in the mid    anterior and mid anteroseptum. The defect is present at rest and stress,    consistent with infarct. Sum stress score of 8. No visual TID. Calculated TID of 1.01. Left ventricular ejection fraction is severely reduced at 25%. Sensitivity of testing is reduced on amlodipine. **Myocardial perfusion is abnormal. Fixed defect. Moderate-to-high risk    scan. Abnormal baseline EKG. **       Medications:  [Held by provider] enoxaparin (LOVENOX) injection 100 mg, BID  metoprolol succinate (TOPROL XL) extended release tablet 50 mg, BID  sodium chloride flush 0.9 % injection 10 mL, 2 times per day  sodium chloride flush 0.9 % injection 10 mL, PRN  0.9 % sodium chloride infusion, PRN  potassium chloride 10 mEq/100 mL IVPB (Peripheral Line), PRN  magnesium sulfate 2000 mg in 50 mL IVPB premix, PRN  promethazine (PHENERGAN) tablet 12.5 mg, Q6H PRN   Or  ondansetron (ZOFRAN) injection 4 mg, Q6H PRN  acetaminophen (TYLENOL) suppository 650 mg, Q6H PRN  polyethylene glycol (GLYCOLAX) packet 17 g, Daily PRN  aspirin EC tablet 81 mg, Daily   Or  aspirin suppository 300 mg, Daily  atorvastatin (LIPITOR) tablet 80 mg, Nightly  dextrose bolus 10% 125 mL, PRN   Or  dextrose bolus 10% 250 mL, PRN  glucagon (rDNA) injection 1 mg, PRN  dextrose 10 % infusion, Continuous PRN  insulin lispro (HUMALOG) injection vial 0-4 Units, TID WC  insulin lispro (HUMALOG) injection vial 0-4 Units, Nightly  amiodarone (CORDARONE) tablet 200 mg, BID      I/O:     Intake/Output Summary (Last 24 hours) at 2/21/2023 0738  Last data filed at 2/21/2023 0509  Gross per 24 hour   Intake 569.19 ml   Output 575 ml   Net -5.81 ml       Physical Exam:    /86   Pulse 93   Temp 97.6 °F (36.4 °C) (Oral)   Resp 18   Ht 6' 1\" (1.854 m)   Wt 228 lb 6.4 oz (103.6 kg)   SpO2 98%   BMI 30.13 kg/m²   Wt Readings from Last 3 Encounters:   02/21/23 228 lb 6.4 oz (103.6 kg)   02/18/23 226 lb (102.5 kg)   02/09/23 232 lb 6.4 oz (105.4 kg)       GENERAL: Well developed, well nourished, no acute distress  NEUROLOGICAL: Alert and oriented x3  PSYCH: Normal mood and affect   SKIN: Warm and dry, without lesions  HEENT: Normocephalic, atraumatic, Sclera non-icteric, mucous membranes moist  NECK: supple, JVP normal, thyroid not enlarged   CAROTID: Normal upstroke, no bruits  CARDIAC: Normal PMI, regular rate and rhythm, normal S1S2, no murmur, rub  RESPIRATORY: Normal respiratory effort, clear to auscultation bilaterally  EXTREMITIES: No cyanosis, clubbing or edema, palpable pulses bilaterally   MUSCULOSKELETAL: No joint swelling or tenderness, no chest wall tenderness  GASTROINTESTINAL:  soft, non-tender, no bruit    Data Review:    CBC:   Recent Labs     02/19/23  0205 02/21/23  0532   WBC 6.8 6.6   HGB 11.6* 11.1*   HCT 35.0* 32.2*   MCV 95.2 95.1    158     BMP:   Recent Labs     02/19/23  0205 02/20/23  0716 02/21/23  0532    146* 142   K 4.5 4.3 4.0    109 107   CO2 26 29 27   BUN 25* 21* 26*   CREATININE 1.4* 1.6* 1.5*     LFTS:   Recent Labs     02/19/23  0205   ALT 26   AST 33   ALKPHOS 70   PROT 6.4   AGRATIO 1.2   BILITOT 0.5     Cardiac Enzymes:   Recent Labs     02/20/23  1325 02/20/23  1959 02/21/23  0229   TROPONINI 0.07*  0.06* 0.05*     PT/INR:   Recent Labs     02/20/23  0716 02/20/23  1127 02/21/23  0532   PROTIME 22.7* 21.9* 21.3*   INR 1.99* 1.91* 1.84*     Yassine Vasquez DO, [facility]  Interventional Cardiology     o: [phone]  [address], [address], [address]      NOTE:  This report was transcribed using voice recognition software. Every effort was made to ensure accuracy; however, inadvertent computerized transcription errors may be present.

## 2023-02-21 NOTE — PROGRESS NOTES
Pt refused Bed alarm. Educated the pt on the reason why we use the bed alarm and to prevent falls in the hospital setting. Pt verbally responded understanding of risks and still asked for bed alarm to be turned off. The call light was placed next to pt and asked for him to call when he is in need of help.

## 2023-02-21 NOTE — OP NOTE
Cardiac Catheterization     Procedure: [facility]  Complications: None  Medications: Procedural sedation with minimal conscious sedation (2.5 Versed/ 125 Fentanyl)  An independent trained observer pushed medications at my direction. We monitored the patient's level of consciousness and vital signs/physiologic status throughout the procedure duration (see start and stop times in log). Estimated Blood Loss: Less than 20 mL  Specimens: were not obtained  Access: US guided 5 Fr Right Femoral  Closure: Mynx  Contrast: 65 cc  Start time: 3626  Stop time: 0807  Fluoro time: 3.2  Findings:     Left Heart Cath  Dominance: Co     LM: normal   LAD: smaller caliber with 20% mid stenosis; 30% ostial second diagonal   LCx: luminal irregularities  RCA: normal     LVEDP: 3 mmHg  LVG not done 2/2 CKD      Impression/Recommendations:  Mild single vessel nonobstructive CAD. Nonischemic cardiomyopathy. ASA, statin, beta blocker. No ACEI/ARB/MRA/ARNI with CKD. Restart AC for AF. Limited echo (follow up LVEF on GDMT) prior to DC.        Mark Hernandez DO, [facility]  Interventional Cardiology     o: [phone]  [facility] [address], [address]

## 2023-02-21 NOTE — PROGRESS NOTES
Office : [phone]     Fax :[phone]       Nephrology progress  Note      Patient's Name: Edwar Whitmore  10:43 AM  2/21/2023    Reason for Consult:  CKD 3 b, planned for left heart cath      Requesting Physician:  KRISTYN Apple CNP      Chief Complaint:    Chief Complaint   Patient presents with    Dizziness     Pt arrived in the ED via [facility]  Pt was at home when he started feeling dizzy. Pt stated he had some chest pains and palpitations beforehand   Pt has extensive cardiac hx            History of Present iIlness:    Edwar Whitmore is a 74 y.o. male with prior history of CHF, HTN, Dm 2, CVA who was admitted for chest pain. Has baseline creat around 1. 6. he is planned for left heart cath Monday. Nephrology consulted for risk stratification to contrast exposure. Interval hx     Renal function stable   No SOB   No edema   I/O last 3 completed shifts: In: 569.2 [I.V.:569.2]  Out: 4746 [Urine:1675]    Past Medical History:   Diagnosis Date    Cerebrovascular disease     CHF (congestive heart failure) ([facility] [address] 04/2017    per  nurse report but wife is unaware    DM (diabetes mellitus screen) 04/2017    boarder line    Hyperlipidemia     Hypertension     Type 2 diabetes mellitus without complication [facility])        Past Surgical History:   Procedure Laterality Date    VASECTOMY         Family History   Problem Relation Age of Onset    High Blood Pressure Mother     Cancer Mother         lung, smoker    Cancer Father         mesothelioma    Depression Sister         reports that he quit smoking about 5 years ago. His smoking use included cigarettes. He has a 80.00 pack-year smoking history. He quit smokeless tobacco use about 5 years ago.  He reports that he does not drink alcohol and does not use drugs. Allergies:  Patient has no known allergies.     Current Medications:    0.9 % sodium chloride infusion, Continuous  sodium chloride flush 0.9 % injection 5-40 mL, 2 times per day  sodium chloride flush 0.9 % injection 5-40 mL, PRN  0.9 % sodium chloride infusion, PRN  acetaminophen (TYLENOL) tablet 650 mg, Q4H PRN  perflutren lipid microspheres (DEFINITY) injection 1.5 mL, ONCE PRN  enoxaparin (LOVENOX) injection 100 mg, BID  metoprolol succinate (TOPROL XL) extended release tablet 50 mg, BID  sodium chloride flush 0.9 % injection 10 mL, 2 times per day  sodium chloride flush 0.9 % injection 10 mL, PRN  0.9 % sodium chloride infusion, PRN  potassium chloride 10 mEq/100 mL IVPB (Peripheral Line), PRN  magnesium sulfate 2000 mg in 50 mL IVPB premix, PRN  promethazine (PHENERGAN) tablet 12.5 mg, Q6H PRN   Or  ondansetron (ZOFRAN) injection 4 mg, Q6H PRN  acetaminophen (TYLENOL) suppository 650 mg, Q6H PRN  polyethylene glycol (GLYCOLAX) packet 17 g, Daily PRN  aspirin EC tablet 81 mg, Daily  atorvastatin (LIPITOR) tablet 80 mg, Nightly  dextrose bolus 10% 125 mL, PRN   Or  dextrose bolus 10% 250 mL, PRN  glucagon (rDNA) injection 1 mg, PRN  dextrose 10 % infusion, Continuous PRN  insulin lispro (HUMALOG) injection vial 0-4 Units, TID WC  insulin lispro (HUMALOG) injection vial 0-4 Units, Nightly  amiodarone (CORDARONE) tablet 200 mg, BID          Physical exam:     Vitals:  /86   Pulse 93   Temp 97.6 °F (36.4 °C) (Oral)   Resp 18   Ht 6' 1\" (1.854 m)   Wt 228 lb 6.4 oz (103.6 kg)   SpO2 98%   BMI 30.13 kg/m²   Constitutional:  OAA X3 NAD  Skin: no rash, turgor wnl  Heent:  eomi, mmm  Neck: no bruits or jvd noted  Cardiovascular:  S1, S2 without m/r/g  Respiratory: CTA B without w/r/r  Abdomen:  +bs, soft, nt, nd  Ext: no  lower extremity edema      Labs:  CBC:   Recent Labs     02/19/23  0205 02/21/23  0532   WBC 6.8 6.6   HGB 11.6* 11.1*    158 BMP:    Recent Labs     02/19/23  0205 02/20/23  0716 02/21/23  0532    146* 142   K 4.5 4.3 4.0    109 107   CO2 26 29 27   BUN 25* 21* 26*   CREATININE 1.4* 1.6* 1.5*   GLUCOSE 113* 118* 111*     Ca/Mg/Phos:   Recent Labs     02/19/23  0205 02/20/23  0716 02/21/23  0532   CALCIUM 8.9 9.4 8.7     Hepatic:   Recent Labs     02/19/23  0205   AST 33   ALT 26   BILITOT 0.5   ALKPHOS 70     Troponin:   Recent Labs     02/20/23  1325 02/20/23  1959 02/21/23  0229   TROPONINI 0.07* 0.06* 0.05*     BNP: No results for input(s): BNP in the last 72 hours. Lipids:   No results for input(s): CHOL, TRIG, HDL, LDLCALC, LABVLDL in the last 72 hours. ABGs: No results for input(s): PHART, PO2ART, ION2TRD in the last 72 hours. INR:   Recent Labs     02/20/23  0716 02/20/23  1127 02/21/23  0532   INR 1.99* 1.91* 1.84*     UA:  No results for input(s): Anat Maryjane, GLUCOSEU, BILIRUBINUR, KETUA, SPECGRAV, BLOODU, PHUR, PROTEINU, UROBILINOGEN, NITRU, LEUKOCYTESUR, LABMICR, URINETYPE in the last 72 hours. Urine Microscopic:   No results for input(s): LABCAST, BACTERIA, COMU, HYALCAST, WBCUA, RBCUA, EPIU in the last 72 hours. Urine Culture: No results for input(s): LABURIN in the last 72 hours. Urine Chemistry: No results for input(s): Nadeen Taye, PROTEINUR, NAUR in the last 72 hours. IMAGING:  MRI brain without contrast   Final Result   1. No acute intracranial abnormality. Specifically, no evidence of acute   infarct. 2. Chronic extensive left temporo-occipital encephalomalacia/gliosis. 3. Involutional parenchymal changes with mild microvascular ischemic disease. Vascular carotid duplex bilateral   Final Result      CT HEAD WO CONTRAST   Final Result   No acute intracranial abnormality identified. Sequela of large territory   infarct involving the left temporal and occipital lobes. XR CHEST PORTABLE   Final Result   No acute airspace disease identified. Assessment/Plan :      1.  CKD 3 B  H/o DM 2  Risk for TOMMY is low  risk. Creat 1.5 today     S/p LHC   Non obstructive CAD     Hold lasix   2. HTN  Controlled. 3. Anemia. Hb 11.1       4. Acid- base disorder. HCo3 25     5.  Electrolytes  Monitor     MRI negative for any acute CVA     Recommend to dose adjust all medications  based on renal functions  Maintain SBP> 90 mmHg   Daily weights   AVOID NSAIDs  Avoid Nephrotoxins  Monitor Intake/Output  Call if significant decrease in urine output            D/w primary team      Thank you for allowing us to participate in care of Anirudh Armstrong         Electronically signed by: Pramod Ospina MD, 2/21/2023, 10:43 AM      [facility] [address]  Office : [phone]  Fax :[phone]

## 2023-02-21 NOTE — PROGRESS NOTES
Pt weight rechecked with standing scale after informing patient of policy. Weight of 228.4 lb charted.

## 2023-02-21 NOTE — PROGRESS NOTES
HOSPITALISTS PROGRESS NOTE    2/21/2023 9:02 AM        Name: Robbie Rea . Admitted: 2/17/2023  Primary Care Provider: KRISTYN Noel CNP (Tel: [phone])      Brief Course: This 74 y.o. male  with PMHx of CVA, hypertension, hyperlipidemia, HFrEF, A-fib; status post cardioversion on 12/2022, DVT/PE; on Coumadin, s/p IVC filter and diabetes mellitus presented with dizziness and near syncope. On admission, patient was noted to be in A-fib with RVR and was given diltiazem bolus. Initial diagnostic lab work showed troponin: 0.05, BNP: 2415, INR: 2.29. CT head negative for any acute intracranial pathology. Interval history:   Pt seen and examined today. Overnight events noted, interval ancillary notes and labs reviewed. denied cough, SOB, chest pain, nausea, vomiting or abdominal pain  Plan for cardiac cath today      Assessment & Plan:     A-fib with RVR present on admission  Status post cardioversion on 12/2022  Cardiology consulted; metoprolol dose was increased and started on amiodarone  Monitor on telemetry     Dizziness and near syncope likely secondary to hypotension in setting of A-fib with RVR  CT head negative for any acute intracranial abnormality; showed sequela of large territory infarct involving left parietal and occipital lobes. Carotid Dopplers negative for any significant stenosis  MRI brain negative for any acute intracranial abnormality. Neurology input appreciated     HFrEF/cardiomyopathy  Last echo on 12/2022 showed EF of 30 to 35%, severe global hypokinesis  Cardiology board; continue medical management. Holding ACE/ARB  Strict and output, daily weights     Elevated troponin; Troponin initially trended up up to 0.26; trending down  Had abnormal stress test; denies any chest pain today.   EKG negative any acute ischemic changes  Cardiology on board;Plan for cardiac cath on Monday  Monitor on telemetry and trend troponin     Diabetes mellitus. Hold oral antidiabetic medications  Hemoglobin A1c 5.8 on admission. Continue SSI monitor blood glucose closely     CKD stage III; Creatinine around baseline on admission. Nephrology consulted; appreciate their recs  Avoid nephrotoxin, strict input, daily weights and monitor renal function closely     Hyperlipidemia: Continue statins     Hypertension; BP soft on admission. Monitor BP closely. Will adjust medication dose if needed     Hx of prior CVA; continue aspirin and statins     Hx of PE/DVT; on Coumadin. INR 2.29 on admission; hold Coumadin. Plan to start on heparin drip or therapeutic Lovenox once INR less than 2     DVT PPX: On Coumadin. Currently on hold.   INR elevated to 2.36  Code:Full Code    Disposition: Once acute medical issues have resolved    Current Medications  0.9 % sodium chloride infusion, Continuous  sodium chloride flush 0.9 % injection 5-40 mL, 2 times per day  sodium chloride flush 0.9 % injection 5-40 mL, PRN  0.9 % sodium chloride infusion, PRN  acetaminophen (TYLENOL) tablet 650 mg, Q4H PRN  perflutren lipid microspheres (DEFINITY) injection 1.5 mL, ONCE PRN  enoxaparin (LOVENOX) injection 100 mg, BID  metoprolol succinate (TOPROL XL) extended release tablet 50 mg, BID  sodium chloride flush 0.9 % injection 10 mL, 2 times per day  sodium chloride flush 0.9 % injection 10 mL, PRN  0.9 % sodium chloride infusion, PRN  potassium chloride 10 mEq/100 mL IVPB (Peripheral Line), PRN  magnesium sulfate 2000 mg in 50 mL IVPB premix, PRN  promethazine (PHENERGAN) tablet 12.5 mg, Q6H PRN   Or  ondansetron (ZOFRAN) injection 4 mg, Q6H PRN  acetaminophen (TYLENOL) suppository 650 mg, Q6H PRN  polyethylene glycol (GLYCOLAX) packet 17 g, Daily PRN  aspirin EC tablet 81 mg, Daily  atorvastatin (LIPITOR) tablet 80 mg, Nightly  dextrose bolus 10% 125 mL, PRN   Or  dextrose bolus 10% 250 mL, PRN  glucagon (rDNA) injection 1 mg, PRN  dextrose 10 % infusion, Continuous PRN  insulin lispro (HUMALOG) injection vial 0-4 Units, TID WC  insulin lispro (HUMALOG) injection vial 0-4 Units, Nightly  amiodarone (CORDARONE) tablet 200 mg, BID      Objective:  /86   Pulse 93   Temp 97.6 °F (36.4 °C) (Oral)   Resp 18   Ht 6' 1\" (1.854 m)   Wt 228 lb 6.4 oz (103.6 kg)   SpO2 98%   BMI 30.13 kg/m²     Intake/Output Summary (Last 24 hours) at 2/21/2023 8609  Last data filed at 2/21/2023 0756  Gross per 24 hour   Intake 569.19 ml   Output 575 ml   Net -5.81 ml        Wt Readings from Last 3 Encounters:   02/21/23 228 lb 6.4 oz (103.6 kg)   02/18/23 226 lb (102.5 kg)   02/09/23 232 lb 6.4 oz (105.4 kg)       Physical Examination:   General appearance:  No apparent distress, appears stated age and cooperative. HEENT: Normocephalic, sclera clear., PERRLA. Trachea midline, no adenopathy. Cardiovascular: Regular rate and rhythm, normal S1, S2. No murmur. Respiratory:Clear to auscultation bilaterally, no wheeze or crackles. GI: Abdomen soft, no tenderness, not distended, normal bowel sounds  Musculoskeletal: No cyanosis in digits. No BLE edema present  Neurology: CN 2-12 grossly intact. No speech or motor deficits  Psych: Not agitated, appropriate affect  Skin: Warm, dry, normal turgor    Labs and Tests:  CBC:   Recent Labs     02/19/23  0205 02/21/23  0532   WBC 6.8 6.6   HGB 11.6* 11.1*    158       BMP:    Recent Labs     02/19/23  0205 02/20/23  0716 02/21/23  0532    146* 142   K 4.5 4.3 4.0    109 107   CO2 26 29 27   BUN 25* 21* 26*   CREATININE 1.4* 1.6* 1.5*   GLUCOSE 113* 118* 111*       Hepatic:   Recent Labs     02/19/23  0205   AST 33   ALT 26   BILITOT 0.5   ALKPHOS 70       MRI brain without contrast   Final Result   1. No acute intracranial abnormality. Specifically, no evidence of acute   infarct. 2. Chronic extensive left temporo-occipital encephalomalacia/gliosis.    3. Involutional parenchymal changes with mild microvascular ischemic disease. Vascular carotid duplex bilateral   Final Result      CT HEAD WO CONTRAST   Final Result   No acute intracranial abnormality identified. Sequela of large territory   infarct involving the left temporal and occipital lobes. XR CHEST PORTABLE   Final Result   No acute airspace disease identified. Problem List  Principal Problem:    Syncope and collapse  Active Problems:    Elevated troponin    History of DVT (deep vein thrombosis)    A-fib (HCC)    Precordial pain    Stage 3 chronic kidney disease (HCC)    TIA involving basilar artery    Dizziness    Essential hypertension  Resolved Problems:    * No resolved hospital problems.  Crystal Brown MD   2/21/2023 9:02 AM

## 2023-02-21 NOTE — PROGRESS NOTES
Pt weight has increased by over 3 pounds since yesterday morning. Pt kept communicating that it \"had to be right I didn't eat then I did a lot\". Pt educated that was still a lot in one day but pt adamant today's weight is correct as pt also has the same things on the bed today that he did yesterday and is wearing the same things. Pt declining to use standing scale at this time will attempt again with am rounds.

## 2023-02-22 VITALS
SYSTOLIC BLOOD PRESSURE: 119 MMHG | OXYGEN SATURATION: 95 % | HEIGHT: 73 IN | WEIGHT: 230.7 LBS | DIASTOLIC BLOOD PRESSURE: 82 MMHG | RESPIRATION RATE: 18 BRPM | HEART RATE: 74 BPM | BODY MASS INDEX: 30.57 KG/M2 | TEMPERATURE: 98.5 F

## 2023-02-22 PROBLEM — I51.9 LV DYSFUNCTION: Status: ACTIVE | Noted: 2023-02-22

## 2023-02-22 LAB
ANION GAP SERPL CALCULATED.3IONS-SCNC: 8 MMOL/L (ref 3–16)
BUN BLDV-MCNC: 27 MG/DL (ref 7–20)
CALCIUM SERPL-MCNC: 8.8 MG/DL (ref 8.3–10.6)
CHLORIDE BLD-SCNC: 114 MMOL/L (ref 99–110)
CO2: 25 MMOL/L (ref 21–32)
CREAT SERPL-MCNC: 1.6 MG/DL (ref 0.8–1.3)
GFR SERPL CREATININE-BSD FRML MDRD: 47 ML/MIN/{1.73_M2}
GLUCOSE BLD-MCNC: 108 MG/DL (ref 70–99)
GLUCOSE BLD-MCNC: 110 MG/DL (ref 70–99)
GLUCOSE BLD-MCNC: 137 MG/DL (ref 70–99)
HCT VFR BLD CALC: 30.7 % (ref 40.5–52.5)
HEMOGLOBIN: 10.4 G/DL (ref 13.5–17.5)
INR BLD: 1.65 (ref 0.87–1.14)
MCH RBC QN AUTO: 32 PG (ref 26–34)
MCHC RBC AUTO-ENTMCNC: 33.9 G/DL (ref 31–36)
MCV RBC AUTO: 94.4 FL (ref 80–100)
PDW BLD-RTO: 13.5 % (ref 12.4–15.4)
PERFORMED ON: ABNORMAL
PERFORMED ON: ABNORMAL
PLATELET # BLD: 144 K/UL (ref 135–450)
PMV BLD AUTO: 8.2 FL (ref 5–10.5)
POTASSIUM SERPL-SCNC: 3.7 MMOL/L (ref 3.5–5.1)
PROTHROMBIN TIME: 19.5 SEC (ref 11.7–14.5)
RBC # BLD: 3.25 M/UL (ref 4.2–5.9)
SODIUM BLD-SCNC: 147 MMOL/L (ref 136–145)
WBC # BLD: 6.7 K/UL (ref 4–11)

## 2023-02-22 PROCEDURE — 6360000002 HC RX W HCPCS: Performed by: INTERNAL MEDICINE

## 2023-02-22 PROCEDURE — 6370000000 HC RX 637 (ALT 250 FOR IP): Performed by: INTERNAL MEDICINE

## 2023-02-22 PROCEDURE — 85610 PROTHROMBIN TIME: CPT

## 2023-02-22 PROCEDURE — 80048 BASIC METABOLIC PNL TOTAL CA: CPT

## 2023-02-22 PROCEDURE — 99233 SBSQ HOSP IP/OBS HIGH 50: CPT | Performed by: NURSE PRACTITIONER

## 2023-02-22 PROCEDURE — 36415 COLL VENOUS BLD VENIPUNCTURE: CPT

## 2023-02-22 PROCEDURE — 85027 COMPLETE CBC AUTOMATED: CPT

## 2023-02-22 PROCEDURE — 2580000003 HC RX 258: Performed by: INTERNAL MEDICINE

## 2023-02-22 RX ORDER — AMIODARONE HYDROCHLORIDE 200 MG/1
TABLET ORAL
Qty: 30 TABLET | Refills: 1 | Status: SHIPPED | OUTPATIENT
Start: 2023-02-22

## 2023-02-22 RX ORDER — ATORVASTATIN CALCIUM 80 MG/1
80 TABLET, FILM COATED ORAL NIGHTLY
Qty: 30 TABLET | Refills: 1 | Status: SHIPPED | OUTPATIENT
Start: 2023-02-22

## 2023-02-22 RX ORDER — ASPIRIN 81 MG/1
81 TABLET ORAL DAILY
Qty: 30 TABLET | Refills: 3 | Status: SHIPPED | OUTPATIENT
Start: 2023-02-22

## 2023-02-22 RX ORDER — WARFARIN SODIUM 7.5 MG/1
7.5 TABLET ORAL
Status: DISCONTINUED | OUTPATIENT
Start: 2023-02-22 | End: 2023-02-22

## 2023-02-22 RX ORDER — WARFARIN SODIUM 5 MG/1
5 TABLET ORAL DAILY
Status: DISCONTINUED | OUTPATIENT
Start: 2023-02-23 | End: 2023-02-22

## 2023-02-22 RX ADMIN — ENOXAPARIN SODIUM 100 MG: 100 INJECTION SUBCUTANEOUS at 07:58

## 2023-02-22 RX ADMIN — METOPROLOL SUCCINATE 50 MG: 50 TABLET, EXTENDED RELEASE ORAL at 07:58

## 2023-02-22 RX ADMIN — ASPIRIN 81 MG: 81 TABLET, COATED ORAL at 07:58

## 2023-02-22 RX ADMIN — AMIODARONE HYDROCHLORIDE 200 MG: 200 TABLET ORAL at 07:58

## 2023-02-22 RX ADMIN — Medication 10 ML: at 07:57

## 2023-02-22 ASSESSMENT — PAIN SCALES - GENERAL
PAINLEVEL_OUTOF10: 0

## 2023-02-22 NOTE — PROGRESS NOTES
Physician Progress Note      PATIENT:               Isai Jenkins  CSN #:                  [id number]  :                       1955  ADMIT DATE:       2023 11:52 PM  [address] [place] DATE:  [address]  PROVIDER #:        Fabian Jain DO          QUERY TEXT:    Pt admitted with hypotension. Noted documentation of NSTEMI on Cardiology PN   on  by ordered Cardiology consultant. If possible, please document in   progress notes and discharge summary:    The medical record reflects the following:  Risk Factors: Chronic Systolic CHF, Nonischemic cardiomyopathy. Clinical Indicators: Cardiology PN on  noted as NSTEMI. LHC noted as Mild   single vessel nonobstructive CAD, Nonischemic cardiomyopathy. Elevated   troponin: Troponin initially trended up up to 0.26; trending down. Had   abnormal stress test; denies any chest pain today. EKG negative any acute   ischemic changes. Treatment: LHC, ASA, Trend troponins, EKG. Options provided:  -- NSTEMI confirmed present on admission  -- NSTEMI confirmed not present on admission  -- NSTEMI ruled out  -- Other - I will add my own diagnosis  -- Disagree - Not applicable / Not valid  -- Disagree - Clinically unable to determine / Unknown  -- Refer to Clinical Documentation Reviewer    PROVIDER RESPONSE TEXT:    The diagnosis of NSTEMI was ruled out.     Query created by: Luci Sharif on 2023 9:08 AM      Electronically signed by:  Fabian Jain DO 2023 9:40 AM

## 2023-02-22 NOTE — DISCHARGE SUMMARY
[facility]    Patient Demographics    Patient. Clair Salcedo  Date of Birth. 1955  MRN. [id number]     Primary care provider. KRISTYN Obando CNP  (Tel: [phone])    Admit date: 2/17/2023    Discharge date (blank if same as Note Date): Note Date: 2/22/2023     Reason for Hospitalization. Chief Complaint   Patient presents with    Dizziness     Pt arrived in the ED via [place] [facility]  Pt was at home when he started feeling dizzy. Pt stated he had some chest pains and palpitations beforehand   Pt has extensive cardiac hx          Significant Findings. Principal Problem:    Syncope and collapse  Active Problems:    SANDY (acute kidney injury) (HCC)    Elevated troponin    History of DVT (deep vein thrombosis)    A-fib (HCC)    Precordial pain    Stage 3 chronic kidney disease (HCC)    TIA involving basilar artery    Dizziness    LV dysfunction    Primary hypertension  Resolved Problems:    * No resolved hospital problems. *       Problems and results from this hospitalization that need follow up. Persistent atrial. Outpatient cardioversion planned in 3-4 weeks. Significant test results and incidental findings. CXR 2/18/2023:  No acute airspace disease identified. CT Head 2/18/2023:  No acute intracranial abnormality identified. Sequela of large territory   infarct involving the left temporal and occipital lobes. Carotid Doppler 2/21/2023:   Summary        There is <50% stenosis of the right internal carotid arteries. No hemodynamically significant carotid stenosis noted on left side. Vertebral flow are antegrade bilaterally. MRI Brain 2/20/2023:  1. No acute intracranial abnormality. Specifically, no evidence of acute   infarct. 2. Chronic extensive left temporo-occipital encephalomalacia/gliosis.    3. Involutional parenchymal changes with mild microvascular ischemic disease. Echo 2/21/2023:  Summary   This was a limited study for NICM, LVEF. Definity was administered. Left ventricular cavity size is normal. Ejection fraction is visually   estimated to be 25 %. The basal anteroseptum, inferolateral, inferior,   apcial anterior, and mid inferoseptal walls appear hypokinetic. Left atrium is dilated. Right atrium is dilated. All valves are structurally normal.       Invasive procedures and treatments. [facility] 2/21/2023:  Dominance: Co      LM: normal   LAD: smaller caliber with 20% mid stenosis; 30% ostial second diagonal   LCx: luminal irregularities  RCA: normal      LVEDP: 3 mmHg  LVG not done 2/2 CKD     Mild single vessel nonobstructive CAD. Nonischemic cardiomyopathy. ASA, statin, beta blocker. No ACEI/ARB/MRA/ARNI with CKD. Restart AC for AF. Limited echo (follow up LVEF on GDMT) prior to DC. [facility] Course. 75 yo male  with hx CVA, HTN, HLD, HFrEF, PAF s/p cardioversion (12/2022) on warfarin, DVT/PE s/p IVC filter, DM2. Presented with dizziness and near syncope. Noted to be in A-fib with RVR (110) and was given diltiazem bolus. Labs remarkable for troponin 0.05, BNP 2415, INR 2.29. CT head negative for any acute intracranial pathology. Persistent atrial fib   - RVR present on admission  - Cardiology consulted; metoprolol dose was increased and started on amiodarone  - Considering outpatient cardioversion in next 3-4 weeks  - Transitioned per EP to Eliquis     Dizziness and near syncope   - Suspected secondary to hypotension in setting of A-fib with RVR  - CT head negative for any acute intracranial abnormality; showed sequela of large territory infarct involving left parietal and occipital lobes.     - Carotid Dopplers negative for any significant stenosis  - MRI brain negative for any acute intracranial abnormality.    - Neurology consulted; continue anticoagulation, asa, statin HFrEF/cardiomyopathy/elevated troponin  - Last echo 12/2022 showed EF of 30 to 35%, severe global hypokinesis  - Cardiology consulted, [facility] with mild single vessel nonobstructive CAD  - Continue asa, statin, beta blocker  - Limited echo shows EF 25%, hypokinesis of basal anteroseptum, inferolateral, inferior, apical anterior, and mid inferoseptal walls  - Continue medical management, no ACE/ARB secondary hypotension    DM2  - A1c 5.8  - Covered low dose correction scale    CKD stage III   - Creatinine around baseline (1.6) on admission.  - Nephrology consulted; low risk for TOMMY  - Lasix resumed on DC     Hyperlipidemia   - Continued statins     Hypertension  - BP soft on admission, likely secondary to RVR  - Improved with rate control      Hx of prior CVA  - Continued aspirin and statin     Hx of PE/DVT  - INR 2.29 on admission  - Transitioned to Eliquis per cardiology on DC    Patient feels good, eager for Dc home. Has been ambulatory in room, no further lightheadedness or dizziness. Denies chest pain, shortness of breath. Reviewed DC plans, follow up and medications. Expresses understanding. Questions answered. Consults. IP CONSULT TO NEUROLOGY  IP CONSULT TO CARDIOLOGY  IP CONSULT TO NEPHROLOGY  IP CONSULT TO CARDIOLOGY  IP CONSULT TO PHARMACY  IP CONSULT TO FINANCIAL COUNSELOR    Physical examination on discharge day. /82   Pulse 74   Temp 98.5 °F (36.9 °C) (Oral)   Resp 18   Ht 6' 1\" (1.854 m)   Wt 230 lb 11.2 oz (104.6 kg)   SpO2 95%   BMI 30.44 kg/m²   General appearance. Alert. Looks comfortable. HEENT. Sclera clear. Moist mucus membranes. Cardiovascular. Regular rate and rhythm, normal S1, S2. No murmur. Respiratory. Not using accessory muscles. Clear to auscultation bilaterally, no wheeze. Gastrointestinal. Abdomen soft, non-tender, not distended, normal bowel sounds  Neurology. Facial symmetry. No speech deficits. Moving all extremities equally. Extremities.  No edema in lower extremities. Skin. Warm, dry, normal turgor    Condition at time of discharge stable    Medication instructions provided to patient at discharge. Medication List        START taking these medications      amiodarone 200 MG tablet  Commonly known as: CORDARONE  Take 200 mg BID until March 4th then decrease to 200 mg daily     apixaban 5 MG Tabs tablet  Commonly known as: ELIQUIS  Take 1 tablet by mouth 2 times daily     aspirin 81 MG EC tablet  Take 1 tablet by mouth daily            CHANGE how you take these medications      atorvastatin 80 MG tablet  Commonly known as: LIPITOR  Take 1 tablet by mouth nightly  What changed:   medication strength  how much to take  how to take this  when to take this  additional instructions            CONTINUE taking these medications      blood glucose monitor kit and supplies  1 kit by Does not apply route daily True Metric testing device     blood glucose test strips  1 strip by Other route daily True Metric Test strips     Blood Pressure Kit  1 kit by Does not apply route daily     finasteride 5 MG tablet  Commonly known as: PROSCAR  Take 1 tablet by mouth daily     Lancets Misc  True Metric Lancets     metFORMIN 500 MG tablet  Commonly known as: GLUCOPHAGE  TAKE 1 TABLET BY MOUTH 2 TIMES A DAY WITH MEALS     metoprolol succinate 50 MG extended release tablet  Commonly known as: TOPROL XL  Take 1 tablet by mouth daily     mineral oil-hydrophilic petrolatum ointment  Apply topically as needed three times per day.      MSM PO     PRENATAL 1 PO     tamsulosin 0.4 MG capsule  Commonly known as: FLOMAX  Take 1 capsule by mouth daily            STOP taking these medications      furosemide 20 MG tablet  Commonly known as: LASIX     warfarin 5 MG tablet  Commonly known as: COUMADIN            ASK your doctor about these medications      midodrine 5 MG tablet  Commonly known as: PROAMATINE  Take 1 tablet by mouth 3 times daily               Where to Get Your Medications        These medications were sent to [facility], [address] [place], [place] [id number]      Phone: [phone]   amiodarone 200 MG tablet  apixaban 5 MG Tabs tablet  aspirin 81 MG EC tablet  atorvastatin 80 MG tablet         Discharge recommendations given to patient. Follow Up. PCP in 1 week   Disposition. home  Activity. activity as tolerated  Diet: ADULT DIET; Regular; 4 carb choices (60 gm/meal); Low Fat/Low Chol/High Fiber/2 gm Na      Spent > 30 minutes in discharge process.     Signed:  KRISTYN Aldana CNP     2/22/2023 1:03 PM

## 2023-02-22 NOTE — PROGRESS NOTES
Pharmacy to Dose Warfarin    Pharmacy consulted to dose warfarin for afib/hx PE. INR Goal: 2-3    INR today: 1.65    Assessment/Plan:  - Resume Warfarin therapy today will dose with 7.5 mg today then resume to home dose regimen  -Bridging with therapeutic LMWH  -Home regimen entered into [facility]  - Daily INR is ordered    Pharmacy will continue to follow.     Gaviota Martin [facility]  [id number]

## 2023-02-22 NOTE — CONSULTS
Pharmacy to check patient copay for  Xarelto and Eliquis    Copay for patient will be: $10.35/month    Pharmacy will continue to follow the decision on therapy and  the patient if appropriate.        Kirti Junior [facility]  [id number]

## 2023-02-22 NOTE — PROGRESS NOTES
CLINICAL PHARMACY NOTE: MEDS TO BEDS    Total # of Prescriptions Filled: 3   The following medications were delivered to the patient:  Eliquis  Amiodarone  atorvastatin    Additional Documentation:  Will buy aspirin otc  Wife picked up

## 2023-02-22 NOTE — CARE COORDINATION
Discharge Planning  Patient discharged  on 2/22/23 to home with family. Transport home;  Family .     All discharge needs met per case management

## 2023-02-22 NOTE — PROGRESS NOTES
[address]   Electrophysiology Progress Note     Date: 2/22/2023  Admit Date: 2/17/2023     Reason for consultation: Atrial fibrillation    Chief Complaint:   Chief Complaint   Patient presents with    Dizziness     Pt arrived in the ED via [place] [facility]  Pt was at home when he started feeling dizzy. Pt stated he had some chest pains and palpitations beforehand   Pt has extensive cardiac hx        History of Present Illness: History obtained from patient and medical record. Niru Restrepo is a 74 y.o. 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 Hx of IVC filter. S/p KRISTIN/DCCV (12/22)    Pt presented to hospital with chest pain, palpitations, and dizziness. Interval Hx: Today, he is being seen at request of nursing for AF. He is in AF. He has episodes of RVR when up walking. His BP is controlled. I called and spoke to his wife. She has great concerns about her  and their financial issues. Patient seen and examined. Clinical notes reviewed. Telemetry reviewed. No new complaints today. No major events overnight. Denies having chest pain, palpitations, shortness of breath, orthopnea/PND, cough, or dizziness at the time of this visit. Allergies:  No Known Allergies    Home Meds:  Prior to Visit Medications    Medication Sig Taking? Authorizing Provider   mineral oil-hydrophilic petrolatum (HYDROPHOR) ointment Apply topically as needed three times per day.   Juan Baltazar APRN - CNP   midodrine (PROAMATINE) 5 MG tablet Take 1 tablet by mouth 3 times daily  Patient taking differently: Take 5 mg by mouth as needed (for systolic b/p under 944)  KRISTYN Nicole - CNS   furosemide (LASIX) 20 MG tablet Take 1 tablet by mouth daily  Patient not taking: No sig reported  Minnette Sandifer, MD   metoprolol succinate (TOPROL XL) 50 MG extended release tablet Take 1 tablet by mouth daily  Minnette Sandifer, MD   tamsulosin (FLOMAX) 0.4 MG capsule Take 1 capsule by mouth daily  Darren Wilson MD   finasteride (PROSCAR) 5 MG tablet Take 1 tablet by mouth daily  Darren Wilson MD   atorvastatin (LIPITOR) 40 MG tablet TAKE ONE TABLET BY MOUTH EVERY NIGHT  Ashely Bailey, APRN - NP   Blood Pressure KIT 1 kit by Does not apply route daily  KRISTYN Obando - CNP   metFORMIN (GLUCOPHAGE) 500 MG tablet TAKE 1 TABLET BY MOUTH 2 TIMES A DAY WITH MEALS  David Tovar MD   blood glucose monitor strips 1 strip by Other route daily True Metric Test strips  Georgina Duran APRN - CNP   Lancets MISC True Metric Lancets  Georgina Duran APRN - CNP   warfarin (COUMADIN) 5 MG tablet TAKE 1 AND 1/2 TABLETS BY MOUTH DAILY  Patient taking differently: Take 5 mg by mouth daily Managed by PCP  NALINI Milligan   Blood Glucose Monitoring Suppl AGUSTO 1 kit by Does not apply route daily True Metric testing device  Rosalie Lorenzo MD   Methylsulfonylmethane (MSM PO) Take by mouth  Historical Provider, MD   Prenatal MV-Min-Fe Fum-FA-DHA (PRENATAL 1 PO) Take by mouth  Historical Provider, MD      Scheduled Meds:   warfarin  7.5 mg Oral Once    [START ON 2/23/2023] warfarin  5 mg Oral Daily    sodium chloride flush  5-40 mL IntraVENous 2 times per day    enoxaparin  1 mg/kg SubCUTAneous BID    metoprolol succinate  50 mg Oral BID    sodium chloride flush  10 mL IntraVENous 2 times per day    aspirin  81 mg Oral Daily    atorvastatin  80 mg Oral Nightly    insulin lispro  0-4 Units SubCUTAneous TID WC    insulin lispro  0-4 Units SubCUTAneous Nightly    amiodarone  200 mg Oral BID     Continuous Infusions:   sodium chloride      sodium chloride      dextrose       PRN Meds:sodium chloride flush, sodium chloride, acetaminophen, perflutren lipid microspheres, sodium chloride flush, sodium chloride, potassium chloride, magnesium sulfate, promethazine **OR** ondansetron, [DISCONTINUED] acetaminophen **OR** acetaminophen, polyethylene glycol, dextrose bolus **OR** dextrose bolus, glucagon (rDNA), dextrose     Past Medical History:  Past Medical History:   Diagnosis Date    Cerebrovascular disease     CHF (congestive heart failure) ([facility] [address] 04/2017    per  nurse report but wife is unaware    DM (diabetes mellitus screen) 04/2017    boarder line    Hyperlipidemia     Hypertension     Type 2 diabetes mellitus without complication [facility])       Past Surgical History:    has a past surgical history that includes Vasectomy. Social History:  Reviewed. reports that he quit smoking about 5 years ago. His smoking use included cigarettes. He has a 80.00 pack-year smoking history. He quit smokeless tobacco use about 5 years ago. He reports that he does not drink alcohol and does not use drugs. Family History:  Reviewed. family history includes Cancer in his father and mother; Depression in his sister; High Blood Pressure in his mother. Review of Systems:  Constitutional: Positive for fatigue, weakness. Negative for fever, night sweats, chills, weight changes  Skin: Negative for rash, dry skin, pruritus, bruising, bleeding, blood clots, or changes in skin pigment  HEENT: Negative for vision changes, ringing in the ears, sore throat, dysphagia, or swollen lymph nodes  Respiratory: Reviewed in HPI  Cardiovascular: Reviewed in HPI  Gastrointestinal: Negative for abdominal pain, N/V/D, constipation, or black/tarry stools  Genito-Urinary: Negative for dysuria, incontinence, urgency, or hematuria  Musculoskeletal: Negative for joint swelling, muscle pain, or injuries  Neurological/Psych: Positive for aphasia. Negative for confusion, seizures, headaches, balance issues or TIA-like symptoms.  No anxiety, depression, or insomnia    Physical Examination:  Vitals:    02/22/23 1028   BP:    Pulse: 86   Resp:    Temp:    SpO2:       In: -   Out: 1025    Wt Readings from Last 3 Encounters:   02/22/23 230 lb 11.2 oz (104.6 kg)   02/18/23 226 lb (102.5 kg)   02/09/23 232 lb 6.4 oz (105.4 kg)       Intake/Output Summary (Last 24 hours) at 2/22/2023 1130  Last data filed at 2/22/2023 0754  Gross per 24 hour   Intake --   Output 1025 ml   Net -1025 ml       Telemetry: Personally Reviewed  - Atrial fibrillation. Episodes of RVR when ambulating  Constitutional: Cooperative and in no apparent distress, and appears stated age  Skin: Warm and pink; no pallor, cyanosis, bruising, or clubbing  HEENT: Symmetric and normocephalic. PERRL, EOM intact. Conjunctiva pink with clear sclera. Mucus membranes pink and moist. Teeth intact. Thyroid smooth without nodules or goiter. Cardiovascular: Regular rate and irregular rhythm. S1/S2 present without murmurs, rubs, or gallops. Peripheral pulses 2+, capillary refill < 3 seconds. No elevation of JVP. No peripheral edema  Respiratory: Respirations symmetric and unlabored. Lungs clear to auscultation bilaterally, no wheezing, crackles, or rhonchi  Gastrointestinal: Abdomen soft and round. Bowel sounds normoactive in all quadrants without tenderness or masses. Musculoskeletal: Bilateral upper and lower extremity strength 5/5 with full ROM  Neurologic/Psych: Awake and orientated to person, place and time. Calm affect, appropriate mood. + Aphasic    Pertinent labs, diagnostic, device, and imaging results reviewed as a part of this visit    Labs:    BMP:   Recent Labs     02/20/23  0716 02/21/23  0532 02/22/23  0536   * 142 147*   K 4.3 4.0 3.7    107 114*   CO2 29 27 25   BUN 21* 26* 27*   CREATININE 1.6* 1.5* 1.6*     Estimated Creatinine Clearance: 58 mL/min (A) (based on SCr of 1.6 mg/dL (H)).    CBC:   Recent Labs     02/21/23  0532 02/22/23  0536   WBC 6.6 6.7   HGB 11.1* 10.4*   HCT 32.2* 30.7*   MCV 95.1 94.4    144     Thyroid: No results found for: TSH, [id number], E7BQPYE, THYROIDAB  Lipids:   Lab Results   Component Value Date/Time    CHOL 102 02/18/2023 06:31 AM    HDL 52 02/18/2023 06:31 AM    TRIG 60 02/18/2023 06:31 AM     LFTS:   Lab Results Component Value Date/Time    ALT 26 2023 02:05 AM    AST 33 2023 02:05 AM    ALKPHOS 70 2023 02:05 AM    PROT 6.4 2023 02:05 AM    AGRATIO 1.2 2023 02:05 AM    BILITOT 0.5 2023 02:05 AM     Cardiac Enzymes:   Lab Results   Component Value Date/Time    TROPONINI 0.05 2023 02:29 AM    TROPONINI 0.06 2023 07:59 PM    TROPONINI 0.07 2023 01:25 PM     Coags:   Lab Results   Component Value Date/Time    PROTIME 19.5 2023 05:36 AM    PROTIME 20.1 2023 12:38 PM    INR 1.65 2023 05:36 AM       EC23 (Personally Interpreted)   - Atrial fibrillation with RVR    ECHO:    Left ventricle is dilated. Overall, left ventricular systolic function is moderately depressed. Ejection fraction is visually estimated to be 30-35%. (Walters's 35%)   There is global hypokinesis, more severe in the inferior/inferoseptal walls. Diastolic dysfunction-indeterminate grade. Moderate, posteriorly directed mitral regurgitation. Moderate tricuspid regurgitation. PASP estimated at 40 mmHg. Biatrial enlargement. Echo:    Left ventricular cavity size is normal. Ejection fraction is visually estimated to be 25%. The basal anteroseptum, inferolateral, inferior,   apcial anterior, and mid inferoseptal walls appear hypokinetic. Left atrium is dilated. Right atrium is dilated. All valves are structurally normal.    Cath: 23  Dominance: Co   LM: normal   LAD: smaller caliber with 20% mid stenosis; 30% ostial second diagonal   LCx: luminal irregularities  RCA: normal   LVEDP: 3 mmHg  LVG not done  CKD     Impression/Recommendations:  Mild single vessel nonobstructive CAD. Nonischemic cardiomyopathy. ASA, statin, beta blocker. No ACEI/ARB/MRA/ARNI with CKD. Restart AC for AF. Limited echo (follow up LVEF on GDMT) prior to DC.      Problem List:   Patient Active Problem List    Diagnosis Date Noted    Syncope and collapse 2023    A-fib ([address] 02/18/2023    Precordial pain 02/18/2023    Stage 3 chronic kidney disease ([facility] [address] 02/18/2023    TIA involving basilar artery 02/18/2023    Dizziness 02/18/2023    Hypernatremia 12/24/2022    Cardiomyopathy ([address] 35/41/1057    Acute systolic heart failure ([address] 12/20/2022    Elevated troponin 12/18/2022    History of DVT (deep vein thrombosis) 12/18/2022    Acute renal failure ([address] 12/17/2022    Acute congestive heart failure ([address] 12/17/2022    Paroxysmal atrial fibrillation ([address] 12/17/2022    Atrial fibrillation with RVR ([address] 12/16/2022    Bilateral carotid artery stenosis 08/21/2020    History of stroke 01/10/2020    Controlled type 2 diabetes mellitus with diabetic polyneuropathy, without long-term current use of insulin ([address] 01/19/2018    Essential hypertension 10/20/2017    Right homonymous hemianopsia 07/25/2017    Hemiparesis affecting right side as late effect of cerebrovascular accident ([address] 07/25/2017    Mixed hyperlipidemia 06/21/2017    Long term current use of anticoagulant therapy 06/21/2017    Peripheral vascular disease ([address] 06/06/2017    CVA (cerebral vascular accident) ([address] 05/03/2017    Pulmonary embolism ([facility] [address] 05/03/2017    DVT (deep venous thrombosis) ([facility] [address] 05/03/2017        Assessment and Plan:     1. Persistent Atrial Fibrillation  - S/p KRISTIN/DCCV (12/22)    - Currently in AF. Episodes of RVR. Does not appear symptomatic  - Continue Toprol XL 50 mg BID  - I have discussed with patient side effects of amiodarone including but not limited to thyroid disease, discoloration of skin and cornea and pulmonary fibrosis. Patient would like to continue with it.     ~ Continue amiodarone 200 mg BID for 2 weeks, then reduce to 200 mg daily on March 4th   ~ Monitor for toxicity, LFTs/TSH normal    - [id number] score:high ; [id number] Vasc score and anticoagulation discussed. High risk for stroke and thromboembolism. Anticoagulation is recommended.  Risk of bleeding was discussed.  ~ On Coumadin with lovenox bridge. Will see if DOAC if his cost is not prohibitive as they are on a fixed income. Will have financial see them                          - Treatment options including cardioversion, rate control strategy, antiarrhythmics, anticoagulation and possible ablation were discussed with patient. Risks, benefits and alternative of each treatment options were explained. All questions answered                          ~ He has been in AF with episodes of RVR at times. He has been started on amiodarone loading this admission. Unfortunately, he has missed a few doses of lovenox and his INR is low, thus he would require KRISTIN/DCCV. Recommend loading for a few weeks, then considering outpatient cardioversion after he has been adequately anti-coagulated on Eliquis or his INR has been stable for 3-4 weeks     2. Chronic systolic heart failure (NYHA Class III)  - Appears compensated  - Continue with BB  ~ No ACE/ARB/ARNI due to CKD. Recommend starting when ok with nephro. Recommend SGLT2 inhibitor once dizzy spells improved  - Monitor I&O's, Daily weights    - His EF continues to worsen. He will likely need an AICD in near future if his EF does not improve. Briefly discussed this with his wife       3. CAD   - Mild non obstructive   - Continue ASA, BB, statin     4. SANDY on CKD              - Stable, near baseline              - Monitor closely     5. Dizziness   - Could be related to AF vs hypotension related   - No dizziness today and his BP/HR are stable    6. Hx of DVT/PE              - On coumadin     7. Hx of CVA   - Aphasic at baseline   - On ASA, coumadin, statin    He can be discharged from EP standpoint today. We can schedule a cardioversion in 4 weeks once his INR is stable and has been loaded with amiodarone. EP will sign off. Please call if there are any questions. Thank you for consult. All pertinent information and plan of care discussed with the EP physician.     All questions and concerns were addressed to the patient. Alternatives to my treatment were discussed. I have discussed the above stated plan with patient and the nurse. The patient verbalized understanding and agreed with the plan.     Thank you for allowing to us to participate in the care of [address]igman, APRN-CNP  [facility]   Office: [phone]

## 2023-02-22 NOTE — CARE COORDINATION
02/22/23 1430   IMM Letter   IMM Letter given to Patient/Family/Significant other/Guardian/POA/by: Second IMM given to patient by Lana Borden RN/CM   IMM Letter date given: 02/22/23   IMM Letter time given: 2021

## 2023-02-22 NOTE — PROGRESS NOTES
Physical/Occupational Therapy Note  René Fat    PT/OT orders received 2/21. Pt previously seen this admission and was independent with functional mobility (~300 ft without issue) and ADLs. They discharged him from therapy at that time. Per pt and RN he has been independent with ambulation since, no concerns for safety or activity tolerance. Will discontinue new therapy orders at this time. Please see PT/OT notes from 2/18/23 for all discharge recommendations. No charge.    Autumn Bryant, PT, DPT #[id number]  [facility], [address] [place] [address] Monie Correia, [address] [facility]

## 2023-02-22 NOTE — PROGRESS NOTES
Office : [phone]     Fax :[phone]       Nephrology progress  Note      Patient's Name: Robbie Rea  9:49 AM  2/22/2023          Requesting Physician:  KRISTYN Noel CNP      Chief Complaint:    Chief Complaint   Patient presents with    Dizziness     Pt arrived in the ED via [facility]  Pt was at home when he started feeling dizzy. Pt stated he had some chest pains and palpitations beforehand   Pt has extensive cardiac hx            History of Present iIlness:    Robbie Rea is a 74 y.o. male with prior history of CHF, HTN, Dm 2, CVA who was admitted for chest pain. Has baseline creat around 1. 6. he is planned for left heart cath Monday. Nephrology consulted for risk stratification to contrast exposure. Interval hx     Renal function stable   No SOB   No edema   I/O last 3 completed shifts: In: 569.2 [I.V.:569.2]  Out: 12 [Urine:1325]    Past Medical History:   Diagnosis Date    Cerebrovascular disease     CHF (congestive heart failure) ([facility] [address] 04/2017    per  nurse report but wife is unaware    DM (diabetes mellitus screen) 04/2017    boarder line    Hyperlipidemia     Hypertension     Type 2 diabetes mellitus without complication [facility])        Past Surgical History:   Procedure Laterality Date    VASECTOMY         Family History   Problem Relation Age of Onset    High Blood Pressure Mother     Cancer Mother         lung, smoker    Cancer Father         mesothelioma    Depression Sister         reports that he quit smoking about 5 years ago. His smoking use included cigarettes. He has a 80.00 pack-year smoking history. He quit smokeless tobacco use about 5 years ago.  He reports that he does not drink alcohol and does not use drugs. Allergies:  Patient has no known allergies.     Current Medications:    warfarin (COUMADIN) tablet 7.5 mg, Once  [START ON 2/23/2023] warfarin (COUMADIN) tablet 5 mg, Daily  sodium chloride flush 0.9 % injection 5-40 mL, 2 times per day  sodium chloride flush 0.9 % injection 5-40 mL, PRN  0.9 % sodium chloride infusion, PRN  acetaminophen (TYLENOL) tablet 650 mg, Q4H PRN  perflutren lipid microspheres (DEFINITY) injection 1.5 mL, ONCE PRN  enoxaparin (LOVENOX) injection 100 mg, BID  metoprolol succinate (TOPROL XL) extended release tablet 50 mg, BID  sodium chloride flush 0.9 % injection 10 mL, 2 times per day  sodium chloride flush 0.9 % injection 10 mL, PRN  0.9 % sodium chloride infusion, PRN  potassium chloride 10 mEq/100 mL IVPB (Peripheral Line), PRN  magnesium sulfate 2000 mg in 50 mL IVPB premix, PRN  promethazine (PHENERGAN) tablet 12.5 mg, Q6H PRN   Or  ondansetron (ZOFRAN) injection 4 mg, Q6H PRN  acetaminophen (TYLENOL) suppository 650 mg, Q6H PRN  polyethylene glycol (GLYCOLAX) packet 17 g, Daily PRN  aspirin EC tablet 81 mg, Daily  atorvastatin (LIPITOR) tablet 80 mg, Nightly  dextrose bolus 10% 125 mL, PRN   Or  dextrose bolus 10% 250 mL, PRN  glucagon (rDNA) injection 1 mg, PRN  dextrose 10 % infusion, Continuous PRN  insulin lispro (HUMALOG) injection vial 0-4 Units, TID WC  insulin lispro (HUMALOG) injection vial 0-4 Units, Nightly  amiodarone (CORDARONE) tablet 200 mg, BID          Physical exam:     Vitals:  /75   Pulse 82   Temp 97.9 °F (36.6 °C) (Oral)   Resp 18   Ht 6' 1\" (1.854 m)   Wt 230 lb 11.2 oz (104.6 kg)   SpO2 92%   BMI 30.44 kg/m²   Constitutional:  OAA X3 NAD  Skin: no rash, turgor wnl  Heent:  eomi, mmm  Neck: no bruits or jvd noted  Cardiovascular:  S1, S2 without m/r/g  Respiratory: CTA B without w/r/r  Abdomen:  +bs, soft, nt, nd  Ext: no  lower extremity edema      Labs:  CBC:   Recent Labs     02/21/23  0532 02/22/23  0536   WBC 6.6 6.7   HGB 11.1* 10.4*    144     BMP:    Recent Labs     02/20/23  0716 02/21/23  0532 02/22/23  0536   * 142 147*   K 4.3 4.0 3.7    107 114*   CO2 29 27 25   BUN 21* 26* 27*   CREATININE 1.6* 1.5* 1.6*   GLUCOSE 118* 111* 108*     Ca/Mg/Phos:   Recent Labs     02/20/23  0716 02/21/23  0532 02/22/23  0536   CALCIUM 9.4 8.7 8.8     Hepatic:   No results for input(s): AST, ALT, ALB, BILITOT, ALKPHOS in the last 72 hours. Troponin:   Recent Labs     02/20/23  1325 02/20/23  1959 02/21/23  0229   TROPONINI 0.07* 0.06* 0.05*     BNP: No results for input(s): BNP in the last 72 hours. Lipids:   No results for input(s): CHOL, TRIG, HDL, LDLCALC, LABVLDL in the last 72 hours. ABGs: No results for input(s): PHART, PO2ART, WEE4FBC in the last 72 hours. INR:   Recent Labs     02/20/23  1127 02/21/23  0532 02/22/23  0536   INR 1.91* 1.84* 1.65*     UA:  No results for input(s): [employer], GLUCOSEU, BILIRUBINUR, KETUA, SPECGRAV, BLOODU, PHUR, PROTEINU, UROBILINOGEN, NITRU, LEUKOCYTESUR, LABMICR, URINETYPE in the last 72 hours. Urine Microscopic:   No results for input(s): LABCAST, BACTERIA, COMU, HYALCAST, WBCUA, RBCUA, EPIU in the last 72 hours. Urine Culture: No results for input(s): LABURIN in the last 72 hours. Urine Chemistry: No results for input(s): Reilly Gale, PROTEINUR, NAUR in the last 72 hours. IMAGING:  MRI brain without contrast   Final Result   1. No acute intracranial abnormality. Specifically, no evidence of acute   infarct. 2. Chronic extensive left temporo-occipital encephalomalacia/gliosis. 3. Involutional parenchymal changes with mild microvascular ischemic disease. Vascular carotid duplex bilateral   Final Result      CT HEAD WO CONTRAST   Final Result   No acute intracranial abnormality identified. Sequela of large territory   infarct involving the left temporal and occipital lobes.          XR CHEST PORTABLE   Final Result   No acute airspace disease identified. Assessment/Plan :      1.  CKD 3 B  H/o DM 2  Risk for TOMMY is low  risk. Creat 1.5- 1.6     S/p LHC   Non obstructive CAD     Hold lasix   2. HTN  Controlled. 3. Anemia. Hb 11.1       4. Acid- base disorder. HCo3 25     5.  Electrolytes  Monitor     MRI negative for any acute CVA     [place] form nephrology standpoint       D/w primary team      Thank you for allowing us to participate in care of [facility]         Electronically signed by: Jonny Chaparro MD, 2/22/2023, 9:49 AM      [facility] of [address]  Office : [phone]  Fax :[phone]

## 2023-02-23 ENCOUNTER — TELEPHONE (OUTPATIENT)
Dept: FAMILY MEDICINE CLINIC | Age: 68
End: 2023-02-23

## 2023-02-23 NOTE — TELEPHONE ENCOUNTER
Care Transitions Initial Follow Up Call    Outreach made within 2 business days of discharge: Yes    Patient: Jevon Matthews Patient : 1955   MRN: [id number]  Reason for Admission: Syncope & Collapse, Afib  Discharge Date: 23       Spoke with: Kristi Nelsonnahid, the wife    Discharge department/facility: [facility]    TCM Interactive Patient Contact:  Was patient able to fill all prescriptions: Yes  Was patient instructed to bring all medications to the follow-up visit: No: Does not want appointment  Is patient taking all medications as directed in the discharge summary?  Yes  Does patient understand their discharge instructions: Yes  Does patient have questions or concerns that need addressed prior to 7-14 day follow up office visit: no, does not want appointment at this time, being followed by many specialties    Scheduled appointment with PCP within 7-14 days    Follow Up  Future Appointments   Date Time Provider Brett Poole   2023  1:30 PM KRISTYN Garduno - CNS FF Cardio MMA   3/24/2023 10:30 AM [facility] CARDIAC CATH LAB PRE/POST ROOM [facility] CATH [facility]   2023  1:15 PM Tom Jimenez MD [facility] AFL Nephrolo   2023  8:00 AM Kwesi Strickland MD FF Cardio MMA   2023 10:30 AM KRISTYN Zabala - CNP [address]       Erin Roberts LPN

## 2023-02-24 ENCOUNTER — CARE COORDINATION (OUTPATIENT)
Dept: CARE COORDINATION | Age: 68
End: 2023-02-24

## 2023-02-24 NOTE — CARE COORDINATION
ACM made outreach to patient for Care Management follow up after recent IP visit. [facility] waiting for return call.

## 2023-02-27 RX ORDER — METOPROLOL SUCCINATE 50 MG/1
50 TABLET, EXTENDED RELEASE ORAL DAILY
Qty: 30 TABLET | Refills: 1 | Status: SHIPPED | OUTPATIENT
Start: 2023-02-27

## 2023-02-27 NOTE — TELEPHONE ENCOUNTER
metoprolol succinate (TOPROL XL) 50 MG extended release tablet [[id number]]     Order Details  Dose: 50 mg Route: Oral Frequency: DAILY   Dispense Quantity: 30 tablet Refills: 1          Sig: Take 1 tablet by mouth daily   [address] [place], [address] [place] [address], [address] [place] [address]   Phone:  [phone]  Fax:  [phone]    Pt states that Jim Kimball agreed to continue prescribing this for him.  Original prescription from [facility]

## 2023-02-27 NOTE — TELEPHONE ENCOUNTER
Medication:   Requested Prescriptions     Pending Prescriptions Disp Refills    metoprolol succinate (TOPROL XL) 50 MG extended release tablet 30 tablet 1     Sig: Take 1 tablet by mouth daily      Last Filled:      Patient Phone Number: [phone] (home)     Last appt: 2/9/2023   Next appt: 5/11/2023    Last OARRS: No flowsheet data found.   PDMP Monitoring:    Last PDMP [facility] as Reviewed [facility]):  Review User Review Instant Review Result          [facility] [address]  Phone: [phone] Fax: [phone]

## 2023-02-28 ENCOUNTER — OFFICE VISIT (OUTPATIENT)
Dept: CARDIOLOGY CLINIC | Age: 68
End: 2023-02-28
Payer: MEDICARE

## 2023-02-28 VITALS
HEIGHT: 72 IN | WEIGHT: 237 LBS | DIASTOLIC BLOOD PRESSURE: 60 MMHG | HEART RATE: 71 BPM | SYSTOLIC BLOOD PRESSURE: 100 MMHG | BODY MASS INDEX: 32.1 KG/M2 | OXYGEN SATURATION: 97 %

## 2023-02-28 DIAGNOSIS — I48.91 ATRIAL FIBRILLATION STATUS POST CARDIOVERSION (HCC): ICD-10-CM

## 2023-02-28 DIAGNOSIS — I50.22 CHRONIC SYSTOLIC HEART FAILURE (HCC): Primary | ICD-10-CM

## 2023-02-28 DIAGNOSIS — N28.9 RENAL INSUFFICIENCY: ICD-10-CM

## 2023-02-28 DIAGNOSIS — E87.0 HYPERNATREMIA: ICD-10-CM

## 2023-02-28 DIAGNOSIS — I95.9 HYPOTENSION, UNSPECIFIED HYPOTENSION TYPE: ICD-10-CM

## 2023-02-28 PROCEDURE — 3017F COLORECTAL CA SCREEN DOC REV: CPT | Performed by: CLINICAL NURSE SPECIALIST

## 2023-02-28 PROCEDURE — 1123F ACP DISCUSS/DSCN MKR DOCD: CPT | Performed by: CLINICAL NURSE SPECIALIST

## 2023-02-28 PROCEDURE — 1111F DSCHRG MED/CURRENT MED MERGE: CPT | Performed by: CLINICAL NURSE SPECIALIST

## 2023-02-28 PROCEDURE — G8427 DOCREV CUR MEDS BY ELIG CLIN: HCPCS | Performed by: CLINICAL NURSE SPECIALIST

## 2023-02-28 PROCEDURE — 1036F TOBACCO NON-USER: CPT | Performed by: CLINICAL NURSE SPECIALIST

## 2023-02-28 PROCEDURE — 3078F DIAST BP <80 MM HG: CPT | Performed by: CLINICAL NURSE SPECIALIST

## 2023-02-28 PROCEDURE — 3074F SYST BP LT 130 MM HG: CPT | Performed by: CLINICAL NURSE SPECIALIST

## 2023-02-28 PROCEDURE — G8417 CALC BMI ABV UP PARAM F/U: HCPCS | Performed by: CLINICAL NURSE SPECIALIST

## 2023-02-28 PROCEDURE — 99214 OFFICE O/P EST MOD 30 MIN: CPT | Performed by: CLINICAL NURSE SPECIALIST

## 2023-02-28 PROCEDURE — G8484 FLU IMMUNIZE NO ADMIN: HCPCS | Performed by: CLINICAL NURSE SPECIALIST

## 2023-02-28 RX ORDER — VITAMIN B COMPLEX
1 CAPSULE ORAL DAILY
COMMUNITY

## 2023-02-28 NOTE — PROGRESS NOTES
[address]  Progress Note    Primary Care Doctor:  Giovani Huang, KRISTYN - CNP    Chief Complaint   Patient presents with    Follow-up    Congestive Heart Failure        History of Present Illness:  74 y.o. male with history of DVT/PE, DM, hypertension, hyperlipidemia, CVA, IVC filter      HFpEF admitted with edema and SOB. New onset AF and had DCCV yesterday. Echo with low EF and pt had positive ST today, will need LHC. HF meds started and pt has diuresed 22L, Cr has been elevated this week and entresto and diuretics are on hold   12/16-26/2022 for SANDY, hypernatremia (155), AF with CV on warfarin (patient had been on this in past for CVA). Stress test abnormal and will need LHC done once creat improves. He was diuresed 280-244    I had the pleasure of seeing Ruddy Nam in follow up for systolic heart failure. He is ambulatory and with his wife. He was in the hospital 2/17-22/23 for dizziness with persistent AF (plans for CV in 3-4 weeks), LHC done no intervention and recheck echo with LVEF of 25%. His weight is down to 222 at home. He is taking all his medications including midodrine. He is not sure he will do cardiac rehab. Past Medical History:   has a past medical history of Cerebrovascular disease, CHF (congestive heart failure) ([address] DM (diabetes mellitus screen), Hyperlipidemia, Hypertension, and Type 2 diabetes mellitus without complication ([address] Surgical History:   has a past surgical history that includes Vasectomy. Social History:   reports that he quit smoking about 5 years ago. His smoking use included cigarettes. He has a 80.00 pack-year smoking history. He quit smokeless tobacco use about 5 years ago. He reports that he does not drink alcohol and does not use drugs.    Family History:   Family History   Problem Relation Age of Onset    High Blood Pressure Mother     Cancer Mother         lung, smoker    Cancer Father         mesothelioma    Depression Sister Home Medications:  Prior to Admission medications    Medication Sig Start Date End Date Taking? Authorizing Provider   b complex vitamins capsule Take 1 capsule by mouth daily   Yes Historical Provider, MD   metoprolol succinate (TOPROL XL) 50 MG extended release tablet Take 1 tablet by mouth daily 2/27/23  Yes Kristene Gilford, APRN - CNP   aspirin 81 MG EC tablet Take 1 tablet by mouth daily 2/22/23  Yes KRISTYN Yanez CNP   atorvastatin (LIPITOR) 80 MG tablet Take 1 tablet by mouth nightly 2/22/23  Yes KRISTYN Yanez CNP   amiodarone (CORDARONE) 200 MG tablet Take 200 mg BID until March 4th then decrease to 200 mg daily 2/22/23  Yes KRISTYN Yanez CNP   apixaban (ELIQUIS) 5 MG TABS tablet Take 1 tablet by mouth 2 times daily 2/22/23  Yes KIRSTYN Yanez CNP   mineral oil-hydrophilic petrolatum (HYDROPHOR) ointment Apply topically as needed three times per day.  2/9/23  Yes Kristene Gilford, APRN - CNP   midodrine (PROAMATINE) 5 MG tablet Take 1 tablet by mouth 3 times daily  Patient taking differently: Take 5 mg by mouth as needed (for systolic b/p under 850) 19/09/19  Yes KRISTYN Molina   tamsulosin (FLOMAX) 0.4 MG capsule Take 1 capsule by mouth daily 12/23/22  Yes Hank Elias MD   finasteride (PROSCAR) 5 MG tablet Take 1 tablet by mouth daily 12/21/22  Yes Hank Elias MD   Blood Pressure KIT 1 kit by Does not apply route daily 9/2/22  Yes Kristene Gilford, APRN - CNP   metFORMIN (GLUCOPHAGE) 500 MG tablet TAKE 1 TABLET BY MOUTH 2 TIMES A DAY WITH MEALS 8/30/22  Yes Amisha Hare MD   blood glucose monitor strips 1 strip by Other route daily True Metric Test strips 5/27/22  Yes Kristene Gilford, APRN - CNP   Lancets MISC True Metric Lancets 5/27/22  Yes Kristene Gilford, APRN - CNP   Blood Glucose Monitoring Suppl AGUSTO 1 kit by Does not apply route daily True Metric testing device 12/21/17  Yes Clay Stern MD   Methylsulfonylmethane (MSM PO) Take by mouth   Yes Historical Provider, MD   Prenatal MV-Min-Fe Fum-FA-DHA (PRENATAL 1 PO) Take by mouth   Yes Historical Provider, MD        Allergies:  Patient has no known allergies. Review of Systems:   Constitutional: there has been no unanticipated weight loss. There's been no change in energy level, sleep pattern, or activity level. Eyes: No visual changes or diplopia. No scleral icterus. ENT: No Headaches, hearing loss or vertigo. No mouth sores or sore throat. Cardiovascular: Reviewed in HPI  Respiratory: No cough or wheezing, no sputum production. No hematemesis. Gastrointestinal: No abdominal pain, appetite loss, blood in stools. No change in bowel or bladder habits. Genitourinary: No dysuria, trouble voiding, or hematuria. Musculoskeletal:  No gait disturbance, weakness or joint complaints. Integumentary: No rash or pruritis. Neurological: No headache, diplopia, change in muscle strength, numbness or tingling. No change in gait, balance, coordination, mood, affect, memory, mentation, behavior. Psychiatric: No anxiety, no depression. Endocrine: No malaise, fatigue or temperature intolerance. No excessive thirst, fluid intake, or urination. No tremor. Hematologic/Lymphatic: No abnormal bruising or bleeding, blood clots or swollen lymph nodes. Allergic/Immunologic: No nasal congestion or hives. Physical Examination:    Vitals:    02/28/23 1322   BP: 100/60   Site: Right Upper Arm   Position: Sitting   Cuff Size: Medium Adult   Pulse: 71   SpO2: 97%   Weight: 237 lb (107.5 kg)   Height: 6' (1.829 m)          Constitutional and General Appearance: Warm and dry, no apparent distress, normal coloration  HEENT:  Normocephalic, atraumatic  Respiratory:  Normal excursion and expansion without use of accessory muscles  Resp Auscultation: Normal breath sounds without dullness  Cardiovascular:   The apical impulses not displaced  Heart tones are crisp and normal  JVP normal cm H2O  Regular rate and rhythm  Peripheral pulses are symmetrical and full  There is no clubbing, cyanosis of the extremities.   Bilateral ankle edema; chronic  Pedal Pulses: 2+ and equal   Abdomen: umbilical hernia  No masses or tenderness  Liver/Spleen: No Abnormalities Noted  Neurological/Psychiatric:  Alert and oriented in all spheres  Moves all extremities well  Exhibits normal gait balance and coordination  No abnormalities of mood, affect, memory, mentation, or behavior are noted    Lab Data:    CBC:   Lab Results   Component Value Date/Time    WBC 6.7 02/22/2023 05:36 AM    WBC 6.6 02/21/2023 05:32 AM    WBC 6.8 02/19/2023 02:05 AM    RBC 3.25 02/22/2023 05:36 AM    RBC 3.39 02/21/2023 05:32 AM    RBC 3.67 02/19/2023 02:05 AM    HGB 10.4 02/22/2023 05:36 AM    HGB 11.1 02/21/2023 05:32 AM    HGB 11.6 02/19/2023 02:05 AM    HCT 30.7 02/22/2023 05:36 AM    HCT 32.2 02/21/2023 05:32 AM    HCT 35.0 02/19/2023 02:05 AM    MCV 94.4 02/22/2023 05:36 AM    MCV 95.1 02/21/2023 05:32 AM    MCV 95.2 02/19/2023 02:05 AM    RDW 13.5 02/22/2023 05:36 AM    RDW 13.3 02/21/2023 05:32 AM    RDW 13.4 02/19/2023 02:05 AM     02/22/2023 05:36 AM     02/21/2023 05:32 AM     02/19/2023 02:05 AM     BMP:  Lab Results   Component Value Date/Time     02/22/2023 05:36 AM     02/21/2023 05:32 AM     02/20/2023 07:16 AM    K 3.7 02/22/2023 05:36 AM    K 4.0 02/21/2023 05:32 AM    K 4.3 02/20/2023 07:16 AM    K 4.5 02/19/2023 02:05 AM    K 4.6 02/18/2023 06:31 AM     02/22/2023 05:36 AM     02/21/2023 05:32 AM     02/20/2023 07:16 AM    CO2 25 02/22/2023 05:36 AM    CO2 27 02/21/2023 05:32 AM    CO2 29 02/20/2023 07:16 AM    BUN 27 02/22/2023 05:36 AM    BUN 26 02/21/2023 05:32 AM    BUN 21 02/20/2023 07:16 AM    CREATININE 1.6 02/22/2023 05:36 AM    CREATININE 1.5 02/21/2023 05:32 AM    CREATININE 1.6 02/20/2023 07:16 AM     BNP:   Lab Results   Component Value Date/Time    PROBNP 2,415 02/18/2023 01:03 AM    PROBNP 20,155 12/16/2022 04:02 PM     Cardiac Imaging:  Limited echo 2/21/2023  This was a limited study for NICM, LVEF. Definity was administered.  Left ventricular cavity size is normal. Ejection fraction is visually estimated to be 25 %. The basal anteroseptum, inferolateral, inferior,  apcial anterior, and mid inferoseptal walls appear hypokinetic.  Left atrium is dilated.  Right atrium is dilated.  All valves are structurally normal.    [facility] 2/21/2023:  Dr Mirza  Dominance: Co      LM: normal   LAD: smaller caliber with 20% mid stenosis; 30% ostial second diagonal   LCx: luminal irregularities  RCA: normal      LVEDP: 3 mmHg  LVG not done 2/2 CKD     Mild single vessel nonobstructive CAD.  Nonischemic cardiomyopathy.  ASA, statin, beta blocker.   No ACEI/ARB/MRA/ARNI with CKD.  Restart AC for AF.   Limited echo (follow up LVEF on GDMT) prior to DC    ST: 12/21/22:   Summary    The overall quality of the study is good. There is subdiaphragmatic    attenuation.        Left ventricular cavity size is severely dilated. RIght ventricle is normal    in size.        Spect imaging demonstrates a small area of mildly decreased perfusion in the    apex. The defect is present at rest and stress, consistent with infarct.    There is an additional medium size defect of moderate intensity in the mid    anterior and mid anteroseptum. The defect is present at rest and stress,    consistent with infarct.        Sum stress score of 8. No visual TID. Calculated TID of 1.01.        Left ventricular ejection fraction is severely reduced at 25%.        Sensitivity of testing is reduced on amlodipine.        **Myocardial perfusion is abnormal. Fixed defect. Moderate-to-high risk    scan. Abnormal baseline EKG.**      Echo 12/19/22  Personally revieweed  Left ventricle is dilated.  Overall, left ventricular systolic function is moderately depressed.  Ejection fraction is visually estimated to be 30-35%.  (Walters's 35%)  There is global hypokinesis, more severe in the inferior/inferoseptal walls. Diastolic dysfunction-indeterminate grade. Moderate, posteriorly directed mitral regurgitation. Moderate tricuspid regurgitation. PASP estimated at 40 mmHg. Biatrial enlargement. Assessment:    1. Systolic heart failure (HCC) on BB; no ace/sglt/aldosterone antagonist due to renal/hypotension   2. Atrial fibrillation status post cardioversion [facility]) on coumadin managed by PCP due to CVA in past   3. Hypernatremia remain off lasix   4. Renal insufficiency Dr Rah Morales   5.  Hypotension       Plan:   Patient Instructions   Cardiac rehab referral  Continue all current medications  RTO in 3 months  Blood work prior to appt    NYHA IV    I appreciate the opportunity of cooperating in the care of this individual.    KRISTYN Lynch - CNS, CNS, 2/28/2023, 4:40 PM

## 2023-03-03 ENCOUNTER — CARE COORDINATION (OUTPATIENT)
Dept: CARE COORDINATION | Age: 68
End: 2023-03-03

## 2023-03-03 NOTE — CARE COORDINATION
ACC: Cici Mariano RN    Care Coordination Interventions    Referral from Primary Care Provider: Yes  Suggested Interventions and Community Resources  Diabetes Education: In Process  Fall Risk Prevention: In Process  Senior Services:  [facility]  Social Work: In Process  Zone Management Tools: In Process  Other Services or Interventions: Help with ACP documents         , Ambulatory Care Coordination Note  3/3/2023    Patient Current Location:  Home: [address] [place] [address]     ACM contacted the family by telephone. Verified name and  with family as identifiers. Provided introduction to self, and explanation of the ACM role. Challenges to be reviewed by the provider   Additional needs identified to be addressed with provider: No  none               Method of communication with provider: none. ACM: Cici Mariano RN    ACM spoke with patient's wife Hiram Canavan. Per David Leachavan patient is doing well at home managing his chronic health conditions. He is keeping all recommended appointments, scheduling follow ups, compliant with medications and monitoring for s/sx of concern. He is aware of what to report to provider vs when to return to ED. Patient is working with Kirk Escoto with [facility] for financial assistance with medical care needs. Per David Leachavan at this time her only need is assistance with ACP documents. She has requested referral sent to Emigdio Crawford for assistance. ACM will send referral today. Offered patient enrollment in the Remote Patient Monitoring (RPM) program for in-home monitoring: Patient declined. Lab Results       None            Care Coordination Interventions    Referral from Primary Care Provider: Yes  Suggested Interventions and Community Resources  Diabetes Education: In Process  Fall Risk Prevention: In Process  Senior Services:  [facility]  Social Work: In Process  Zone Management Tools:  In Process  Other Services or Interventions: Help with ACP documents Goals Addressed    None         Future Appointments   Date Time Provider Brett Poole   3/24/2023 10:30 AM [facility] CARDIAC CATH LAB PRE/POST ROOM [facility] CATH [facility]   4/11/2023  1:15 PM Candice Monroy MD [facility] AFL Nephrolo   4/25/2023  8:00 AM Donna Dukes MD FF Cardio MMA   5/11/2023 10:30 AM Romina Mcconnell APRN - CNP [address]   5/31/2023  2:00 PM KRISTYN Kelley - CNS FF Cardio MMA   ,   General Assessment    Do you have any symptoms that are causing concern?: No     , and Care Coordination Episodes    Type: Amb Care Management  Episode: Rising Risk  Noted: 2/10/2023

## 2023-03-03 NOTE — CARE COORDINATION
Prior to sending this referral, please add both [facility] Social Workers to the patient's care team. The referrals are identified through the care team assignment. Please complete the questions below, and/or provide a narrative description of the identified patient need below and include this smart phrase in your patient encounter.     -This patient is referred this date to [facility] for review, assessment, and consultation as needed regarding the following presenting concern(s). Please bold all that apply. No    [facility] assessment of patient's substance use disorder, if any, is indicated and requested. No   Patient has concerns about apparently deteriorating mental status. No   Patient desires assistance with locating an accessible behavioral health treatment provider. No   Patient has questions/concerns regarding application and/or eligibility for Medicaid. No   Patient has questions/concerns regarding application and/or eligibility for a Medicaid Waiver program ([place], Home Care Waiver, Assisted Living Waiver, etc.). No   Patient has questions/concerns about the cost of prescription drugs. No   Patient has questions/concerns regarding Medicare coverage, Including Part D prescription drug coverage. No   Patient desires supportive services in the home regarding activities of daily living (homemaking, transferring, bathing, toileting, transportation, shopping, etc.). No Patient desires information about long-term care in a skilled nursing facility (SNF). No   Patient has inadequate and/or unaffordable housing. No   Patient desires assistance with smoking cessation (Note: select this option only if patient is reasonably unable to participate in smoking cessation support groups offered periodically at [facility]). Yes   Patient desires information about advance directives (Power of  [place], Living Will, DNR, Guardianship, etc.).     Please provide additional information if needed: (additional needs, household size, monthly income, source of income)  When calling please speak with patient's wife. Patient is unable to speak on phone due to past CVA. Patient's wife is requesting a call to answer questions regarding POA paperwork. She has not started the process and has many questions to address.

## 2023-03-06 ENCOUNTER — TELEPHONE (OUTPATIENT)
Dept: CARE COORDINATION | Age: 68
End: 2023-03-06

## 2023-03-06 NOTE — TELEPHONE ENCOUNTER
SW received referral for AD's. Placed outreach call to spouse and she prefers forms be mailed to her. SW confirmed address and advised it will be mailed out tomorrow. Will send request to [employer] on this date. She does not request a follow-up call to assist in completion of forms. Provided her with this worker's contact number should any questions arise.     Sunil BHAKTA, [facility]     [phone]

## 2023-03-08 ENCOUNTER — TELEPHONE (OUTPATIENT)
Dept: FAMILY MEDICINE CLINIC | Age: 68
End: 2023-03-08

## 2023-03-08 NOTE — LETTER
March 8, 2023       Guille Daugherty YOB: 1955   [facility] Date of Visit:  3/8/2023       To Whom It May Concern: It is my medical opinion that Guille Daugherty requires a disability parking placard for the following reasons:  He cannot walk 200 feet without stopping to rest.  Duration of need: 5 years    If you have any questions or concerns, please don't hesitate to call.     Sincerely,        KRISTYN Mcgarry - CNP

## 2023-03-08 NOTE — TELEPHONE ENCOUNTER
Pt was in hospital end of Feb 2022. Hospital changed atorvastin from 40mg to 80mg and pt's wife would like to know why. No longer taking Warfin. Taking Eliquis instead. Would like to know when blood tests start and how often they will occur.     You can reach wife today outside the hours of 1pm and 230pm.    [phone]

## 2023-03-08 NOTE — TELEPHONE ENCOUNTER
Not sure why they increased - may consider asking Cardio.   LDL was at goal in hospital. Alert and oriented to person, place, time/situation. normal mood and affect. no apparent risk to self or others.

## 2023-03-17 ENCOUNTER — CARE COORDINATION (OUTPATIENT)
Dept: CARE COORDINATION | Age: 68
End: 2023-03-17

## 2023-03-17 NOTE — CARE COORDINATION
Ambulatory Care Coordination Note  3/17/2023    Patient Current Location:  Home: [address] [place] [address]     ACM contacted the family by telephone. Verified name and  with family as identifiers. Provided introduction to self, and explanation of the ACM role. Challenges to be reviewed by the provider   Additional needs identified to be addressed with provider: No  none               Method of communication with provider: none. ACM: Rebecca Webster RN    ACM made outreach to patient and spoke with his wife [employer]. [employer] advised ACM that she has received resources from [employer], Juan José IZAGUIRRE Person and no further assistance is needed from [employer]. Patient and spouse are working with [employer] for financial assistance with [facility]. ACM encouraged future outreach with any future CM needs. Offered patient enrollment in the Remote Patient Monitoring (RPM) program for in-home monitoring: NA. Lab Results       None            Care Coordination Interventions    Referral from Primary Care Provider: Yes  Suggested Interventions and Community Resources  Diabetes Education: Completed  Fall Risk Prevention: Completed  Senior Services:  [facility]  Social Work: In Process  Zone Management Tools: Completed  Other Services or Interventions: Help with ACP documents          Goals Addressed                   This Visit's Progress     COMPLETED: Community Resource Goal        I will complete referral to community agency [employer] [facility] (Senior Services) and  for assistance.      Barriers: overwhelmed by complexity of regimen, stress, and lack of education  Plan for overcoming my barriers: will work with [employer] and PCP   Confidence: 8/10  Anticipated Goal Completion Date: 5/10/2023    Declined working with [address]  Will work towards [facility] completion with     Patient's wife [employer] has declined services with [employer], she has received resources from [address] and is currently working with [employer] with [address] [place] [address] Appointments   Date Time Provider Brett Poole   3/24/2023 10:30 AM [facility] CARDIAC CATH LAB PRE/POST ROOM [facility] CATH [facility]   4/11/2023  1:15 PM Diony Anne MD [facility] FF AFL [place]   4/26/2023 11:00 AM Hudson Escobar MD FF Cardio MMA   5/11/2023 10:30 AM Apryl Kelly, APRN - CNP [id number] - DYD   5/31/2023  2:00 PM Pamela Nicholson APRN - CNS FF Cardio MMA   ,   General Assessment    Do you have any symptoms that are causing concern?: No     , and Care Coordination Episodes    Type: Amb Care Management  Episode: Rising Risk  Noted: 2/10/2023

## 2023-03-20 PROBLEM — R79.89 ELEVATED TROPONIN: Status: RESOLVED | Noted: 2022-12-18 | Resolved: 2023-03-20

## 2023-03-20 PROBLEM — R77.8 ELEVATED TROPONIN: Status: RESOLVED | Noted: 2022-12-18 | Resolved: 2023-03-20

## 2023-03-24 ENCOUNTER — HOSPITAL ENCOUNTER (OUTPATIENT)
Dept: CARDIAC CATH/INVASIVE PROCEDURES | Age: 68
Discharge: HOME | End: 2023-03-24
Attending: INTERNAL MEDICINE | Admitting: INTERNAL MEDICINE
Payer: MEDICARE

## 2023-03-24 VITALS
HEART RATE: 73 BPM | WEIGHT: 230 LBS | SYSTOLIC BLOOD PRESSURE: 135 MMHG | RESPIRATION RATE: 16 BRPM | DIASTOLIC BLOOD PRESSURE: 79 MMHG | BODY MASS INDEX: 30.48 KG/M2 | HEIGHT: 73 IN

## 2023-03-24 LAB
EKG ATRIAL RATE: 68 BPM
EKG ATRIAL RATE: 73 BPM
EKG DIAGNOSIS: NORMAL
EKG DIAGNOSIS: NORMAL
EKG Q-T INTERVAL: 410 MS
EKG Q-T INTERVAL: 430 MS
EKG QRS DURATION: 84 MS
EKG QRS DURATION: 90 MS
EKG QTC CALCULATION (BAZETT): 436 MS
EKG QTC CALCULATION (BAZETT): 451 MS
EKG R AXIS: 15 DEGREES
EKG R AXIS: 17 DEGREES
EKG T AXIS: 70 DEGREES
EKG T AXIS: 78 DEGREES
EKG VENTRICULAR RATE: 62 BPM
EKG VENTRICULAR RATE: 73 BPM

## 2023-03-24 PROCEDURE — 93010 ELECTROCARDIOGRAM REPORT: CPT | Performed by: INTERNAL MEDICINE

## 2023-03-24 PROCEDURE — 93005 ELECTROCARDIOGRAM TRACING: CPT | Performed by: INTERNAL MEDICINE

## 2023-03-24 PROCEDURE — 7100000010 HC PHASE II RECOVERY - FIRST 15 MIN

## 2023-03-24 PROCEDURE — 92960 CARDIOVERSION ELECTRIC EXT: CPT

## 2023-03-24 PROCEDURE — 2500000003 HC RX 250 WO HCPCS

## 2023-03-24 PROCEDURE — 92960 CARDIOVERSION ELECTRIC EXT: CPT | Performed by: INTERNAL MEDICINE

## 2023-03-24 RX ORDER — SODIUM CHLORIDE 0.9 % (FLUSH) 0.9 %
5-40 SYRINGE (ML) INJECTION PRN
Status: DISCONTINUED | OUTPATIENT
Start: 2023-03-24 | End: 2023-03-24 | Stop reason: HOSPADM

## 2023-03-24 NOTE — H&P
[address]   Electrophysiology Progress Note     Date: 3/24/2023  Admit Date: 3/24/2023     Reason for consultation: Atrial fibrillation    History of Present Illness: History obtained from patient and medical record. Angel Vegas is a 74 y.o. 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 Hx of IVC filter. S/p KRISTIN/DCCV (12/22)    Pt presented to hospital with chest pain, palpitations, and dizziness. He is in AF. He has episodes of RVR when up walking. His BP is controlled. I called and spoke to his wife. She has great concerns about her  and their financial issues. Patient seen and examined. Clinical notes reviewed. Telemetry reviewed. No new complaints today. No major events overnight. Denies having chest pain, palpitations, shortness of breath, orthopnea/PND, cough, or dizziness at the time of this visit. Allergies:  No Known Allergies    Home Meds:  Prior to Visit Medications    Medication Sig Taking? Authorizing Provider   b complex vitamins capsule Take 1 capsule by mouth daily  Historical Provider, MD   metoprolol succinate (TOPROL XL) 50 MG extended release tablet Take 1 tablet by mouth daily  KRISTYN Rivera CNP   aspirin 81 MG EC tablet Take 1 tablet by mouth daily  KRISTYN Hall CNP   atorvastatin (LIPITOR) 80 MG tablet Take 1 tablet by mouth nightly  KRISTYN Hall CNP   amiodarone (CORDARONE) 200 MG tablet Take 200 mg BID until March 4th then decrease to 200 mg daily  KRISTYN Hlal CNP   apixaban (ELIQUIS) 5 MG TABS tablet Take 1 tablet by mouth 2 times daily  KRISTYN Hall CNP   mineral oil-hydrophilic petrolatum (HYDROPHOR) ointment Apply topically as needed three times per day.   KRISTYN Rivera CNP   midodrine (PROAMATINE) 5 MG tablet Take 1 tablet by mouth 3 times daily  Patient taking differently: Take 5 mg by mouth as needed (for systolic b/p under like to continue with it.     ~ Continue amiodarone 200 mg BID for 2 weeks, then reduce to 200 mg daily on March 4th   ~ Monitor for toxicity, LFTs/TSH normal    - [id number] score:high ; [id number] Vasc score and anticoagulation discussed. High risk for stroke and thromboembolism. Anticoagulation is recommended. Risk of bleeding was discussed.  ~ On Coumadin with lovenox bridge. Will see if DOAC if his cost is not prohibitive as they are on a fixed income. Will have financial see them                          - Treatment options including cardioversion, rate control strategy, antiarrhythmics, anticoagulation and possible ablation were discussed with patient. Risks, benefits and alternative of each treatment options were explained. All questions answered                Cardioversion discussed. Risks, benefits and alternative of procedure were explained. All questions answered. Patient understood and would like to proceed. 2. Chronic systolic heart failure (NYHA Class III)  - Appears compensated  - Continue with BB  ~ No ACE/ARB/ARNI due to CKD. Recommend starting when ok with nephro. Recommend SGLT2 inhibitor once dizzy spells improved  - Monitor I&O's, Daily weights    - His EF continues to worsen. He will likely need an AICD in near future if his EF does not improve. Briefly discussed this with his wife       3. CAD   - Mild non obstructive   - Continue ASA, BB, statin     4. SANDY on CKD              - Stable, near baseline              - Monitor closely     5. Dizziness   - Could be related to AF vs hypotension related   - No dizziness today and his BP/HR are stable    6. Hx of DVT/PE              - On coumadin     7. Hx of CVA   - Aphasic at baseline   - On ASA, coumadin, statin    All questions and concerns were addressed to the patient. Alternatives to my treatment were discussed. I have discussed the above stated plan with patient and the nurse.  The patient verbalized understanding and agreed with the plan.    Thank you for allowing to us to participate in the care of [facility], MD, MPH  [facility]   Office: [phone]  Fax: [phone]      H&P Update    I have reviewed the history and physical and examined the patient and updated with relevant changes. Consent: I have discussed with the patient and/or the patient representative the indication, alternatives, and the possible risks and/or complications of the planned procedure and the anesthesia methods. The patient and/or patient representative appear to understand and agree to proceed. Vitals:    03/24/23 0935   BP: 135/79   Pulse: 73   Resp: 16     Prior to Admission medications    Medication Sig Start Date End Date Taking? Authorizing Provider   b complex vitamins capsule Take 1 capsule by mouth daily    Historical Provider, MD   metoprolol succinate (TOPROL XL) 50 MG extended release tablet Take 1 tablet by mouth daily 2/27/23   KRISTYN Medina - CNP   aspirin 81 MG EC tablet Take 1 tablet by mouth daily 2/22/23   KRISTYN Arreguin CNP   atorvastatin (LIPITOR) 80 MG tablet Take 1 tablet by mouth nightly 2/22/23   KRISTYN Arreguin - CNP   amiodarone (CORDARONE) 200 MG tablet Take 200 mg BID until March 4th then decrease to 200 mg daily 2/22/23   KRISTYN Arreguin CNP   apixaban (ELIQUIS) 5 MG TABS tablet Take 1 tablet by mouth 2 times daily 2/22/23   KRISTYN Arreguin - CNP   mineral oil-hydrophilic petrolatum (HYDROPHOR) ointment Apply topically as needed three times per day.  2/9/23   KRISTYN Medina CNP   midodrine (PROAMATINE) 5 MG tablet Take 1 tablet by mouth 3 times daily  Patient taking differently: Take 5 mg by mouth as needed (for systolic b/p under 747) 72/97/02   KRISTYN Quevedo - CNS   tamsulosin (FLOMAX) 0.4 MG capsule Take 1 capsule by mouth daily 12/23/22   Bree Young MD   finasteride (PROSCAR) 5 MG tablet Take 1 tablet by mouth daily

## 2023-03-24 NOTE — PROCEDURES
[facility]     Electrophysiology Procedure Note       Date of Procedure: 3/24/2023  Patient's Name: Hannah Briones  YOB: 1955   Medical Record Number: [id number]  Procedure Performed by: Federica Padilla MD    Procedures performed:    External Electrical cardioversion   IV sedation. Start time: 11:15 AM  Stop time: 11:20 AM    Medication: Brevital Sodium   Sedation medication was given by physician     Indication of the procedure: Persistent atrial fibrillation     Details of procedure: The patient was brought to the cath lab area in a fasting and non-sedated state. The risks, benefits and alternatives of the procedure were discussed with the patient. The patient opted to proceed with the procedure. Written informed consent was signed and placed in the chart. A timeout protocol was completed to identify the patient and the procedure being performed. Then we used brevital for sedation. 100 mg Brevital was given by me as one dose, and electrical DC cardioversion was perfomred using 200J and 360J, synchronized shock. Patient remained in atrial fibrillation. The patient tolerated the procedure well and there were no complications. Conclusion:   Failed external DC cardioversion of atrial fibrillation     Discussed ablation. Will follow up in office for further evaluation.

## 2023-03-28 NOTE — TELEPHONE ENCOUNTER
metFORMIN (GLUCOPHAGE) 500 MG tablet       [place], [place] - [address] [place] [address]   Phone:  [phone]  Fax:  [phone]

## 2023-03-29 NOTE — TELEPHONE ENCOUNTER
Medication:   Requested Prescriptions     Pending Prescriptions Disp Refills    metFORMIN (GLUCOPHAGE) 500 MG tablet 180 tablet 1     Sig: TAKE 1 TABLET BY MOUTH 2 TIMES A DAY WITH MEALS      Last Filled:      Patient Phone Number: [phone] (home)     Last appt: 2/9/2023   Next appt: 5/11/2023    Last OARRS: No flowsheet data found.   PDMP Monitoring:    Last PDMP [employer] as Reviewed [facility]):  Review User Review Instant Review Result          [facility] [address] [place] [address]  Phone: [phone] Fax: [phone]

## 2023-04-06 ENCOUNTER — TELEPHONE (OUTPATIENT)
Dept: FAMILY MEDICINE CLINIC | Age: 68
End: 2023-04-06

## 2023-04-06 RX ORDER — AMIODARONE HYDROCHLORIDE 200 MG/1
TABLET ORAL
Qty: 30 TABLET | Refills: 0 | Status: SHIPPED | OUTPATIENT
Start: 2023-04-06

## 2023-04-06 NOTE — TELEPHONE ENCOUNTER
amiodarone (CORDARONE) 200 MG tablet [[id number]    [place], [place] - [address] [place] [address], [place] [address]   Phone:  [phone]  Fax:  [phone]

## 2023-04-24 PROBLEM — I48.11 LONGSTANDING PERSISTENT ATRIAL FIBRILLATION (HCC): Status: ACTIVE | Noted: 2022-12-17

## 2023-04-24 PROBLEM — I50.22 CHRONIC SYSTOLIC HEART FAILURE (HCC): Status: ACTIVE | Noted: 2022-12-20

## 2023-04-24 NOTE — PROGRESS NOTES
[address]   Electrophysiology Follow up   Date: 4/26/2023  I had the privilege of visiting Armando Ventura in the office. CC: Afib   HPI: Armando Ventura is a 74 y.o. 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 Hx of IVC filter. S/p KRISTIN/DCCV (12/22)     2/18/2022 Pt presented to hospital with chest pain, palpitations, and dizziness. He was in AF. He had episodes of RVR when up walking. Did not undergo DCCV d/t subtherapeutic INR. Treated with amiodarone and had cardioversion which failed despite antiarrhythmic therapy. Failed DCCV 3/24/2023    Santana Carter presents to the office in follow up. He has complaints of dizziness when he changes positions or is walking long distances. He remains in atrial fibrillation and his heart rate is low. We will discontinue his amiodarone today and see if his dizziness improved. We discussed his ejection fraction and need for a pacemaker if it does not improve. Assessment and plan:   -Persistent Atrial Fibrillation   Patient options discussed with patient and his wife. Atrial fibrillation ablation discussed in addition to risk-benefit alternatives. Patient is high risk for ablation due to severe LV dysfunction, history of DVT PE, IVC filter CVA. Shared decision made to anticoagulate and rate control only at this time. ECG today shows Atrial Fibrillation    Patient has a [id number] Score of at least 6 (Age, HTN, DM, CVA2, CHF) Continue Eliquis 5 mg BID    Continue Toprol XL 50 mg daily      Stop Amiodarone 200 mg daily ALT 26, AST 33 2/19/2023, TSH 1.20 12/18/2022   Failed DCCV  3/24/2023   Plan for rate control and anticoagulation only.      Non-Ischemic cardiomyopathy, severe LV systolic function with EF: 20%, NYHAFC III, Stage C on medical therapy  Patient is on guideline directed medical therapy for more than three months with BB   Not on ACE-/I and ARB due to CKD and also borderline low

## 2023-04-26 ENCOUNTER — TELEPHONE (OUTPATIENT)
Dept: FAMILY MEDICINE CLINIC | Age: 68
End: 2023-04-26

## 2023-04-26 ENCOUNTER — OFFICE VISIT (OUTPATIENT)
Dept: CARDIOLOGY CLINIC | Age: 68
End: 2023-04-26
Payer: MEDICARE

## 2023-04-26 VITALS
WEIGHT: 236.6 LBS | HEART RATE: 47 BPM | SYSTOLIC BLOOD PRESSURE: 102 MMHG | BODY MASS INDEX: 31.36 KG/M2 | HEIGHT: 73 IN | OXYGEN SATURATION: 98 % | DIASTOLIC BLOOD PRESSURE: 60 MMHG

## 2023-04-26 DIAGNOSIS — I42.0 DILATED CARDIOMYOPATHY (HCC): ICD-10-CM

## 2023-04-26 DIAGNOSIS — I63.9 CEREBROVASCULAR ACCIDENT (CVA), UNSPECIFIED MECHANISM (HCC): ICD-10-CM

## 2023-04-26 DIAGNOSIS — I51.9 LV DYSFUNCTION: ICD-10-CM

## 2023-04-26 DIAGNOSIS — I10 PRIMARY HYPERTENSION: ICD-10-CM

## 2023-04-26 DIAGNOSIS — I50.22 CHRONIC SYSTOLIC HEART FAILURE (HCC): ICD-10-CM

## 2023-04-26 DIAGNOSIS — I48.11 LONGSTANDING PERSISTENT ATRIAL FIBRILLATION (HCC): Primary | ICD-10-CM

## 2023-04-26 PROCEDURE — 99214 OFFICE O/P EST MOD 30 MIN: CPT | Performed by: INTERNAL MEDICINE

## 2023-04-26 PROCEDURE — 3074F SYST BP LT 130 MM HG: CPT | Performed by: INTERNAL MEDICINE

## 2023-04-26 PROCEDURE — G8417 CALC BMI ABV UP PARAM F/U: HCPCS | Performed by: INTERNAL MEDICINE

## 2023-04-26 PROCEDURE — 1036F TOBACCO NON-USER: CPT | Performed by: INTERNAL MEDICINE

## 2023-04-26 PROCEDURE — 1123F ACP DISCUSS/DSCN MKR DOCD: CPT | Performed by: INTERNAL MEDICINE

## 2023-04-26 PROCEDURE — 3017F COLORECTAL CA SCREEN DOC REV: CPT | Performed by: INTERNAL MEDICINE

## 2023-04-26 PROCEDURE — 93000 ELECTROCARDIOGRAM COMPLETE: CPT | Performed by: INTERNAL MEDICINE

## 2023-04-26 PROCEDURE — G8427 DOCREV CUR MEDS BY ELIG CLIN: HCPCS | Performed by: INTERNAL MEDICINE

## 2023-04-26 PROCEDURE — 3078F DIAST BP <80 MM HG: CPT | Performed by: INTERNAL MEDICINE

## 2023-04-26 NOTE — PATIENT INSTRUCTIONS
You are scheduled for a ICD placement    Our  will call you to discuss a date for you procedure. The Cath Lab will call you a week before your procedure. The night before your procedure you will need to scrub with Hibiclens wash. The day of your procedure you will need to check in at the registration desk, which is in the main lobby at [address] [place] [address] will need to fast for at least 8 hours prior to your procedure. You will need  to hold day before procedure   You may take all other medications with a sip of water the morning of your procedure. Please have a responsible adult to drive you home upon discharge. The discharging unit will be giving you discharge instructions. If you have any questions regarding your procedure itself or your medications, please call [phone] and ask to talk to an EP nurse. You will be seen in the office in 1 week for a wound check and then 3 months following implantation.

## 2023-04-27 ENCOUNTER — TELEPHONE (OUTPATIENT)
Dept: CARDIOLOGY CLINIC | Age: 68
End: 2023-04-27

## 2023-04-27 RX ORDER — METOPROLOL SUCCINATE 50 MG/1
50 TABLET, EXTENDED RELEASE ORAL DAILY
Qty: 90 TABLET | Refills: 0 | Status: SHIPPED | OUTPATIENT
Start: 2023-04-27

## 2023-04-27 RX ORDER — FINASTERIDE 5 MG/1
5 TABLET, FILM COATED ORAL DAILY
Qty: 90 TABLET | Refills: 0 | Status: SHIPPED | OUTPATIENT
Start: 2023-04-27

## 2023-04-27 RX ORDER — TAMSULOSIN HYDROCHLORIDE 0.4 MG/1
0.4 CAPSULE ORAL DAILY
Qty: 90 CAPSULE | Refills: 0 | Status: SHIPPED | OUTPATIENT
Start: 2023-04-27

## 2023-04-27 NOTE — TELEPHONE ENCOUNTER
Spoke with Axel Torres and got the patient scheduled for procedure. We went over the instructions below and she verbalized understanding. Procedure: - Single Chamber ICD  Date: 5/19/2023  Arrival time: 1:30 pm   Procedure time: 2:30  pm  (Pt needed later time due to schedule)    You are scheduled for a ICD placement    The day of your procedure you will need to check in at the registration  desk, which is in the main lobby at [address] [place] [address] 2 will need to fast for at least 8 hours prior to your procedure.  -You will need to hold Eliquis day before procedure  -You may take all other medications with a sip of water the morning of your procedure.  -The night before your procedure you will need to scrub with Hibiclens Wash. -Please have a responsible adult to drive you home upon discharge.  -The discharging unit will be giving you discharge instructions. If you have any questions regarding your procedure itself or your medications, please call [phone] and ask to talk to an EP nurse. You will be seen in the office in 1 week for a wound check and then 3  months following implantation.       Qgenda update / Added in Epic / emailed cath lab [employer] Rep

## 2023-05-19 ENCOUNTER — HOSPITAL ENCOUNTER (OUTPATIENT)
Dept: CARDIAC CATH/INVASIVE PROCEDURES | Age: 68
Discharge: HOME | End: 2023-05-19
Attending: INTERNAL MEDICINE | Admitting: INTERNAL MEDICINE
Payer: MEDICARE

## 2023-05-19 ENCOUNTER — APPOINTMENT (OUTPATIENT)
Dept: GENERAL RADIOLOGY | Age: 68
End: 2023-05-19
Attending: INTERNAL MEDICINE
Payer: MEDICARE

## 2023-05-19 VITALS
HEART RATE: 75 BPM | HEIGHT: 73 IN | TEMPERATURE: 98 F | DIASTOLIC BLOOD PRESSURE: 84 MMHG | WEIGHT: 236 LBS | BODY MASS INDEX: 31.28 KG/M2 | SYSTOLIC BLOOD PRESSURE: 117 MMHG | OXYGEN SATURATION: 99 %

## 2023-05-19 PROBLEM — I50.20 HFREF (HEART FAILURE WITH REDUCED EJECTION FRACTION) (HCC): Status: ACTIVE | Noted: 2023-05-19

## 2023-05-19 PROBLEM — I42.8 NON-ISCHEMIC CARDIOMYOPATHY (HCC): Status: ACTIVE | Noted: 2022-12-20

## 2023-05-19 LAB
ANION GAP SERPL CALCULATED.3IONS-SCNC: 11 MMOL/L (ref 3–16)
BUN SERPL-MCNC: 43 MG/DL (ref 7–20)
CALCIUM SERPL-MCNC: 9.7 MG/DL (ref 8.3–10.6)
CHLORIDE SERPL-SCNC: 106 MMOL/L (ref 99–110)
CO2 SERPL-SCNC: 26 MMOL/L (ref 21–32)
CREAT SERPL-MCNC: 2.1 MG/DL (ref 0.8–1.3)
DEPRECATED RDW RBC AUTO: 13.5 % (ref 12.4–15.4)
GFR SERPLBLD CREATININE-BSD FMLA CKD-EPI: 34 ML/MIN/{1.73_M2}
GLUCOSE SERPL-MCNC: 98 MG/DL (ref 70–99)
HCT VFR BLD AUTO: 38.3 % (ref 40.5–52.5)
HGB BLD-MCNC: 12.6 G/DL (ref 13.5–17.5)
MCH RBC QN AUTO: 32.7 PG (ref 26–34)
MCHC RBC AUTO-ENTMCNC: 32.8 G/DL (ref 31–36)
MCV RBC AUTO: 99.5 FL (ref 80–100)
PLATELET # BLD AUTO: 189 K/UL (ref 135–450)
PMV BLD AUTO: 8.1 FL (ref 5–10.5)
POTASSIUM SERPL-SCNC: 4.7 MMOL/L (ref 3.5–5.1)
RBC # BLD AUTO: 3.85 M/UL (ref 4.2–5.9)
SODIUM SERPL-SCNC: 143 MMOL/L (ref 136–145)
WBC # BLD AUTO: 7.6 K/UL (ref 4–11)

## 2023-05-19 PROCEDURE — C1892 INTRO/SHEATH,FIXED,PEEL-AWAY: HCPCS

## 2023-05-19 PROCEDURE — 71045 X-RAY EXAM CHEST 1 VIEW: CPT

## 2023-05-19 PROCEDURE — 2500000003 HC RX 250 WO HCPCS

## 2023-05-19 PROCEDURE — 99152 MOD SED SAME PHYS/QHP 5/>YRS: CPT | Performed by: INTERNAL MEDICINE

## 2023-05-19 PROCEDURE — 93005 ELECTROCARDIOGRAM TRACING: CPT | Performed by: INTERNAL MEDICINE

## 2023-05-19 PROCEDURE — 33249 INSJ/RPLCMT DEFIB W/LEAD(S): CPT | Performed by: INTERNAL MEDICINE

## 2023-05-19 PROCEDURE — 99152 MOD SED SAME PHYS/QHP 5/>YRS: CPT

## 2023-05-19 PROCEDURE — 99153 MOD SED SAME PHYS/QHP EA: CPT

## 2023-05-19 PROCEDURE — 2709999900 HC NON-CHARGEABLE SUPPLY

## 2023-05-19 PROCEDURE — 36415 COLL VENOUS BLD VENIPUNCTURE: CPT

## 2023-05-19 PROCEDURE — C1777 LEAD, AICD, ENDO SINGLE COIL: HCPCS

## 2023-05-19 PROCEDURE — C1722 AICD, SINGLE CHAMBER: HCPCS

## 2023-05-19 PROCEDURE — 80048 BASIC METABOLIC PNL TOTAL CA: CPT

## 2023-05-19 PROCEDURE — 85027 COMPLETE CBC AUTOMATED: CPT

## 2023-05-19 PROCEDURE — C1894 INTRO/SHEATH, NON-LASER: HCPCS

## 2023-05-19 PROCEDURE — C1889 IMPLANT/INSERT DEVICE, NOC: HCPCS

## 2023-05-19 PROCEDURE — 6360000002 HC RX W HCPCS

## 2023-05-19 PROCEDURE — 33249 INSJ/RPLCMT DEFIB W/LEAD(S): CPT

## 2023-05-19 RX ORDER — SODIUM CHLORIDE 0.9 % (FLUSH) 0.9 %
5-40 SYRINGE (ML) INJECTION PRN
Status: DISCONTINUED | OUTPATIENT
Start: 2023-05-19 | End: 2023-05-19 | Stop reason: HOSPADM

## 2023-05-19 NOTE — H&P
healthy weight reduce cardiovascular risk factors. Patient Active Problem List    Diagnosis Date Noted    LV dysfunction 02/22/2023    Syncope and collapse 02/18/2023    A-fib ([address] 02/18/2023    Precordial pain 02/18/2023    Stage 3 chronic kidney disease ([address] 02/18/2023    TIA involving basilar artery 02/18/2023    Dizziness 02/18/2023    Hypernatremia 12/24/2022    Cardiomyopathy ([address] 12/20/2022    Chronic systolic heart failure ([address] 12/20/2022    History of DVT (deep vein thrombosis) 12/18/2022    SANDY (acute kidney injury) ([address] 12/17/2022    Acute congestive heart failure ([address] 12/17/2022    Longstanding persistent atrial fibrillation ([address] 12/17/2022    Atrial fibrillation with RVR ([address] 12/16/2022    Bilateral carotid artery stenosis 08/21/2020    History of stroke 01/10/2020    Controlled type 2 diabetes mellitus with diabetic polyneuropathy, without long-term current use of insulin ([address] 01/19/2018    Primary hypertension 10/20/2017    Right homonymous hemianopsia 07/25/2017    Hemiparesis affecting right side as late effect of cerebrovascular accident ([address] 07/25/2017    Mixed hyperlipidemia 06/21/2017    Long term current use of anticoagulant therapy 06/21/2017    Peripheral vascular disease ([address] 06/06/2017    CVA (cerebral vascular accident) ([address] 05/03/2017    Pulmonary embolism ([address] 05/03/2017    DVT (deep venous thrombosis) ([address] 05/03/2017     Diagnostic studies:   ECG 4/26/23  AFIB, QTcH 470,QRS 92    Limited Echo 2/21/2023   This was a limited study for NICM, LVEF. Definity was administered. Left ventricular cavity size is normal. Ejection fraction is visually   estimated to be 25 %. The basal anteroseptum, inferolateral, inferior,   apcial anterior, and mid inferoseptal walls appear hypokinetic. Left atrium is dilated. Right atrium is dilated.    All valves are structurally normal.    Cath: 2/21/2023  Dominance: Co   LM: normal   LAD: smaller caliber with 20% mid stenosis; 30%

## 2023-05-19 NOTE — PROCEDURES
[address] [place] [address]   Electrophysiology Procedure Note       Date of Procedure: 5/19/2023  Patient's Name: Anirudh Armstrong  YOB: 1955   Medical Record Number: [id number]  Procedure Performed by: Anders Gonzales MD    Procedures performed:  Insertion of MRI compatible right ventricular defibrillator lead under fluoroscopy. Insertion of a MRI compatible  single chamber ICD. Programming and interrogation of a single chamber AICD. IV sedation. Sedation start time: 14:18 PM  Sedation stop time: 15:21 PM   Versed: 7 mg   Fentanyl: 275 mcg   An independent trained observer pushed medications at my direction    Indication of procedure:     Anirudh Armstrong is a 74 y.o. male with the following:  Indication for ICD: primary  Type of cardiomyopathy: Nonischemic  LV dysfunction with EF: 20%  NYHA class: III  Beta blocker therapy: Yes  ACE/ARB therapy: No due to CKD  On maximally tolerated guideline directed medications for cardiomyopathy for at least 90 days: Yes  Life expectancy is over one year: Yes  History of atrial fibrillation (if yes, type): yes - chronic   Shared Decision Making: Yes - Shared Decision Making Tool was used. Details of procedure: The patient was brought to the electrophysiology laboratory in stable condition. The patient was in a fasting and non-sedated state. The risks, benefits and alternatives of the procedure were discussed with the patient. The risks including, but not limited to, the risks of vascular injury, bleeding, infection, device malfunction, lead dislodgement, radiation exposure, injury to cardiac and surrounding structures (including pneumothorax), stroke, myocardial infarction and death were discussed in detail. The patient opted to proceed with the device implantation. Written informed consent was signed and placed in the chart. Prophylactic antibiotic was given. The patient was prepped and draped in a sterile fashion.  A timeout protocol was

## 2023-05-20 LAB
EKG ATRIAL RATE: 288 BPM
EKG DIAGNOSIS: NORMAL
EKG Q-T INTERVAL: 380 MS
EKG QRS DURATION: 94 MS
EKG QTC CALCULATION (BAZETT): 424 MS
EKG R AXIS: 22 DEGREES
EKG T AXIS: 95 DEGREES
EKG VENTRICULAR RATE: 75 BPM

## 2023-05-20 PROCEDURE — 93010 ELECTROCARDIOGRAM REPORT: CPT | Performed by: INTERNAL MEDICINE

## 2023-05-22 ENCOUNTER — NURSE ONLY (OUTPATIENT)
Dept: CARDIOLOGY CLINIC | Age: 68
End: 2023-05-22

## 2023-05-22 DIAGNOSIS — I50.20 HFREF (HEART FAILURE WITH REDUCED EJECTION FRACTION) (HCC): ICD-10-CM

## 2023-05-22 DIAGNOSIS — I50.22 CHRONIC SYSTOLIC HEART FAILURE (HCC): ICD-10-CM

## 2023-05-22 DIAGNOSIS — Z95.810 ICD (IMPLANTABLE CARDIOVERTER-DEFIBRILLATOR), SINGLE, IN SITU: Primary | ICD-10-CM

## 2023-05-22 DIAGNOSIS — I42.8 NON-ISCHEMIC CARDIOMYOPATHY (HCC): ICD-10-CM

## 2023-05-22 DIAGNOSIS — I48.11 LONGSTANDING PERSISTENT ATRIAL FIBRILLATION (HCC): ICD-10-CM

## 2023-05-22 DIAGNOSIS — I51.9 LV DYSFUNCTION: ICD-10-CM

## 2023-05-30 ENCOUNTER — NURSE ONLY (OUTPATIENT)
Dept: CARDIOLOGY CLINIC | Age: 68
End: 2023-05-30
Payer: MEDICARE

## 2023-05-30 DIAGNOSIS — I51.9 LV DYSFUNCTION: ICD-10-CM

## 2023-05-30 DIAGNOSIS — I50.20 HFREF (HEART FAILURE WITH REDUCED EJECTION FRACTION) (HCC): ICD-10-CM

## 2023-05-30 DIAGNOSIS — I50.22 CHRONIC SYSTOLIC HEART FAILURE (HCC): ICD-10-CM

## 2023-05-30 DIAGNOSIS — Z95.810 ICD (IMPLANTABLE CARDIOVERTER-DEFIBRILLATOR), SINGLE, IN SITU: ICD-10-CM

## 2023-05-30 DIAGNOSIS — I48.11 LONGSTANDING PERSISTENT ATRIAL FIBRILLATION (HCC): ICD-10-CM

## 2023-05-30 DIAGNOSIS — I42.8 NON-ISCHEMIC CARDIOMYOPATHY (HCC): ICD-10-CM

## 2023-05-30 PROCEDURE — 93282 PRGRMG EVAL IMPLANTABLE DFB: CPT | Performed by: INTERNAL MEDICINE

## 2023-05-31 ENCOUNTER — TELEPHONE (OUTPATIENT)
Dept: CARDIOLOGY CLINIC | Age: 68
End: 2023-05-31

## 2023-05-31 ENCOUNTER — OFFICE VISIT (OUTPATIENT)
Dept: CARDIOLOGY CLINIC | Age: 68
End: 2023-05-31
Payer: MEDICARE

## 2023-05-31 VITALS
WEIGHT: 241.6 LBS | DIASTOLIC BLOOD PRESSURE: 54 MMHG | HEIGHT: 73 IN | OXYGEN SATURATION: 98 % | HEART RATE: 77 BPM | SYSTOLIC BLOOD PRESSURE: 96 MMHG | BODY MASS INDEX: 32.02 KG/M2

## 2023-05-31 DIAGNOSIS — I48.91 ATRIAL FIBRILLATION STATUS POST CARDIOVERSION (HCC): ICD-10-CM

## 2023-05-31 DIAGNOSIS — I50.22 CHRONIC SYSTOLIC HEART FAILURE (HCC): Primary | ICD-10-CM

## 2023-05-31 DIAGNOSIS — N28.9 RENAL INSUFFICIENCY: ICD-10-CM

## 2023-05-31 DIAGNOSIS — I95.9 HYPOTENSION, UNSPECIFIED HYPOTENSION TYPE: ICD-10-CM

## 2023-05-31 DIAGNOSIS — Z95.810 ICD (IMPLANTABLE CARDIOVERTER-DEFIBRILLATOR), SINGLE, IN SITU: ICD-10-CM

## 2023-05-31 PROCEDURE — 99214 OFFICE O/P EST MOD 30 MIN: CPT | Performed by: CLINICAL NURSE SPECIALIST

## 2023-05-31 PROCEDURE — 3078F DIAST BP <80 MM HG: CPT | Performed by: CLINICAL NURSE SPECIALIST

## 2023-05-31 PROCEDURE — 1036F TOBACCO NON-USER: CPT | Performed by: CLINICAL NURSE SPECIALIST

## 2023-05-31 PROCEDURE — G8417 CALC BMI ABV UP PARAM F/U: HCPCS | Performed by: CLINICAL NURSE SPECIALIST

## 2023-05-31 PROCEDURE — G8427 DOCREV CUR MEDS BY ELIG CLIN: HCPCS | Performed by: CLINICAL NURSE SPECIALIST

## 2023-05-31 PROCEDURE — 3017F COLORECTAL CA SCREEN DOC REV: CPT | Performed by: CLINICAL NURSE SPECIALIST

## 2023-05-31 PROCEDURE — 1123F ACP DISCUSS/DSCN MKR DOCD: CPT | Performed by: CLINICAL NURSE SPECIALIST

## 2023-05-31 PROCEDURE — 3074F SYST BP LT 130 MM HG: CPT | Performed by: CLINICAL NURSE SPECIALIST

## 2023-05-31 NOTE — TELEPHONE ENCOUNTER
Minh Oviedo called to inform [facility] that YES to order the Pt Jardiance 10 mg -  as long as his PCP agrees      Medication Refill    Medication needing refilled:  JARDIANCE    Dosage of the medication:  10mg    How are you taking this medication (QD, BID, TID, QID, PRN):  Take 1 tablet by mouth daily    30 or 90 day supply called in:  90    When will you run out of your medication:    Which Pharmacy are we sending the medication to?:    [facility]   [address] [address]   Phone:   [phone]   Fax:  [phone]

## 2023-05-31 NOTE — PATIENT INSTRUCTIONS
You look fabulous  Continue all current medications  Will check with Bety regarding jardiance  RTO in

## 2023-05-31 NOTE — PROGRESS NOTES
[address]  Progress Note    Primary Care Doctor:  Jeimy Saeed, KRISTYN - VERNELL    Chief Complaint   Patient presents with    Congestive Heart Failure        History of Present Illness:  74 y.o. male with history of DVT/PE, DM, hypertension, hyperlipidemia, CVA, IVC filter      HFpEF admitted with edema and SOB. New onset AF and had DCCV yesterday. Echo with low EF and pt had positive ST today, will need LHC. HF meds started and pt has diuresed 22L, Cr has been elevated this week and entresto and diuretics are on hold   12/16-26/2022 for SANDY, hypernatremia (155), AF with CV on warfarin (patient had been on this in past for CVA). Stress test abnormal and will need LHC done once creat improves. He was diuresed 280-244  2/17-22/23 for dizziness with persistent AF (plans for CV in 3-4 weeks), LHC done no intervention and recheck echo with LVEF of 25%  ICD placed 5/192023    I had the pleasure of seeing Stanislaw Ovens in follow up for systolic heart failure. He is ambulatory and with his wife. He had an ICD placed 5/19/2023 which site still with steris and just starting to obtain information. He is feeling great. He is talking more and denies any chest pain, palpitations, lightheadedness, edema or shortness of breath. His blood work is stable. He is going to see his PCP soon for A1c and wants to get off the metformin. He is compliant with all his medications as his wife makes things happen. Past Medical History:   has a past medical history of Cerebrovascular disease, CHF (congestive heart failure) ([address] DM (diabetes mellitus screen), Hyperlipidemia, Hypertension, and Type 2 diabetes mellitus without complication ([address] Surgical History:   has a past surgical history that includes Vasectomy. Social History:   reports that he quit smoking about 6 years ago. His smoking use included cigarettes. He has a 80.00 pack-year smoking history. He quit smokeless tobacco use about 6 years ago.  He

## 2023-06-01 NOTE — TELEPHONE ENCOUNTER
Let patient know that Elenor Gains PCP is ok to start jardiance  Script is at [facility]

## 2023-06-05 ENCOUNTER — OFFICE VISIT (OUTPATIENT)
Dept: NEUROLOGY | Age: 68
End: 2023-06-05
Payer: MEDICARE

## 2023-06-05 VITALS
BODY MASS INDEX: 31.94 KG/M2 | HEART RATE: 55 BPM | HEIGHT: 73 IN | WEIGHT: 241 LBS | SYSTOLIC BLOOD PRESSURE: 114 MMHG | DIASTOLIC BLOOD PRESSURE: 79 MMHG

## 2023-06-05 DIAGNOSIS — Z86.73 HISTORY OF CVA (CEREBROVASCULAR ACCIDENT): Primary | ICD-10-CM

## 2023-06-05 DIAGNOSIS — I48.91 ATRIAL FIBRILLATION WITH RVR (HCC): ICD-10-CM

## 2023-06-05 DIAGNOSIS — Z79.01 ANTICOAGULATED: ICD-10-CM

## 2023-06-05 PROCEDURE — 3017F COLORECTAL CA SCREEN DOC REV: CPT | Performed by: PSYCHIATRY & NEUROLOGY

## 2023-06-05 PROCEDURE — G8427 DOCREV CUR MEDS BY ELIG CLIN: HCPCS | Performed by: PSYCHIATRY & NEUROLOGY

## 2023-06-05 PROCEDURE — 99213 OFFICE O/P EST LOW 20 MIN: CPT | Performed by: PSYCHIATRY & NEUROLOGY

## 2023-06-05 PROCEDURE — 3074F SYST BP LT 130 MM HG: CPT | Performed by: PSYCHIATRY & NEUROLOGY

## 2023-06-05 PROCEDURE — 3078F DIAST BP <80 MM HG: CPT | Performed by: PSYCHIATRY & NEUROLOGY

## 2023-06-05 PROCEDURE — 1123F ACP DISCUSS/DSCN MKR DOCD: CPT | Performed by: PSYCHIATRY & NEUROLOGY

## 2023-06-05 PROCEDURE — G8417 CALC BMI ABV UP PARAM F/U: HCPCS | Performed by: PSYCHIATRY & NEUROLOGY

## 2023-06-05 PROCEDURE — 1036F TOBACCO NON-USER: CPT | Performed by: PSYCHIATRY & NEUROLOGY

## 2023-06-05 NOTE — PROGRESS NOTES
40.00     Pack years: 80.00     Types: Cigarettes     Quit date: 2017     Years since quittin.0    Smokeless tobacco: Former     Quit date: 2017   Vaping Use    Vaping Use: Never used   Substance and Sexual Activity    Alcohol use: No     Comment: quit , history of alcoholism    Drug use: No     Social Determinants of Health     Financial Resource Strain: High Risk    Difficulty of Paying Living Expenses: Very hard   Food Insecurity: Food Insecurity Present    Worried About [address] in the Last Year: Sometimes true    Ran Out of Food in the Last Year: Never true   Transportation Needs: No Transportation Needs    Lack of Transportation (Medical): No    Lack of Transportation (Non-Medical): No   Physical Activity: Sufficiently Active    Days of Exercise per Week: 5 days    Minutes of Exercise per Session: 30 min   Stress: No Stress Concern Present    Feeling of Stress : Not at all   Social Connections: Socially Isolated    Frequency of Communication with Friends and Family: Never    Frequency of Social Gatherings with Friends and Family: Never    Attends Yazdanism Services: Never    Active Member of Clubs or Organizations: No    Attends Club or Organization Meetings: Never    Marital Status:    Housing Stability: Low Risk     Unable to Pay for Housing in the Last Year: No    Number of Places Lived in the Last Year: 1    Unstable Housing in the Last Year: No        Objective:  Exam:  /79   Pulse 55   Ht 6' 1\" (1.854 m)   Wt 241 lb (109.3 kg)   BMI 31.80 kg/m²   This is a well-nourished patient in no acute distress  Patient is awake, alert and oriented x3. Speech shows aphasia  Pupils are equal round reacting to light. Extraocular movements intact. Right homonymous hemianopsia Face symmetrical. Tongue midline. Motor exam shows mild right-sided weakness. Deep tendon reflexes normal. Plantar reflexes downgoing. Sensory exam normal. Coordination impaired on the right side.

## 2023-06-14 ENCOUNTER — OFFICE VISIT (OUTPATIENT)
Dept: FAMILY MEDICINE CLINIC | Age: 68
End: 2023-06-14
Payer: MEDICARE

## 2023-06-14 VITALS
DIASTOLIC BLOOD PRESSURE: 80 MMHG | HEIGHT: 73 IN | WEIGHT: 239 LBS | OXYGEN SATURATION: 98 % | BODY MASS INDEX: 31.68 KG/M2 | SYSTOLIC BLOOD PRESSURE: 114 MMHG | HEART RATE: 63 BPM

## 2023-06-14 DIAGNOSIS — Z86.73 HISTORY OF CVA (CEREBROVASCULAR ACCIDENT): ICD-10-CM

## 2023-06-14 DIAGNOSIS — N40.1 BENIGN PROSTATIC HYPERPLASIA WITH URINARY FREQUENCY: ICD-10-CM

## 2023-06-14 DIAGNOSIS — R35.0 BENIGN PROSTATIC HYPERPLASIA WITH URINARY FREQUENCY: ICD-10-CM

## 2023-06-14 DIAGNOSIS — E78.2 MIXED HYPERLIPIDEMIA: ICD-10-CM

## 2023-06-14 DIAGNOSIS — Z12.5 SCREENING FOR MALIGNANT NEOPLASM OF PROSTATE: ICD-10-CM

## 2023-06-14 DIAGNOSIS — E11.42 CONTROLLED TYPE 2 DIABETES MELLITUS WITH DIABETIC POLYNEUROPATHY, WITHOUT LONG-TERM CURRENT USE OF INSULIN (HCC): ICD-10-CM

## 2023-06-14 DIAGNOSIS — Z00.00 ENCOUNTER FOR ANNUAL WELLNESS EXAM IN MEDICARE PATIENT: Primary | ICD-10-CM

## 2023-06-14 LAB — HBA1C MFR BLD: 5.4 %

## 2023-06-14 PROCEDURE — 3074F SYST BP LT 130 MM HG: CPT | Performed by: NURSE PRACTITIONER

## 2023-06-14 PROCEDURE — 3044F HG A1C LEVEL LT 7.0%: CPT | Performed by: NURSE PRACTITIONER

## 2023-06-14 PROCEDURE — G8427 DOCREV CUR MEDS BY ELIG CLIN: HCPCS | Performed by: NURSE PRACTITIONER

## 2023-06-14 PROCEDURE — G8417 CALC BMI ABV UP PARAM F/U: HCPCS | Performed by: NURSE PRACTITIONER

## 2023-06-14 PROCEDURE — 1123F ACP DISCUSS/DSCN MKR DOCD: CPT | Performed by: NURSE PRACTITIONER

## 2023-06-14 PROCEDURE — 83037 HB GLYCOSYLATED A1C HOME DEV: CPT | Performed by: NURSE PRACTITIONER

## 2023-06-14 PROCEDURE — G0439 PPPS, SUBSEQ VISIT: HCPCS | Performed by: NURSE PRACTITIONER

## 2023-06-14 PROCEDURE — 3017F COLORECTAL CA SCREEN DOC REV: CPT | Performed by: NURSE PRACTITIONER

## 2023-06-14 PROCEDURE — 2022F DILAT RTA XM EVC RTNOPTHY: CPT | Performed by: NURSE PRACTITIONER

## 2023-06-14 PROCEDURE — 99214 OFFICE O/P EST MOD 30 MIN: CPT | Performed by: NURSE PRACTITIONER

## 2023-06-14 PROCEDURE — 1036F TOBACCO NON-USER: CPT | Performed by: NURSE PRACTITIONER

## 2023-06-14 PROCEDURE — 3078F DIAST BP <80 MM HG: CPT | Performed by: NURSE PRACTITIONER

## 2023-06-14 RX ORDER — ATORVASTATIN CALCIUM 40 MG/1
40 TABLET, FILM COATED ORAL DAILY
Qty: 90 TABLET | Refills: 1 | Status: SHIPPED | OUTPATIENT
Start: 2023-06-14

## 2023-06-14 RX ORDER — ATORVASTATIN CALCIUM 80 MG/1
80 TABLET, FILM COATED ORAL NIGHTLY
Qty: 90 TABLET | Refills: 1 | Status: CANCELLED | OUTPATIENT
Start: 2023-06-14

## 2023-06-14 ASSESSMENT — PATIENT HEALTH QUESTIONNAIRE - PHQ9
SUM OF ALL RESPONSES TO PHQ QUESTIONS 1-9: 0
2. FEELING DOWN, DEPRESSED OR HOPELESS: 0
SUM OF ALL RESPONSES TO PHQ QUESTIONS 1-9: 0
SUM OF ALL RESPONSES TO PHQ QUESTIONS 1-9: 0
1. LITTLE INTEREST OR PLEASURE IN DOING THINGS: 0
SUM OF ALL RESPONSES TO PHQ QUESTIONS 1-9: 0
SUM OF ALL RESPONSES TO PHQ9 QUESTIONS 1 & 2: 0

## 2023-07-03 ENCOUNTER — NURSE ONLY (OUTPATIENT)
Dept: CARDIOLOGY CLINIC | Age: 68
End: 2023-07-03
Payer: MEDICARE

## 2023-07-03 DIAGNOSIS — I42.8 NON-ISCHEMIC CARDIOMYOPATHY (HCC): ICD-10-CM

## 2023-07-03 DIAGNOSIS — I50.22 CHRONIC SYSTOLIC HEART FAILURE (HCC): ICD-10-CM

## 2023-07-03 DIAGNOSIS — Z95.810 ICD (IMPLANTABLE CARDIOVERTER-DEFIBRILLATOR), SINGLE, IN SITU: Primary | ICD-10-CM

## 2023-07-03 PROCEDURE — G2066 INTER DEVC REMOTE 30D: HCPCS | Performed by: NURSE PRACTITIONER

## 2023-07-03 PROCEDURE — 93297 REM INTERROG DEV EVAL ICPMS: CPT | Performed by: NURSE PRACTITIONER

## 2023-07-03 NOTE — PROGRESS NOTES
Remote transmission received from patient's single chamber ICD home monitor. Transmission shows normal sensing and pacing function. Noted NSVT/ AF RVR and 99.9% AF burden (Eliquis, Toprol). Optivol/ TI is within normal range. NP will review. See interrogation under cardiology tab in the [address] field for more details. Will continue to monitor remotely. (End of 31-day monitoring period 7/3/23).

## 2023-07-25 PROBLEM — I48.91 A-FIB (HCC): Status: RESOLVED | Noted: 2023-02-18 | Resolved: 2023-07-25

## 2023-07-25 PROBLEM — I51.9 LV DYSFUNCTION: Status: RESOLVED | Noted: 2023-02-22 | Resolved: 2023-07-25

## 2023-07-25 PROBLEM — I50.9 CHRONIC CONGESTIVE HEART FAILURE (HCC): Status: RESOLVED | Noted: 2022-12-17 | Resolved: 2023-07-25

## 2023-07-25 PROBLEM — R42 DIZZINESS: Status: RESOLVED | Noted: 2023-02-18 | Resolved: 2023-07-25

## 2023-07-25 PROBLEM — N17.9 AKI (ACUTE KIDNEY INJURY) (HCC): Status: RESOLVED | Noted: 2022-12-17 | Resolved: 2023-07-25

## 2023-07-25 PROBLEM — I50.20 HFREF (HEART FAILURE WITH REDUCED EJECTION FRACTION) (HCC): Status: RESOLVED | Noted: 2023-05-19 | Resolved: 2023-07-25

## 2023-07-25 PROBLEM — R55 SYNCOPE AND COLLAPSE: Status: RESOLVED | Noted: 2023-02-18 | Resolved: 2023-07-25

## 2023-07-25 PROBLEM — G45.0 TIA INVOLVING BASILAR ARTERY: Status: RESOLVED | Noted: 2023-02-18 | Resolved: 2023-07-25

## 2023-07-25 PROBLEM — R07.2 PRECORDIAL PAIN: Status: RESOLVED | Noted: 2023-02-18 | Resolved: 2023-07-25

## 2023-07-26 NOTE — PROGRESS NOTES
12/22  The overall quality of the study is good. There is subdiaphragmatic attenuation. Left ventricular cavity size is severely dilated. RIght ventricle is normal in size. Spect imaging demonstrates a small area of mildly decreased perfusion in the   apex. The defect is present at rest and stress, consistent with infarct. There is an additional medium size defect of moderate intensity in the mid   anterior and mid anteroseptum. The defect is present at rest and stress, consistent with infarct. Sum stress score of 8. No visual TID. Calculated TID of 1.01. Left ventricular ejection fraction is severely reduced at 25%. Sensitivity of testing is reduced on amlodipine. **Myocardial perfusion is abnormal. Fixed defect. Moderate-to-high risk   scan. Abnormal baseline EKG.**    Cath: 2/21/23  Dominance: Co   LM: normal   LAD: smaller caliber with 20% mid stenosis; 30% ostial second diagonal   LCx: luminal irregularities  RCA: normal   LVEDP: 3 mmHg  LVG not done 2/2 CKD    Assessment: 1. Permanent Atrial Fibrillation  - S/p KRISTIN/DCCV (12/22)? - Currently in AF. Episodes of RVR. Does not appear symptomatic  - Continue Toprol XL 25 mg daily   ~ Previously on amiodarone     - [id number] score:high ; UYD1MG2 Vasc score and anticoagulation discussed. High risk for stroke and thromboembolism. Anticoagulation is recommended. Risk of bleeding was discussed. ~ Continue Eliquis 5 mg BID; tolerating well. Discussed risks of recurrent falls and need to use safety aides. I doubt he would be a Watchman candidate given his hx and presence of IVC filter, but this can be considered and discussed with Dr. Grace Landeros in the future if he continues to fall                          - He has episodes of RVR on his device. Recommended to increase metoprolol to 25 mg BID to help with rate control, but they declined due to issues with his dizziness.  One option would be to put on TID midodrine so we can increase his

## 2023-07-27 RX ORDER — TAMSULOSIN HYDROCHLORIDE 0.4 MG/1
0.4 CAPSULE ORAL DAILY
Qty: 90 CAPSULE | Refills: 0 | Status: SHIPPED | OUTPATIENT
Start: 2023-07-27

## 2023-07-27 RX ORDER — METOPROLOL SUCCINATE 50 MG/1
50 TABLET, EXTENDED RELEASE ORAL DAILY
Qty: 90 TABLET | Refills: 0 | Status: SHIPPED | OUTPATIENT
Start: 2023-07-27

## 2023-07-27 NOTE — TELEPHONE ENCOUNTER
Medication:   Requested Prescriptions     Pending Prescriptions Disp Refills    metoprolol succinate (TOPROL XL) 50 MG extended release tablet 90 tablet 0     Sig: Take 1 tablet by mouth daily    tamsulosin (FLOMAX) 0.4 MG capsule 90 capsule 0     Sig: Take 1 capsule by mouth daily      Last Filled:      Patient Phone Number: [phone] (home)     Last appt: 6/14/2023   Next appt: 12/14/2023    Last OARRS: No flowsheet data found.   PDMP Monitoring:    Last PDMP Vitaliy pappas Reviewed [facility]):  Review User Review Instant Review Result          Preferred Pharmacy:   [address]  [address] [place] [address]  Phone: [phone] Fax: [phone]

## 2023-07-27 NOTE — TELEPHONE ENCOUNTER
tamsulosin (FLOMAX) 0.4 MG capsule     metoprolol succinate (TOPROL XL) 50 MG extended release tablet       [place] [address] [place] [address]   Phone:  [phone]  Fax:  [phone]

## 2023-08-07 ENCOUNTER — HOSPITAL ENCOUNTER (INPATIENT)
Age: 68
LOS: 2 days | Discharge: HOME | DRG: 308 | End: 2023-08-09
Attending: STUDENT IN AN ORGANIZED HEALTH CARE EDUCATION/TRAINING PROGRAM | Admitting: HOSPITALIST
Payer: MEDICARE

## 2023-08-07 ENCOUNTER — APPOINTMENT (OUTPATIENT)
Dept: GENERAL RADIOLOGY | Age: 68
DRG: 308 | End: 2023-08-07
Payer: MEDICARE

## 2023-08-07 ENCOUNTER — APPOINTMENT (OUTPATIENT)
Dept: CT IMAGING | Age: 68
DRG: 308 | End: 2023-08-07
Payer: MEDICARE

## 2023-08-07 DIAGNOSIS — R55 SYNCOPE AND COLLAPSE: Primary | ICD-10-CM

## 2023-08-07 PROBLEM — R42 DIZZINESS: Status: ACTIVE | Noted: 2023-08-07

## 2023-08-07 LAB
ALBUMIN SERPL-MCNC: 3.7 G/DL (ref 3.4–5)
ALBUMIN/GLOB SERPL: 1.7 {RATIO} (ref 1.1–2.2)
ALP SERPL-CCNC: 56 U/L (ref 40–129)
ALT SERPL-CCNC: 31 U/L (ref 10–40)
ANION GAP SERPL CALCULATED.3IONS-SCNC: 8 MMOL/L (ref 3–16)
APTT BLD: 35.3 SEC (ref 22.7–35.9)
AST SERPL-CCNC: 24 U/L (ref 15–37)
BASOPHILS # BLD: 0.1 K/UL (ref 0–0.2)
BASOPHILS NFR BLD: 1.2 %
BILIRUB SERPL-MCNC: 1 MG/DL (ref 0–1)
BILIRUB UR QL STRIP.AUTO: NEGATIVE
BUN SERPL-MCNC: 22 MG/DL (ref 7–20)
CALCIUM SERPL-MCNC: 9.3 MG/DL (ref 8.3–10.6)
CHLORIDE SERPL-SCNC: 109 MMOL/L (ref 99–110)
CK SERPL-CCNC: 62 U/L (ref 39–308)
CLARITY UR: CLEAR
CO2 SERPL-SCNC: 26 MMOL/L (ref 21–32)
COLOR UR: YELLOW
CREAT SERPL-MCNC: 1.5 MG/DL (ref 0.8–1.3)
DEPRECATED RDW RBC AUTO: 13 % (ref 12.4–15.4)
EOSINOPHIL # BLD: 0.2 K/UL (ref 0–0.6)
EOSINOPHIL NFR BLD: 3.9 %
ETHANOLAMINE SERPL-MCNC: NORMAL MG/DL (ref 0–0.08)
GFR SERPLBLD CREATININE-BSD FMLA CKD-EPI: 51 ML/MIN/{1.73_M2}
GLUCOSE BLD-MCNC: 160 MG/DL (ref 70–99)
GLUCOSE BLD-MCNC: 97 MG/DL (ref 70–99)
GLUCOSE SERPL-MCNC: 100 MG/DL (ref 70–99)
GLUCOSE UR STRIP.AUTO-MCNC: >=1000 MG/DL
HCT VFR BLD AUTO: 33.3 % (ref 40.5–52.5)
HGB BLD-MCNC: 11 G/DL (ref 13.5–17.5)
HGB UR QL STRIP.AUTO: NEGATIVE
INR PPP: 1.33 (ref 0.84–1.16)
KETONES UR STRIP.AUTO-MCNC: NEGATIVE MG/DL
LEUKOCYTE ESTERASE UR QL STRIP.AUTO: NEGATIVE
LYMPHOCYTES # BLD: 1.1 K/UL (ref 1–5.1)
LYMPHOCYTES NFR BLD: 22.9 %
MCH RBC QN AUTO: 32.4 PG (ref 26–34)
MCHC RBC AUTO-ENTMCNC: 33.1 G/DL (ref 31–36)
MCV RBC AUTO: 98 FL (ref 80–100)
MONOCYTES # BLD: 0.6 K/UL (ref 0–1.3)
MONOCYTES NFR BLD: 11.7 %
NEUTROPHILS # BLD: 2.9 K/UL (ref 1.7–7.7)
NEUTROPHILS NFR BLD: 60.3 %
NITRITE UR QL STRIP.AUTO: NEGATIVE
PERFORMED ON: ABNORMAL
PERFORMED ON: NORMAL
PH UR STRIP.AUTO: 5.5 [PH] (ref 5–8)
PLATELET # BLD AUTO: 161 K/UL (ref 135–450)
PMV BLD AUTO: 7.9 FL (ref 5–10.5)
POTASSIUM SERPL-SCNC: 4.2 MMOL/L (ref 3.5–5.1)
PROT SERPL-MCNC: 5.9 G/DL (ref 6.4–8.2)
PROT UR STRIP.AUTO-MCNC: NEGATIVE MG/DL
PROTHROMBIN TIME: 16.5 SEC (ref 11.5–14.8)
RBC # BLD AUTO: 3.39 M/UL (ref 4.2–5.9)
SODIUM SERPL-SCNC: 143 MMOL/L (ref 136–145)
SP GR UR STRIP.AUTO: 1.02 (ref 1–1.03)
TROPONIN, HIGH SENSITIVITY: 31 NG/L (ref 0–22)
UA COMPLETE W REFLEX CULTURE PNL UR: ABNORMAL
UA DIPSTICK W REFLEX MICRO PNL UR: ABNORMAL
URN SPEC COLLECT METH UR: ABNORMAL
UROBILINOGEN UR STRIP-ACNC: 0.2 E.U./DL
WBC # BLD AUTO: 4.8 K/UL (ref 4–11)

## 2023-08-07 PROCEDURE — 82077 ASSAY SPEC XCP UR&BREATH IA: CPT

## 2023-08-07 PROCEDURE — 70450 CT HEAD/BRAIN W/O DYE: CPT

## 2023-08-07 PROCEDURE — 85610 PROTHROMBIN TIME: CPT

## 2023-08-07 PROCEDURE — 84484 ASSAY OF TROPONIN QUANT: CPT

## 2023-08-07 PROCEDURE — 1200000000 HC SEMI PRIVATE

## 2023-08-07 PROCEDURE — 85025 COMPLETE CBC W/AUTO DIFF WBC: CPT

## 2023-08-07 PROCEDURE — 85730 THROMBOPLASTIN TIME PARTIAL: CPT

## 2023-08-07 PROCEDURE — 6370000000 HC RX 637 (ALT 250 FOR IP): Performed by: HOSPITALIST

## 2023-08-07 PROCEDURE — 81003 URINALYSIS AUTO W/O SCOPE: CPT

## 2023-08-07 PROCEDURE — 6360000002 HC RX W HCPCS: Performed by: STUDENT IN AN ORGANIZED HEALTH CARE EDUCATION/TRAINING PROGRAM

## 2023-08-07 PROCEDURE — 80053 COMPREHEN METABOLIC PANEL: CPT

## 2023-08-07 PROCEDURE — 2580000003 HC RX 258: Performed by: STUDENT IN AN ORGANIZED HEALTH CARE EDUCATION/TRAINING PROGRAM

## 2023-08-07 PROCEDURE — 99285 EMERGENCY DEPT VISIT HI MDM: CPT

## 2023-08-07 PROCEDURE — 82550 ASSAY OF CK (CPK): CPT

## 2023-08-07 PROCEDURE — 2060000000 HC ICU INTERMEDIATE R&B

## 2023-08-07 PROCEDURE — 36415 COLL VENOUS BLD VENIPUNCTURE: CPT

## 2023-08-07 PROCEDURE — 94760 N-INVAS EAR/PLS OXIMETRY 1: CPT

## 2023-08-07 PROCEDURE — 93005 ELECTROCARDIOGRAM TRACING: CPT | Performed by: STUDENT IN AN ORGANIZED HEALTH CARE EDUCATION/TRAINING PROGRAM

## 2023-08-07 PROCEDURE — 71045 X-RAY EXAM CHEST 1 VIEW: CPT

## 2023-08-07 RX ORDER — ACETAMINOPHEN 650 MG/1
650 SUPPOSITORY RECTAL EVERY 6 HOURS PRN
Status: DISCONTINUED | OUTPATIENT
Start: 2023-08-07 | End: 2023-08-09 | Stop reason: HOSPADM

## 2023-08-07 RX ORDER — TAMSULOSIN HYDROCHLORIDE 0.4 MG/1
0.4 CAPSULE ORAL DAILY
Status: DISCONTINUED | OUTPATIENT
Start: 2023-08-08 | End: 2023-08-09 | Stop reason: HOSPADM

## 2023-08-07 RX ORDER — MIDODRINE HYDROCHLORIDE 5 MG/1
5 TABLET ORAL
Status: DISCONTINUED | OUTPATIENT
Start: 2023-08-08 | End: 2023-08-09 | Stop reason: HOSPADM

## 2023-08-07 RX ORDER — ATORVASTATIN CALCIUM 40 MG/1
40 TABLET, FILM COATED ORAL NIGHTLY
Status: DISCONTINUED | OUTPATIENT
Start: 2023-08-07 | End: 2023-08-09 | Stop reason: HOSPADM

## 2023-08-07 RX ORDER — ONDANSETRON 2 MG/ML
4 INJECTION INTRAMUSCULAR; INTRAVENOUS EVERY 6 HOURS PRN
Status: DISCONTINUED | OUTPATIENT
Start: 2023-08-07 | End: 2023-08-09 | Stop reason: HOSPADM

## 2023-08-07 RX ORDER — ACETAMINOPHEN 325 MG/1
650 TABLET ORAL EVERY 6 HOURS PRN
Status: DISCONTINUED | OUTPATIENT
Start: 2023-08-07 | End: 2023-08-09 | Stop reason: HOSPADM

## 2023-08-07 RX ORDER — SODIUM CHLORIDE 0.9 % (FLUSH) 0.9 %
5-40 SYRINGE (ML) INJECTION PRN
Status: DISCONTINUED | OUTPATIENT
Start: 2023-08-07 | End: 2023-08-09 | Stop reason: HOSPADM

## 2023-08-07 RX ORDER — SODIUM CHLORIDE 9 MG/ML
INJECTION, SOLUTION INTRAVENOUS PRN
Status: DISCONTINUED | OUTPATIENT
Start: 2023-08-07 | End: 2023-08-09 | Stop reason: HOSPADM

## 2023-08-07 RX ORDER — INSULIN LISPRO 100 [IU]/ML
0-4 INJECTION, SOLUTION INTRAVENOUS; SUBCUTANEOUS NIGHTLY
Status: DISCONTINUED | OUTPATIENT
Start: 2023-08-07 | End: 2023-08-09 | Stop reason: HOSPADM

## 2023-08-07 RX ORDER — ENOXAPARIN SODIUM 100 MG/ML
30 INJECTION SUBCUTANEOUS 2 TIMES DAILY
Status: DISCONTINUED | OUTPATIENT
Start: 2023-08-07 | End: 2023-08-07

## 2023-08-07 RX ORDER — INSULIN LISPRO 100 [IU]/ML
0-4 INJECTION, SOLUTION INTRAVENOUS; SUBCUTANEOUS
Status: DISCONTINUED | OUTPATIENT
Start: 2023-08-08 | End: 2023-08-09 | Stop reason: HOSPADM

## 2023-08-07 RX ORDER — ONDANSETRON 4 MG/1
4 TABLET, ORALLY DISINTEGRATING ORAL EVERY 8 HOURS PRN
Status: DISCONTINUED | OUTPATIENT
Start: 2023-08-07 | End: 2023-08-09 | Stop reason: HOSPADM

## 2023-08-07 RX ORDER — ASPIRIN 81 MG/1
81 TABLET ORAL DAILY
Status: DISCONTINUED | OUTPATIENT
Start: 2023-08-08 | End: 2023-08-09 | Stop reason: HOSPADM

## 2023-08-07 RX ORDER — GLUCAGON 1 MG/ML
1 KIT INJECTION PRN
Status: DISCONTINUED | OUTPATIENT
Start: 2023-08-07 | End: 2023-08-07 | Stop reason: RX

## 2023-08-07 RX ORDER — METOPROLOL SUCCINATE 50 MG/1
50 TABLET, EXTENDED RELEASE ORAL DAILY
Status: DISCONTINUED | OUTPATIENT
Start: 2023-08-08 | End: 2023-08-09 | Stop reason: HOSPADM

## 2023-08-07 RX ORDER — DEXTROSE MONOHYDRATE 100 MG/ML
INJECTION, SOLUTION INTRAVENOUS CONTINUOUS PRN
Status: DISCONTINUED | OUTPATIENT
Start: 2023-08-07 | End: 2023-08-09 | Stop reason: HOSPADM

## 2023-08-07 RX ORDER — SODIUM CHLORIDE 0.9 % (FLUSH) 0.9 %
5-40 SYRINGE (ML) INJECTION EVERY 12 HOURS SCHEDULED
Status: DISCONTINUED | OUTPATIENT
Start: 2023-08-07 | End: 2023-08-09 | Stop reason: HOSPADM

## 2023-08-07 RX ORDER — POLYETHYLENE GLYCOL 3350 17 G/17G
17 POWDER, FOR SOLUTION ORAL DAILY PRN
Status: DISCONTINUED | OUTPATIENT
Start: 2023-08-07 | End: 2023-08-09 | Stop reason: HOSPADM

## 2023-08-07 RX ADMIN — ATORVASTATIN CALCIUM 40 MG: 40 TABLET, FILM COATED ORAL at 21:07

## 2023-08-07 RX ADMIN — AMIODARONE HYDROCHLORIDE 150 MG: 50 INJECTION, SOLUTION INTRAVENOUS at 18:55

## 2023-08-07 RX ADMIN — AMIODARONE HYDROCHLORIDE 1 MG/MIN: 50 INJECTION, SOLUTION INTRAVENOUS at 19:11

## 2023-08-07 RX ADMIN — APIXABAN 5 MG: 5 TABLET, FILM COATED ORAL at 21:07

## 2023-08-07 ASSESSMENT — PAIN DESCRIPTION - DESCRIPTORS: DESCRIPTORS: OTHER (COMMENT)

## 2023-08-07 ASSESSMENT — PAIN DESCRIPTION - LOCATION: LOCATION: HEAD

## 2023-08-07 ASSESSMENT — PAIN SCALES - GENERAL: PAINLEVEL_OUTOF10: 5

## 2023-08-07 ASSESSMENT — PAIN - FUNCTIONAL ASSESSMENT: PAIN_FUNCTIONAL_ASSESSMENT: 0-10

## 2023-08-07 NOTE — PROGRESS NOTES
Mr. Isai Wilder has a history of a stroke and he lost his peripheral vision in his right eye as a result. He continues to have aphasia at times, especially when he is tired or under stress. He also is hard of hearing on the right side due to his employment in a [address] when younger. Patient seen in ED, room 20. Admission completed down to Skin Assessment. IP nurse will need to complete the rest of the admission including the 4 Eyes Assessment, Immunizations, Covid Vaccines, Rights and Responsibilities, Orientation to room, Plan of Care, Education/Learning Assessment and Education Plan, white board, height and weight, pain assessment and head to toe assessment. Patient is being admitted for dizziness. Home Medications as well as Outside Sources have been verbally reviewed with patient and updated as appropriate and is now Completed. Plan of care updated if indicated. Patient's wife, Alireza Piedra is supposed to bring in the [facility] paperwork for his chart. All questions answered.

## 2023-08-07 NOTE — ED NOTES
Education provided on the need for urine specimen. Per wife statement, Jim Hart has a shy bladder and he's also on a 2 liter per day water restriction. \"     Sarai Lorenzo, SERINA  08/07/23 2520

## 2023-08-07 NOTE — ED NOTES
ED TO INPATIENT SBAR HANDOFF    Patient Name: Janessa Chávez   :  1955  74 y.o. MRN:  [id number]  Preferred Name  Cornelious Kussmaul  ED Room #:  ED-0020/20  Family/Caregiver Present yes   Restraints no   Sitter no   Sepsis Risk Score Sepsis Risk Score: 1.24    Situation  Code Status: Full Code No additional code details. Allergies: Patient has no known allergies. Weight: Patient Vitals for the past 96 hrs (Last 3 readings):   Weight   23 1650 235 lb (106.6 kg)     Arrived from: home  Chief Complaint:   Chief Complaint   Patient presents with    Fall     Patient arrives to ED from [facility]. Patient fell at home and hit his head on a CD rack. Patient has been feeling dizziness since this morning. Patient is unsure how long he lost consciousness for, all he recalls is his wife waking him up. Hospital Problem/Diagnosis:  Principal Problem:    Dizziness  Resolved Problems:    * No resolved hospital problems. *    Imaging:   XR CHEST PORTABLE   Final Result   Stable cardiomegaly. Clear lungs.          CT HEAD WO CONTRAST    (Results Pending)     Abnormal labs:   Abnormal Labs Reviewed   CBC WITH AUTO DIFFERENTIAL - Abnormal; Notable for the following components:       Result Value    RBC 3.39 (*)     Hemoglobin 11.0 (*)     Hematocrit 33.3 (*)     All other components within normal limits   COMPREHENSIVE METABOLIC PANEL W/ REFLEX TO MG FOR LOW K - Abnormal; Notable for the following components:    Glucose 100 (*)     BUN 22 (*)     Creatinine 1.5 (*)     Est, Glom Filt Rate 51 (*)     Total Protein 5.9 (*)     All other components within normal limits   TROPONIN - Abnormal; Notable for the following components:    Troponin, High Sensitivity 31 (*)     All other components within normal limits   PROTIME-INR - Abnormal; Notable for the following components:    Protime 16.5 (*)     INR 1.33 (*)     All other components within normal limits     Critical values: no     Abnormal Assessment Findings: Kenneth Alvarez on 8/7/2023 at 6:50 PM       Nikki Cabrales RN  08/07/23 5459

## 2023-08-07 NOTE — H&P
HOSPITALISTS HISTORY AND PHYSICAL    8/7/2023 7:25 PM    Patient Information:  Peri Ibrahim is a 74 y.o. male [id number]  PCP:  KRISTYN Waters CNP (Tel: [phone] )    Chief complaint:    Chief Complaint   Patient presents with    Fall     Patient arrives to ED from [facility]. Patient fell at home and hit his head on a CD rack. Patient has been feeling dizziness since this morning. Patient is unsure how long he lost consciousness for, all he recalls is his wife waking him up. History of Present Illness:  Peri Ibrahim is a 74 y.o. male who presented with complaints of fall and hitting his head. On his CT back. Patient reports feeling dizzy this morning. P patient with history of CHF CAD apparently was not feeling well this morning. Patient dates he fell and hit his abdomen was lost consciousness but unclear how long patient was out. Last thing he recalls his wife waking him up. Patient has no recollection of the event. No bowel or bladder incontinence noted. Patient when he woke up no focal deficit was feeling fine. Patient while in the ER cart did dizzy again did hit his head on CT route. He has AICD pacemaker which was interrogated concerning for possible ventricular tachycardia  REVIEW OF SYSTEMS:   Constitutional: Negative for fever,chills or night sweats  ENT: Negative for rhinorrhea, epistaxis, hoarseness, sore throat. Respiratory: Negative for shortness of breath,wheezing  Cardiovascular: Negative for chest pain, palpitations   Gastrointestinal: Negative for nausea, vomiting, diarrhea  Genitourinary: Negative for polyuria, dysuria   Hematologic/Lymphatic: Negative for bleeding tendency, easy bruising  Musculoskeletal: Negative for myalgias and arthralgias  Neurologic: Negative for confusion,dysarthria.   Skin: Negative for itching,rash, good capillary refill. Psychiatric: Negative for depression,anxiety, agitation. Endocrine: Negative for polydipsia,polyuria,heat /cold intolerance. Past Medical History:   has a past medical history of Cerebrovascular disease, CHF (congestive heart failure) ([address]), DM (diabetes mellitus screen), Hyperlipidemia, Hypertension, and Type 2 diabetes mellitus without complication ([address]). Past Surgical History:   has a past surgical history that includes Vasectomy and IVC filter insertion (05/2017). Medications:  No current facility-administered medications on file prior to encounter. Current Outpatient Medications on File Prior to Encounter   Medication Sig Dispense Refill    metoprolol succinate (TOPROL XL) 50 MG extended release tablet Take 1 tablet by mouth daily 90 tablet 0    tamsulosin (FLOMAX) 0.4 MG capsule Take 1 capsule by mouth daily 90 capsule 0    apixaban (ELIQUIS) 5 MG TABS tablet Take 1 tablet by mouth 2 times daily 180 tablet 1    atorvastatin (LIPITOR) 40 MG tablet Take 1 tablet by mouth daily 90 tablet 1    empagliflozin (JARDIANCE) 10 MG tablet Take 1 tablet by mouth daily 90 tablet 0    b complex vitamins capsule Take 1 capsule by mouth daily      aspirin 81 MG EC tablet Take 1 tablet by mouth daily 30 tablet 3    mineral oil-hydrophilic petrolatum (HYDROPHOR) ointment Apply topically as needed three times per day.  454 g 0    midodrine (PROAMATINE) 5 MG tablet Take 1 tablet by mouth 3 times daily (Patient taking differently: Take 1 tablet by mouth as needed (for systolic b/p under 048)) 90 tablet 1    Blood Pressure KIT 1 kit by Does not apply route daily 1 kit 0    blood glucose monitor strips 1 strip by Other route daily True Metric Test strips 100 strip 3    Lancets MISC True Metric Lancets 100 each 3    Blood Glucose Monitoring Suppl AGUSTO 1 kit by Does not apply route daily True Metric testing device 1 Device 0    Methylsulfonylmethane (MSM PO) Take by mouth

## 2023-08-08 ENCOUNTER — NURSE ONLY (OUTPATIENT)
Dept: CARDIOLOGY CLINIC | Age: 68
End: 2023-08-08

## 2023-08-08 ENCOUNTER — TELEPHONE (OUTPATIENT)
Dept: FAMILY MEDICINE CLINIC | Age: 68
End: 2023-08-08

## 2023-08-08 DIAGNOSIS — I47.20 VT (VENTRICULAR TACHYCARDIA) (HCC): ICD-10-CM

## 2023-08-08 DIAGNOSIS — Z95.810 ICD (IMPLANTABLE CARDIOVERTER-DEFIBRILLATOR), SINGLE, IN SITU: Primary | ICD-10-CM

## 2023-08-08 PROBLEM — I42.0 DILATED CARDIOMYOPATHY (HCC): Status: ACTIVE | Noted: 2022-12-20

## 2023-08-08 LAB
ANION GAP SERPL CALCULATED.3IONS-SCNC: 1 MMOL/L (ref 3–16)
BASOPHILS # BLD: 0.1 K/UL (ref 0–0.2)
BASOPHILS NFR BLD: 1 %
BUN SERPL-MCNC: 21 MG/DL (ref 7–20)
CALCIUM SERPL-MCNC: 9.1 MG/DL (ref 8.3–10.6)
CHLORIDE SERPL-SCNC: 110 MMOL/L (ref 99–110)
CO2 SERPL-SCNC: 34 MMOL/L (ref 21–32)
CREAT SERPL-MCNC: 1.5 MG/DL (ref 0.8–1.3)
DEPRECATED RDW RBC AUTO: 13.5 % (ref 12.4–15.4)
EKG ATRIAL RATE: 60 BPM
EKG ATRIAL RATE: 85 BPM
EKG DIAGNOSIS: NORMAL
EKG DIAGNOSIS: NORMAL
EKG Q-T INTERVAL: 394 MS
EKG Q-T INTERVAL: 442 MS
EKG QRS DURATION: 84 MS
EKG QRS DURATION: 90 MS
EKG QTC CALCULATION (BAZETT): 428 MS
EKG QTC CALCULATION (BAZETT): 442 MS
EKG R AXIS: 22 DEGREES
EKG R AXIS: 50 DEGREES
EKG T AXIS: 68 DEGREES
EKG T AXIS: 74 DEGREES
EKG VENTRICULAR RATE: 60 BPM
EKG VENTRICULAR RATE: 71 BPM
EOSINOPHIL # BLD: 0.2 K/UL (ref 0–0.6)
EOSINOPHIL NFR BLD: 3.4 %
EST. AVERAGE GLUCOSE BLD GHB EST-MCNC: 102.5 MG/DL
GFR SERPLBLD CREATININE-BSD FMLA CKD-EPI: 51 ML/MIN/{1.73_M2}
GLUCOSE BLD-MCNC: 105 MG/DL (ref 70–99)
GLUCOSE BLD-MCNC: 106 MG/DL (ref 70–99)
GLUCOSE BLD-MCNC: 109 MG/DL (ref 70–99)
GLUCOSE BLD-MCNC: 131 MG/DL (ref 70–99)
GLUCOSE SERPL-MCNC: 96 MG/DL (ref 70–99)
HBA1C MFR BLD: 5.2 %
HCT VFR BLD AUTO: 31.6 % (ref 40.5–52.5)
HGB BLD-MCNC: 10.6 G/DL (ref 13.5–17.5)
LYMPHOCYTES # BLD: 1.4 K/UL (ref 1–5.1)
LYMPHOCYTES NFR BLD: 22.5 %
MCH RBC QN AUTO: 32.7 PG (ref 26–34)
MCHC RBC AUTO-ENTMCNC: 33.4 G/DL (ref 31–36)
MCV RBC AUTO: 97.8 FL (ref 80–100)
MONOCYTES # BLD: 0.6 K/UL (ref 0–1.3)
MONOCYTES NFR BLD: 9.9 %
NEUTROPHILS # BLD: 3.9 K/UL (ref 1.7–7.7)
NEUTROPHILS NFR BLD: 63.2 %
PERFORMED ON: ABNORMAL
PLATELET # BLD AUTO: 155 K/UL (ref 135–450)
PMV BLD AUTO: 7.7 FL (ref 5–10.5)
POTASSIUM SERPL-SCNC: 4 MMOL/L (ref 3.5–5.1)
RBC # BLD AUTO: 3.23 M/UL (ref 4.2–5.9)
SODIUM SERPL-SCNC: 145 MMOL/L (ref 136–145)
TROPONIN, HIGH SENSITIVITY: 28 NG/L (ref 0–22)
WBC # BLD AUTO: 6.2 K/UL (ref 4–11)

## 2023-08-08 PROCEDURE — 99223 1ST HOSP IP/OBS HIGH 75: CPT | Performed by: INTERNAL MEDICINE

## 2023-08-08 PROCEDURE — 6370000000 HC RX 637 (ALT 250 FOR IP): Performed by: HOSPITALIST

## 2023-08-08 PROCEDURE — 93010 ELECTROCARDIOGRAM REPORT: CPT | Performed by: INTERNAL MEDICINE

## 2023-08-08 PROCEDURE — 85025 COMPLETE CBC W/AUTO DIFF WBC: CPT

## 2023-08-08 PROCEDURE — 83036 HEMOGLOBIN GLYCOSYLATED A1C: CPT

## 2023-08-08 PROCEDURE — 1200000000 HC SEMI PRIVATE

## 2023-08-08 PROCEDURE — 36415 COLL VENOUS BLD VENIPUNCTURE: CPT

## 2023-08-08 PROCEDURE — 93005 ELECTROCARDIOGRAM TRACING: CPT | Performed by: HOSPITALIST

## 2023-08-08 PROCEDURE — 2580000003 HC RX 258: Performed by: HOSPITALIST

## 2023-08-08 PROCEDURE — 80048 BASIC METABOLIC PNL TOTAL CA: CPT

## 2023-08-08 PROCEDURE — 6360000002 HC RX W HCPCS: Performed by: HOSPITALIST

## 2023-08-08 PROCEDURE — 84484 ASSAY OF TROPONIN QUANT: CPT

## 2023-08-08 RX ADMIN — MIDODRINE HYDROCHLORIDE 5 MG: 5 TABLET ORAL at 18:10

## 2023-08-08 RX ADMIN — METOPROLOL SUCCINATE 50 MG: 50 TABLET, EXTENDED RELEASE ORAL at 09:44

## 2023-08-08 RX ADMIN — SODIUM CHLORIDE, PRESERVATIVE FREE 10 ML: 5 INJECTION INTRAVENOUS at 20:58

## 2023-08-08 RX ADMIN — ASPIRIN 81 MG: 81 TABLET, COATED ORAL at 09:44

## 2023-08-08 RX ADMIN — SODIUM CHLORIDE, PRESERVATIVE FREE 10 ML: 5 INJECTION INTRAVENOUS at 09:45

## 2023-08-08 RX ADMIN — MIDODRINE HYDROCHLORIDE 5 MG: 5 TABLET ORAL at 09:45

## 2023-08-08 RX ADMIN — ATORVASTATIN CALCIUM 40 MG: 40 TABLET, FILM COATED ORAL at 20:58

## 2023-08-08 RX ADMIN — TAMSULOSIN HYDROCHLORIDE 0.4 MG: 0.4 CAPSULE ORAL at 09:45

## 2023-08-08 RX ADMIN — AMIODARONE HYDROCHLORIDE 0.5 MG/MIN: 50 INJECTION, SOLUTION INTRAVENOUS at 01:17

## 2023-08-08 RX ADMIN — APIXABAN 5 MG: 5 TABLET, FILM COATED ORAL at 20:58

## 2023-08-08 RX ADMIN — APIXABAN 5 MG: 5 TABLET, FILM COATED ORAL at 09:44

## 2023-08-08 ASSESSMENT — PAIN SCALES - GENERAL
PAINLEVEL_OUTOF10: 0

## 2023-08-08 NOTE — PROGRESS NOTES
Call placed again to [facility] call center d/t no return call with prior consult. Per their records they already received 2 messages around 6 pm and 630pm from \"Gene\" for a STAT consult and for EP physician.

## 2023-08-08 NOTE — PLAN OF CARE
Problem: ABCDS Injury Assessment  Goal: Absence of physical injury  Outcome: Progressing     Problem: Discharge Planning  Goal: Discharge to home or other facility with appropriate resources  Outcome: Progressing  Flowsheets (Taken 8/7/2023 2119)  Discharge to home or other facility with appropriate resources: Identify barriers to discharge with patient and caregiver     Problem: Safety - Adult  Goal: Free from fall injury  Outcome: Progressing

## 2023-08-08 NOTE — CONSULTS
[facility]   Nephrology progress Note  [phone]   [email or web address]         Reason for Consult:  CKD    HISTORY OF PRESENT ILLNESS:      The patient is a 74 y.o. male with significant past medical history of CKD 3b, basleine Cr 1.5-1.6 mg/dL follows with Dr. Hitesh ramos (last seen by Dr. Kwesi Ott), CHF, DM, who presents with syncope. Reportedly he fell, hit his head on CD rack. He also reported dizziness. Today, he feels fine and attributes symptoms to not eating. AICD pacemaker interrogation was concerning for Vtach. On admission, lizeth indices appear to be at baseline. Per last neph note, lasix was held due to slight uptrend in creatinine. Past Medical History:        Diagnosis Date    Cerebrovascular disease     CHF (congestive heart failure) ([address]) 04/2017    per  nurse report but wife is unaware    DM (diabetes mellitus screen) 04/2017    boarder line    Hyperlipidemia     Hypertension     Type 2 diabetes mellitus without complication ([address])     no medications since June       Past Surgical History:        Procedure Laterality Date    CARDIAC CATHETERIZATION Right     CARDIAC DEFIBRILLATOR PLACEMENT  05/19/2023    Medtronic    IVC FILTER INSERTION  05/2017    VASECTOMY         Current Medications:    No current facility-administered medications on file prior to encounter.      Current Outpatient Medications on File Prior to Encounter   Medication Sig Dispense Refill    metoprolol succinate (TOPROL XL) 50 MG extended release tablet Take 1 tablet by mouth daily 90 tablet 0    tamsulosin (FLOMAX) 0.4 MG capsule Take 1 capsule by mouth daily 90 capsule 0    apixaban (ELIQUIS) 5 MG TABS tablet Take 1 tablet by mouth 2 times daily 180 tablet 1    atorvastatin (LIPITOR) 40 MG tablet Take 1 tablet by mouth daily 90 tablet 1    empagliflozin (JARDIANCE) 10 MG tablet Take 1 tablet by mouth daily 90 tablet 0    b complex vitamins capsule Take 1 capsule by mouth daily

## 2023-08-08 NOTE — PROGRESS NOTES
Unable to verify if cardiology consult was called yet. STAT Consult called to cardiology at this time. Awaiting return call.

## 2023-08-08 NOTE — CONSULTS
[facility]   Electrophysiology Consultation   Date: 8/8/2023  Reason for Consultation: Syncope  Consult Requesting Physician: Natalio Vega MD     Chief Complaint   Patient presents with    Fall     Patient arrives to ED from [facility]. Patient fell at home and hit his head on a CD rack. Patient has been feeling dizziness since this morning. Patient is unsure how long he lost consciousness for, all he recalls is his wife waking him up. HPI: Bety Ruff is a 74 y.o. male with history of CVA, heart failure and cardiomyopathy status post ICD, atrial fibrillation, hypertension, hyperlipidemia was not feeling well. He had not had anything to eat for several hours. He went to get his food and then passed out. He was apparently out for unknown amount of time. In emergency room he was dizzy again and hit his head on the way to [address]. After he ate he felt better. Interrogation of his device showed an episode of ventricular tachycardia that was terminated with ATP. No shock was seen. This event happened at least 40 minutes before he is fainting episode and therefore is unrelated. Past Medical History:   Diagnosis Date    Cerebrovascular disease     CHF (congestive heart failure) ([address]) 04/2017    per  nurse report but wife is unaware    DM (diabetes mellitus screen) 04/2017    boarder line    Hyperlipidemia     Hypertension     Type 2 diabetes mellitus without complication ([address])     no medications since June        Past Surgical History:   Procedure Laterality Date    CARDIAC CATHETERIZATION Right     CARDIAC DEFIBRILLATOR PLACEMENT  05/19/2023    Medtronic    IVC FILTER INSERTION  05/2017    VASECTOMY         No Known Allergies    Social History:  Reviewed. reports that he quit smoking about 6 years ago. His smoking use included cigarettes. He has a 80.00 pack-year smoking history. He quit smokeless tobacco use about 6 years ago.  He reports that he does not drink alcohol and does not allowing me to participate in the care of Giron Sung Mendez     NOTE: This report was transcribed using voice recognition software. Every effort was made to ensure accuracy, however, inadvertent computerized transcription errors may be present.

## 2023-08-09 ENCOUNTER — TELEPHONE (OUTPATIENT)
Dept: FAMILY MEDICINE CLINIC | Age: 68
End: 2023-08-09

## 2023-08-09 VITALS
OXYGEN SATURATION: 94 % | BODY MASS INDEX: 32.95 KG/M2 | WEIGHT: 248.6 LBS | HEIGHT: 73 IN | TEMPERATURE: 97.6 F | RESPIRATION RATE: 18 BRPM | HEART RATE: 81 BPM | SYSTOLIC BLOOD PRESSURE: 152 MMHG | DIASTOLIC BLOOD PRESSURE: 95 MMHG

## 2023-08-09 LAB
GLUCOSE BLD-MCNC: 108 MG/DL (ref 70–99)
GLUCOSE BLD-MCNC: 92 MG/DL (ref 70–99)
PERFORMED ON: ABNORMAL
PERFORMED ON: NORMAL

## 2023-08-09 PROCEDURE — 6370000000 HC RX 637 (ALT 250 FOR IP): Performed by: HOSPITALIST

## 2023-08-09 PROCEDURE — 2580000003 HC RX 258: Performed by: HOSPITALIST

## 2023-08-09 RX ADMIN — ASPIRIN 81 MG: 81 TABLET, COATED ORAL at 08:04

## 2023-08-09 RX ADMIN — METOPROLOL SUCCINATE 50 MG: 50 TABLET, EXTENDED RELEASE ORAL at 08:04

## 2023-08-09 RX ADMIN — TAMSULOSIN HYDROCHLORIDE 0.4 MG: 0.4 CAPSULE ORAL at 08:04

## 2023-08-09 RX ADMIN — SODIUM CHLORIDE, PRESERVATIVE FREE 10 ML: 5 INJECTION INTRAVENOUS at 08:04

## 2023-08-09 RX ADMIN — APIXABAN 5 MG: 5 TABLET, FILM COATED ORAL at 08:04

## 2023-08-09 ASSESSMENT — PAIN SCALES - GENERAL
PAINLEVEL_OUTOF10: 0
PAINLEVEL_OUTOF10: 0

## 2023-08-09 NOTE — PLAN OF CARE
Problem: ABCDS Injury Assessment  Goal: Absence of physical injury  8/9/2023 0856 by Silvano Srivastava RN  Outcome: Adequate for Discharge  8/9/2023 1267 by Silvano Srivastava RN  Outcome: Adequate for Discharge  8/9/2023 7250 by Silvano Srivastava RN  Outcome: Progressing  8/8/2023 2201 by Royce Nassar RN  Outcome: Progressing  Flowsheets (Taken 8/8/2023 2201)  Absence of Physical Injury: Implement safety measures based on patient assessment     Problem: Discharge Planning  Goal: Discharge to home or other facility with appropriate resources  8/9/2023 0856 by Silvano Srivastava RN  Outcome: Adequate for Discharge  8/9/2023 6803 by Silvano Srivastava RN  Outcome: Adequate for Discharge  8/9/2023 6166 by Silvano Srivastava RN  Outcome: Progressing  Note: Patient plans to go home today, spoke to wife and states she will be here at 1000  8/8/2023 2201 by Royce Nassar RN  Outcome: Progressing  Flowsheets (Taken 8/8/2023 2201)  Discharge to home or other facility with appropriate resources: Identify discharge learning needs (meds, wound care, etc)     Problem: Safety - Adult  Goal: Free from fall injury  8/9/2023 0856 by Silvano Srivastava RN  Outcome: Adequate for Discharge  8/9/2023 5835 by Silvano Srivastava RN  Outcome: Adequate for Discharge  8/9/2023 2765 by Silvano Srivastava RN  Outcome: Progressing     Problem: Pain  Goal: Verbalizes/displays adequate comfort level or baseline comfort level  8/9/2023 0856 by Silvano Srivastava RN  Outcome: Adequate for Discharge  8/9/2023 4901 by Silvano Srivastava RN  Outcome: Adequate for Discharge  8/9/2023 0296 by Silvano Srivastava RN  Outcome: Progressing  8/8/2023 2201 by Royce Nassar RN  Outcome: Progressing  Flowsheets (Taken 8/8/2023 2201)  Verbalizes/displays adequate comfort level or baseline comfort level: Assess pain using appropriate pain scale     Problem: Neurosensory - Adult  Goal: Achieves stable or improved neurological status  8/9/2023 0856 by Elden Gosselin, RN  Outcome: Adequate for Discharge  8/9/2023 1209 by Elden Gosselin, RN  Outcome: Adequate for Discharge  8/9/2023 1129 by Elden Gosselin, RN  Outcome: Progressing  8/8/2023 2202 by Travis Wang RN  Outcome: Progressing  Flowsheets (Taken 8/8/2023 2202)  Achieves stable or improved neurological status: Assess for and report changes in neurological status     Problem: Cardiovascular - Adult  Goal: Absence of cardiac dysrhythmias or at baseline  8/9/2023 0856 by Elden Gosselin, RN  Outcome: Adequate for Discharge  8/9/2023 0544 by Elden Gosselin, RN  Outcome: Adequate for Discharge  8/9/2023 8273 by Elden Gosselin, RN  Outcome: Progressing  8/8/2023 2202 by Travis Wang RN  Outcome: Progressing  Flowsheets (Taken 8/8/2023 2202)  Absence of cardiac dysrhythmias or at baseline: Monitor cardiac rate and rhythm

## 2023-08-09 NOTE — FLOWSHEET NOTE
Pt assess completed per flow sheet. Pt with expressive aphasia but was able to express needs. POC discussed with pt for the night and all questions answered.

## 2023-08-09 NOTE — TELEPHONE ENCOUNTER
Care Transitions Initial Follow Up Call    Outreach made within 2 business days of discharge: Yes    Patient: Ayesha Sick Patient : 1955   MRN: [id number]  Reason for Admission: HEAD INJURY/FALL  Discharge Date: 23       Spoke with: Pt wife    Discharge department/facility: [facility]    TCM Interactive Patient Contact:  Was patient able to fill all prescriptions: Yes  Was patient instructed to bring all medications to the follow-up visit: Yes, didn't change any medications  Is patient taking all medications as directed in the discharge summary?  Yes  Does patient understand their discharge instructions: Yes  Does patient have questions or concerns that need addressed prior to 7-14 day follow up office visit: no    Scheduled appointment with PCP within 7-14 days    Follow Up  Future Appointments   Date Time Provider [address] [place] [address]   8/10/2023  1:30 PM KRISTYN Gifford  W Tianna Stauffer   2023 11:00 AM Jamie Acuña DEVICE CHECK [facility] Card[facility]   2023 11:00 AM KRISTYN Paez CNP [facility]   2023  1:30 PM KRISTYN Acevedo [facility]   10/10/2023  2:20 PM Nick Eugene MD AFL NEPH [facility] Nephrolo   2023 10:00 AM KRISTYN Gifford  W Tianna Garcia LPN

## 2023-08-09 NOTE — PLAN OF CARE
Problem: ABCDS Injury Assessment  Goal: Absence of physical injury  8/9/2023 0855 by Iline Canavan, RN  Outcome: Progressing  8/8/2023 2201 by Anh Clay RN  Outcome: Progressing  Flowsheets (Taken 8/8/2023 2201)  Absence of Physical Injury: Implement safety measures based on patient assessment     Problem: Discharge Planning  Goal: Discharge to home or other facility with appropriate resources  8/9/2023 0855 by Iline Canavan, RN  Outcome: Progressing  Note: Patient plans to go home today, spoke to wife and states she will be here at 1000  8/8/2023 2201 by Anh Clay RN  Outcome: Progressing  Flowsheets (Taken 8/8/2023 2201)  Discharge to home or other facility with appropriate resources: Identify discharge learning needs (meds, wound care, etc)     Problem: Safety - Adult  Goal: Free from fall injury  Outcome: Progressing     Problem: Pain  Goal: Verbalizes/displays adequate comfort level or baseline comfort level  8/9/2023 0855 by Iline Canavan, RN  Outcome: Progressing  8/8/2023 2201 by Anh Clay RN  Outcome: Progressing  Flowsheets (Taken 8/8/2023 2201)  Verbalizes/displays adequate comfort level or baseline comfort level: Assess pain using appropriate pain scale     Problem: Neurosensory - Adult  Goal: Achieves stable or improved neurological status  8/9/2023 0855 by Iline Canavan, RN  Outcome: Progressing  8/8/2023 2202 by Anh Clay RN  Outcome: Progressing  Flowsheets (Taken 8/8/2023 2202)  Achieves stable or improved neurological status: Assess for and report changes in neurological status     Problem: Cardiovascular - Adult  Goal: Absence of cardiac dysrhythmias or at baseline  8/9/2023 0855 by Iline Canavan, RN  Outcome: Progressing  8/8/2023 2202 by Anh Clay RN  Outcome: Progressing  Flowsheets (Taken 8/8/2023 2202)  Absence of cardiac dysrhythmias or at baseline: Monitor cardiac rate and rhythm

## 2023-08-09 NOTE — CARE COORDINATION
08/09/23 0833   IMM Letter   IMM Letter given to Patient/Family/Significant other/Guardian/POA/by: CM provided IMM letter to Patient. IMM Letter date given: 08/09/23   IMM Letter time given: 0833     Electronically signed by Lester Canales on 8/9/2023 at 8:33 AM     Patient is aware of discharge, and agreeable.

## 2023-08-09 NOTE — PLAN OF CARE
Problem: ABCDS Injury Assessment  Goal: Absence of physical injury  Outcome: Progressing  Flowsheets (Taken 8/8/2023 2201)  Absence of Physical Injury: Implement safety measures based on patient assessment     Problem: Discharge Planning  Goal: Discharge to home or other facility with appropriate resources  Outcome: Progressing  Flowsheets (Taken 8/8/2023 2201)  Discharge to home or other facility with appropriate resources: Identify discharge learning needs (meds, wound care, etc)     Problem: Pain  Goal: Verbalizes/displays adequate comfort level or baseline comfort level  Outcome: Progressing  Flowsheets (Taken 8/8/2023 2201)  Verbalizes/displays adequate comfort level or baseline comfort level: Assess pain using appropriate pain scale

## 2023-08-09 NOTE — PROGRESS NOTES
Data- discharge order received, pt verbalized agreement to discharge, disposition to previous residence, no needs for HHC/DME. Action- discharge instructions prepared and given to patient, pt verbalized understanding. Medication information packet given r/t NEW and/or CHANGED prescriptions emphasizing name/purpose/side effects, pt verbalized understanding. Discharge instruction summary: Diet- regular, Activity- as tolerated, Primary Care Physician as follows: Stevo Rivera, KRISTYN - [phone] f/u appointment to be scheduled by patient, immunizations reviewed and up to date, prescription medications filled n/a. Inpatient surgical procedure precautions reviewed. 1. WEIGHT: Admit Weight - Scale: 235 lb (106.6 kg) (08/07/23 1650)        Today  Weight - Scale: 248 lb 9.6 oz (112.8 kg) (08/09/23 0330)       2. O2 SAT.: SpO2: 94 % (08/09/23 0800)    Response- Pt belongings gathered, IV removed. Disposition is home (no HHC/DME needs), transported with belongings, taken to lobby via w/c w/ belongings and wife, no complications.

## 2023-08-09 NOTE — PLAN OF CARE
Problem: Neurosensory - Adult  Goal: Achieves stable or improved neurological status  Outcome: Progressing  Flowsheets (Taken 8/8/2023 2202)  Achieves stable or improved neurological status: Assess for and report changes in neurological status     Problem: Cardiovascular - Adult  Goal: Absence of cardiac dysrhythmias or at baseline  Outcome: Progressing  Flowsheets (Taken 8/8/2023 2202)  Absence of cardiac dysrhythmias or at baseline: Monitor cardiac rate and rhythm

## 2023-08-10 ENCOUNTER — OFFICE VISIT (OUTPATIENT)
Dept: FAMILY MEDICINE CLINIC | Age: 68
End: 2023-08-10

## 2023-08-10 VITALS
DIASTOLIC BLOOD PRESSURE: 56 MMHG | OXYGEN SATURATION: 98 % | WEIGHT: 240 LBS | SYSTOLIC BLOOD PRESSURE: 86 MMHG | HEART RATE: 68 BPM | BODY MASS INDEX: 31.66 KG/M2

## 2023-08-10 DIAGNOSIS — I10 ESSENTIAL HYPERTENSION: ICD-10-CM

## 2023-08-10 DIAGNOSIS — Z86.73 HISTORY OF CVA (CEREBROVASCULAR ACCIDENT): ICD-10-CM

## 2023-08-10 DIAGNOSIS — N40.1 BENIGN PROSTATIC HYPERPLASIA WITH URINARY FREQUENCY: ICD-10-CM

## 2023-08-10 DIAGNOSIS — R35.0 BENIGN PROSTATIC HYPERPLASIA WITH URINARY FREQUENCY: ICD-10-CM

## 2023-08-10 DIAGNOSIS — Z09 HOSPITAL DISCHARGE FOLLOW-UP: Primary | ICD-10-CM

## 2023-08-10 DIAGNOSIS — I95.9 HYPOTENSION, UNSPECIFIED HYPOTENSION TYPE: ICD-10-CM

## 2023-08-10 RX ORDER — METOPROLOL SUCCINATE 25 MG/1
25 TABLET, EXTENDED RELEASE ORAL DAILY
Qty: 90 TABLET | Refills: 0 | Status: SHIPPED | OUTPATIENT
Start: 2023-08-10

## 2023-08-10 ASSESSMENT — ENCOUNTER SYMPTOMS
VOMITING: 0
NAUSEA: 0
SHORTNESS OF BREATH: 0
COUGH: 0
DIARRHEA: 0

## 2023-08-10 NOTE — PATIENT INSTRUCTIONS
Decrease metoprolol succinate to 25mg. Stop tamsulosin. Monitor urine output - if having issues can restart. Continue to monitor blood pressures. Monitor dizzy episodes.

## 2023-08-16 ENCOUNTER — TELEPHONE (OUTPATIENT)
Dept: FAMILY MEDICINE CLINIC | Age: 68
End: 2023-08-16

## 2023-08-16 NOTE — TELEPHONE ENCOUNTER
tamsulosin (FLOMAX) capsule 0.4 mg   [[id number]    Patient stopped the above medication as directed. He is now having trouble urinating. She is going to have him start taking the medication again. If you have any questions she can be reached at [phone]. Please advise.

## 2023-08-17 PROCEDURE — G2066 INTER DEVC REMOTE 30D: HCPCS | Performed by: NURSE PRACTITIONER

## 2023-08-17 PROCEDURE — 93297 REM INTERROG DEV EVAL ICPMS: CPT | Performed by: NURSE PRACTITIONER

## 2023-08-24 ENCOUNTER — OFFICE VISIT (OUTPATIENT)
Dept: CARDIOLOGY CLINIC | Age: 68
End: 2023-08-24
Payer: MEDICARE

## 2023-08-24 ENCOUNTER — NURSE ONLY (OUTPATIENT)
Dept: CARDIOLOGY CLINIC | Age: 68
End: 2023-08-24
Payer: MEDICARE

## 2023-08-24 VITALS
DIASTOLIC BLOOD PRESSURE: 72 MMHG | HEART RATE: 65 BPM | WEIGHT: 240.8 LBS | SYSTOLIC BLOOD PRESSURE: 118 MMHG | OXYGEN SATURATION: 99 % | HEIGHT: 73 IN | BODY MASS INDEX: 31.91 KG/M2

## 2023-08-24 DIAGNOSIS — I42.8 NON-ISCHEMIC CARDIOMYOPATHY (HCC): ICD-10-CM

## 2023-08-24 DIAGNOSIS — Z86.73 HISTORY OF STROKE: ICD-10-CM

## 2023-08-24 DIAGNOSIS — Z95.810 ICD (IMPLANTABLE CARDIOVERTER-DEFIBRILLATOR), SINGLE, IN SITU: Primary | ICD-10-CM

## 2023-08-24 DIAGNOSIS — I47.20 VT (VENTRICULAR TACHYCARDIA) (HCC): ICD-10-CM

## 2023-08-24 DIAGNOSIS — I50.22 CHRONIC SYSTOLIC HEART FAILURE (HCC): ICD-10-CM

## 2023-08-24 DIAGNOSIS — I48.91 ATRIAL FIBRILLATION WITH RVR (HCC): Primary | ICD-10-CM

## 2023-08-24 DIAGNOSIS — I48.91 ATRIAL FIBRILLATION WITH RVR (HCC): ICD-10-CM

## 2023-08-24 DIAGNOSIS — I63.9 CEREBROVASCULAR ACCIDENT (CVA), UNSPECIFIED MECHANISM (HCC): ICD-10-CM

## 2023-08-24 DIAGNOSIS — I42.0 DILATED CARDIOMYOPATHY (HCC): ICD-10-CM

## 2023-08-24 DIAGNOSIS — Z95.810 ICD (IMPLANTABLE CARDIOVERTER-DEFIBRILLATOR), SINGLE, IN SITU: ICD-10-CM

## 2023-08-24 DIAGNOSIS — I48.11 LONGSTANDING PERSISTENT ATRIAL FIBRILLATION (HCC): ICD-10-CM

## 2023-08-24 DIAGNOSIS — Z86.718 HISTORY OF DVT (DEEP VEIN THROMBOSIS): ICD-10-CM

## 2023-08-24 DIAGNOSIS — I10 PRIMARY HYPERTENSION: ICD-10-CM

## 2023-08-24 PROCEDURE — 93000 ELECTROCARDIOGRAM COMPLETE: CPT | Performed by: NURSE PRACTITIONER

## 2023-08-24 PROCEDURE — 3017F COLORECTAL CA SCREEN DOC REV: CPT | Performed by: NURSE PRACTITIONER

## 2023-08-24 PROCEDURE — 1036F TOBACCO NON-USER: CPT | Performed by: NURSE PRACTITIONER

## 2023-08-24 PROCEDURE — G8427 DOCREV CUR MEDS BY ELIG CLIN: HCPCS | Performed by: NURSE PRACTITIONER

## 2023-08-24 PROCEDURE — 99214 OFFICE O/P EST MOD 30 MIN: CPT | Performed by: NURSE PRACTITIONER

## 2023-08-24 PROCEDURE — 93282 PRGRMG EVAL IMPLANTABLE DFB: CPT | Performed by: INTERNAL MEDICINE

## 2023-08-24 PROCEDURE — 1111F DSCHRG MED/CURRENT MED MERGE: CPT | Performed by: NURSE PRACTITIONER

## 2023-08-24 PROCEDURE — 1123F ACP DISCUSS/DSCN MKR DOCD: CPT | Performed by: NURSE PRACTITIONER

## 2023-08-24 PROCEDURE — 3074F SYST BP LT 130 MM HG: CPT | Performed by: NURSE PRACTITIONER

## 2023-08-24 PROCEDURE — G8417 CALC BMI ABV UP PARAM F/U: HCPCS | Performed by: NURSE PRACTITIONER

## 2023-08-24 PROCEDURE — 3078F DIAST BP <80 MM HG: CPT | Performed by: NURSE PRACTITIONER

## 2023-08-31 NOTE — TELEPHONE ENCOUNTER
empagliflozin (JARDIANCE) 10 MG tablet       [facility]   [address], [facility] [place] [address]   Phone:  [phone]  Fax:  [phone]

## 2023-08-31 NOTE — TELEPHONE ENCOUNTER
Medication:   Requested Prescriptions     Pending Prescriptions Disp Refills    empagliflozin (JARDIANCE) 10 MG tablet 90 tablet 0     Sig: Take 1 tablet by mouth daily      Last Filled:      Patient Phone Number: [phone] (home)     Last appt: 8/10/2023   Next appt: 12/14/2023    Last OARRS: No flowsheet data found.   PDMP Monitoring:    Last PDMP René Sharif as Reviewed [facility]):  Review User Review Instant Review Result          Preferred Pharmacy:   [address]  [address] [place] [address]  Phone: [phone] Fax: [phone]

## 2023-09-07 ENCOUNTER — TELEPHONE (OUTPATIENT)
Dept: CARDIOLOGY CLINIC | Age: 68
End: 2023-09-07

## 2023-09-07 DIAGNOSIS — I50.22 CHRONIC SYSTOLIC HEART FAILURE (HCC): Primary | ICD-10-CM

## 2023-09-07 NOTE — TELEPHONE ENCOUNTER
Pt wife called asking if the lab orders can be faxed to [address] [place] [address] on [facility] before Monday 9/11    Pls advise thank you

## 2023-09-12 PROCEDURE — 93296 REM INTERROG EVL PM/IDS: CPT | Performed by: INTERNAL MEDICINE

## 2023-09-12 PROCEDURE — 93295 DEV INTERROG REMOTE 1/2/MLT: CPT | Performed by: INTERNAL MEDICINE

## 2023-09-19 ENCOUNTER — OFFICE VISIT (OUTPATIENT)
Dept: CARDIOLOGY CLINIC | Age: 68
End: 2023-09-19
Payer: MEDICARE

## 2023-09-19 VITALS
WEIGHT: 241 LBS | OXYGEN SATURATION: 100 % | DIASTOLIC BLOOD PRESSURE: 64 MMHG | SYSTOLIC BLOOD PRESSURE: 110 MMHG | HEIGHT: 73 IN | HEART RATE: 70 BPM | BODY MASS INDEX: 31.94 KG/M2

## 2023-09-19 DIAGNOSIS — I50.22 CHRONIC SYSTOLIC HEART FAILURE (HCC): Primary | ICD-10-CM

## 2023-09-19 DIAGNOSIS — Z95.810 ICD (IMPLANTABLE CARDIOVERTER-DEFIBRILLATOR), SINGLE, IN SITU: ICD-10-CM

## 2023-09-19 DIAGNOSIS — N28.9 RENAL INSUFFICIENCY: ICD-10-CM

## 2023-09-19 DIAGNOSIS — I48.91 ATRIAL FIBRILLATION WITH RVR (HCC): ICD-10-CM

## 2023-09-19 DIAGNOSIS — I95.9 HYPOTENSION, UNSPECIFIED HYPOTENSION TYPE: ICD-10-CM

## 2023-09-19 PROCEDURE — 3017F COLORECTAL CA SCREEN DOC REV: CPT | Performed by: CLINICAL NURSE SPECIALIST

## 2023-09-19 PROCEDURE — 3078F DIAST BP <80 MM HG: CPT | Performed by: CLINICAL NURSE SPECIALIST

## 2023-09-19 PROCEDURE — G8427 DOCREV CUR MEDS BY ELIG CLIN: HCPCS | Performed by: CLINICAL NURSE SPECIALIST

## 2023-09-19 PROCEDURE — 1123F ACP DISCUSS/DSCN MKR DOCD: CPT | Performed by: CLINICAL NURSE SPECIALIST

## 2023-09-19 PROCEDURE — G8417 CALC BMI ABV UP PARAM F/U: HCPCS | Performed by: CLINICAL NURSE SPECIALIST

## 2023-09-19 PROCEDURE — 1036F TOBACCO NON-USER: CPT | Performed by: CLINICAL NURSE SPECIALIST

## 2023-09-19 PROCEDURE — 3074F SYST BP LT 130 MM HG: CPT | Performed by: CLINICAL NURSE SPECIALIST

## 2023-09-19 PROCEDURE — 99214 OFFICE O/P EST MOD 30 MIN: CPT | Performed by: CLINICAL NURSE SPECIALIST

## 2023-09-19 RX ORDER — MIDODRINE HYDROCHLORIDE 2.5 MG/1
2.5 TABLET ORAL 2 TIMES DAILY
Qty: 90 TABLET | Refills: 0 | Status: SHIPPED
Start: 2023-09-19

## 2023-09-19 RX ORDER — METOPROLOL SUCCINATE 25 MG/1
25 TABLET, EXTENDED RELEASE ORAL 2 TIMES DAILY
Qty: 180 TABLET | Refills: 0 | Status: SHIPPED
Start: 2023-09-19

## 2023-09-19 NOTE — PATIENT INSTRUCTIONS
Take the midodrine 2.5 mg twice a day  Increase the metoprolol to 25 mg twice a day  Continue all other medications  RTO in 4 months

## 2023-09-19 NOTE — PROGRESS NOTES
35%)  There is global hypokinesis, more severe in the inferior/inferoseptal walls. Diastolic dysfunction-indeterminate grade. Moderate, posteriorly directed mitral regurgitation. Moderate tricuspid regurgitation. PASP estimated at 40 mmHg. Biatrial enlargement. Assessment:    1. Systolic heart failure (HCC) on BB and sglt2; no ace/aldosterone antagonist due to renal/hypotension   2. Atrial fibrillation status post cardioversion [facility]) on coumadin managed by PCP due to CVA in past   3. ICD in place   4. Renal insufficiency Dr Shelli Ferrari   5.  Hypotension on midodrine       Plan:   Patient Instructions   Take the midodrine 2.5 mg twice a day  Increase the metoprolol to 25 mg twice a day  Continue all other medications  RTO in 4 months      NYHA IV    I appreciate the opportunity of cooperating in the care of this individual.    KRISTYN Mg - CNS, CNS, 9/19/2023, 3:54 PM

## 2023-09-21 PROCEDURE — 93297 REM INTERROG DEV EVAL ICPMS: CPT | Performed by: NURSE PRACTITIONER

## 2023-09-21 PROCEDURE — G2066 INTER DEVC REMOTE 30D: HCPCS | Performed by: NURSE PRACTITIONER

## 2023-10-13 RX ORDER — MIDODRINE HYDROCHLORIDE 2.5 MG/1
2.5 TABLET ORAL 2 TIMES DAILY
Qty: 90 TABLET | Refills: 0 | Status: SHIPPED | OUTPATIENT
Start: 2023-10-13

## 2023-10-13 RX ORDER — METOPROLOL SUCCINATE 25 MG/1
25 TABLET, EXTENDED RELEASE ORAL 2 TIMES DAILY
Qty: 180 TABLET | Refills: 0 | Status: SHIPPED | OUTPATIENT
Start: 2023-10-13

## 2023-10-13 NOTE — TELEPHONE ENCOUNTER
midodrine (PROAMATINE) 2.5 MG tablet     metoprolol succinate (TOPROL XL) 25 MG extended release tablet   [place] [address] [place] [address]   Phone:  [phone]  Fax:  [phone]

## 2023-11-02 ENCOUNTER — TELEPHONE (OUTPATIENT)
Dept: FAMILY MEDICINE CLINIC | Age: 68
End: 2023-11-02

## 2023-11-02 RX ORDER — TAMSULOSIN HYDROCHLORIDE 0.4 MG/1
0.4 CAPSULE ORAL DAILY
Qty: 90 CAPSULE | Refills: 0 | Status: SHIPPED | OUTPATIENT
Start: 2023-11-02

## 2023-11-02 NOTE — TELEPHONE ENCOUNTER
Called and spoke with wife- patient needed to restart it due to not being able to tolerate not being on it. ( See message from 8/16/2023) Refill sent to pharmacy.

## 2023-11-02 NOTE — TELEPHONE ENCOUNTER
TAMSULOSIN HCL BRITTANI     [address] [phone]    FYI - pt [facility] originally prescribed medication. Please advise. ..

## 2023-11-22 PROCEDURE — G2066 INTER DEVC REMOTE 30D: HCPCS | Performed by: CLINICAL NURSE SPECIALIST

## 2023-11-22 PROCEDURE — 93297 REM INTERROG DEV EVAL ICPMS: CPT | Performed by: CLINICAL NURSE SPECIALIST

## 2023-11-27 RX ORDER — MIDODRINE HYDROCHLORIDE 2.5 MG/1
2.5 TABLET ORAL 2 TIMES DAILY
Qty: 180 TABLET | Refills: 0 | Status: SHIPPED | OUTPATIENT
Start: 2023-11-27

## 2023-11-27 NOTE — TELEPHONE ENCOUNTER
Medication:   Requested Prescriptions     Pending Prescriptions Disp Refills    empagliflozin (JARDIANCE) 10 MG tablet 90 tablet 0     Sig: Take 1 tablet by mouth daily    midodrine (PROAMATINE) 2.5 MG tablet 90 tablet 0     Sig: Take 1 tablet by mouth 2 times daily      Last Filled:  8/31/2023, 10/13/2023    Patient Phone Number: [phone] (home)     Last appt: 8/10/2023   Next appt: 12/14/2023    Last OARRS:        No data to display              PDMP Monitoring:    Last PDMP Johnny Sit as Reviewed [facility]):  Review User Review Instant Review Result          Preferred Pharmacy:   [address]  [address] [place] [address]  Phone: [phone] Fax: [phone]

## 2023-12-12 PROCEDURE — 93296 REM INTERROG EVL PM/IDS: CPT | Performed by: INTERNAL MEDICINE

## 2023-12-12 PROCEDURE — 93295 DEV INTERROG REMOTE 1/2/MLT: CPT | Performed by: INTERNAL MEDICINE

## 2023-12-13 DIAGNOSIS — Z86.73 HISTORY OF CVA (CEREBROVASCULAR ACCIDENT): ICD-10-CM

## 2023-12-13 DIAGNOSIS — E78.2 MIXED HYPERLIPIDEMIA: ICD-10-CM

## 2023-12-13 RX ORDER — ATORVASTATIN CALCIUM 40 MG/1
40 TABLET, FILM COATED ORAL DAILY
Qty: 90 TABLET | Refills: 1 | Status: SHIPPED | OUTPATIENT
Start: 2023-12-13

## 2023-12-13 NOTE — TELEPHONE ENCOUNTER
apixaban (ELIQUIS) 5 MG TABS tablet     atorvastatin (LIPITOR) 40 MG tablet     [facility]  [address], [facility] [place] [address]  Phone: [phone]  Fax: [phone]

## 2023-12-14 ENCOUNTER — OFFICE VISIT (OUTPATIENT)
Dept: FAMILY MEDICINE CLINIC | Age: 68
End: 2023-12-14
Payer: MEDICARE

## 2023-12-14 VITALS
DIASTOLIC BLOOD PRESSURE: 80 MMHG | WEIGHT: 253 LBS | BODY MASS INDEX: 33.38 KG/M2 | SYSTOLIC BLOOD PRESSURE: 122 MMHG | OXYGEN SATURATION: 99 % | HEART RATE: 79 BPM

## 2023-12-14 DIAGNOSIS — R35.0 BENIGN PROSTATIC HYPERPLASIA WITH URINARY FREQUENCY: ICD-10-CM

## 2023-12-14 DIAGNOSIS — E78.2 MIXED HYPERLIPIDEMIA: ICD-10-CM

## 2023-12-14 DIAGNOSIS — N18.32 STAGE 3B CHRONIC KIDNEY DISEASE (HCC): ICD-10-CM

## 2023-12-14 DIAGNOSIS — Z86.73 HISTORY OF CVA (CEREBROVASCULAR ACCIDENT): Primary | ICD-10-CM

## 2023-12-14 DIAGNOSIS — I95.9 HYPOTENSION, UNSPECIFIED HYPOTENSION TYPE: ICD-10-CM

## 2023-12-14 DIAGNOSIS — E11.42 CONTROLLED TYPE 2 DIABETES MELLITUS WITH DIABETIC POLYNEUROPATHY, WITHOUT LONG-TERM CURRENT USE OF INSULIN (HCC): ICD-10-CM

## 2023-12-14 DIAGNOSIS — I48.91 ATRIAL FIBRILLATION WITH RVR (HCC): ICD-10-CM

## 2023-12-14 DIAGNOSIS — N40.1 BENIGN PROSTATIC HYPERPLASIA WITH URINARY FREQUENCY: ICD-10-CM

## 2023-12-14 DIAGNOSIS — I50.22 CHRONIC SYSTOLIC HEART FAILURE (HCC): ICD-10-CM

## 2023-12-14 PROCEDURE — 3017F COLORECTAL CA SCREEN DOC REV: CPT | Performed by: NURSE PRACTITIONER

## 2023-12-14 PROCEDURE — 3074F SYST BP LT 130 MM HG: CPT | Performed by: NURSE PRACTITIONER

## 2023-12-14 PROCEDURE — 1123F ACP DISCUSS/DSCN MKR DOCD: CPT | Performed by: NURSE PRACTITIONER

## 2023-12-14 PROCEDURE — G8417 CALC BMI ABV UP PARAM F/U: HCPCS | Performed by: NURSE PRACTITIONER

## 2023-12-14 PROCEDURE — 3079F DIAST BP 80-89 MM HG: CPT | Performed by: NURSE PRACTITIONER

## 2023-12-14 PROCEDURE — 2022F DILAT RTA XM EVC RTNOPTHY: CPT | Performed by: NURSE PRACTITIONER

## 2023-12-14 PROCEDURE — G8427 DOCREV CUR MEDS BY ELIG CLIN: HCPCS | Performed by: NURSE PRACTITIONER

## 2023-12-14 PROCEDURE — 3044F HG A1C LEVEL LT 7.0%: CPT | Performed by: NURSE PRACTITIONER

## 2023-12-14 PROCEDURE — 1036F TOBACCO NON-USER: CPT | Performed by: NURSE PRACTITIONER

## 2023-12-14 PROCEDURE — G8484 FLU IMMUNIZE NO ADMIN: HCPCS | Performed by: NURSE PRACTITIONER

## 2023-12-14 PROCEDURE — 99214 OFFICE O/P EST MOD 30 MIN: CPT | Performed by: NURSE PRACTITIONER

## 2023-12-14 NOTE — PROGRESS NOTES
Kelli [place]  : 1955  Encounter date: 2023    This is a 79 y.o. male who presents with  Chief Complaint   Patient presents with    6 Month Follow-Up     History of present illness:    HPI   Presents to clinic today for follow up on chronic health conditions. Hx of CVA  Is able to follow with Neurology PRN. Doing well. Continues follow up with Cardiology and Nephrology. Generally speech and general cognition improved after pacemaker placement. Has good days and bad days with his aphasia. Has been staying active, doing exercise. Afib/CHF/ICD/Hypotension/HLD  Following with Cardiology/EP. Generally doing well after medication adjustments. Has follow up scheduled in January. Continues with twice daily midodrine which has been going well. CKD  Following with Nephrology. Stable. T2DM  Last A1C 5.2. Checks sugars intermittently in the am - 90s-100s. Does well with diet. Stays active. BPH  Stopped the finasteride. Dizziness has improved. Continues with Flomax. Does struggle some with overnight incontinence. Does still down to urinate due to right leg weakness.      No Known Allergies  Current Outpatient Medications   Medication Sig Dispense Refill    atorvastatin (LIPITOR) 40 MG tablet Take 1 tablet by mouth daily 90 tablet 1    apixaban (ELIQUIS) 5 MG TABS tablet Take 1 tablet by mouth 2 times daily 180 tablet 1    empagliflozin (JARDIANCE) 10 MG tablet Take 1 tablet by mouth daily 90 tablet 0    midodrine (PROAMATINE) 2.5 MG tablet Take 1 tablet by mouth 2 times daily 180 tablet 0    tamsulosin (FLOMAX) 0.4 MG capsule Take 1 capsule by mouth daily 90 capsule 0    metoprolol succinate (TOPROL XL) 25 MG extended release tablet Take 1 tablet by mouth 2 times daily 180 tablet 0    b complex vitamins capsule Take 1 capsule by mouth daily      aspirin 81 MG EC tablet Take 1 tablet by mouth daily 30 tablet 3    mineral oil-hydrophilic petrolatum (HYDROPHOR) ointment Apply topically as

## 2023-12-23 PROCEDURE — G2066 INTER DEVC REMOTE 30D: HCPCS | Performed by: CLINICAL NURSE SPECIALIST

## 2023-12-23 PROCEDURE — 93297 REM INTERROG DEV EVAL ICPMS: CPT | Performed by: CLINICAL NURSE SPECIALIST

## 2024-01-12 ENCOUNTER — TELEPHONE (OUTPATIENT)
Dept: FAMILY MEDICINE CLINIC | Age: 69
End: 2024-01-12

## 2024-01-12 RX ORDER — METOPROLOL SUCCINATE 25 MG/1
25 TABLET, EXTENDED RELEASE ORAL 2 TIMES DAILY
Qty: 180 TABLET | Refills: 1 | Status: SHIPPED | OUTPATIENT
Start: 2024-01-12

## 2024-01-12 NOTE — TELEPHONE ENCOUNTER
metoprolol succinate (TOPROL XL) 25 MG extended release tablet     [facility] - [place], [place] - [facility] [address] - P [phone] - F [phone]  [address] [place] [address]  Phone: [phone]  Fax: [phone]

## 2024-01-23 PROCEDURE — 93297 REM INTERROG DEV EVAL ICPMS: CPT | Performed by: CLINICAL NURSE SPECIALIST

## 2024-01-25 ENCOUNTER — TELEPHONE (OUTPATIENT)
Dept: FAMILY MEDICINE CLINIC | Age: 69
End: 2024-01-25

## 2024-01-25 RX ORDER — TAMSULOSIN HYDROCHLORIDE 0.4 MG/1
0.4 CAPSULE ORAL DAILY
Qty: 90 CAPSULE | Refills: 0 | Status: SHIPPED | OUTPATIENT
Start: 2024-01-25

## 2024-01-25 NOTE — TELEPHONE ENCOUNTER
tamsulosin (FLOMAX) 0.4 MG capsule     [facility] - [place], [place] - [address] - P [phone] - F [phone]  [address], [facility] [address]  Phone: [phone]  Fax: [phone]

## 2024-01-31 ENCOUNTER — OFFICE VISIT (OUTPATIENT)
Dept: CARDIOLOGY CLINIC | Age: 69
End: 2024-01-31
Payer: MEDICARE

## 2024-01-31 VITALS
HEIGHT: 73 IN | WEIGHT: 249 LBS | BODY MASS INDEX: 33 KG/M2 | DIASTOLIC BLOOD PRESSURE: 70 MMHG | OXYGEN SATURATION: 99 % | HEART RATE: 49 BPM | SYSTOLIC BLOOD PRESSURE: 118 MMHG

## 2024-01-31 DIAGNOSIS — I50.22 CHRONIC SYSTOLIC HEART FAILURE (HCC): Primary | ICD-10-CM

## 2024-01-31 DIAGNOSIS — N28.9 RENAL INSUFFICIENCY: ICD-10-CM

## 2024-01-31 DIAGNOSIS — I95.9 HYPOTENSION, UNSPECIFIED HYPOTENSION TYPE: ICD-10-CM

## 2024-01-31 DIAGNOSIS — Z95.810 ICD (IMPLANTABLE CARDIOVERTER-DEFIBRILLATOR), SINGLE, IN SITU: ICD-10-CM

## 2024-01-31 DIAGNOSIS — E55.9 VITAMIN D DEFICIENCY: ICD-10-CM

## 2024-01-31 DIAGNOSIS — I48.91 ATRIAL FIBRILLATION STATUS POST CARDIOVERSION (HCC): ICD-10-CM

## 2024-01-31 PROCEDURE — G8484 FLU IMMUNIZE NO ADMIN: HCPCS | Performed by: CLINICAL NURSE SPECIALIST

## 2024-01-31 PROCEDURE — 3078F DIAST BP <80 MM HG: CPT | Performed by: CLINICAL NURSE SPECIALIST

## 2024-01-31 PROCEDURE — 1036F TOBACCO NON-USER: CPT | Performed by: CLINICAL NURSE SPECIALIST

## 2024-01-31 PROCEDURE — 1123F ACP DISCUSS/DSCN MKR DOCD: CPT | Performed by: CLINICAL NURSE SPECIALIST

## 2024-01-31 PROCEDURE — 3074F SYST BP LT 130 MM HG: CPT | Performed by: CLINICAL NURSE SPECIALIST

## 2024-01-31 PROCEDURE — 99214 OFFICE O/P EST MOD 30 MIN: CPT | Performed by: CLINICAL NURSE SPECIALIST

## 2024-01-31 PROCEDURE — 3017F COLORECTAL CA SCREEN DOC REV: CPT | Performed by: CLINICAL NURSE SPECIALIST

## 2024-01-31 PROCEDURE — G8427 DOCREV CUR MEDS BY ELIG CLIN: HCPCS | Performed by: CLINICAL NURSE SPECIALIST

## 2024-01-31 PROCEDURE — G8417 CALC BMI ABV UP PARAM F/U: HCPCS | Performed by: CLINICAL NURSE SPECIALIST

## 2024-01-31 NOTE — PROGRESS NOTES
[facility]  Progress Note    Primary Care Doctor:  AlexBety, APRN - CNP    Chief Complaint   Patient presents with    Follow-up     Patient has no cardiac issues.     Congestive Heart Failure    Irregular Heart Beat    Cardiomyopathy    Hypertension        History of Present Illness:  68 y.o. male with history of DVT/PE, DM, hypertension, hyperlipidemia, CVA, IVC filter      HFpEF admitted with edema and SOB. New onset AF and had DCCV yesterday. Echo with low EF and pt had positive ST today, will need LHC. HF meds started and pt has diuresed 22L, Cr has been elevated this week and entresto and diuretics are on hold   12/16-26/2022 for SANDY, hypernatremia (155), AF with CV on warfarin (patient had been on this in past for CVA).  Stress test abnormal and will need LHC done once creat improves.  He was diuresed 280-244  2/17-22/23 for dizziness with persistent AF (plans for CV in 3-4 weeks), LHC done no intervention and recheck echo with LVEF of 25%  ICD placed 5/192023 8/7/2023 for syncope and found to have his ICD pace him out of [place]    I had the pleasure of seeing Augie Mendez in follow up for systolic heart failure.  He is ambulatory and with his wife.  He feels great, no further dizziness after splitting the dose of metoprolol.  His optival from 1/15/2024 shows normal thoracic impedence.  Blood work from 1/26/24 shows creat 1.75 and bnp of 183.  No dizziness, chest pain, palpitations, edema or shortness of breath.  He is taking all his medications. He wants to drink more fluids    Past Medical History:   has a past medical history of Cerebrovascular disease, CHF (congestive heart failure) (HCC), DM (diabetes mellitus screen), Hyperlipidemia, Hypertension, and Type 2 diabetes mellitus without complication (HCC).  Surgical History:   has a past surgical history that includes Vasectomy; IVC filter insertion (05/2017); Cardiac defibrillator placement (05/19/2023); and Cardiac catheterization

## 2024-01-31 NOTE — PATIENT INSTRUCTIONS
Drink <64 ounces fluid  Continue all current medications  Echo and appt in 6 months  Blood work prior to appt  
59

## 2024-02-26 ENCOUNTER — TELEPHONE (OUTPATIENT)
Dept: FAMILY MEDICINE CLINIC | Age: 69
End: 2024-02-26

## 2024-02-26 RX ORDER — MIDODRINE HYDROCHLORIDE 2.5 MG/1
2.5 TABLET ORAL 2 TIMES DAILY
Qty: 180 TABLET | Refills: 0 | Status: SHIPPED | OUTPATIENT
Start: 2024-02-26

## 2024-02-26 NOTE — TELEPHONE ENCOUNTER
Pt wife called in for (Augie) refill:    empagliflozin (JARDIANCE) 10 MG tablet     midodrine (PROAMATINE) 2.5 MG tablet     Pharmacy:    [facility] - [place] [address] [address] [place] - P [phone] - F [phone]  [address] [facility] [address]  Phone: [phone]  Fax: [phone]     Please advise...

## 2024-03-14 DIAGNOSIS — E78.2 MIXED HYPERLIPIDEMIA: ICD-10-CM

## 2024-03-14 DIAGNOSIS — Z86.73 HISTORY OF CVA (CEREBROVASCULAR ACCIDENT): ICD-10-CM

## 2024-03-14 RX ORDER — ATORVASTATIN CALCIUM 40 MG/1
40 TABLET, FILM COATED ORAL DAILY
Qty: 90 TABLET | Refills: 1 | Status: SHIPPED | OUTPATIENT
Start: 2024-03-14

## 2024-04-09 RX ORDER — METOPROLOL SUCCINATE 25 MG/1
25 TABLET, EXTENDED RELEASE ORAL 2 TIMES DAILY
Qty: 180 TABLET | Refills: 0 | Status: SHIPPED | OUTPATIENT
Start: 2024-04-09

## 2024-04-09 RX ORDER — TAMSULOSIN HYDROCHLORIDE 0.4 MG/1
0.4 CAPSULE ORAL DAILY
Qty: 90 CAPSULE | Refills: 0 | Status: SHIPPED | OUTPATIENT
Start: 2024-04-09

## 2024-05-24 NOTE — TELEPHONE ENCOUNTER
empagliflozin (JARDIANCE) 10 MG tablet         midodrine (PROAMATINE) 2.5 MG tablet            [facility] - [place], [place] - [address] - P [phone] - F [phone]  [address] [facility] [address]  Phone: [phone]  Fax: [phone]

## 2024-05-24 NOTE — TELEPHONE ENCOUNTER
Medication:   Requested Prescriptions     Pending Prescriptions Disp Refills    empagliflozin (JARDIANCE) 10 MG tablet 90 tablet 0     Sig: Take 1 tablet by mouth daily    midodrine (PROAMATINE) 2.5 MG tablet 180 tablet 0     Sig: Take 1 tablet by mouth 2 times daily      Provider out of office.     Patient Phone Number: [phone] (home)     Last appt: 12/14/2023   Next appt: 6/17/2024    Last OARRS:        No data to display              PDMP Monitoring:    Last PDMP [place] as Reviewed ([place]):  Review User Review Instant Review Result          Preferred Pharmacy:   [facility] - [place], [place] - [address] - P [phone] - F [phone]  [address] [facility] [address]  Phone: [phone] Fax: [phone]

## 2024-05-26 RX ORDER — MIDODRINE HYDROCHLORIDE 2.5 MG/1
2.5 TABLET ORAL 2 TIMES DAILY
Qty: 180 TABLET | Refills: 0 | Status: SHIPPED | OUTPATIENT
Start: 2024-05-26

## 2024-05-29 ENCOUNTER — NURSE ONLY (OUTPATIENT)
Dept: CARDIOLOGY CLINIC | Age: 69
End: 2024-05-29
Payer: MEDICARE

## 2024-05-29 ENCOUNTER — OFFICE VISIT (OUTPATIENT)
Dept: CARDIOLOGY CLINIC | Age: 69
End: 2024-05-29
Payer: MEDICARE

## 2024-05-29 VITALS
BODY MASS INDEX: 33.74 KG/M2 | HEIGHT: 73 IN | WEIGHT: 254.6 LBS | DIASTOLIC BLOOD PRESSURE: 74 MMHG | OXYGEN SATURATION: 99 % | SYSTOLIC BLOOD PRESSURE: 124 MMHG | HEART RATE: 96 BPM

## 2024-05-29 DIAGNOSIS — I42.0 DILATED CARDIOMYOPATHY (HCC): ICD-10-CM

## 2024-05-29 DIAGNOSIS — Z95.810 ICD (IMPLANTABLE CARDIOVERTER-DEFIBRILLATOR), SINGLE, IN SITU: Primary | ICD-10-CM

## 2024-05-29 DIAGNOSIS — I48.91 ATRIAL FIBRILLATION STATUS POST CARDIOVERSION (HCC): Primary | ICD-10-CM

## 2024-05-29 DIAGNOSIS — I48.91 ATRIAL FIBRILLATION WITH RVR (HCC): ICD-10-CM

## 2024-05-29 DIAGNOSIS — I50.22 CHRONIC SYSTOLIC HEART FAILURE (HCC): ICD-10-CM

## 2024-05-29 PROCEDURE — 3017F COLORECTAL CA SCREEN DOC REV: CPT | Performed by: INTERNAL MEDICINE

## 2024-05-29 PROCEDURE — G2211 COMPLEX E/M VISIT ADD ON: HCPCS | Performed by: INTERNAL MEDICINE

## 2024-05-29 PROCEDURE — G8417 CALC BMI ABV UP PARAM F/U: HCPCS | Performed by: INTERNAL MEDICINE

## 2024-05-29 PROCEDURE — G8427 DOCREV CUR MEDS BY ELIG CLIN: HCPCS | Performed by: INTERNAL MEDICINE

## 2024-05-29 PROCEDURE — 99214 OFFICE O/P EST MOD 30 MIN: CPT | Performed by: INTERNAL MEDICINE

## 2024-05-29 PROCEDURE — 3074F SYST BP LT 130 MM HG: CPT | Performed by: INTERNAL MEDICINE

## 2024-05-29 PROCEDURE — 1123F ACP DISCUSS/DSCN MKR DOCD: CPT | Performed by: INTERNAL MEDICINE

## 2024-05-29 PROCEDURE — 3078F DIAST BP <80 MM HG: CPT | Performed by: INTERNAL MEDICINE

## 2024-05-29 PROCEDURE — 1036F TOBACCO NON-USER: CPT | Performed by: INTERNAL MEDICINE

## 2024-05-29 RX ORDER — DIGOXIN 125 MCG
TABLET ORAL
Qty: 45 TABLET | Refills: 3 | Status: SHIPPED | OUTPATIENT
Start: 2024-05-29

## 2024-05-29 NOTE — PATIENT INSTRUCTIONS
Begin Digoxin 0.125mg every other day.    Call the office for any new, worsening, or concerning symptoms.

## 2024-05-29 NOTE — PROGRESS NOTES
[facility]   Electrophysiology Follow up   Date: 5/28/2024  I had the privilege of visiting Augie Mendez in the office.     CC: Afib   HPI: Augie Mendez is a 68 y.o. 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 Hx of IVC filter. S/p KRISTIN/DCCV (12/22)     2/18/2022 Pt presented to hospital with chest pain, palpitations, and dizziness. He was in AF. He had episodes of RVR when up walking. Did not undergo DCCV d/t subtherapeutic INR.     Treated with amiodarone and had cardioversion which failed despite antiarrhythmic therapy.   Failed DCCV 3/24/2023    Augie presents to the office in follow up. He has complaints of dizziness when he changes positions or is walking long distances. He remains in atrial fibrillation and his heart rate is low. We will discontinue his amiodarone today and see if his dizziness improved. We discussed his ejection fraction and need for a pacemaker if it does not improve.     Assessment and plan:   -Persistent Atrial Fibrillation   Patient options discussed with patient and his wife.   Atrial fibrillation ablation discussed in addition to risk-benefit alternatives.     Patient is high risk for ablation due to severe LV dysfunction, history of DVT PE, IVC filter CVA.   Shared decision made to anticoagulate and rate control only at this time.     ECG today shows ***   High risk [id number] score. Anticoagulation is recommended.  Continue Eliquis 5 mg BID    Continue Toprol XL 50 mg daily      No longer on Amiodarone    Failed DCCV  3/24/2023   Plan for rate control and anticoagulation only.     Non-Ischemic cardiomyopathy, severe LV systolic function with EF: 20%, NYHAFC III, Stage C on medical therapy   S/p single chamber ICD 5/19/23   Interrogation today shows:*** years remaining, AP ***% ,  ***%,  ***% AT/AF burden per device interrogation today, Underlying ***, presenting ***    Not on ACE-/I and ARB due to CKD and

## 2024-05-29 NOTE — PROGRESS NOTES
[facility]   Electrophysiology Follow up   Date: 5/29/2024  I had the privilege of visiting Augie Mendez in the office.     CC: Afib   HPI: Augie Mendez is a 68 y.o. 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 Hx of IVC filter. S/p KRISTIN/DCCV (12/22)     2/18/2022 Pt presented to hospital with chest pain, palpitations, and dizziness. He was in AF. He had episodes of RVR when up walking. Did not undergo DCCV d/t subtherapeutic INR.     Treated with amiodarone and had cardioversion which failed despite antiarrhythmic therapy.   Failed DCCV 3/24/2023.    Augie presents to the office for regular follow up. Her reports he does not feel bad; it is unclear if he is having palpitations. He has no chest pain, SOB, dizziness at this time. His wife is with him for the visit.; she answers a lot for him as Augie has aphasia. Home B/P stable per wife, systolic usually 120's. She states higher doses of BB causes dizziness and falls.    Assessment and plan:   - Permanent Atrial Fibrillation   High risk [id number] score. Anticoagulation is recommended.     On Eliquis.   Patient is high risk for ablation due to severe LV dysfunction, history of DVT PE, IVC filter CVA.   Shared decision has been made to anticoagulate and rate control only.   ECG today shows AF 64     He has had episode of RVR occasionally.    Would like to increase Toprol XL but patient has orthostatic and has had dizzy spells and fall. Wife is concerned about increasing toprol dose.    Begin Digoxin 0.125mg every other day for better HR control. He has CKD and high creatinine.    Continue Toprol XL 50 mg daily    No longer on Amiodarone     Failed DCCV  3/24/2023  Plan for rate control and anticoagulation only.    If rate control becomes an issue and/or inappropriate AICD shock, AV milla ablation and biv upgrade is the last option.      -Non-Ischemic cardiomyopathy, severe LV systolic function

## 2024-05-30 PROCEDURE — 93282 PRGRMG EVAL IMPLANTABLE DFB: CPT | Performed by: INTERNAL MEDICINE

## 2024-06-17 ENCOUNTER — OFFICE VISIT (OUTPATIENT)
Dept: FAMILY MEDICINE CLINIC | Age: 69
End: 2024-06-17
Payer: MEDICARE

## 2024-06-17 VITALS
WEIGHT: 257 LBS | BODY MASS INDEX: 33.91 KG/M2 | OXYGEN SATURATION: 96 % | DIASTOLIC BLOOD PRESSURE: 78 MMHG | HEART RATE: 88 BPM | SYSTOLIC BLOOD PRESSURE: 112 MMHG

## 2024-06-17 DIAGNOSIS — I48.91 ATRIAL FIBRILLATION WITH RVR (HCC): ICD-10-CM

## 2024-06-17 DIAGNOSIS — I50.22 CHRONIC SYSTOLIC HEART FAILURE (HCC): ICD-10-CM

## 2024-06-17 DIAGNOSIS — N40.1 BENIGN PROSTATIC HYPERPLASIA WITH URINARY FREQUENCY: ICD-10-CM

## 2024-06-17 DIAGNOSIS — E11.42 CONTROLLED TYPE 2 DIABETES MELLITUS WITH DIABETIC POLYNEUROPATHY, WITHOUT LONG-TERM CURRENT USE OF INSULIN (HCC): ICD-10-CM

## 2024-06-17 DIAGNOSIS — I95.9 HYPOTENSION, UNSPECIFIED HYPOTENSION TYPE: ICD-10-CM

## 2024-06-17 DIAGNOSIS — N18.32 STAGE 3B CHRONIC KIDNEY DISEASE (HCC): ICD-10-CM

## 2024-06-17 DIAGNOSIS — E55.9 VITAMIN D DEFICIENCY: ICD-10-CM

## 2024-06-17 DIAGNOSIS — E78.2 MIXED HYPERLIPIDEMIA: ICD-10-CM

## 2024-06-17 DIAGNOSIS — Z86.73 HISTORY OF CVA (CEREBROVASCULAR ACCIDENT): ICD-10-CM

## 2024-06-17 DIAGNOSIS — Z00.00 ENCOUNTER FOR INITIAL ANNUAL WELLNESS VISIT IN MEDICARE PATIENT: Primary | ICD-10-CM

## 2024-06-17 DIAGNOSIS — R35.0 BENIGN PROSTATIC HYPERPLASIA WITH URINARY FREQUENCY: ICD-10-CM

## 2024-06-17 DIAGNOSIS — Z12.5 SCREENING FOR MALIGNANT NEOPLASM OF PROSTATE: ICD-10-CM

## 2024-06-17 PROCEDURE — 3046F HEMOGLOBIN A1C LEVEL >9.0%: CPT | Performed by: NURSE PRACTITIONER

## 2024-06-17 PROCEDURE — G8427 DOCREV CUR MEDS BY ELIG CLIN: HCPCS | Performed by: NURSE PRACTITIONER

## 2024-06-17 PROCEDURE — 3017F COLORECTAL CA SCREEN DOC REV: CPT | Performed by: NURSE PRACTITIONER

## 2024-06-17 PROCEDURE — 3078F DIAST BP <80 MM HG: CPT | Performed by: NURSE PRACTITIONER

## 2024-06-17 PROCEDURE — 2022F DILAT RTA XM EVC RTNOPTHY: CPT | Performed by: NURSE PRACTITIONER

## 2024-06-17 PROCEDURE — 99214 OFFICE O/P EST MOD 30 MIN: CPT | Performed by: NURSE PRACTITIONER

## 2024-06-17 PROCEDURE — G0439 PPPS, SUBSEQ VISIT: HCPCS | Performed by: NURSE PRACTITIONER

## 2024-06-17 PROCEDURE — 1123F ACP DISCUSS/DSCN MKR DOCD: CPT | Performed by: NURSE PRACTITIONER

## 2024-06-17 PROCEDURE — 3074F SYST BP LT 130 MM HG: CPT | Performed by: NURSE PRACTITIONER

## 2024-06-17 PROCEDURE — G8417 CALC BMI ABV UP PARAM F/U: HCPCS | Performed by: NURSE PRACTITIONER

## 2024-06-17 PROCEDURE — 1036F TOBACCO NON-USER: CPT | Performed by: NURSE PRACTITIONER

## 2024-06-17 SDOH — ECONOMIC STABILITY: FOOD INSECURITY: WITHIN THE PAST 12 MONTHS, YOU WORRIED THAT YOUR FOOD WOULD RUN OUT BEFORE YOU GOT MONEY TO BUY MORE.: NEVER TRUE

## 2024-06-17 SDOH — ECONOMIC STABILITY: FOOD INSECURITY: WITHIN THE PAST 12 MONTHS, THE FOOD YOU BOUGHT JUST DIDN'T LAST AND YOU DIDN'T HAVE MONEY TO GET MORE.: NEVER TRUE

## 2024-06-17 SDOH — ECONOMIC STABILITY: INCOME INSECURITY: HOW HARD IS IT FOR YOU TO PAY FOR THE VERY BASICS LIKE FOOD, HOUSING, MEDICAL CARE, AND HEATING?: NOT HARD AT ALL

## 2024-06-17 ASSESSMENT — PATIENT HEALTH QUESTIONNAIRE - PHQ9
2. FEELING DOWN, DEPRESSED OR HOPELESS: NOT AT ALL
SUM OF ALL RESPONSES TO PHQ QUESTIONS 1-9: 0
SUM OF ALL RESPONSES TO PHQ QUESTIONS 1-9: 0
SUM OF ALL RESPONSES TO PHQ9 QUESTIONS 1 & 2: 0
SUM OF ALL RESPONSES TO PHQ QUESTIONS 1-9: 0
SUM OF ALL RESPONSES TO PHQ QUESTIONS 1-9: 0
1. LITTLE INTEREST OR PLEASURE IN DOING THINGS: NOT AT ALL

## 2024-06-17 NOTE — PROGRESS NOTES
and go unsteady or longer than 20 seconds: No    Vision Screen for Initial Exam: not applicable    EKG for Initial Exam at 65 (): not applicable    AAA U/S screen for men 65-75 who smoked (): not applicable    Assessment/Plan:    Augie was seen today for medicare aw.    Diagnoses and all orders for this visit:    Encounter for initial annual wellness visit in Medicare patient  Recommended screenings discussed and ordered if patient agreed  Recommended vaccinations discussed and ordered if patient agreed  Encouraged healthy diet   Encouraged regular exercise and maintaining a healthy weight    Medicare Safety Interventions: Home safety tips provided  Individualized Personal Health Plan included in patient instructions and AVS  -     CBC with Auto Differential; Future  -     Comprehensive Metabolic Panel, Fasting; Future  -     Lipid, Fasting; Future  -     Hemoglobin A1C; Future  -     Vitamin D 25 Hydroxy; Future  -     Vitamin B12 & Folate; Future    Mixed hyperlipidemia  Discussed trial off atorvastatin.  Discussed risk with hx of stroke and T2DM.  Would like to trial off medications.  Will recheck lipids in 3 months after discontinuing.     -     CBC with Auto Differential; Future  -     Comprehensive Metabolic Panel, Fasting; Future  -     Lipid, Fasting; Future    History of CVA (cerebrovascular accident)  Stable.  Continue on current medication regimen. PRN follow up with Neurology.   -     Comprehensive Metabolic Panel, Fasting; Future  -     Lipid, Fasting; Future  -     Vitamin B12 & Folate; Future    Hypotension, unspecified hypotension type  Stable.  Continue on current medication regimen. Following with Cardiology.   -     Vitamin B12 & Folate; Future    Atrial fibrillation with RVR (HCC)  Continue follow up with Cardiology.     Chronic systolic heart failure (HCC)  Continue follow up with Cardiology.     Stage 3b chronic kidney disease (HCC)  Continue follow up with Nephrology.    Benign

## 2024-06-19 ENCOUNTER — TELEPHONE (OUTPATIENT)
Dept: FAMILY MEDICINE CLINIC | Age: 69
End: 2024-06-19

## 2024-06-19 DIAGNOSIS — Z86.73 HISTORY OF CVA (CEREBROVASCULAR ACCIDENT): ICD-10-CM

## 2024-06-19 NOTE — TELEPHONE ENCOUNTER
apixaban (ELIQUIS) 5 MG TABS tablet      [facility] - [place], [place] - [address] - P [phone] - F [phone]  [address], [place] [address]  Phone: [phone]  Fax: [phone]

## 2024-07-03 ENCOUNTER — TELEPHONE (OUTPATIENT)
Dept: FAMILY MEDICINE CLINIC | Age: 69
End: 2024-07-03

## 2024-07-03 NOTE — PATIENT INSTRUCTIONS
--Exercise and Seniors  Is it safe for me to exercise?  It is safe for most adults older than 65 years of age to exercise. Even patients who have chronic illnesses such as heart disease, high blood pressure, diabetes and arthritis can exercise safely. Many of these conditions are improved with exercise. If you are not sure if exercise is safe for you or if you are currently inactive, ask your doctor.    How do I get started?  It is important to wear loose, comfortable clothing and well-fitting, sturdy shoes. Your shoes should have a good arch support, and an elevated and cushioned heel to absorb shock.If you are not already active, begin slowly. Start with exercises that you are already comfortable doing. Starting slowly makes it less likely that you will injure yourself. Starting slowly also helps prevent soreness. The saying “no pain, no gain” is not true for older or elderly adults. You do not have to exercise at a high intensity to get most health benefits.For example, walking is an excellent activity to start with. As you become used to exercising, or if you are already active, you can slowly increase the intensity of your exercise program.    What type of exercise should I do?  There are several types of exercise that you should do. You will want to do some type of aerobic activity for at least 30 minutes on most days of the week. Examples are walking, swimming and bicycling. You should also do resistance (also called strength training) 2 days per week.Warm up for 5 minutes before each exercise session. Walking slowly and then stretching are good warm-up activities. You should also cool down with more stretching for 5 minutes when you finish exercising. Cool down longer in warmer weather.Exercise is only good for you if you are feeling well. Wait to exercise until you feel better if you have a cold, the flu or another illness. If you miss exercise for more  than 2 weeks, be sure to start slowly

## 2024-07-03 NOTE — TELEPHONE ENCOUNTER
Reviewed recent labs. CBC okay.  Kidney function stable. Liver/Electrolytes normal. Fasting glucose good. Prostate markers at goal. Lipid panel at goal. Vitamin studies good.

## 2024-07-05 ENCOUNTER — TELEPHONE (OUTPATIENT)
Dept: CARDIOLOGY CLINIC | Age: 69
End: 2024-07-05

## 2024-07-05 NOTE — TELEPHONE ENCOUNTER
Deepthi, wife called to discuss why is she being billed twice for 05/29 when she paid her co-pay for $35.00  Per Deepthi she stated she is being billed for a second account.  What is the second account and why wasn't she told that day about second account.  Please advise. Was given Deepthi 3 numbers to call for billing. FYI Thank you

## 2024-07-15 ENCOUNTER — TELEPHONE (OUTPATIENT)
Dept: FAMILY MEDICINE CLINIC | Age: 69
End: 2024-07-15

## 2024-07-15 RX ORDER — METOPROLOL SUCCINATE 25 MG/1
25 TABLET, EXTENDED RELEASE ORAL 2 TIMES DAILY
Qty: 180 TABLET | Refills: 0 | Status: SHIPPED | OUTPATIENT
Start: 2024-07-15

## 2024-07-15 NOTE — TELEPHONE ENCOUNTER
metoprolol succinate (TOPROL XL) 25 MG extended release tablet     [facility] - [place], [place] - [facility] [address] - P [phone] - F [phone]  [address] [place] [address]  Phone: [phone]  Fax: [phone]

## 2024-07-18 ENCOUNTER — TELEPHONE (OUTPATIENT)
Dept: FAMILY MEDICINE CLINIC | Age: 69
End: 2024-07-18

## 2024-07-18 ENCOUNTER — TELEPHONE (OUTPATIENT)
Dept: CARDIOLOGY CLINIC | Age: 69
End: 2024-07-18

## 2024-07-18 PROBLEM — E87.0 HYPERNATREMIA: Status: RESOLVED | Noted: 2022-12-24 | Resolved: 2024-07-18

## 2024-07-18 PROBLEM — I48.91 ATRIAL FIBRILLATION WITH RVR (HCC): Status: RESOLVED | Noted: 2022-12-16 | Resolved: 2024-07-18

## 2024-07-18 NOTE — TELEPHONE ENCOUNTER
Spoke pt's wife, relayed the message. She stated he is still taking digoxin but bp machine is not working. She said pt is in bed most day and night, not drinking enough water. She is afraid pt will be dehydrated. She also wants to ask should she bring pt to ER? She also stated pt's device doesn't light up what it used to be but she will try to send the interrogation.

## 2024-07-18 NOTE — PROGRESS NOTES
anteroseptum, inferolateral, inferior,   apcial anterior, and mid inferoseptal walls appear hypokinetic.   Left atrium is dilated. Right atrium is dilated. All valves are structurally normal.    GXT: 12/22  The overall quality of the study is good. There is subdiaphragmatic attenuation.      Left ventricular cavity size is severely dilated. RIght ventricle is normal in size.      Spect imaging demonstrates a small area of mildly decreased perfusion in the   apex. The defect is present at rest and stress, consistent with infarct.   There is an additional medium size defect of moderate intensity in the mid   anterior and mid anteroseptum. The defect is present at rest and stress, consistent with infarct.      Sum stress score of 8. No visual TID. Calculated TID of 1.01.    Left ventricular ejection fraction is severely reduced at 25%.   Sensitivity of testing is reduced on amlodipine.      **Myocardial perfusion is abnormal. Fixed defect. Moderate-to-high risk   scan. Abnormal baseline EKG.**    Cath: 2/21/23  Dominance: Co   LM: normal   LAD: smaller caliber with 20% mid stenosis; 30% ostial second diagonal   LCx: luminal irregularities  RCA: normal   LVEDP: 3 mmHg  LVG not done 2/2 CKD    Assessment:    1.Permanent Atrial Fibrillation  - S/p KRISTIN/DCCV (12/22)?   - Currently in AF  - Continue Toprol XL 25 mg BID  - On digoxin 125 mcg QOD. Stop digoxin given symptoms and concern for toxicity   ~ Previously on amiodarone     - [id number] score:high ; [id number] Vasc score and anticoagulation discussed. High risk for stroke and thromboembolism. Anticoagulation is recommended. Risk of bleeding was discussed.  ~ Continue Eliquis 5 mg BID; tolerating well. Monitor for bleeding                          - Will stop his dig for now due to concerns about toxicity and symptoms from the med. If rates become uncontrolled again, will increase his metoprolol gradually. If rate control becomes an issue, AV milla ablation with CRT-D

## 2024-07-18 NOTE — TELEPHONE ENCOUNTER
Needs to contact cardiologist as soon as possible. If unable to contact them soon and symptoms persist would recommend evaluation in the ER as these could all be symptoms of Digoxin toxicity.

## 2024-07-18 NOTE — TELEPHONE ENCOUNTER
Wife states pt is sleeping a lot, no energy and less of an appetite. States this started in early June and believes it is from the digoxin. States spoke to PCP who said to call cardiologist. Please call to advise.

## 2024-07-18 NOTE — TELEPHONE ENCOUNTER
Spoke with patients wife, told her to contact cardiology. Told patients wife that if they could not get into cardiology soon to go to the ER. Patients wife agreed.

## 2024-07-18 NOTE — TELEPHONE ENCOUNTER
Please confirm how patient is taking Digoxin. Any vitals if any? May also send device interrogation

## 2024-07-18 NOTE — TELEPHONE ENCOUNTER
Patient's wife called this morning, concerned  \"isn't acting like himself\". Cardiologist started him on digoxin about 2 weeks ago, now C/O feeling sluggish, no energy, fatigue, and decreased appetite. Could this be from medication? Please advise.

## 2024-07-19 ENCOUNTER — OFFICE VISIT (OUTPATIENT)
Dept: CARDIOLOGY CLINIC | Age: 69
End: 2024-07-19
Payer: MEDICARE

## 2024-07-19 VITALS
BODY MASS INDEX: 34.11 KG/M2 | HEART RATE: 76 BPM | WEIGHT: 257.4 LBS | HEIGHT: 73 IN | OXYGEN SATURATION: 98 % | SYSTOLIC BLOOD PRESSURE: 108 MMHG | DIASTOLIC BLOOD PRESSURE: 66 MMHG

## 2024-07-19 DIAGNOSIS — Z95.810 ICD (IMPLANTABLE CARDIOVERTER-DEFIBRILLATOR), SINGLE, IN SITU: Primary | ICD-10-CM

## 2024-07-19 DIAGNOSIS — I50.22 CHRONIC SYSTOLIC HEART FAILURE (HCC): ICD-10-CM

## 2024-07-19 DIAGNOSIS — I63.9 CEREBROVASCULAR ACCIDENT (CVA), UNSPECIFIED MECHANISM (HCC): ICD-10-CM

## 2024-07-19 DIAGNOSIS — I10 PRIMARY HYPERTENSION: ICD-10-CM

## 2024-07-19 DIAGNOSIS — I50.21 ACUTE SYSTOLIC HEART FAILURE (HCC): ICD-10-CM

## 2024-07-19 DIAGNOSIS — I48.11 LONGSTANDING PERSISTENT ATRIAL FIBRILLATION (HCC): ICD-10-CM

## 2024-07-19 DIAGNOSIS — I48.91 ATRIAL FIBRILLATION WITH RVR (HCC): ICD-10-CM

## 2024-07-19 PROCEDURE — 99214 OFFICE O/P EST MOD 30 MIN: CPT | Performed by: NURSE PRACTITIONER

## 2024-07-19 PROCEDURE — G8417 CALC BMI ABV UP PARAM F/U: HCPCS | Performed by: NURSE PRACTITIONER

## 2024-07-19 PROCEDURE — 3078F DIAST BP <80 MM HG: CPT | Performed by: NURSE PRACTITIONER

## 2024-07-19 PROCEDURE — 3074F SYST BP LT 130 MM HG: CPT | Performed by: NURSE PRACTITIONER

## 2024-07-19 PROCEDURE — 1036F TOBACCO NON-USER: CPT | Performed by: NURSE PRACTITIONER

## 2024-07-19 PROCEDURE — 1123F ACP DISCUSS/DSCN MKR DOCD: CPT | Performed by: NURSE PRACTITIONER

## 2024-07-19 PROCEDURE — G8427 DOCREV CUR MEDS BY ELIG CLIN: HCPCS | Performed by: NURSE PRACTITIONER

## 2024-07-19 PROCEDURE — 93000 ELECTROCARDIOGRAM COMPLETE: CPT | Performed by: NURSE PRACTITIONER

## 2024-07-19 PROCEDURE — 3017F COLORECTAL CA SCREEN DOC REV: CPT | Performed by: NURSE PRACTITIONER

## 2024-07-30 RX ORDER — TAMSULOSIN HYDROCHLORIDE 0.4 MG/1
0.4 CAPSULE ORAL DAILY
Qty: 90 CAPSULE | Refills: 1 | Status: SHIPPED | OUTPATIENT
Start: 2024-07-30

## 2024-07-30 NOTE — TELEPHONE ENCOUNTER
tamsulosin (FLOMAX) 0.4 MG capsule [[id number]]     [facility] [place] - [place], [place] - [address] - P [phone] - F [phone]  [address] [place] [address]  Phone: [phone]  Fax: [phone]

## 2024-08-27 RX ORDER — MIDODRINE HYDROCHLORIDE 2.5 MG/1
2.5 TABLET ORAL 2 TIMES DAILY
Qty: 180 TABLET | Refills: 0 | Status: SHIPPED | OUTPATIENT
Start: 2024-08-27

## 2024-09-19 DIAGNOSIS — Z86.73 HISTORY OF CVA (CEREBROVASCULAR ACCIDENT): ICD-10-CM

## 2024-10-10 RX ORDER — METOPROLOL SUCCINATE 25 MG/1
25 TABLET, EXTENDED RELEASE ORAL 2 TIMES DAILY
Qty: 180 TABLET | Refills: 0 | Status: SHIPPED | OUTPATIENT
Start: 2024-10-10

## 2024-10-23 ENCOUNTER — TELEPHONE (OUTPATIENT)
Dept: FAMILY MEDICINE CLINIC | Age: 69
End: 2024-10-23

## 2024-10-23 RX ORDER — TAMSULOSIN HYDROCHLORIDE 0.4 MG/1
0.4 CAPSULE ORAL DAILY
Qty: 90 CAPSULE | Refills: 0 | Status: SHIPPED | OUTPATIENT
Start: 2024-10-23

## 2024-10-23 NOTE — TELEPHONE ENCOUNTER
tamsulosin (FLOMAX) 0.4 MG capsule     [facility] - [place], [place] - [address] - P [phone] - F [phone]  [address], [facility] [address]  Phone: [phone]  Fax: [phone]

## 2024-11-25 ENCOUNTER — TELEPHONE (OUTPATIENT)
Dept: FAMILY MEDICINE CLINIC | Age: 69
End: 2024-11-25

## 2024-11-25 RX ORDER — MIDODRINE HYDROCHLORIDE 2.5 MG/1
2.5 TABLET ORAL 2 TIMES DAILY
Qty: 180 TABLET | Refills: 0 | Status: SHIPPED | OUTPATIENT
Start: 2024-11-25

## 2024-11-25 NOTE — TELEPHONE ENCOUNTER
empagliflozin (JARDIANCE) 10 MG tablet      midodrine (PROAMATINE) 2.5 MG tablet     [facility] [place] - [place], [place] - [address] - P [phone] - F [phone]  [address] [facility] [address]  Phone: [phone]  Fax: [phone]

## 2024-12-19 DIAGNOSIS — Z86.73 HISTORY OF CVA (CEREBROVASCULAR ACCIDENT): ICD-10-CM

## 2024-12-26 ENCOUNTER — OFFICE VISIT (OUTPATIENT)
Dept: FAMILY MEDICINE CLINIC | Age: 69
End: 2024-12-26
Payer: MEDICARE

## 2024-12-26 VITALS
BODY MASS INDEX: 34.92 KG/M2 | HEART RATE: 76 BPM | OXYGEN SATURATION: 99 % | WEIGHT: 264.6 LBS | TEMPERATURE: 97.2 F | SYSTOLIC BLOOD PRESSURE: 128 MMHG | DIASTOLIC BLOOD PRESSURE: 86 MMHG

## 2024-12-26 DIAGNOSIS — R35.0 BENIGN PROSTATIC HYPERPLASIA WITH URINARY FREQUENCY: ICD-10-CM

## 2024-12-26 DIAGNOSIS — Z86.73 HISTORY OF CVA (CEREBROVASCULAR ACCIDENT): ICD-10-CM

## 2024-12-26 DIAGNOSIS — E78.2 MIXED HYPERLIPIDEMIA: ICD-10-CM

## 2024-12-26 DIAGNOSIS — N18.32 STAGE 3B CHRONIC KIDNEY DISEASE (HCC): ICD-10-CM

## 2024-12-26 DIAGNOSIS — I48.91 ATRIAL FIBRILLATION WITH RVR (HCC): ICD-10-CM

## 2024-12-26 DIAGNOSIS — E11.42 CONTROLLED TYPE 2 DIABETES MELLITUS WITH DIABETIC POLYNEUROPATHY, WITHOUT LONG-TERM CURRENT USE OF INSULIN (HCC): Primary | ICD-10-CM

## 2024-12-26 DIAGNOSIS — N40.1 BENIGN PROSTATIC HYPERPLASIA WITH URINARY FREQUENCY: ICD-10-CM

## 2024-12-26 DIAGNOSIS — I50.22 CHRONIC SYSTOLIC HEART FAILURE (HCC): ICD-10-CM

## 2024-12-26 LAB — HBA1C MFR BLD: 5.6 %

## 2024-12-26 PROCEDURE — 2022F DILAT RTA XM EVC RTNOPTHY: CPT | Performed by: NURSE PRACTITIONER

## 2024-12-26 PROCEDURE — 99214 OFFICE O/P EST MOD 30 MIN: CPT | Performed by: NURSE PRACTITIONER

## 2024-12-26 PROCEDURE — 3074F SYST BP LT 130 MM HG: CPT | Performed by: NURSE PRACTITIONER

## 2024-12-26 PROCEDURE — G8484 FLU IMMUNIZE NO ADMIN: HCPCS | Performed by: NURSE PRACTITIONER

## 2024-12-26 PROCEDURE — G8427 DOCREV CUR MEDS BY ELIG CLIN: HCPCS | Performed by: NURSE PRACTITIONER

## 2024-12-26 PROCEDURE — 1123F ACP DISCUSS/DSCN MKR DOCD: CPT | Performed by: NURSE PRACTITIONER

## 2024-12-26 PROCEDURE — 1159F MED LIST DOCD IN RCRD: CPT | Performed by: NURSE PRACTITIONER

## 2024-12-26 PROCEDURE — G8417 CALC BMI ABV UP PARAM F/U: HCPCS | Performed by: NURSE PRACTITIONER

## 2024-12-26 PROCEDURE — 3044F HG A1C LEVEL LT 7.0%: CPT | Performed by: NURSE PRACTITIONER

## 2024-12-26 PROCEDURE — 3017F COLORECTAL CA SCREEN DOC REV: CPT | Performed by: NURSE PRACTITIONER

## 2024-12-26 PROCEDURE — 3079F DIAST BP 80-89 MM HG: CPT | Performed by: NURSE PRACTITIONER

## 2024-12-26 PROCEDURE — 1036F TOBACCO NON-USER: CPT | Performed by: NURSE PRACTITIONER

## 2024-12-26 PROCEDURE — 83036 HEMOGLOBIN GLYCOSYLATED A1C: CPT | Performed by: NURSE PRACTITIONER

## 2024-12-26 ASSESSMENT — ENCOUNTER SYMPTOMS
SHORTNESS OF BREATH: 0
COUGH: 0
DIARRHEA: 0
NAUSEA: 0
VOMITING: 0

## 2024-12-26 NOTE — PROGRESS NOTES
apply route daily 1 kit 0    blood glucose monitor strips 1 strip by Other route daily True Metric Test strips 100 strip 3    Lancets MISC True Metric Lancets 100 each 3    Blood Glucose Monitoring Suppl AGUSTO 1 kit by Does not apply route daily True Metric testing device 1 Device 0    Methylsulfonylmethane (MSM PO) Take by mouth      Prenatal MV-Min-Fe Fum-FA-DHA (PRENATAL 1 PO) Take by mouth       No current facility-administered medications for this visit.      Review of Systems   Constitutional:  Negative for activity change, appetite change, chills, fatigue and fever.   Respiratory:  Negative for cough and shortness of breath.    Cardiovascular:  Negative for chest pain and palpitations.   Gastrointestinal:  Negative for diarrhea, nausea and vomiting.     Past medical, surgical, family and social history were reviewed and updated with the patient.    Objective:    /86 (Site: Left Upper Arm, Position: Sitting, Cuff Size: Large Adult)   Pulse 76   Temp 97.2 °F (36.2 °C) (Infrared)   Wt 120 kg (264 lb 9.6 oz)   SpO2 99%   BMI 34.92 kg/m²   Weight - Scale: 120 kg (264 lb 9.6 oz)     BP Readings from Last 3 Encounters:   12/26/24 128/86   07/19/24 108/66   06/25/24 132/76     Wt Readings from Last 3 Encounters:   12/26/24 120 kg (264 lb 9.6 oz)   07/19/24 116.8 kg (257 lb 6.4 oz)   06/25/24 116.6 kg (257 lb)     Physical Exam  Constitutional:       General: He is not in acute distress.     Appearance: He is well-developed.   HENT:      Head: Normocephalic and atraumatic.   Cardiovascular:      Rate and Rhythm: Normal rate and regular rhythm.      Heart sounds: Normal heart sounds, S1 normal and S2 normal.   Pulmonary:      Effort: Pulmonary effort is normal. No respiratory distress.      Breath sounds: Normal breath sounds.   Skin:     General: Skin is warm and dry.   Neurological:      Mental Status: He is alert and oriented to person, place, and time.   Psychiatric:         Thought Content: Thought

## 2025-01-10 RX ORDER — METOPROLOL SUCCINATE 25 MG/1
25 TABLET, EXTENDED RELEASE ORAL 2 TIMES DAILY
Qty: 180 TABLET | Refills: 1 | Status: SHIPPED | OUTPATIENT
Start: 2025-01-10

## 2025-01-27 RX ORDER — TAMSULOSIN HYDROCHLORIDE 0.4 MG/1
0.4 CAPSULE ORAL DAILY
Qty: 90 CAPSULE | Refills: 1 | Status: SHIPPED | OUTPATIENT
Start: 2025-01-27

## 2025-02-20 RX ORDER — MIDODRINE HYDROCHLORIDE 2.5 MG/1
2.5 TABLET ORAL 2 TIMES DAILY
Qty: 180 TABLET | Refills: 1 | Status: SHIPPED | OUTPATIENT
Start: 2025-02-20

## 2025-02-20 NOTE — TELEPHONE ENCOUNTER
12/16/24 1219   Discharge Planning   Home or Post Acute Services Post acute facilities (Rehab/SNF/etc)   Type of Post Acute Facility Services Long term care   Expected Discharge Disposition Inter  ([facility])   Does the patient need discharge transport arranged? Yes   RoundTrip coordination needed? Yes   What day is the transport expected? 12/16/24   What time is the transport expected? 0200   Intensity of Service   Intensity of Service >30 min     Pt cleared for return to [facility] today at 2:00. Pt and son aware and in agreement with the plan. Bedside nurse and facility updated on discharge and time. Nursing will call report prior to discharge.    Pt has appt on 6/26/25   · plt dropped from 115 on 6/29 to now 97, ok to proceed with lovenox and coumadine bridge- was on heparin drip was dced on 07/4    · Monitor closely, platelet went up to 25,656  Vitamin B12 is deficient ,start p o  Treatment

## 2025-02-20 NOTE — TELEPHONE ENCOUNTER
empagliflozin (JARDIANCE) 10 MG tablet       midodrine (PROAMATINE) 2.5 MG tablet     [facility] [place] [facility] - [place], [place] - [address] - P [phone] - F [phone]  [address] [facility] [address]  Phone: [phone]  Fax: [phone]

## 2025-03-25 DIAGNOSIS — Z86.73 HISTORY OF CVA (CEREBROVASCULAR ACCIDENT): ICD-10-CM

## 2025-03-25 NOTE — TELEPHONE ENCOUNTER
Pt wife Deepthi called in requesting a refill on   apixaban (ELIQUIS) 5 MG TABS tablet to be sent to   [facility] - [place], [place] - [facility] [address]

## 2025-04-11 ENCOUNTER — TELEPHONE (OUTPATIENT)
Dept: FAMILY MEDICINE CLINIC | Age: 70
End: 2025-04-11

## 2025-04-11 RX ORDER — METOPROLOL SUCCINATE 25 MG/1
25 TABLET, EXTENDED RELEASE ORAL 2 TIMES DAILY
Qty: 180 TABLET | Refills: 0 | Status: SHIPPED | OUTPATIENT
Start: 2025-04-11

## 2025-04-11 RX ORDER — TAMSULOSIN HYDROCHLORIDE 0.4 MG/1
0.4 CAPSULE ORAL DAILY
Qty: 90 CAPSULE | Refills: 0 | Status: SHIPPED | OUTPATIENT
Start: 2025-04-11

## 2025-04-11 NOTE — TELEPHONE ENCOUNTER
Patients spouse calling for a refill on patients medication.    tamsulosin (FLOMAX) 0.4 MG capsule [[id number]]    metoprolol succinate (TOPROL XL) 25 MG extended release tablet [[id number]]    [facility] - [place], [place] - [address] - P [phone] - F [phone]  [address] [facility] [address]  Phone: [phone]  Fax: [phone]     Please advise

## 2025-05-22 ENCOUNTER — TELEPHONE (OUTPATIENT)
Dept: FAMILY MEDICINE CLINIC | Age: 70
End: 2025-05-22

## 2025-05-22 NOTE — TELEPHONE ENCOUNTER
empagliflozin (JARDIANCE) 10 MG tablet     midodrine (PROAMATINE) 2.5 MG tablet     [facility] [place] [facility] - [place], [place] - [address] - P [phone] - F [phone]  [address] [facility] [address]  Phone: [phone]  Fax: [phone]

## 2025-06-10 PROCEDURE — 93296 REM INTERROG EVL PM/IDS: CPT | Performed by: INTERNAL MEDICINE

## 2025-06-10 PROCEDURE — 93295 DEV INTERROG REMOTE 1/2/MLT: CPT | Performed by: INTERNAL MEDICINE

## 2025-06-15 NOTE — PROCEDURES
H&P Update    I have reviewed the history and physical and examined the patient and updated with relevant changes. Consent: I have discussed with the patient and/or the patient representative the indication, alternatives, and the possible risks and/or complications of the planned procedure and the anesthesia methods. The patient and/or patient representative appear to understand and agree to proceed. Vitals:    12/19/22 0917   BP: (!) 98/55   Pulse: 70   Resp: 16   Temp: 97.5 °F (36.4 °C)   SpO2: 92%     Prior to Admission medications    Medication Sig Start Date End Date Taking?  Authorizing Provider   atorvastatin (LIPITOR) 40 MG tablet TAKE ONE TABLET BY MOUTH EVERY NIGHT 10/4/22   KRISTYN Bishop NP   Blood Pressure KIT 1 kit by Does not apply route daily 9/2/22 Cody Morning, APRN - CNP   metFORMIN (GLUCOPHAGE) 500 MG tablet TAKE 1 TABLET BY MOUTH 2 TIMES A DAY WITH MEALS 8/30/22   Yelena SpeechVivedia., MD   lisinopril (PRINIVIL;ZESTRIL) 40 MG tablet TAKE 1 TABLET BY MOUTH ONE TIME A DAY 8/30/22   Yelena SpeechVive., MD   amLODIPine (NORVASC) 5 MG tablet TAKE 1 TABLET BY MOUTH ONE TIME A DAY 8/30/22   Yelena Arvizu., MD   blood glucose monitor strips 1 strip by Other route daily True Metric Test strips 5/27/22 Cody Morning, APRN - CNP   Lancets MISC True Metric Lancets 5/27/22   Shaggy Morning, APRN - CNP   warfarin (COUMADIN) 5 MG tablet TAKE 1 AND 1/2 TABLETS BY MOUTH DAILY  Patient taking differently: TAKE 1/2 TABLETS BY MOUTH DAILY 9/8/21   NALINI Bertrand   Blood Glucose Monitoring Suppl AGUSTO 1 kit by Does not apply route daily True Metric testing device 12/21/17   Nicolasa Quinonez MD   Methylsulfonylmethane (MSM PO) Take by mouth    Historical Provider, MD   Prenatal MV-Min-Fe Fum-FA-DHA (PRENATAL 1 PO) Take by mouth    Historical Provider, MD     Past Medical History:   Diagnosis Date    Cerebrovascular disease     CHF (congestive heart failure) The clinical goals for the shift include Patient will be free from pain throughout shift      Problem: Pain - Adult  Goal: Verbalizes/displays adequate comfort level or baseline comfort level  Outcome: Progressing     Problem: Safety - Adult  Goal: Free from fall injury  Outcome: Progressing     Problem: Discharge Planning  Goal: Discharge to home or other facility with appropriate resources  Outcome: Progressing     Problem: Chronic Conditions and Co-morbidities  Goal: Patient's chronic conditions and co-morbidity symptoms are monitored and maintained or improved  Outcome: Progressing     Problem: Fall/Injury  Goal: Not fall by end of shift  Outcome: Progressing  Goal: Be free from injury by end of the shift  Outcome: Progressing  Goal: Verbalize understanding of personal risk factors for fall in the hospital  Outcome: Progressing  Goal: Verbalize understanding of risk factor reduction measures to prevent injury from fall in the home  Outcome: Progressing  Goal: Use assistive devices by end of the shift  Outcome: Progressing  Goal: Pace activities to prevent fatigue by end of the shift  Outcome: Progressing     Problem: Pain  Goal: Takes deep breaths with improved pain control throughout the shift  Outcome: Progressing  Goal: Turns in bed with improved pain control throughout the shift  Outcome: Progressing  Goal: Walks with improved pain control throughout the shift  Outcome: Progressing  Goal: Performs ADL's with improved pain control throughout shift  Outcome: Progressing  Goal: Participates in PT with improved pain control throughout the shift  Outcome: Progressing  Goal: Free from opioid side effects throughout the shift  Outcome: Progressing  Goal: Free from acute confusion related to pain meds throughout the shift  Outcome: Progressing     Problem: Skin  Goal: Decreased wound size/increased tissue granulation at next dressing change  Outcome: Progressing  Goal: Participates in plan/prevention/treatment  ([facility] [address] 04/2017    per  nurse report but wife is unaware    DM (diabetes mellitus screen) 04/2017    boarder line    Hyperlipidemia     Hypertension     Type 2 diabetes mellitus without complication [facility])      Past Surgical History:   Procedure Laterality Date    VASECTOMY       No Known Allergies    Pre-Sedation Documentation and Exam:   I have personally completed a history, physical exam & review of systems for this patient (see notes).     Mallampati Airway Assessment:  Class II     Prior History of Anesthesia Complications:   None    ASA Classification:  Class 3 - A patient with severe systemic disease that limits activity but is not incapacitating    Sedation/ Anesthesia Plan:   Intravenous sedation    Medications Planned:   Brevital intravenously     Patient is an appropriate candidate for plan of sedation:   Yes    Electronically signed by Ana Grier MD on 12/19/2022 at 11:14 AM measures  Outcome: Progressing  Goal: Prevent/manage excess moisture  Outcome: Progressing  Goal: Prevent/minimize sheer/friction injuries  Outcome: Progressing  Goal: Promote/optimize nutrition  Outcome: Progressing  Goal: Promote skin healing  Outcome: Progressing

## 2025-06-26 DIAGNOSIS — Z86.73 HISTORY OF CVA (CEREBROVASCULAR ACCIDENT): ICD-10-CM

## 2025-06-26 RX ORDER — METOPROLOL SUCCINATE 25 MG/1
25 TABLET, EXTENDED RELEASE ORAL 2 TIMES DAILY
Qty: 180 TABLET | Refills: 0 | Status: SHIPPED | OUTPATIENT
Start: 2025-06-26

## 2025-06-26 NOTE — TELEPHONE ENCOUNTER
Medication:   Requested Prescriptions     Pending Prescriptions Disp Refills    metoprolol succinate (TOPROL XL) 25 MG extended release tablet 30 tablet 0     Sig: Take 1 tablet by mouth 2 times daily    apixaban (ELIQUIS) 5 MG TABS tablet 30 tablet 0     Sig: Take 1 tablet by mouth 2 times daily      Last Filled:  4/11/2025                        3/26/2025    Patient Phone Number: [phone] (home)     Last appt: 12/26/2024   Next appt: 7/9/2025    Last OARRS:        No data to display              PDMP Monitoring:    Last PDMP Parish as Reviewed ([place]):  Review User Review Instant Review Result          Preferred Pharmacy:   [facility] [place] [facility] - [place], [place] - [address] [phone] - F [phone]  [address] [facility] [address]  Phone: [phone] Fax: [phone]

## 2025-06-26 NOTE — TELEPHONE ENCOUNTER
Pt wife called in requesting refills on  apixaban (ELIQUIS) 5 MG TABS tablet ,  metoprolol succinate (TOPROL XL) 25 MG extended release tablet  to be sent to  [facility] - [place], [place] - [facility] [address]

## 2025-07-07 ENCOUNTER — HOSPITAL ENCOUNTER (EMERGENCY)
Age: 70
Discharge: HOME | End: 2025-07-07
Payer: MEDICARE

## 2025-07-07 ENCOUNTER — APPOINTMENT (OUTPATIENT)
Dept: GENERAL RADIOLOGY | Age: 70
End: 2025-07-07
Payer: MEDICARE

## 2025-07-07 ENCOUNTER — APPOINTMENT (OUTPATIENT)
Dept: VASCULAR LAB | Age: 70
End: 2025-07-07
Payer: MEDICARE

## 2025-07-07 VITALS
TEMPERATURE: 98.9 F | HEART RATE: 99 BPM | HEIGHT: 72 IN | DIASTOLIC BLOOD PRESSURE: 113 MMHG | OXYGEN SATURATION: 99 % | RESPIRATION RATE: 25 BRPM | SYSTOLIC BLOOD PRESSURE: 148 MMHG | BODY MASS INDEX: 36.84 KG/M2 | WEIGHT: 272 LBS

## 2025-07-07 DIAGNOSIS — M25.572 ACUTE LEFT ANKLE PAIN: Primary | ICD-10-CM

## 2025-07-07 LAB — ECHO BSA: 2.5 M2

## 2025-07-07 PROCEDURE — 93971 EXTREMITY STUDY: CPT | Performed by: INTERNAL MEDICINE

## 2025-07-07 PROCEDURE — 93971 EXTREMITY STUDY: CPT

## 2025-07-07 PROCEDURE — 73610 X-RAY EXAM OF ANKLE: CPT

## 2025-07-07 PROCEDURE — 99284 EMERGENCY DEPT VISIT MOD MDM: CPT

## 2025-07-07 RX ORDER — NAPROXEN 500 MG/1
500 TABLET ORAL 2 TIMES DAILY WITH MEALS
Qty: 30 TABLET | Refills: 0 | Status: SHIPPED | OUTPATIENT
Start: 2025-07-07

## 2025-07-07 ASSESSMENT — ENCOUNTER SYMPTOMS
WHEEZING: 0
DIARRHEA: 0
NAUSEA: 0
ABDOMINAL PAIN: 0
COUGH: 0
RHINORRHEA: 0
SHORTNESS OF BREATH: 0
VOMITING: 0

## 2025-07-07 ASSESSMENT — PAIN SCALES - GENERAL
PAINLEVEL_OUTOF10: 8
PAINLEVEL_OUTOF10: 4

## 2025-07-07 ASSESSMENT — PAIN DESCRIPTION - DESCRIPTORS: DESCRIPTORS: SHARP

## 2025-07-07 ASSESSMENT — PAIN DESCRIPTION - LOCATION: LOCATION: FOOT

## 2025-07-07 ASSESSMENT — PAIN - FUNCTIONAL ASSESSMENT: PAIN_FUNCTIONAL_ASSESSMENT: 0-10

## 2025-07-07 NOTE — ED PROVIDER NOTES
[facility] EMERGENCY DEPARTMENT  EMERGENCY DEPARTMENT ENCOUNTER        Pt Name: Augie Mendez  MRN: [id number]  Birthdate 1955  Date of evaluation: 7/7/2025  Provider: Ashli Koo PA-C  PCP: Bety Johnson APRN - CNP  Note Started: 2:32 PM EDT 7/7/25      TOMY. I have evaluated this patient.        CHIEF COMPLAINT       Chief Complaint   Patient presents with    Foot Pain     Patient arrives from home via [facility] with complaints of left foot pain. Patient previously had stroke with some aphasia. Patient reports that he is unable to bear weight on the foot. Believes that he has hx of blood clot. No deformity or redness noted.        HISTORY OF PRESENT ILLNESS: 1 or more Elements     History From: Patient  Limitations to history : Language aphasia from previous CVA    Augie Mendez is a 69 y.o. male who presents for evaluation of left foot/ankle pain.  Patient states that he is having pain in the foot and ankle.  No injury or trauma.  Patient states that he has had pain like this before but is a very poor historian secondary to history of aphasia sign not able to deduce what previous diagnosis was or how it was treated.  He does have history of DVT and A-fib and is anticoagulated on Eliquis.  He denies previous history of gouty arthritis.  States that he had pain and was not able to flex or extend at the ankle earlier today but it seems to be loosening up a little bit.  No chest pain or shortness of breath.  No additional information is able to be obtained at this time.    Nursing Notes were all reviewed and agreed with or any disagreements were addressed in the HPI.    REVIEW OF SYSTEMS :      Review of Systems   Constitutional:  Negative for appetite change, chills and fever.   HENT:  Negative for congestion and rhinorrhea.    Respiratory:  Negative for cough, shortness of breath and wheezing.    Cardiovascular:  Negative for chest pain.   Gastrointestinal:  Negative for

## 2025-07-08 ENCOUNTER — TELEPHONE (OUTPATIENT)
Dept: FAMILY MEDICINE CLINIC | Age: 70
End: 2025-07-08

## 2025-07-08 NOTE — TELEPHONE ENCOUNTER
Patient went to ER on 7/7 for a bad sprain in foot/ankle. I advised that they would have to schedule a f/u. Wife states he they live on the 3rd floor and he can't make it down the stairs. Would like a call to discuss.

## 2025-07-09 ENCOUNTER — TELEPHONE (OUTPATIENT)
Dept: ORTHOPEDIC SURGERY | Age: 70
End: 2025-07-09

## 2025-07-09 NOTE — TELEPHONE ENCOUNTER
I spoke to pt's wife, Deepthi, and offered an appt with Dr. Lui for his Left ankle. They declined, at this time.

## 2025-07-09 NOTE — TELEPHONE ENCOUNTER
Spoke with pt wife. Bad sprain of ankle. Lives on third floor only stairs no elevator.  Given no crutches, or cane. Using old cane. Initially calling to cancel apt today. Given naproxen for pain, which can't take because of eliquis.  Pt wife would like pt to have an appointment within a week or so. Stated it depends on how fast he heals.

## 2025-07-09 NOTE — TELEPHONE ENCOUNTER
Anesthesia Evaluation     NPO Solid Status: > 8 hours  NPO Liquid Status: > 8 hours           Airway   Mallampati: III  TM distance: >3 FB  Neck ROM: full  Dental    (+) poor dentition    Pulmonary - normal exam   (+) asthma,  Cardiovascular - normal exam    (+) hyperlipidemia,       Neuro/Psych  GI/Hepatic/Renal/Endo    (+)  GERD,      Musculoskeletal     Abdominal    Substance History      OB/GYN          Other   arthritis,                      Anesthesia Plan    ASA 3     MAC     intravenous induction     Anesthetic plan, all risks, benefits, and alternatives have been provided, discussed and informed consent has been obtained with: patient.       Can we get some clarification on what is going on?

## 2025-07-24 RX ORDER — TAMSULOSIN HYDROCHLORIDE 0.4 MG/1
0.4 CAPSULE ORAL DAILY
Qty: 90 CAPSULE | Refills: 0 | Status: SHIPPED | OUTPATIENT
Start: 2025-07-24

## 2025-07-24 NOTE — TELEPHONE ENCOUNTER
Patients wife called in requesting a refill on  tamsulosin (FLOMAX) 0.4 MG capsule to be sent to   [facility] - [place], [place] - [facility] [address]

## 2025-07-28 ENCOUNTER — OFFICE VISIT (OUTPATIENT)
Dept: FAMILY MEDICINE CLINIC | Age: 70
End: 2025-07-28
Payer: MEDICARE

## 2025-07-28 VITALS
HEART RATE: 49 BPM | TEMPERATURE: 97.6 F | BODY MASS INDEX: 37.77 KG/M2 | SYSTOLIC BLOOD PRESSURE: 108 MMHG | OXYGEN SATURATION: 95 % | DIASTOLIC BLOOD PRESSURE: 78 MMHG | WEIGHT: 278.5 LBS | RESPIRATION RATE: 16 BRPM

## 2025-07-28 DIAGNOSIS — M25.572 ACUTE LEFT ANKLE PAIN: Primary | ICD-10-CM

## 2025-07-28 DIAGNOSIS — Z59.86 FINANCIAL INSECURITY DUE TO MEDICAL EXPENSES: ICD-10-CM

## 2025-07-28 PROCEDURE — 3074F SYST BP LT 130 MM HG: CPT | Performed by: NURSE PRACTITIONER

## 2025-07-28 PROCEDURE — G2211 COMPLEX E/M VISIT ADD ON: HCPCS | Performed by: NURSE PRACTITIONER

## 2025-07-28 PROCEDURE — 1036F TOBACCO NON-USER: CPT | Performed by: NURSE PRACTITIONER

## 2025-07-28 PROCEDURE — G8417 CALC BMI ABV UP PARAM F/U: HCPCS | Performed by: NURSE PRACTITIONER

## 2025-07-28 PROCEDURE — G8427 DOCREV CUR MEDS BY ELIG CLIN: HCPCS | Performed by: NURSE PRACTITIONER

## 2025-07-28 PROCEDURE — 99213 OFFICE O/P EST LOW 20 MIN: CPT | Performed by: NURSE PRACTITIONER

## 2025-07-28 PROCEDURE — 1159F MED LIST DOCD IN RCRD: CPT | Performed by: NURSE PRACTITIONER

## 2025-07-28 PROCEDURE — 3078F DIAST BP <80 MM HG: CPT | Performed by: NURSE PRACTITIONER

## 2025-07-28 PROCEDURE — 1123F ACP DISCUSS/DSCN MKR DOCD: CPT | Performed by: NURSE PRACTITIONER

## 2025-07-28 PROCEDURE — 3017F COLORECTAL CA SCREEN DOC REV: CPT | Performed by: NURSE PRACTITIONER

## 2025-07-28 SDOH — ECONOMIC STABILITY: FOOD INSECURITY: WITHIN THE PAST 12 MONTHS, YOU WORRIED THAT YOUR FOOD WOULD RUN OUT BEFORE YOU GOT MONEY TO BUY MORE.: NEVER TRUE

## 2025-07-28 SDOH — ECONOMIC STABILITY: FOOD INSECURITY: WITHIN THE PAST 12 MONTHS, THE FOOD YOU BOUGHT JUST DIDN'T LAST AND YOU DIDN'T HAVE MONEY TO GET MORE.: NEVER TRUE

## 2025-07-28 SDOH — ECONOMIC STABILITY - INCOME SECURITY: FINANCIAL INSECURITY: Z59.86

## 2025-07-28 ASSESSMENT — PATIENT HEALTH QUESTIONNAIRE - PHQ9
2. FEELING DOWN, DEPRESSED OR HOPELESS: NOT AT ALL
1. LITTLE INTEREST OR PLEASURE IN DOING THINGS: NOT AT ALL
SUM OF ALL RESPONSES TO PHQ QUESTIONS 1-9: 0

## 2025-08-06 ENCOUNTER — TELEPHONE (OUTPATIENT)
Dept: OTHER | Age: 70
End: 2025-08-06

## 2025-08-18 ENCOUNTER — TELEPHONE (OUTPATIENT)
Dept: OTHER | Age: 70
End: 2025-08-18

## 2025-08-21 ENCOUNTER — TELEPHONE (OUTPATIENT)
Dept: FAMILY MEDICINE CLINIC | Age: 70
End: 2025-08-21

## 2025-08-21 ENCOUNTER — TELEPHONE (OUTPATIENT)
Dept: CARDIOLOGY CLINIC | Age: 70
End: 2025-08-21

## 2025-08-21 DIAGNOSIS — N18.32 STAGE 3B CHRONIC KIDNEY DISEASE (HCC): ICD-10-CM

## 2025-08-21 DIAGNOSIS — I50.22 CHRONIC SYSTOLIC HEART FAILURE (HCC): Primary | ICD-10-CM

## 2025-08-22 ENCOUNTER — APPOINTMENT (OUTPATIENT)
Dept: GENERAL RADIOLOGY | Age: 70
DRG: 308 | End: 2025-08-22
Payer: MEDICARE

## 2025-08-22 ENCOUNTER — HOSPITAL ENCOUNTER (INPATIENT)
Age: 70
LOS: 6 days | Discharge: HOME | DRG: 308 | End: 2025-08-28
Attending: EMERGENCY MEDICINE | Admitting: INTERNAL MEDICINE
Payer: MEDICARE

## 2025-08-22 ENCOUNTER — HOSPITAL ENCOUNTER (OUTPATIENT)
Dept: ONCOLOGY | Age: 70
Setting detail: INFUSION SERIES
Discharge: HOME | End: 2025-08-22
Payer: MEDICARE

## 2025-08-22 ENCOUNTER — TELEPHONE (OUTPATIENT)
Dept: CARDIOLOGY CLINIC | Age: 70
End: 2025-08-22

## 2025-08-22 ENCOUNTER — OFFICE VISIT (OUTPATIENT)
Dept: CARDIOLOGY CLINIC | Age: 70
End: 2025-08-22

## 2025-08-22 VITALS
HEART RATE: 58 BPM | WEIGHT: 293 LBS | BODY MASS INDEX: 39.68 KG/M2 | OXYGEN SATURATION: 94 % | DIASTOLIC BLOOD PRESSURE: 74 MMHG | HEIGHT: 72 IN | SYSTOLIC BLOOD PRESSURE: 112 MMHG

## 2025-08-22 DIAGNOSIS — I48.91 ATRIAL FIBRILLATION WITH RAPID VENTRICULAR RESPONSE (HCC): Primary | ICD-10-CM

## 2025-08-22 DIAGNOSIS — I95.9 HYPOTENSION, UNSPECIFIED HYPOTENSION TYPE: ICD-10-CM

## 2025-08-22 DIAGNOSIS — I48.91 ATRIAL FIBRILLATION WITH RVR (HCC): ICD-10-CM

## 2025-08-22 DIAGNOSIS — Z95.810 ICD (IMPLANTABLE CARDIOVERTER-DEFIBRILLATOR), SINGLE, IN SITU: ICD-10-CM

## 2025-08-22 DIAGNOSIS — I50.21 ACUTE SYSTOLIC HEART FAILURE (HCC): ICD-10-CM

## 2025-08-22 DIAGNOSIS — I50.22 CHRONIC SYSTOLIC HEART FAILURE (HCC): ICD-10-CM

## 2025-08-22 DIAGNOSIS — I50.21 ACUTE SYSTOLIC HEART FAILURE (HCC): Primary | ICD-10-CM

## 2025-08-22 DIAGNOSIS — N28.9 RENAL INSUFFICIENCY: ICD-10-CM

## 2025-08-22 DIAGNOSIS — I50.9 ACUTE CONGESTIVE HEART FAILURE, UNSPECIFIED HEART FAILURE TYPE (HCC): ICD-10-CM

## 2025-08-22 LAB
ANION GAP SERPL CALCULATED.3IONS-SCNC: 12 MMOL/L (ref 3–16)
ANION GAP SERPL CALCULATED.3IONS-SCNC: 12 MMOL/L (ref 3–16)
APTT BLD: 31.9 SEC (ref 22.8–35.8)
BASE EXCESS BLDV CALC-SCNC: -5.6 MMOL/L (ref -3–3)
BASOPHILS # BLD: 0.1 K/UL (ref 0–0.2)
BASOPHILS NFR BLD: 0.6 %
BUN SERPL-MCNC: 47 MG/DL (ref 7–20)
BUN SERPL-MCNC: 47 MG/DL (ref 7–20)
CALCIUM SERPL-MCNC: 8.9 MG/DL (ref 8.3–10.6)
CALCIUM SERPL-MCNC: 8.9 MG/DL (ref 8.3–10.6)
CHLORIDE SERPL-SCNC: 114 MMOL/L (ref 99–110)
CHLORIDE SERPL-SCNC: 115 MMOL/L (ref 99–110)
CO2 BLDV-SCNC: 44 MMOL/L
CO2 SERPL-SCNC: 16 MMOL/L (ref 21–32)
CO2 SERPL-SCNC: 18 MMOL/L (ref 21–32)
COHGB MFR BLDV: 3.8 % (ref 0–1.5)
CREAT SERPL-MCNC: 2.5 MG/DL (ref 0.8–1.3)
CREAT SERPL-MCNC: 2.5 MG/DL (ref 0.8–1.3)
DEPRECATED RDW RBC AUTO: 14.9 % (ref 12.4–15.4)
DO-HGB MFR BLDV: 6 %
EOSINOPHIL # BLD: 0.6 K/UL (ref 0–0.6)
EOSINOPHIL NFR BLD: 6.4 %
GFR SERPLBLD CREATININE-BSD FMLA CKD-EPI: 27 ML/MIN/{1.73_M2}
GFR SERPLBLD CREATININE-BSD FMLA CKD-EPI: 27 ML/MIN/{1.73_M2}
GLUCOSE SERPL-MCNC: 110 MG/DL (ref 70–99)
GLUCOSE SERPL-MCNC: 113 MG/DL (ref 70–99)
HCO3 BLDV-SCNC: 18.7 MMOL/L (ref 23–29)
HCT VFR BLD AUTO: 37.9 % (ref 40.5–52.5)
HGB BLD-MCNC: 12.5 G/DL (ref 13.5–17.5)
INR PPP: 1.69 (ref 0.86–1.14)
LYMPHOCYTES # BLD: 0.7 K/UL (ref 1–5.1)
LYMPHOCYTES NFR BLD: 7.8 %
MAGNESIUM SERPL-MCNC: 1.84 MG/DL (ref 1.8–2.4)
MCH RBC QN AUTO: 31.2 PG (ref 26–34)
MCHC RBC AUTO-ENTMCNC: 33 G/DL (ref 31–36)
MCV RBC AUTO: 94.7 FL (ref 80–100)
METHGB MFR BLDV: 0.6 %
MONOCYTES # BLD: 0.9 K/UL (ref 0–1.3)
MONOCYTES NFR BLD: 9.9 %
NEUTROPHILS # BLD: 6.6 K/UL (ref 1.7–7.7)
NEUTROPHILS NFR BLD: 75.3 %
NT-PROBNP SERPL-MCNC: ABNORMAL PG/ML (ref 0–124)
NT-PROBNP SERPL-MCNC: ABNORMAL PG/ML (ref 0–124)
O2 CT VFR BLDV CALC: 16 VOL %
O2 THERAPY: ABNORMAL
PCO2 BLDV: 32 MMHG (ref 40–50)
PH BLDV: 7.37 [PH] (ref 7.35–7.45)
PLATELET # BLD AUTO: 196 K/UL (ref 135–450)
PMV BLD AUTO: 8.2 FL (ref 5–10.5)
PO2 BLDV: 70.1 MMHG (ref 25–40)
POTASSIUM SERPL-SCNC: 4.3 MMOL/L (ref 3.5–5.1)
POTASSIUM SERPL-SCNC: 4.3 MMOL/L (ref 3.5–5.1)
PROTHROMBIN TIME: 20 SEC (ref 12.1–14.9)
RBC # BLD AUTO: 4.01 M/UL (ref 4.2–5.9)
SAO2 % BLDV: 94 %
SODIUM SERPL-SCNC: 143 MMOL/L (ref 136–145)
SODIUM SERPL-SCNC: 144 MMOL/L (ref 136–145)
TROPONIN, HIGH SENSITIVITY: 54 NG/L (ref 0–22)
TROPONIN, HIGH SENSITIVITY: 57 NG/L (ref 0–22)
WBC # BLD AUTO: 8.8 K/UL (ref 4–11)

## 2025-08-22 PROCEDURE — 84484 ASSAY OF TROPONIN QUANT: CPT

## 2025-08-22 PROCEDURE — 85730 THROMBOPLASTIN TIME PARTIAL: CPT

## 2025-08-22 PROCEDURE — 2500000003 HC RX 250 WO HCPCS: Performed by: INTERNAL MEDICINE

## 2025-08-22 PROCEDURE — 83735 ASSAY OF MAGNESIUM: CPT

## 2025-08-22 PROCEDURE — 71045 X-RAY EXAM CHEST 1 VIEW: CPT

## 2025-08-22 PROCEDURE — 83880 ASSAY OF NATRIURETIC PEPTIDE: CPT

## 2025-08-22 PROCEDURE — 2500000003 HC RX 250 WO HCPCS: Performed by: EMERGENCY MEDICINE

## 2025-08-22 PROCEDURE — 94760 N-INVAS EAR/PLS OXIMETRY 1: CPT

## 2025-08-22 PROCEDURE — 99285 EMERGENCY DEPT VISIT HI MDM: CPT

## 2025-08-22 PROCEDURE — 82803 BLOOD GASES ANY COMBINATION: CPT

## 2025-08-22 PROCEDURE — 82728 ASSAY OF FERRITIN: CPT

## 2025-08-22 PROCEDURE — 2060000000 HC ICU INTERMEDIATE R&B

## 2025-08-22 PROCEDURE — 6360000002 HC RX W HCPCS: Performed by: EMERGENCY MEDICINE

## 2025-08-22 PROCEDURE — 2580000003 HC RX 258: Performed by: EMERGENCY MEDICINE

## 2025-08-22 PROCEDURE — 36415 COLL VENOUS BLD VENIPUNCTURE: CPT

## 2025-08-22 PROCEDURE — 83540 ASSAY OF IRON: CPT

## 2025-08-22 PROCEDURE — 83550 IRON BINDING TEST: CPT

## 2025-08-22 PROCEDURE — 85610 PROTHROMBIN TIME: CPT

## 2025-08-22 PROCEDURE — 6370000000 HC RX 637 (ALT 250 FOR IP): Performed by: INTERNAL MEDICINE

## 2025-08-22 PROCEDURE — 93005 ELECTROCARDIOGRAM TRACING: CPT | Performed by: EMERGENCY MEDICINE

## 2025-08-22 PROCEDURE — 80048 BASIC METABOLIC PNL TOTAL CA: CPT

## 2025-08-22 PROCEDURE — 96374 THER/PROPH/DIAG INJ IV PUSH: CPT

## 2025-08-22 PROCEDURE — 99211 OFF/OP EST MAY X REQ PHY/QHP: CPT

## 2025-08-22 PROCEDURE — 85025 COMPLETE CBC W/AUTO DIFF WBC: CPT

## 2025-08-22 RX ORDER — POLYETHYLENE GLYCOL 3350 17 G/17G
17 POWDER, FOR SOLUTION ORAL DAILY PRN
Status: DISCONTINUED | OUTPATIENT
Start: 2025-08-22 | End: 2025-08-28 | Stop reason: HOSPADM

## 2025-08-22 RX ORDER — DILTIAZEM HYDROCHLORIDE 5 MG/ML
25 INJECTION INTRAVENOUS ONCE
Status: COMPLETED | OUTPATIENT
Start: 2025-08-22 | End: 2025-08-22

## 2025-08-22 RX ORDER — SODIUM CHLORIDE 0.9 % (FLUSH) 0.9 %
5-40 SYRINGE (ML) INJECTION PRN
Status: DISCONTINUED | OUTPATIENT
Start: 2025-08-22 | End: 2025-08-28 | Stop reason: HOSPADM

## 2025-08-22 RX ORDER — FUROSEMIDE 10 MG/ML
40 INJECTION INTRAMUSCULAR; INTRAVENOUS ONCE
Status: COMPLETED | OUTPATIENT
Start: 2025-08-22 | End: 2025-08-23

## 2025-08-22 RX ORDER — MIDODRINE HYDROCHLORIDE 5 MG/1
2.5 TABLET ORAL 2 TIMES DAILY
Status: DISCONTINUED | OUTPATIENT
Start: 2025-08-22 | End: 2025-08-28 | Stop reason: HOSPADM

## 2025-08-22 RX ORDER — MAGNESIUM SULFATE IN WATER 40 MG/ML
2000 INJECTION, SOLUTION INTRAVENOUS PRN
Status: DISCONTINUED | OUTPATIENT
Start: 2025-08-22 | End: 2025-08-28 | Stop reason: HOSPADM

## 2025-08-22 RX ORDER — SODIUM CHLORIDE 9 MG/ML
INJECTION, SOLUTION INTRAVENOUS PRN
Status: DISCONTINUED | OUTPATIENT
Start: 2025-08-22 | End: 2025-08-28 | Stop reason: HOSPADM

## 2025-08-22 RX ORDER — METOPROLOL SUCCINATE 25 MG/1
25 TABLET, EXTENDED RELEASE ORAL 2 TIMES DAILY
Status: DISCONTINUED | OUTPATIENT
Start: 2025-08-22 | End: 2025-08-28 | Stop reason: HOSPADM

## 2025-08-22 RX ORDER — ONDANSETRON 4 MG/1
4 TABLET, ORALLY DISINTEGRATING ORAL EVERY 8 HOURS PRN
Status: DISCONTINUED | OUTPATIENT
Start: 2025-08-22 | End: 2025-08-28 | Stop reason: HOSPADM

## 2025-08-22 RX ORDER — TAMSULOSIN HYDROCHLORIDE 0.4 MG/1
0.4 CAPSULE ORAL DAILY
Status: DISCONTINUED | OUTPATIENT
Start: 2025-08-23 | End: 2025-08-28 | Stop reason: HOSPADM

## 2025-08-22 RX ORDER — SODIUM CHLORIDE 0.9 % (FLUSH) 0.9 %
5-40 SYRINGE (ML) INJECTION EVERY 12 HOURS SCHEDULED
Status: DISCONTINUED | OUTPATIENT
Start: 2025-08-22 | End: 2025-08-28 | Stop reason: HOSPADM

## 2025-08-22 RX ORDER — 0.9 % SODIUM CHLORIDE 0.9 %
500 INTRAVENOUS SOLUTION INTRAVENOUS ONCE
Status: COMPLETED | OUTPATIENT
Start: 2025-08-22 | End: 2025-08-22

## 2025-08-22 RX ORDER — ASPIRIN 81 MG/1
81 TABLET ORAL DAILY
Status: DISCONTINUED | OUTPATIENT
Start: 2025-08-23 | End: 2025-08-28 | Stop reason: HOSPADM

## 2025-08-22 RX ORDER — ONDANSETRON 2 MG/ML
4 INJECTION INTRAMUSCULAR; INTRAVENOUS EVERY 6 HOURS PRN
Status: DISCONTINUED | OUTPATIENT
Start: 2025-08-22 | End: 2025-08-28 | Stop reason: HOSPADM

## 2025-08-22 RX ORDER — DILTIAZEM HCL IN NACL,ISO-OSM 125 MG/125
2.5-15 PLASTIC BAG, INJECTION (ML) INTRAVENOUS CONTINUOUS
Status: DISCONTINUED | OUTPATIENT
Start: 2025-08-22 | End: 2025-08-22

## 2025-08-22 RX ADMIN — Medication 5 MG/HR: at 15:38

## 2025-08-22 RX ADMIN — SODIUM CHLORIDE 500 ML: 0.9 INJECTION, SOLUTION INTRAVENOUS at 15:38

## 2025-08-22 RX ADMIN — AMIODARONE HYDROCHLORIDE 150 MG: 1.5 INJECTION, SOLUTION INTRAVENOUS at 17:43

## 2025-08-22 RX ADMIN — METOPROLOL SUCCINATE 25 MG: 25 TABLET, EXTENDED RELEASE ORAL at 22:16

## 2025-08-22 RX ADMIN — APIXABAN 5 MG: 5 TABLET, FILM COATED ORAL at 22:16

## 2025-08-22 RX ADMIN — AMIODARONE HYDROCHLORIDE 1 MG/MIN: 1.8 INJECTION, SOLUTION INTRAVENOUS at 18:10

## 2025-08-22 RX ADMIN — DILTIAZEM HYDROCHLORIDE 25 MG: 5 INJECTION, SOLUTION INTRAVENOUS at 15:32

## 2025-08-22 RX ADMIN — AMIODARONE HYDROCHLORIDE 0.5 MG/MIN: 1.8 INJECTION, SOLUTION INTRAVENOUS at 23:47

## 2025-08-22 RX ADMIN — MIDODRINE HYDROCHLORIDE 2.5 MG: 5 TABLET ORAL at 22:16

## 2025-08-22 RX ADMIN — Medication 10 ML: at 22:16

## 2025-08-22 ASSESSMENT — PAIN - FUNCTIONAL ASSESSMENT
PAIN_FUNCTIONAL_ASSESSMENT: 0-10
PAIN_FUNCTIONAL_ASSESSMENT: 0-10

## 2025-08-23 LAB
ANION GAP SERPL CALCULATED.3IONS-SCNC: 12 MMOL/L (ref 3–16)
BUN SERPL-MCNC: 46 MG/DL (ref 7–20)
CALCIUM SERPL-MCNC: 8.6 MG/DL (ref 8.3–10.6)
CHLORIDE SERPL-SCNC: 115 MMOL/L (ref 99–110)
CO2 SERPL-SCNC: 18 MMOL/L (ref 21–32)
CREAT SERPL-MCNC: 2.5 MG/DL (ref 0.8–1.3)
EKG ATRIAL RATE: 241 BPM
EKG DIAGNOSIS: NORMAL
EKG DIAGNOSIS: NORMAL
EKG Q-T INTERVAL: 308 MS
EKG Q-T INTERVAL: 314 MS
EKG QRS DURATION: 82 MS
EKG QRS DURATION: 84 MS
EKG QTC CALCULATION (BAZETT): 401 MS
EKG QTC CALCULATION (BAZETT): 465 MS
EKG R AXIS: 0 DEGREES
EKG R AXIS: 33 DEGREES
EKG T AXIS: 108 DEGREES
EKG T AXIS: 145 DEGREES
EKG VENTRICULAR RATE: 102 BPM
EKG VENTRICULAR RATE: 132 BPM
FERRITIN SERPL IA-MCNC: 50.6 NG/ML (ref 30–400)
GFR SERPLBLD CREATININE-BSD FMLA CKD-EPI: 27 ML/MIN/{1.73_M2}
GLUCOSE SERPL-MCNC: 116 MG/DL (ref 70–99)
IRON SATN MFR SERPL: 7 % (ref 20–50)
IRON SERPL-MCNC: 25 UG/DL (ref 59–158)
MAGNESIUM SERPL-MCNC: 1.92 MG/DL (ref 1.8–2.4)
POTASSIUM SERPL-SCNC: 3.8 MMOL/L (ref 3.5–5.1)
SODIUM SERPL-SCNC: 145 MMOL/L (ref 136–145)
TIBC SERPL-MCNC: 345 UG/DL (ref 260–445)
TROPONIN, HIGH SENSITIVITY: 50 NG/L (ref 0–22)
TROPONIN, HIGH SENSITIVITY: 55 NG/L (ref 0–22)

## 2025-08-23 PROCEDURE — 93010 ELECTROCARDIOGRAM REPORT: CPT | Performed by: INTERNAL MEDICINE

## 2025-08-23 PROCEDURE — 80048 BASIC METABOLIC PNL TOTAL CA: CPT

## 2025-08-23 PROCEDURE — 93005 ELECTROCARDIOGRAM TRACING: CPT | Performed by: INTERNAL MEDICINE

## 2025-08-23 PROCEDURE — 82570 ASSAY OF URINE CREATININE: CPT

## 2025-08-23 PROCEDURE — 2500000003 HC RX 250 WO HCPCS: Performed by: NURSE PRACTITIONER

## 2025-08-23 PROCEDURE — 99223 1ST HOSP IP/OBS HIGH 75: CPT | Performed by: INTERNAL MEDICINE

## 2025-08-23 PROCEDURE — 2500000003 HC RX 250 WO HCPCS: Performed by: INTERNAL MEDICINE

## 2025-08-23 PROCEDURE — 84484 ASSAY OF TROPONIN QUANT: CPT

## 2025-08-23 PROCEDURE — 83735 ASSAY OF MAGNESIUM: CPT

## 2025-08-23 PROCEDURE — 2060000000 HC ICU INTERMEDIATE R&B

## 2025-08-23 PROCEDURE — 6360000002 HC RX W HCPCS: Performed by: INTERNAL MEDICINE

## 2025-08-23 PROCEDURE — 6370000000 HC RX 637 (ALT 250 FOR IP): Performed by: INTERNAL MEDICINE

## 2025-08-23 PROCEDURE — 36415 COLL VENOUS BLD VENIPUNCTURE: CPT

## 2025-08-23 PROCEDURE — 84156 ASSAY OF PROTEIN URINE: CPT

## 2025-08-23 RX ORDER — FUROSEMIDE 10 MG/ML
40 INJECTION INTRAMUSCULAR; INTRAVENOUS 2 TIMES DAILY
Status: DISCONTINUED | OUTPATIENT
Start: 2025-08-23 | End: 2025-08-28

## 2025-08-23 RX ADMIN — Medication 10 ML: at 08:37

## 2025-08-23 RX ADMIN — FUROSEMIDE 40 MG: 10 INJECTION, SOLUTION INTRAMUSCULAR; INTRAVENOUS at 12:48

## 2025-08-23 RX ADMIN — SODIUM CHLORIDE, PRESERVATIVE FREE 10 ML: 5 INJECTION INTRAVENOUS at 03:03

## 2025-08-23 RX ADMIN — MIDODRINE HYDROCHLORIDE 2.5 MG: 5 TABLET ORAL at 20:43

## 2025-08-23 RX ADMIN — METOPROLOL SUCCINATE 25 MG: 25 TABLET, EXTENDED RELEASE ORAL at 20:43

## 2025-08-23 RX ADMIN — Medication 10 ML: at 20:44

## 2025-08-23 RX ADMIN — MICONAZOLE NITRATE: 20 POWDER TOPICAL at 01:52

## 2025-08-23 RX ADMIN — ASPIRIN 81 MG: 81 TABLET, COATED ORAL at 08:36

## 2025-08-23 RX ADMIN — METOPROLOL SUCCINATE 25 MG: 25 TABLET, EXTENDED RELEASE ORAL at 08:36

## 2025-08-23 RX ADMIN — EMPAGLIFLOZIN 10 MG: 10 TABLET, FILM COATED ORAL at 08:36

## 2025-08-23 RX ADMIN — MICONAZOLE NITRATE: 20 POWDER TOPICAL at 20:53

## 2025-08-23 RX ADMIN — AMIODARONE HYDROCHLORIDE 0.5 MG/MIN: 1.8 INJECTION, SOLUTION INTRAVENOUS at 03:05

## 2025-08-23 RX ADMIN — TAMSULOSIN HYDROCHLORIDE 0.4 MG: 0.4 CAPSULE ORAL at 08:36

## 2025-08-23 RX ADMIN — FUROSEMIDE 40 MG: 10 INJECTION, SOLUTION INTRAMUSCULAR; INTRAVENOUS at 18:33

## 2025-08-23 RX ADMIN — FUROSEMIDE 40 MG: 10 INJECTION, SOLUTION INTRAMUSCULAR; INTRAVENOUS at 03:03

## 2025-08-23 RX ADMIN — APIXABAN 5 MG: 5 TABLET, FILM COATED ORAL at 20:43

## 2025-08-23 RX ADMIN — MICONAZOLE NITRATE: 20 POWDER TOPICAL at 08:37

## 2025-08-23 RX ADMIN — MIDODRINE HYDROCHLORIDE 2.5 MG: 5 TABLET ORAL at 08:36

## 2025-08-23 RX ADMIN — APIXABAN 5 MG: 5 TABLET, FILM COATED ORAL at 08:36

## 2025-08-23 ASSESSMENT — PAIN SCALES - GENERAL
PAINLEVEL_OUTOF10: 0
PAINLEVEL_OUTOF10: 9
PAINLEVEL_OUTOF10: 0
PAINLEVEL_OUTOF10: 0

## 2025-08-23 ASSESSMENT — PAIN DESCRIPTION - LOCATION: LOCATION: CHEST

## 2025-08-24 PROBLEM — N18.4 CKD (CHRONIC KIDNEY DISEASE) STAGE 4, GFR 15-29 ML/MIN (HCC): Status: ACTIVE | Noted: 2025-08-24

## 2025-08-24 PROBLEM — E87.8 ELECTROLYTE IMBALANCE: Status: ACTIVE | Noted: 2025-08-24

## 2025-08-24 LAB
ANION GAP SERPL CALCULATED.3IONS-SCNC: 10 MMOL/L (ref 3–16)
BUN SERPL-MCNC: 49 MG/DL (ref 7–20)
CALCIUM SERPL-MCNC: 8.5 MG/DL (ref 8.3–10.6)
CHLORIDE SERPL-SCNC: 114 MMOL/L (ref 99–110)
CO2 SERPL-SCNC: 23 MMOL/L (ref 21–32)
CREAT SERPL-MCNC: 3 MG/DL (ref 0.8–1.3)
CREAT UR-MCNC: 32.3 MG/DL (ref 39–259)
GFR SERPLBLD CREATININE-BSD FMLA CKD-EPI: 22 ML/MIN/{1.73_M2}
GLUCOSE SERPL-MCNC: 107 MG/DL (ref 70–99)
MAGNESIUM SERPL-MCNC: 1.91 MG/DL (ref 1.8–2.4)
POTASSIUM SERPL-SCNC: 3.7 MMOL/L (ref 3.5–5.1)
PROT UR-MCNC: 6 MG/DL
PROT/CREAT UR-RTO: 0.2 MG/DL
SODIUM SERPL-SCNC: 147 MMOL/L (ref 136–145)

## 2025-08-24 PROCEDURE — 94760 N-INVAS EAR/PLS OXIMETRY 1: CPT

## 2025-08-24 PROCEDURE — 6370000000 HC RX 637 (ALT 250 FOR IP): Performed by: INTERNAL MEDICINE

## 2025-08-24 PROCEDURE — 2060000000 HC ICU INTERMEDIATE R&B

## 2025-08-24 PROCEDURE — 83735 ASSAY OF MAGNESIUM: CPT

## 2025-08-24 PROCEDURE — 6360000002 HC RX W HCPCS: Performed by: INTERNAL MEDICINE

## 2025-08-24 PROCEDURE — 6360000002 HC RX W HCPCS: Performed by: NURSE PRACTITIONER

## 2025-08-24 PROCEDURE — 2580000003 HC RX 258: Performed by: NURSE PRACTITIONER

## 2025-08-24 PROCEDURE — 2500000003 HC RX 250 WO HCPCS: Performed by: INTERNAL MEDICINE

## 2025-08-24 PROCEDURE — 36415 COLL VENOUS BLD VENIPUNCTURE: CPT

## 2025-08-24 PROCEDURE — 6370000000 HC RX 637 (ALT 250 FOR IP): Performed by: NURSE PRACTITIONER

## 2025-08-24 PROCEDURE — 80048 BASIC METABOLIC PNL TOTAL CA: CPT

## 2025-08-24 PROCEDURE — 99232 SBSQ HOSP IP/OBS MODERATE 35: CPT | Performed by: NURSE PRACTITIONER

## 2025-08-24 RX ADMIN — TAMSULOSIN HYDROCHLORIDE 0.4 MG: 0.4 CAPSULE ORAL at 08:47

## 2025-08-24 RX ADMIN — MICONAZOLE NITRATE: 20 POWDER TOPICAL at 21:27

## 2025-08-24 RX ADMIN — ASPIRIN 81 MG: 81 TABLET, COATED ORAL at 08:47

## 2025-08-24 RX ADMIN — METOPROLOL SUCCINATE 25 MG: 25 TABLET, EXTENDED RELEASE ORAL at 08:46

## 2025-08-24 RX ADMIN — EMPAGLIFLOZIN 10 MG: 10 TABLET, FILM COATED ORAL at 08:47

## 2025-08-24 RX ADMIN — METOPROLOL SUCCINATE 25 MG: 25 TABLET, EXTENDED RELEASE ORAL at 21:26

## 2025-08-24 RX ADMIN — Medication 10 ML: at 08:57

## 2025-08-24 RX ADMIN — APIXABAN 5 MG: 5 TABLET, FILM COATED ORAL at 21:26

## 2025-08-24 RX ADMIN — MIDODRINE HYDROCHLORIDE 2.5 MG: 5 TABLET ORAL at 21:25

## 2025-08-24 RX ADMIN — APIXABAN 5 MG: 5 TABLET, FILM COATED ORAL at 08:47

## 2025-08-24 RX ADMIN — MICONAZOLE NITRATE: 20 POWDER TOPICAL at 11:29

## 2025-08-24 RX ADMIN — FUROSEMIDE 40 MG: 10 INJECTION, SOLUTION INTRAMUSCULAR; INTRAVENOUS at 18:10

## 2025-08-24 RX ADMIN — FUROSEMIDE 40 MG: 10 INJECTION, SOLUTION INTRAMUSCULAR; INTRAVENOUS at 08:47

## 2025-08-24 RX ADMIN — SODIUM CHLORIDE 125 MG: 9 INJECTION, SOLUTION INTRAVENOUS at 09:33

## 2025-08-24 RX ADMIN — MIDODRINE HYDROCHLORIDE 2.5 MG: 5 TABLET ORAL at 08:47

## 2025-08-24 RX ADMIN — Medication 10 ML: at 21:28

## 2025-08-24 ASSESSMENT — PAIN SCALES - GENERAL
PAINLEVEL_OUTOF10: 0

## 2025-08-25 ENCOUNTER — APPOINTMENT (OUTPATIENT)
Dept: ULTRASOUND IMAGING | Age: 70
DRG: 308 | End: 2025-08-25
Payer: MEDICARE

## 2025-08-25 PROBLEM — I50.9 ACUTE EXACERBATION OF CHRONIC HEART FAILURE (HCC): Status: ACTIVE | Noted: 2025-08-25

## 2025-08-25 LAB
ANION GAP SERPL CALCULATED.3IONS-SCNC: 11 MMOL/L (ref 3–16)
BUN SERPL-MCNC: 48 MG/DL (ref 7–20)
CALCIUM SERPL-MCNC: 8.6 MG/DL (ref 8.3–10.6)
CHLORIDE SERPL-SCNC: 112 MMOL/L (ref 99–110)
CO2 SERPL-SCNC: 24 MMOL/L (ref 21–32)
CREAT SERPL-MCNC: 3.2 MG/DL (ref 0.8–1.3)
GFR SERPLBLD CREATININE-BSD FMLA CKD-EPI: 20 ML/MIN/{1.73_M2}
GLUCOSE SERPL-MCNC: 105 MG/DL (ref 70–99)
MAGNESIUM SERPL-MCNC: 1.72 MG/DL (ref 1.8–2.4)
NT-PROBNP SERPL-MCNC: ABNORMAL PG/ML (ref 0–124)
POTASSIUM SERPL-SCNC: 3.6 MMOL/L (ref 3.5–5.1)
SODIUM SERPL-SCNC: 147 MMOL/L (ref 136–145)

## 2025-08-25 PROCEDURE — 36415 COLL VENOUS BLD VENIPUNCTURE: CPT

## 2025-08-25 PROCEDURE — 6370000000 HC RX 637 (ALT 250 FOR IP): Performed by: INTERNAL MEDICINE

## 2025-08-25 PROCEDURE — 94760 N-INVAS EAR/PLS OXIMETRY 1: CPT

## 2025-08-25 PROCEDURE — 6360000002 HC RX W HCPCS: Performed by: NURSE PRACTITIONER

## 2025-08-25 PROCEDURE — 6370000000 HC RX 637 (ALT 250 FOR IP): Performed by: NURSE PRACTITIONER

## 2025-08-25 PROCEDURE — 2580000003 HC RX 258: Performed by: NURSE PRACTITIONER

## 2025-08-25 PROCEDURE — 76770 US EXAM ABDO BACK WALL COMP: CPT

## 2025-08-25 PROCEDURE — 83880 ASSAY OF NATRIURETIC PEPTIDE: CPT

## 2025-08-25 PROCEDURE — 2060000000 HC ICU INTERMEDIATE R&B

## 2025-08-25 PROCEDURE — 99232 SBSQ HOSP IP/OBS MODERATE 35: CPT | Performed by: NURSE PRACTITIONER

## 2025-08-25 PROCEDURE — 2500000003 HC RX 250 WO HCPCS: Performed by: INTERNAL MEDICINE

## 2025-08-25 PROCEDURE — 80048 BASIC METABOLIC PNL TOTAL CA: CPT

## 2025-08-25 PROCEDURE — 83735 ASSAY OF MAGNESIUM: CPT

## 2025-08-25 RX ORDER — LANOLIN ALCOHOL/MO/W.PET/CERES
400 CREAM (GRAM) TOPICAL DAILY
Status: COMPLETED | OUTPATIENT
Start: 2025-08-25 | End: 2025-08-27

## 2025-08-25 RX ADMIN — Medication 10 ML: at 20:31

## 2025-08-25 RX ADMIN — ASPIRIN 81 MG: 81 TABLET, COATED ORAL at 09:59

## 2025-08-25 RX ADMIN — APIXABAN 5 MG: 5 TABLET, FILM COATED ORAL at 09:59

## 2025-08-25 RX ADMIN — Medication 400 MG: at 12:13

## 2025-08-25 RX ADMIN — MIDODRINE HYDROCHLORIDE 2.5 MG: 5 TABLET ORAL at 20:31

## 2025-08-25 RX ADMIN — METOPROLOL SUCCINATE 25 MG: 25 TABLET, EXTENDED RELEASE ORAL at 09:58

## 2025-08-25 RX ADMIN — MIDODRINE HYDROCHLORIDE 2.5 MG: 5 TABLET ORAL at 09:59

## 2025-08-25 RX ADMIN — Medication 10 ML: at 10:07

## 2025-08-25 RX ADMIN — METOPROLOL SUCCINATE 25 MG: 25 TABLET, EXTENDED RELEASE ORAL at 20:30

## 2025-08-25 RX ADMIN — TAMSULOSIN HYDROCHLORIDE 0.4 MG: 0.4 CAPSULE ORAL at 09:59

## 2025-08-25 RX ADMIN — MICONAZOLE NITRATE: 20 POWDER TOPICAL at 12:08

## 2025-08-25 RX ADMIN — MICONAZOLE NITRATE: 20 POWDER TOPICAL at 20:30

## 2025-08-25 RX ADMIN — APIXABAN 5 MG: 5 TABLET, FILM COATED ORAL at 20:30

## 2025-08-25 RX ADMIN — SODIUM CHLORIDE 125 MG: 9 INJECTION, SOLUTION INTRAVENOUS at 10:12

## 2025-08-25 ASSESSMENT — PAIN SCALES - GENERAL
PAINLEVEL_OUTOF10: 0

## 2025-08-26 ENCOUNTER — APPOINTMENT (OUTPATIENT)
Dept: CT IMAGING | Age: 70
DRG: 308 | End: 2025-08-26
Payer: MEDICARE

## 2025-08-26 PROBLEM — N13.8 OBSTRUCTIVE NEPHROPATHY: Status: ACTIVE | Noted: 2025-08-26

## 2025-08-26 LAB
ALBUMIN SERPL-MCNC: 3 G/DL (ref 3.4–5)
ALBUMIN/GLOB SERPL: 1 {RATIO} (ref 1.1–2.2)
ALP SERPL-CCNC: 56 U/L (ref 40–129)
ALT SERPL-CCNC: 20 U/L (ref 10–40)
ANION GAP SERPL CALCULATED.3IONS-SCNC: 11 MMOL/L (ref 3–16)
AST SERPL-CCNC: 21 U/L (ref 15–37)
BASOPHILS # BLD: 0.1 K/UL (ref 0–0.2)
BASOPHILS NFR BLD: 0.8 %
BILIRUB SERPL-MCNC: 0.7 MG/DL (ref 0–1)
BUN SERPL-MCNC: 51 MG/DL (ref 7–20)
CALCIUM SERPL-MCNC: 8.4 MG/DL (ref 8.3–10.6)
CHLORIDE SERPL-SCNC: 114 MMOL/L (ref 99–110)
CO2 SERPL-SCNC: 23 MMOL/L (ref 21–32)
CREAT SERPL-MCNC: 2.9 MG/DL (ref 0.8–1.3)
DEPRECATED RDW RBC AUTO: 15.4 % (ref 12.4–15.4)
EOSINOPHIL # BLD: 1.1 K/UL (ref 0–0.6)
EOSINOPHIL NFR BLD: 15.9 %
GFR SERPLBLD CREATININE-BSD FMLA CKD-EPI: 23 ML/MIN/{1.73_M2}
GLUCOSE SERPL-MCNC: 109 MG/DL (ref 70–99)
HCT VFR BLD AUTO: 39.8 % (ref 40.5–52.5)
HGB BLD-MCNC: 13.1 G/DL (ref 13.5–17.5)
LYMPHOCYTES # BLD: 0.8 K/UL (ref 1–5.1)
LYMPHOCYTES NFR BLD: 11.3 %
MAGNESIUM SERPL-MCNC: 1.69 MG/DL (ref 1.8–2.4)
MCH RBC QN AUTO: 30.8 PG (ref 26–34)
MCHC RBC AUTO-ENTMCNC: 33 G/DL (ref 31–36)
MCV RBC AUTO: 93.5 FL (ref 80–100)
MONOCYTES # BLD: 0.8 K/UL (ref 0–1.3)
MONOCYTES NFR BLD: 11.4 %
NEUTROPHILS # BLD: 4.3 K/UL (ref 1.7–7.7)
NEUTROPHILS NFR BLD: 60.6 %
PHOSPHATE SERPL-MCNC: 4.5 MG/DL (ref 2.5–4.9)
PLATELET # BLD AUTO: 197 K/UL (ref 135–450)
PMV BLD AUTO: 8.4 FL (ref 5–10.5)
POTASSIUM SERPL-SCNC: 3.5 MMOL/L (ref 3.5–5.1)
PROT SERPL-MCNC: 6 G/DL (ref 6.4–8.2)
RBC # BLD AUTO: 4.26 M/UL (ref 4.2–5.9)
SODIUM SERPL-SCNC: 148 MMOL/L (ref 136–145)
WBC # BLD AUTO: 7 K/UL (ref 4–11)

## 2025-08-26 PROCEDURE — 97535 SELF CARE MNGMENT TRAINING: CPT

## 2025-08-26 PROCEDURE — 99232 SBSQ HOSP IP/OBS MODERATE 35: CPT | Performed by: NURSE PRACTITIONER

## 2025-08-26 PROCEDURE — 6360000002 HC RX W HCPCS: Performed by: NURSE PRACTITIONER

## 2025-08-26 PROCEDURE — 6370000000 HC RX 637 (ALT 250 FOR IP): Performed by: NURSE PRACTITIONER

## 2025-08-26 PROCEDURE — 6360000002 HC RX W HCPCS: Performed by: INTERNAL MEDICINE

## 2025-08-26 PROCEDURE — 97161 PT EVAL LOW COMPLEX 20 MIN: CPT

## 2025-08-26 PROCEDURE — 2060000000 HC ICU INTERMEDIATE R&B

## 2025-08-26 PROCEDURE — 97530 THERAPEUTIC ACTIVITIES: CPT

## 2025-08-26 PROCEDURE — 6370000000 HC RX 637 (ALT 250 FOR IP): Performed by: INTERNAL MEDICINE

## 2025-08-26 PROCEDURE — 80053 COMPREHEN METABOLIC PANEL: CPT

## 2025-08-26 PROCEDURE — 36415 COLL VENOUS BLD VENIPUNCTURE: CPT

## 2025-08-26 PROCEDURE — 94760 N-INVAS EAR/PLS OXIMETRY 1: CPT

## 2025-08-26 PROCEDURE — 83735 ASSAY OF MAGNESIUM: CPT

## 2025-08-26 PROCEDURE — 2580000003 HC RX 258: Performed by: NURSE PRACTITIONER

## 2025-08-26 PROCEDURE — 2500000003 HC RX 250 WO HCPCS: Performed by: INTERNAL MEDICINE

## 2025-08-26 PROCEDURE — 85025 COMPLETE CBC W/AUTO DIFF WBC: CPT

## 2025-08-26 PROCEDURE — 74176 CT ABD & PELVIS W/O CONTRAST: CPT

## 2025-08-26 PROCEDURE — 97116 GAIT TRAINING THERAPY: CPT

## 2025-08-26 PROCEDURE — 97165 OT EVAL LOW COMPLEX 30 MIN: CPT

## 2025-08-26 PROCEDURE — 84100 ASSAY OF PHOSPHORUS: CPT

## 2025-08-26 RX ORDER — MAGNESIUM SULFATE 1 G/100ML
1000 INJECTION INTRAVENOUS ONCE
Status: COMPLETED | OUTPATIENT
Start: 2025-08-26 | End: 2025-08-26

## 2025-08-26 RX ADMIN — METOPROLOL SUCCINATE 25 MG: 25 TABLET, EXTENDED RELEASE ORAL at 19:45

## 2025-08-26 RX ADMIN — APIXABAN 5 MG: 5 TABLET, FILM COATED ORAL at 08:09

## 2025-08-26 RX ADMIN — Medication 400 MG: at 08:09

## 2025-08-26 RX ADMIN — MICONAZOLE NITRATE: 20 POWDER TOPICAL at 21:48

## 2025-08-26 RX ADMIN — TAMSULOSIN HYDROCHLORIDE 0.4 MG: 0.4 CAPSULE ORAL at 08:10

## 2025-08-26 RX ADMIN — ASPIRIN 81 MG: 81 TABLET, COATED ORAL at 08:09

## 2025-08-26 RX ADMIN — MIDODRINE HYDROCHLORIDE 2.5 MG: 5 TABLET ORAL at 19:45

## 2025-08-26 RX ADMIN — MAGNESIUM SULFATE HEPTAHYDRATE 1000 MG: 1 INJECTION, SOLUTION INTRAVENOUS at 13:55

## 2025-08-26 RX ADMIN — METOPROLOL SUCCINATE 25 MG: 25 TABLET, EXTENDED RELEASE ORAL at 08:09

## 2025-08-26 RX ADMIN — APIXABAN 5 MG: 5 TABLET, FILM COATED ORAL at 19:45

## 2025-08-26 RX ADMIN — Medication 10 ML: at 08:12

## 2025-08-26 RX ADMIN — SODIUM CHLORIDE 125 MG: 9 INJECTION, SOLUTION INTRAVENOUS at 08:52

## 2025-08-26 RX ADMIN — Medication 10 ML: at 20:38

## 2025-08-26 RX ADMIN — MICONAZOLE NITRATE: 20 POWDER TOPICAL at 08:11

## 2025-08-26 ASSESSMENT — PAIN SCALES - GENERAL
PAINLEVEL_OUTOF10: 0

## 2025-08-27 LAB
ALBUMIN SERPL-MCNC: 3 G/DL (ref 3.4–5)
ANION GAP SERPL CALCULATED.3IONS-SCNC: 10 MMOL/L (ref 3–16)
BUN SERPL-MCNC: 37 MG/DL (ref 7–20)
CALCIUM SERPL-MCNC: 8.3 MG/DL (ref 8.3–10.6)
CHLORIDE SERPL-SCNC: 113 MMOL/L (ref 99–110)
CO2 SERPL-SCNC: 23 MMOL/L (ref 21–32)
CREAT SERPL-MCNC: 2.5 MG/DL (ref 0.8–1.3)
GFR SERPLBLD CREATININE-BSD FMLA CKD-EPI: 27 ML/MIN/{1.73_M2}
GLUCOSE SERPL-MCNC: 100 MG/DL (ref 70–99)
MAGNESIUM SERPL-MCNC: 1.72 MG/DL (ref 1.8–2.4)
PHOSPHATE SERPL-MCNC: 3.3 MG/DL (ref 2.5–4.9)
POTASSIUM SERPL-SCNC: 3.2 MMOL/L (ref 3.5–5.1)
SODIUM SERPL-SCNC: 146 MMOL/L (ref 136–145)

## 2025-08-27 PROCEDURE — 2500000003 HC RX 250 WO HCPCS: Performed by: INTERNAL MEDICINE

## 2025-08-27 PROCEDURE — 2580000003 HC RX 258: Performed by: NURSE PRACTITIONER

## 2025-08-27 PROCEDURE — 6360000002 HC RX W HCPCS: Performed by: NURSE PRACTITIONER

## 2025-08-27 PROCEDURE — 6370000000 HC RX 637 (ALT 250 FOR IP): Performed by: INTERNAL MEDICINE

## 2025-08-27 PROCEDURE — 6370000000 HC RX 637 (ALT 250 FOR IP): Performed by: NURSE PRACTITIONER

## 2025-08-27 PROCEDURE — 2500000003 HC RX 250 WO HCPCS: Performed by: NURSE PRACTITIONER

## 2025-08-27 PROCEDURE — 6370000000 HC RX 637 (ALT 250 FOR IP): Performed by: HOSPITALIST

## 2025-08-27 PROCEDURE — 80069 RENAL FUNCTION PANEL: CPT

## 2025-08-27 PROCEDURE — 36415 COLL VENOUS BLD VENIPUNCTURE: CPT

## 2025-08-27 PROCEDURE — 83735 ASSAY OF MAGNESIUM: CPT

## 2025-08-27 PROCEDURE — 2060000000 HC ICU INTERMEDIATE R&B

## 2025-08-27 PROCEDURE — 99232 SBSQ HOSP IP/OBS MODERATE 35: CPT | Performed by: NURSE PRACTITIONER

## 2025-08-27 RX ORDER — POTASSIUM CHLORIDE 1500 MG/1
40 TABLET, EXTENDED RELEASE ORAL ONCE
Status: COMPLETED | OUTPATIENT
Start: 2025-08-27 | End: 2025-08-27

## 2025-08-27 RX ADMIN — POTASSIUM CHLORIDE 40 MEQ: 1500 TABLET, EXTENDED RELEASE ORAL at 09:47

## 2025-08-27 RX ADMIN — MICONAZOLE NITRATE: 20 POWDER TOPICAL at 09:51

## 2025-08-27 RX ADMIN — MIDODRINE HYDROCHLORIDE 2.5 MG: 5 TABLET ORAL at 20:26

## 2025-08-27 RX ADMIN — TAMSULOSIN HYDROCHLORIDE 0.4 MG: 0.4 CAPSULE ORAL at 09:47

## 2025-08-27 RX ADMIN — ASPIRIN 81 MG: 81 TABLET, COATED ORAL at 09:47

## 2025-08-27 RX ADMIN — Medication 10 ML: at 20:27

## 2025-08-27 RX ADMIN — MICONAZOLE NITRATE: 20 POWDER TOPICAL at 20:26

## 2025-08-27 RX ADMIN — METOPROLOL SUCCINATE 25 MG: 25 TABLET, EXTENDED RELEASE ORAL at 20:26

## 2025-08-27 RX ADMIN — METOPROLOL SUCCINATE 25 MG: 25 TABLET, EXTENDED RELEASE ORAL at 09:47

## 2025-08-27 RX ADMIN — SODIUM CHLORIDE 125 MG: 9 INJECTION, SOLUTION INTRAVENOUS at 12:39

## 2025-08-27 RX ADMIN — APIXABAN 5 MG: 5 TABLET, FILM COATED ORAL at 20:27

## 2025-08-27 RX ADMIN — Medication 10 ML: at 09:47

## 2025-08-27 RX ADMIN — APIXABAN 5 MG: 5 TABLET, FILM COATED ORAL at 09:47

## 2025-08-27 RX ADMIN — Medication 400 MG: at 09:47

## 2025-08-27 ASSESSMENT — PAIN SCALES - GENERAL
PAINLEVEL_OUTOF10: 0

## 2025-08-28 VITALS
TEMPERATURE: 97.8 F | HEART RATE: 78 BPM | HEIGHT: 72 IN | RESPIRATION RATE: 18 BRPM | SYSTOLIC BLOOD PRESSURE: 102 MMHG | OXYGEN SATURATION: 91 % | DIASTOLIC BLOOD PRESSURE: 64 MMHG | WEIGHT: 273.7 LBS | BODY MASS INDEX: 37.07 KG/M2

## 2025-08-28 LAB
ALBUMIN SERPL-MCNC: 2.8 G/DL (ref 3.4–5)
ANION GAP SERPL CALCULATED.3IONS-SCNC: 9 MMOL/L (ref 3–16)
BUN SERPL-MCNC: 34 MG/DL (ref 7–20)
CALCIUM SERPL-MCNC: 8.5 MG/DL (ref 8.3–10.6)
CHLORIDE SERPL-SCNC: 110 MMOL/L (ref 99–110)
CO2 SERPL-SCNC: 24 MMOL/L (ref 21–32)
CREAT SERPL-MCNC: 1.9 MG/DL (ref 0.8–1.3)
GFR SERPLBLD CREATININE-BSD FMLA CKD-EPI: 38 ML/MIN/{1.73_M2}
GLUCOSE SERPL-MCNC: 107 MG/DL (ref 70–99)
NT-PROBNP SERPL-MCNC: 8039 PG/ML (ref 0–124)
PHOSPHATE SERPL-MCNC: 2.8 MG/DL (ref 2.5–4.9)
POTASSIUM SERPL-SCNC: 3.4 MMOL/L (ref 3.5–5.1)
SODIUM SERPL-SCNC: 143 MMOL/L (ref 136–145)

## 2025-08-28 PROCEDURE — 6370000000 HC RX 637 (ALT 250 FOR IP): Performed by: INTERNAL MEDICINE

## 2025-08-28 PROCEDURE — 2500000003 HC RX 250 WO HCPCS: Performed by: INTERNAL MEDICINE

## 2025-08-28 PROCEDURE — 36415 COLL VENOUS BLD VENIPUNCTURE: CPT

## 2025-08-28 PROCEDURE — 80069 RENAL FUNCTION PANEL: CPT

## 2025-08-28 PROCEDURE — 83880 ASSAY OF NATRIURETIC PEPTIDE: CPT

## 2025-08-28 PROCEDURE — 99232 SBSQ HOSP IP/OBS MODERATE 35: CPT | Performed by: NURSE PRACTITIONER

## 2025-08-28 RX ORDER — POTASSIUM CHLORIDE 1500 MG/1
40 TABLET, EXTENDED RELEASE ORAL ONCE
Status: COMPLETED | OUTPATIENT
Start: 2025-08-28 | End: 2025-08-28

## 2025-08-28 RX ORDER — FUROSEMIDE 20 MG/1
20 TABLET ORAL DAILY
Qty: 60 TABLET | Refills: 3 | Status: ON HOLD | OUTPATIENT
Start: 2025-08-28

## 2025-08-28 RX ORDER — TAMSULOSIN HYDROCHLORIDE 0.4 MG/1
0.4 CAPSULE ORAL DAILY
Qty: 90 CAPSULE | Refills: 3 | Status: ON HOLD | OUTPATIENT
Start: 2025-08-28

## 2025-08-28 RX ORDER — POTASSIUM CHLORIDE 1500 MG/1
20 TABLET, EXTENDED RELEASE ORAL DAILY
Qty: 10 TABLET | Refills: 0 | Status: ON HOLD | OUTPATIENT
Start: 2025-08-28 | End: 2025-09-07

## 2025-08-28 RX ORDER — LANOLIN ALCOHOL/MO/W.PET/CERES
400 CREAM (GRAM) TOPICAL DAILY
Status: DISCONTINUED | OUTPATIENT
Start: 2025-08-28 | End: 2025-08-28 | Stop reason: HOSPADM

## 2025-08-28 RX ORDER — LANOLIN ALCOHOL/MO/W.PET/CERES
400 CREAM (GRAM) TOPICAL DAILY
Qty: 5 TABLET | Refills: 0 | Status: ON HOLD | OUTPATIENT
Start: 2025-08-29 | End: 2025-09-03

## 2025-08-28 RX ORDER — FUROSEMIDE 20 MG/1
20 TABLET ORAL DAILY
Status: DISCONTINUED | OUTPATIENT
Start: 2025-08-28 | End: 2025-08-28 | Stop reason: HOSPADM

## 2025-08-28 RX ORDER — FERROUS SULFATE 325(65) MG
325 TABLET ORAL
Qty: 30 TABLET | Refills: 5 | Status: ON HOLD | OUTPATIENT
Start: 2025-08-28

## 2025-08-28 RX ADMIN — APIXABAN 5 MG: 5 TABLET, FILM COATED ORAL at 08:23

## 2025-08-28 RX ADMIN — METOPROLOL SUCCINATE 25 MG: 25 TABLET, EXTENDED RELEASE ORAL at 08:22

## 2025-08-28 RX ADMIN — TAMSULOSIN HYDROCHLORIDE 0.4 MG: 0.4 CAPSULE ORAL at 08:23

## 2025-08-28 RX ADMIN — Medication 10 ML: at 08:23

## 2025-08-28 RX ADMIN — POTASSIUM CHLORIDE 40 MEQ: 1500 TABLET, EXTENDED RELEASE ORAL at 08:22

## 2025-08-28 RX ADMIN — ASPIRIN 81 MG: 81 TABLET, COATED ORAL at 08:22

## 2025-08-28 RX ADMIN — Medication 400 MG: at 08:22

## 2025-08-28 RX ADMIN — MICONAZOLE NITRATE: 20 POWDER TOPICAL at 08:23

## 2025-08-28 ASSESSMENT — PAIN SCALES - GENERAL
PAINLEVEL_OUTOF10: 0

## 2025-08-29 ENCOUNTER — TELEPHONE (OUTPATIENT)
Dept: FAMILY MEDICINE CLINIC | Age: 70
End: 2025-08-29

## 2025-08-29 ENCOUNTER — TELEPHONE (OUTPATIENT)
Dept: OTHER | Age: 70
End: 2025-08-29

## 2025-09-03 PROBLEM — R31.9 HEMATURIA: Status: ACTIVE | Noted: 2025-09-03

## 2025-09-03 PROBLEM — I48.21 PERMANENT ATRIAL FIBRILLATION (HCC): Status: ACTIVE | Noted: 2023-02-18

## 2025-09-04 ENCOUNTER — ANESTHESIA (OUTPATIENT)
Dept: OPERATING ROOM | Age: 70
End: 2025-09-04
Payer: MEDICARE

## 2025-09-04 ENCOUNTER — ANESTHESIA EVENT (OUTPATIENT)
Dept: OPERATING ROOM | Age: 70
End: 2025-09-04
Payer: MEDICARE

## 2025-09-04 PROBLEM — I31.39 PERICARDIAL EFFUSION: Status: ACTIVE | Noted: 2025-09-04

## 2025-09-04 PROBLEM — N18.9 ACUTE KIDNEY INJURY SUPERIMPOSED ON CKD: Status: ACTIVE | Noted: 2022-12-17

## 2025-09-04 PROCEDURE — 2580000003 HC RX 258: Performed by: INTERNAL MEDICINE

## 2025-09-04 PROCEDURE — 2500000003 HC RX 250 WO HCPCS: Performed by: NURSE ANESTHETIST, CERTIFIED REGISTERED

## 2025-09-04 PROCEDURE — 6360000002 HC RX W HCPCS: Performed by: NURSE ANESTHETIST, CERTIFIED REGISTERED

## 2025-09-04 RX ORDER — PHENYLEPHRINE HCL IN 0.9% NACL 1 MG/10 ML
SYRINGE (ML) INTRAVENOUS
Status: DISCONTINUED | OUTPATIENT
Start: 2025-09-04 | End: 2025-09-04 | Stop reason: SDUPTHER

## 2025-09-04 RX ORDER — MIDAZOLAM HYDROCHLORIDE 1 MG/ML
INJECTION, SOLUTION INTRAMUSCULAR; INTRAVENOUS
Status: DISCONTINUED | OUTPATIENT
Start: 2025-09-04 | End: 2025-09-04 | Stop reason: SDUPTHER

## 2025-09-04 RX ORDER — FENTANYL CITRATE 50 UG/ML
INJECTION, SOLUTION INTRAMUSCULAR; INTRAVENOUS
Status: DISCONTINUED | OUTPATIENT
Start: 2025-09-04 | End: 2025-09-04 | Stop reason: SDUPTHER

## 2025-09-04 RX ORDER — PROPOFOL 10 MG/ML
INJECTION, EMULSION INTRAVENOUS
Status: DISCONTINUED | OUTPATIENT
Start: 2025-09-04 | End: 2025-09-04 | Stop reason: SDUPTHER

## 2025-09-04 RX ADMIN — PROPOFOL 10 MG: 10 INJECTION, EMULSION INTRAVENOUS at 15:09

## 2025-09-04 RX ADMIN — Medication 50 MCG: at 15:23

## 2025-09-04 RX ADMIN — MIDAZOLAM 2 MG: 1 INJECTION INTRAMUSCULAR; INTRAVENOUS at 14:39

## 2025-09-04 RX ADMIN — PROPOFOL 10 MG: 10 INJECTION, EMULSION INTRAVENOUS at 14:46

## 2025-09-04 RX ADMIN — FENTANYL CITRATE 50 MCG: 50 INJECTION, SOLUTION INTRAMUSCULAR; INTRAVENOUS at 14:39

## 2025-09-04 RX ADMIN — Medication 10 MG: at 15:05

## 2025-09-04 RX ADMIN — FENTANYL CITRATE 50 MCG: 50 INJECTION, SOLUTION INTRAMUSCULAR; INTRAVENOUS at 14:43

## 2025-09-04 RX ADMIN — SODIUM CHLORIDE: 9 INJECTION, SOLUTION INTRAVENOUS at 14:39

## 2025-09-04 RX ADMIN — Medication 10 MG: at 14:43

## 2025-09-05 PROBLEM — N13.30 HYDRONEPHROSIS: Status: ACTIVE | Noted: 2025-09-05
